# Patient Record
Sex: FEMALE | Race: WHITE | NOT HISPANIC OR LATINO | Employment: OTHER | URBAN - METROPOLITAN AREA
[De-identification: names, ages, dates, MRNs, and addresses within clinical notes are randomized per-mention and may not be internally consistent; named-entity substitution may affect disease eponyms.]

---

## 2017-01-09 ENCOUNTER — OFFICE VISIT (OUTPATIENT)
Dept: INTERNAL MEDICINE | Facility: CLINIC | Age: 77
End: 2017-01-09
Payer: MEDICARE

## 2017-01-09 ENCOUNTER — HOSPITAL ENCOUNTER (OUTPATIENT)
Dept: RADIOLOGY | Facility: HOSPITAL | Age: 77
Discharge: HOME | End: 2017-01-09
Attending: INTERNAL MEDICINE
Payer: MEDICARE

## 2017-01-09 ENCOUNTER — HOSPITAL ENCOUNTER (OUTPATIENT)
Dept: CARDIOLOGY | Facility: CLINIC | Age: 77
Discharge: HOME | End: 2017-01-09
Payer: MEDICARE

## 2017-01-09 VITALS
DIASTOLIC BLOOD PRESSURE: 80 MMHG | HEART RATE: 66 BPM | BODY MASS INDEX: 36.32 KG/M2 | WEIGHT: 239.63 LBS | HEIGHT: 68 IN | SYSTOLIC BLOOD PRESSURE: 146 MMHG

## 2017-01-09 DIAGNOSIS — I10 ESSENTIAL HYPERTENSION: ICD-10-CM

## 2017-01-09 DIAGNOSIS — Z12.31 ENCOUNTER FOR SCREENING MAMMOGRAM FOR MALIGNANT NEOPLASM OF BREAST: ICD-10-CM

## 2017-01-09 DIAGNOSIS — Z00.00 ROUTINE PHYSICAL EXAMINATION: Primary | ICD-10-CM

## 2017-01-09 DIAGNOSIS — Z23 NEED FOR PROPHYLACTIC VACCINATION WITH STREPTOCOCCUS PNEUMONIAE (PNEUMOCOCCUS) AND INFLUENZA VACCINES: ICD-10-CM

## 2017-01-09 DIAGNOSIS — R73.9 HYPERGLYCEMIA: ICD-10-CM

## 2017-01-09 DIAGNOSIS — Z00.00 ROUTINE PHYSICAL EXAMINATION: ICD-10-CM

## 2017-01-09 DIAGNOSIS — K50.00 CROHN'S DISEASE OF SMALL INTESTINE WITHOUT COMPLICATION: ICD-10-CM

## 2017-01-09 DIAGNOSIS — G43.909 MIGRAINE WITHOUT STATUS MIGRAINOSUS, NOT INTRACTABLE, UNSPECIFIED MIGRAINE TYPE: ICD-10-CM

## 2017-01-09 DIAGNOSIS — I70.0 AORTIC ATHEROSCLEROSIS: ICD-10-CM

## 2017-01-09 DIAGNOSIS — E78.5 HYPERLIPIDEMIA, UNSPECIFIED HYPERLIPIDEMIA TYPE: ICD-10-CM

## 2017-01-09 PROCEDURE — G0009 ADMIN PNEUMOCOCCAL VACCINE: HCPCS | Mod: S$GLB,,, | Performed by: INTERNAL MEDICINE

## 2017-01-09 PROCEDURE — 90732 PPSV23 VACC 2 YRS+ SUBQ/IM: CPT | Mod: S$GLB,,, | Performed by: INTERNAL MEDICINE

## 2017-01-09 PROCEDURE — 90732 PPSV23 VACC 2 YRS+ SUBQ/IM: CPT | Mod: PBBFAC

## 2017-01-09 PROCEDURE — 99999 PR PBB SHADOW E&M-EST. PATIENT-LVL III: CPT | Mod: PBBFAC,,, | Performed by: INTERNAL MEDICINE

## 2017-01-09 PROCEDURE — 71020 XR CHEST PA AND LATERAL: CPT | Mod: 26,,, | Performed by: RADIOLOGY

## 2017-01-09 PROCEDURE — 93000 ELECTROCARDIOGRAM COMPLETE: CPT | Mod: S$GLB,,, | Performed by: INTERNAL MEDICINE

## 2017-01-09 PROCEDURE — 3077F SYST BP >= 140 MM HG: CPT | Mod: S$GLB,,, | Performed by: INTERNAL MEDICINE

## 2017-01-09 PROCEDURE — G0009 ADMIN PNEUMOCOCCAL VACCINE: HCPCS | Mod: PBBFAC

## 2017-01-09 PROCEDURE — 99397 PER PM REEVAL EST PAT 65+ YR: CPT | Mod: S$GLB,,, | Performed by: INTERNAL MEDICINE

## 2017-01-09 PROCEDURE — 3079F DIAST BP 80-89 MM HG: CPT | Mod: S$GLB,,, | Performed by: INTERNAL MEDICINE

## 2017-01-09 PROCEDURE — 99499 UNLISTED E&M SERVICE: CPT | Mod: S$GLB,,, | Performed by: INTERNAL MEDICINE

## 2017-01-09 RX ORDER — METOPROLOL TARTRATE 50 MG/1
50 TABLET ORAL 2 TIMES DAILY
Qty: 180 TABLET | Refills: 4 | Status: SHIPPED | OUTPATIENT
Start: 2017-01-09 | End: 2017-01-24

## 2017-01-09 RX ORDER — SIMVASTATIN 10 MG/1
10 TABLET, FILM COATED ORAL NIGHTLY
Qty: 90 TABLET | Refills: 4 | Status: SHIPPED | OUTPATIENT
Start: 2017-01-09 | End: 2017-12-26 | Stop reason: SDUPTHER

## 2017-01-09 RX ORDER — ONDANSETRON 4 MG/1
4 TABLET, ORALLY DISINTEGRATING ORAL EVERY 8 HOURS PRN
Qty: 20 TABLET | Refills: 1 | Status: SHIPPED | OUTPATIENT
Start: 2017-01-09 | End: 2017-01-23 | Stop reason: SDUPTHER

## 2017-01-09 RX ORDER — OMEPRAZOLE 20 MG/1
20 CAPSULE, DELAYED RELEASE ORAL DAILY
Qty: 90 CAPSULE | Refills: 4 | Status: SHIPPED | OUTPATIENT
Start: 2017-01-09 | End: 2017-12-26 | Stop reason: SDUPTHER

## 2017-01-09 NOTE — MR AVS SNAPSHOT
[facility] - Internal Medicine  [address]  [facility] [address]  Phone: [phone]  Fax: [phone]                  Gabriel Grace   2017 1:30 PM   Office Visit    Description:  Female : 1940   Provider:  Prosper Mancilla MD   Department:  [facility] - Internal Medicine           Reason for Visit     Annual Exam           Diagnoses this Visit        Comments    Routine physical examination    -  Primary     Crohn's disease of small intestine without complication     Continue medication.     Aortic atherosclerosis     Continue medication    Essential hypertension         Migraine without status migrainosus, not intractable, unspecified migraine type         Hyperglycemia         Hyperlipidemia, unspecified hyperlipidemia type         Encounter for screening mammogram for malignant neoplasm of breast         Need for prophylactic vaccination with Streptococcus pneumoniae (Pneumococcus) and Influenza vaccines                To Do List           Future Appointments        Provider Department Dept Phone    2017 2:45 PM NOM [id number] 485 LB LIMIT [facility] [phone]    2017 1:30 PM Darell Leigh OD [facility] - Optometry [phone]    2017 1:15 PM Paulo Swain MD [facility] - GYN Oncology [phone]      Goals (5 Years of Data)     None       These Medications        Disp Refills Start End    ondansetron (ZOFRAN-ODT) 4 MG TbDL 20 tablet 1 2017     Take 1 tablet (4 mg total) by mouth every 8 (eight) hours as needed. - Oral    Pharmacy: [facility] - [facility] [address] [facility] Ph #: [phone]       metoprolol tartrate (LOPRESSOR) 50 MG tablet 180 tablet 4 2017     Take 1 tablet (50 mg total) by mouth 2 (two) times daily. - Oral    Pharmacy: [facility] - [facility] [address] [facility] Ph #: [phone]       omeprazole (PRILOSEC) 20 MG capsule 90 capsule 4 2017     Take 1 capsule  (20 mg total) by mouth once daily. - Oral    Pharmacy: [facility] - [place], [place] - [address] Ph #: [phone]       simvastatin (ZOCOR) 10 MG tablet 90 tablet 4 1/9/2017     Take 1 tablet (10 mg total) by mouth every evening. - Oral    Pharmacy: [facility] - [place], [place] - [address] Ph #: [phone]         [facility]     [facility] On Call Nurse Care Line - 24/7 Assistance  Registered nurses in the [facility] On Call Center provide clinical advisement, health education, appointment booking, and other advisory services.  Call for this free service at [phone].             Medications           Message regarding Medications     Verify the changes and/or additions to your medication regime listed below are the same as discussed with your clinician today.  If any of these changes or additions are incorrect, please notify your healthcare provider.        CHANGE how you are taking these medications     Start Taking Instead of    ondansetron (ZOFRAN-ODT) 4 MG TbDL ondansetron (ZOFRAN-ODT) 4 MG TbDL    Dosage:  Take 1 tablet (4 mg total) by mouth every 8 (eight) hours as needed. Dosage:  Take 2 tablets (8 mg total) by mouth every 8 (eight) hours as needed.    Reason for Change:  Patient no longer taking     simvastatin (ZOCOR) 10 MG tablet simvastatin (ZOCOR) 10 MG tablet    Dosage:  Take 1 tablet (10 mg total) by mouth every evening. Dosage:  Take 1 tablet (10 mg total) by mouth every evening. 1 Tablet Oral Every day    Reason for Change:  Reorder       STOP taking these medications     guaifenesin-codeine 100-10 mg/5 ml (TUSSI-ORGANIDIN NR)  mg/5 mL syrup Take 5 mLs by mouth 3 (three) times daily as needed.    oxycodone-acetaminophen (PERCOCET) 5-325 mg per tablet Take 1 tablet by mouth every 4 (four) hours as needed for Pain.           Verify that the below list of medications is an accurate representation of the medications you are currently  "taking.  If none reported, the list may be blank. If incorrect, please contact your healthcare provider. Carry this list with you in case of emergency.           Current Medications     aspirin (ECOTRIN) 81 MG EC tablet Take 81 mg by mouth. 1 Tablet, Delayed Release (E.C.) Oral Every day    biotin 2,500 mcg Cap     calcium-magnesium-zinc Tab Take by mouth. 1  Oral Every day    cholecalciferol, vitamin D3, (VITAMIN D3) 1,000 unit capsule Take 1,000 Units by mouth once daily.      cyanocobalamin, vitamin B-12, (VITAMIN B-12) 50 mcg tablet Take 50 mcg by mouth once daily.      FLAXSEED OIL ORAL Take 1,000 mg by mouth. 1  Oral Every day    folic acid (FOLVITE) 400 MCG tablet Take 400 mcg by mouth. 4 Tablet Oral Every day    mesalamine (ASACOL) 400 mg EC tablet Take 400 mg by mouth. 4 Tablet, Delayed Release (E.C.) Oral Twice a day     metoprolol tartrate (LOPRESSOR) 50 MG tablet Take 1 tablet (50 mg total) by mouth 2 (two) times daily.    multivitamin (THERAGRAN) per tablet Take by mouth. 1  By mouth Every day    omega-3 fatty acids-vitamin E (FISH OIL) 1,000 mg Cap     omeprazole (PRILOSEC) 20 MG capsule Take 1 capsule (20 mg total) by mouth once daily.    simvastatin (ZOCOR) 10 MG tablet Take 1 tablet (10 mg total) by mouth every evening.    vitamin E 1000 UNIT capsule Take by mouth. 1 Capsule Oral Every day    ondansetron (ZOFRAN-ODT) 4 MG TbDL Take 1 tablet (4 mg total) by mouth every 8 (eight) hours as needed.           Clinical Reference Information           Vital Signs - Last Recorded  Most recent update: 1/9/2017  1:49 PM by Lata Carr    BP Pulse Ht Wt BMI    (!) 146/80 66 5' 8" (1.727 m) 108.7 kg (239 lb 10.2 oz) 36.44 kg/m2      Blood Pressure          Most Recent Value    BP  (!)  146/80      Allergies as of 1/9/2017     No Known Allergies      Immunizations Administered on Date of Encounter - 1/9/2017     Name Date Dose VIS Date Route    Pneumococcal Polysaccharide - 23 Valent 1/9/2017 0.5 mL 4/24/2015 " Intramuscular      Orders Placed During Today's Visit      Normal Orders This Visit    Pneumococcal Polysaccharide Vaccine (23 Valent) (SQ/IM)     Future Labs/Procedures Expected by Expires    CBC auto differential  1/9/2017 1/9/2018    Comprehensive metabolic panel  1/9/2017 1/9/2018    EKG 12-lead  1/9/2017 1/9/2018    Hemoglobin A1c  1/9/2017 1/9/2018    Lipid panel  1/9/2017 1/9/2018    TSH  1/9/2017 1/9/2018    X-Ray Chest PA And Lateral  1/9/2017 1/9/2018      [facility] Sign-Up     Activating your [facility] account is as easy as 1-2-3!     1) Visit [email or web address], select Sign Up Now, enter this activation code and your date of birth, then select Next.  [email or web address]  Expires: 2/23/2017  2:27 PM      2) Create a username and password to use when you visit [facility] in the future and select a security question in case you lose your password and select Next.    3) Enter your e-mail address and click Sign Up!    Additional Information  If you have questions, please e-mail [email or web address] or call [phone] to talk to our [facility] staff. Remember, [facility] is NOT to be used for urgent needs. For medical emergencies, dial 911.

## 2017-01-09 NOTE — PROGRESS NOTES
Subjective:       Patient ID: Gabriel Grace is a 76 y.o. female.    Chief Complaint: Annual Exam    HPI Comments: History of present illness: Patient here for annual exam.  She says she would like to do labs EKG and chest x-ray.  She has a history of hypertension, migraines, aortic atherosclerosis, Crohn's disease, dyslipidemia and hyperglycemia.  She says blood pressure and migraines have been stable.  I encouraged her reducing weight to help with blood pressure sugar and lipids.     Review of Systems   Constitutional: Negative for chills, fatigue, fever and unexpected weight change.   HENT: Negative for sore throat.    Eyes: Negative for pain and visual disturbance.   Respiratory: Negative for apnea, cough, chest tightness and shortness of breath.    Cardiovascular: Negative for chest pain and leg swelling.   Gastrointestinal: Negative for abdominal pain, constipation, diarrhea, nausea and vomiting.   Genitourinary: Negative for frequency, hematuria and menstrual problem.   Musculoskeletal: Positive for arthralgias. Negative for joint swelling, neck pain and neck stiffness.   Skin: Negative for rash and wound.   Neurological: Negative for dizziness and headaches.   Hematological: Negative for adenopathy.   Psychiatric/Behavioral: Negative for dysphoric mood. The patient is not nervous/anxious.        Objective:      Physical Exam   Constitutional: She is oriented to person, place, and time. She appears well-developed and well-nourished.   Overweight female   HENT:   Head: Normocephalic and atraumatic.   Right Ear: Tympanic membrane, external ear and ear canal normal.   Left Ear: Tympanic membrane, external ear and ear canal normal.   Nose: Nose normal.   Mouth/Throat: Oropharynx is clear and moist and mucous membranes are normal. No oropharyngeal exudate.   Eyes: Conjunctivae and EOM are normal. Pupils are equal, round, and reactive to light. Right eye exhibits no discharge. Left eye exhibits no discharge.   Neck:  Normal range of motion. Neck supple. No JVD present. No thyromegaly present.   Cardiovascular: Normal rate, regular rhythm, normal heart sounds and intact distal pulses.    No murmur heard.  Pulmonary/Chest: Effort normal and breath sounds normal. No respiratory distress. She has no wheezes. She has no rales.   Abdominal: Soft. Bowel sounds are normal. She exhibits no mass. There is no tenderness.   Musculoskeletal: Normal range of motion. She exhibits edema (trace ankle). She exhibits no tenderness.   Lymphadenopathy:     She has no cervical adenopathy.        Right: No supraclavicular adenopathy present.        Left: No supraclavicular adenopathy present.   Neurological: She is alert and oriented to person, place, and time. She has normal reflexes. No cranial nerve deficit.   Skin: No rash noted.   Psychiatric: She has a normal mood and affect. She does not exhibit a depressed mood.       Assessment:       1. Routine physical examination    2. Crohn's disease of small intestine without complication    3. Aortic atherosclerosis    4. Essential hypertension    5. Migraine without status migrainosus, not intractable, unspecified migraine type    6. Hyperglycemia    7. Hyperlipidemia, unspecified hyperlipidemia type        Plan:       Gabriel was seen today for annual exam.    Diagnoses and all orders for this visit:    Routine physical examination    Crohn's disease of small intestine without complication  Comments:  Continue medication.     Aortic atherosclerosis  Comments:  Continue medication    Essential hypertension    Migraine without status migrainosus, not intractable, unspecified migraine type    Hyperglycemia    Hyperlipidemia, unspecified hyperlipidemia type    Other orders  -     ondansetron (ZOFRAN-ODT) 4 MG TbDL; Take 1 tablet (4 mg total) by mouth every 8 (eight) hours as needed.  -     metoprolol tartrate (LOPRESSOR) 50 MG tablet; Take 1 tablet (50 mg total) by mouth 2 (two) times daily.  -      omeprazole (PRILOSEC) 20 MG capsule; Take 1 capsule (20 mg total) by mouth once daily.  -     simvastatin (ZOCOR) 10 MG tablet; Take 1 tablet (10 mg total) by mouth every evening.

## 2017-01-12 ENCOUNTER — TELEPHONE (OUTPATIENT)
Dept: INTERNAL MEDICINE | Facility: CLINIC | Age: 77
End: 2017-01-12

## 2017-01-12 DIAGNOSIS — R94.31 ABNORMAL EKG: Primary | ICD-10-CM

## 2017-01-12 DIAGNOSIS — I70.0 ATHEROSCLEROSIS OF AORTA: ICD-10-CM

## 2017-01-12 NOTE — TELEPHONE ENCOUNTER
Spoke to pt- adv below mess- pt voiced understanding. She will call and sched cardio appt on her own tomorrow. Provided pt w/phone number.

## 2017-01-12 NOTE — TELEPHONE ENCOUNTER
Please let pt know that her EKG show some minor but new changes and her CXR shows some mild calcium buildup in the blood vessels so I want her to see Cardiology. Her other labs are fairly stable. CXR is unchanged from prior testing. Let me know of any questions. I placed the order.

## 2017-01-16 ENCOUNTER — OFFICE VISIT (OUTPATIENT)
Dept: OPTOMETRY | Facility: CLINIC | Age: 77
End: 2017-01-16
Payer: MEDICARE

## 2017-01-16 DIAGNOSIS — Z13.5 SCREENING FOR EYE CONDITION: ICD-10-CM

## 2017-01-16 DIAGNOSIS — H53.19 PHOTOPSIA: ICD-10-CM

## 2017-01-16 DIAGNOSIS — H43.811 POSTERIOR VITREOUS DETACHMENT OF RIGHT EYE: Primary | ICD-10-CM

## 2017-01-16 DIAGNOSIS — H25.13 NUCLEAR SCLEROSIS, BILATERAL: ICD-10-CM

## 2017-01-16 DIAGNOSIS — H43.391 VITREOUS FLOATERS OF RIGHT EYE: ICD-10-CM

## 2017-01-16 PROCEDURE — 92014 COMPRE OPH EXAM EST PT 1/>: CPT | Mod: S$GLB,,, | Performed by: OPTOMETRIST

## 2017-01-16 PROCEDURE — 99999 PR PBB SHADOW E&M-EST. PATIENT-LVL III: CPT | Mod: PBBFAC,,, | Performed by: OPTOMETRIST

## 2017-01-16 NOTE — MR AVS SNAPSHOT
[facility] - Optometry  [address]  [facility] [address]  Phone: [phone]  Fax: [phone]                  Gabriel Grace   2017 1:30 PM   Office Visit    Description:  Female : 1940   Provider:  Darell Leigh OD   Department:  [facility] - Optometry           Reason for Visit     Follow-up     Spots and/or Floaters     Eye Problem                To Do List           Future Appointments        Provider Department Dept Phone    2017 1:15 PM Paulo Swain MD [facility] - GYN Oncology [phone]    2017 1:30 PM [facility] MAMMO2 SCREEN [facility] [phone]    2017 1:00 PM Wayne Monreal MD [facility] - Cardiology [phone]      Goals (5 Years of Data)     None      [facility] On Call     [facility] Nurse [facility] -  Assistance  Registered nurses in the [facility] Center provide clinical advisement, health education, appointment booking, and other advisory services.  Call for this free service at [phone].             Medications           Message regarding Medications     Verify the changes and/or additions to your medication regime listed below are the same as discussed with your clinician today.  If any of these changes or additions are incorrect, please notify your healthcare provider.             Verify that the below list of medications is an accurate representation of the medications you are currently taking.  If none reported, the list may be blank. If incorrect, please contact your healthcare provider. Carry this list with you in case of emergency.           Current Medications     aspirin (ECOTRIN) 81 MG EC tablet Take 81 mg by mouth. 1 Tablet, Delayed Release (E.C.) Oral Every day    biotin 2,500 mcg Cap     calcium-magnesium-zinc Tab Take by mouth. 1  Oral Every day    cholecalciferol, vitamin D3, (VITAMIN D3) 1,000 unit capsule Take 1,000 Units by mouth once daily.      cyanocobalamin, vitamin B-12, (VITAMIN B-12)  50 mcg tablet Take 50 mcg by mouth once daily.      FLAXSEED OIL ORAL Take 1,000 mg by mouth. 1  Oral Every day    folic acid (FOLVITE) 400 MCG tablet Take 400 mcg by mouth. 4 Tablet Oral Every day    mesalamine (ASACOL) 400 mg EC tablet Take 400 mg by mouth. 4 Tablet, Delayed Release (E.C.) Oral Twice a day     metoprolol tartrate (LOPRESSOR) 50 MG tablet Take 1 tablet (50 mg total) by mouth 2 (two) times daily.    multivitamin (THERAGRAN) per tablet Take by mouth. 1  By mouth Every day    omega-3 fatty acids-vitamin E (FISH OIL) 1,000 mg Cap     omeprazole (PRILOSEC) 20 MG capsule Take 1 capsule (20 mg total) by mouth once daily.    ondansetron (ZOFRAN-ODT) 4 MG TbDL Take 1 tablet (4 mg total) by mouth every 8 (eight) hours as needed.    simvastatin (ZOCOR) 10 MG tablet Take 1 tablet (10 mg total) by mouth every evening.    vitamin E 1000 UNIT capsule Take by mouth. 1 Capsule Oral Every day           Clinical Reference Information           Allergies as of 1/16/2017     No Known Allergies      Immunizations Administered on Date of Encounter - 1/16/2017     None      [facility] Sign-Up     Activating your [facility] account is as easy as 1-2-3!     1) Visit [email or web address], select Sign Up Now, enter this activation code and your date of birth, then select Next.  [email or web address]  Expires: 2/23/2017  2:27 PM      2) Create a username and password to use when you visit [facility] in the future and select a security question in case you lose your password and select Next.    3) Enter your e-mail address and click Sign Up!    Additional Information  If you have questions, please e-mail [email or web address] or call [phone] to talk to our [facility] staff. Remember, [facility] is NOT to be used for urgent needs. For medical emergencies, dial 911.         Instructions    S/P surgery to upper eyelids. (levator resection). Being followed by Dr. Pringle.  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  Persistent  symptoms of flashes (and floater) in the right eye x 6 + weeks.  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  No evidence of secondary retinal tear/hole/break.  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  Suggest retina consult as a precaution, and Ms. Grace requests consult with Dr. Collier.  Generate referral.  Follow-up thereafter as recommended by Dr. Collier

## 2017-01-16 NOTE — PATIENT INSTRUCTIONS
S/P surgery to upper eyelids. (levator resection). Being followed by Dr. Pringle.  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  Persistent symptoms of flashes (and floater) in the right eye x 6 + weeks.  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  No evidence of secondary retinal tear/hole/break.  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  Suggest retina consult as a precaution, and Ms. Grace requests consult with Dr. Collier.  Generate referral.  Follow-up thereafter as recommended by Dr. Collier

## 2017-01-16 NOTE — PROGRESS NOTES
HPI     Follow-up    Additional comments: pt in for rechk of flashes and floaters           Spots and/or Floaters    Additional comments: Still seeing floaters, in OD, but not as noticeable   as at first.            Eye Problem    Additional comments: Had 4 episodes of seeing flashes on sat nite, and   once today- looks like zig-zag lines.            Comments   Patient in for progress check.    Being followed for (diagnosis):   Flashes and floaters in OD    Date last seen:  12/5/16    Doctor last seen:  Dr tesfaye    Prescribed eye medications(s) using:  none    OTC eye medication(s) using:  none    Signs/symptoms of condition resolved/better/stable/worse?:  same    Allergies/Medications reviewed today?  yes               Last edited by Darell Tesfaye, OD on 1/16/2017  1:32 PM. (History)            Assessment /Plan     For exam results, see Encounter Report.    1. Posterior vitreous detachment of right eye  Ambulatory Referral to Ophthalmology   2. Vitreous floaters of right eye  Ambulatory Referral to Ophthalmology   3. Photopsia  Ambulatory Referral to Ophthalmology   4. Nuclear sclerosis, bilateral     5. Screening for eye condition                  S/P surgery to upper eyelids. (levator resection). Being followed by Dr. Pringle.  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  Persistent symptoms of flashes (and floater) in the right eye x 6 + weeks.  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  No evidence of secondary retinal tear/hole/break.  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  Suggest retina consult as a precaution, and Ms. Grace requests consult with Dr. Collier.  Generate referral.  Follow-up thereafter as recommended by Dr. Collier

## 2017-01-17 ENCOUNTER — OFFICE VISIT (OUTPATIENT)
Dept: GYNECOLOGIC ONCOLOGY | Facility: CLINIC | Age: 77
End: 2017-01-17
Payer: MEDICARE

## 2017-01-17 VITALS
WEIGHT: 238.13 LBS | SYSTOLIC BLOOD PRESSURE: 146 MMHG | DIASTOLIC BLOOD PRESSURE: 73 MMHG | HEIGHT: 68 IN | BODY MASS INDEX: 36.09 KG/M2 | HEART RATE: 92 BPM

## 2017-01-17 DIAGNOSIS — Z12.31 SCREENING MAMMOGRAM, ENCOUNTER FOR: ICD-10-CM

## 2017-01-17 DIAGNOSIS — K86.2 PANCREAS CYST: ICD-10-CM

## 2017-01-17 DIAGNOSIS — Z01.419 WELL WOMAN EXAM WITH ROUTINE GYNECOLOGICAL EXAM: Primary | ICD-10-CM

## 2017-01-17 PROCEDURE — 99499 UNLISTED E&M SERVICE: CPT | Mod: S$GLB,,, | Performed by: OBSTETRICS & GYNECOLOGY

## 2017-01-17 PROCEDURE — G0101 CA SCREEN;PELVIC/BREAST EXAM: HCPCS | Mod: S$GLB,,, | Performed by: OBSTETRICS & GYNECOLOGY

## 2017-01-17 PROCEDURE — 99999 PR PBB SHADOW E&M-EST. PATIENT-LVL IV: CPT | Mod: PBBFAC,,, | Performed by: OBSTETRICS & GYNECOLOGY

## 2017-01-17 NOTE — MR AVS SNAPSHOT
[facility] - GYN Oncology  [address]  [facility] [address]  Phone: [phone]                  Gabriel Grace   2017 1:15 PM   Office Visit    Description:  Female : 1940   Provider:  Paulo Swain MD   Department:  [facility] - GYN Oncology           Reason for Visit     Well Woman           Diagnoses this Visit        Comments    Well woman exam with routine gynecological exam    -  Primary     Pancreas cyst         Screening mammogram, encounter for                To Do List           Future Appointments        Provider Department Dept Phone    2017 1:30 PM [facility] [phone]    2017 2:30 PM JENIFFER Collier MD [facility] Ophthalmology [phone]    2017 1:00 PM Wayne Monreal MD [facility] Cardiology [phone]      Goals (5 Years of Data)     None      Follow-Up and Disposition     Return in about 1 year (around 2018).      [facility]     [facility] Nurse [facility] -  Assistance  Registered nurses in the [facility] Center provide clinical advisement, health education, appointment booking, and other advisory services.  Call for this free service at [phone].             Medications           Message regarding Medications     Verify the changes and/or additions to your medication regime listed below are the same as discussed with your clinician today.  If any of these changes or additions are incorrect, please notify your healthcare provider.             Verify that the below list of medications is an accurate representation of the medications you are currently taking.  If none reported, the list may be blank. If incorrect, please contact your healthcare provider. Carry this list with you in case of emergency.           Current Medications     aspirin (ECOTRIN) 81 MG EC tablet Take 81 mg by mouth. 1 Tablet, Delayed Release (E.C.) Oral Every day    biotin 2,500 mcg Cap      "calcium-magnesium-zinc Tab Take by mouth. 1  Oral Every day    cholecalciferol, vitamin D3, (VITAMIN D3) 1,000 unit capsule Take 1,000 Units by mouth once daily.      cyanocobalamin, vitamin B-12, (VITAMIN B-12) 50 mcg tablet Take 50 mcg by mouth once daily.      FLAXSEED OIL ORAL Take 1,000 mg by mouth. 1  Oral Every day    folic acid (FOLVITE) 400 MCG tablet Take 400 mcg by mouth. 4 Tablet Oral Every day    mesalamine (ASACOL) 400 mg EC tablet Take 400 mg by mouth. 4 Tablet, Delayed Release (E.C.) Oral Twice a day     metoprolol tartrate (LOPRESSOR) 50 MG tablet Take 1 tablet (50 mg total) by mouth 2 (two) times daily.    multivitamin (THERAGRAN) per tablet Take by mouth. 1  By mouth Every day    omeprazole (PRILOSEC) 20 MG capsule Take 1 capsule (20 mg total) by mouth once daily.    ondansetron (ZOFRAN-ODT) 4 MG TbDL Take 1 tablet (4 mg total) by mouth every 8 (eight) hours as needed.    simvastatin (ZOCOR) 10 MG tablet Take 1 tablet (10 mg total) by mouth every evening.    vitamin E 1000 UNIT capsule Take by mouth. 1 Capsule Oral Every day    omega-3 fatty acids-vitamin E (FISH OIL) 1,000 mg Cap            Clinical Reference Information           Vital Signs - Last Recorded  Most recent update: 1/17/2017  1:10 PM by Addie Toledo MA    BP Pulse Ht Wt BMI    (!) 146/73 92 5' 8" (1.727 m) 108 kg (238 lb 1.6 oz) 36.2 kg/m2      Blood Pressure          Most Recent Value    BP  (!)  146/73      Allergies as of 1/17/2017     No Known Allergies      Immunizations Administered on Date of Encounter - 1/17/2017     None      Orders Placed During Today's Visit     Future Labs/Procedures Expected by Expires    Mammo Digital Screening Bilat with CAD  1/22/2018 1/17/2019      [facility] Sign-Up     Activating your [facility] account is as easy as 1-2-3!     1) Visit [email or web address], select Sign Up Now, enter this activation code and your date of birth, then select Next.  [email or web address]  Expires: 2/23/2017  2:27 PM  "     2) Create a username and password to use when you visit [facility] in the future and select a security question in case you lose your password and select Next.    3) Enter your e-mail address and click Sign Up!    Additional Information  If you have questions, please e-mail [email or web address] or call [phone] to talk to our [facility] staff. Remember, [facility] is NOT to be used for urgent needs. For medical emergencies, dial 911.

## 2017-01-17 NOTE — PROGRESS NOTES
"Subjective:       Patient ID: Gabriel Grace is a 76 y.o. female.    Chief Complaint: Well Woman (12 month)    HPI   Patient comes in today for well woman exam. She has no complaints. Denies vaginal bleeding.   When seen last year she had complaints of LLQ pain CT scan of the abdomen and pelvis showed a 1.8 cm cyst in the tail of the pancreas. S/P endoscopy with Dr. Librado Gonzalez. Cyst was aspirated. Cytology was negative. Had mild gastritis.        Last Mammogram: Jan 2016: normal. Scheduled for 1/20/2017  Last BMD: 2014: normal. No need to repeat unless clinically indicated per report.   Last Colonoscopy: 2009: normal.       Review of Systems   Constitutional: Negative for chills, fatigue and fever.   Eyes: Negative for visual disturbance.   Respiratory: Negative for cough, shortness of breath and wheezing.    Cardiovascular: Negative for chest pain, palpitations and leg swelling.   Gastrointestinal: Negative for abdominal distention, abdominal pain, constipation, diarrhea, nausea and vomiting.   Genitourinary: Negative for difficulty urinating, dysuria, frequency, genital sores, hematuria, pelvic pain, urgency, vaginal bleeding, vaginal discharge and vaginal pain.   Musculoskeletal: Positive for back pain (chronic). Negative for gait problem and neck stiffness.   Skin: Negative for rash.   Neurological: Negative for seizures and weakness.   Hematological: Negative for adenopathy. Does not bruise/bleed easily.   Psychiatric/Behavioral: The patient is not nervous/anxious.        Objective:       Visit Vitals    BP (!) 146/73    Pulse 92    Ht 5' 8" (1.727 m)    Wt 108 kg (238 lb 1.6 oz)    BMI 36.2 kg/m2       Physical Exam   Constitutional: She is oriented to person, place, and time. She appears well-developed and well-nourished.   HENT:   Head: Normocephalic and atraumatic.   Eyes: No scleral icterus.   Neck: No tracheal deviation present. No thyroid mass and no thyromegaly present.   Cardiovascular: Normal rate " and regular rhythm.    Pulmonary/Chest: Effort normal and breath sounds normal. She has no wheezes. Right breast exhibits no mass, no nipple discharge, no skin change and no tenderness. Left breast exhibits no mass, no nipple discharge, no skin change and no tenderness.   Abdominal: She exhibits no distension and no mass. There is no hepatosplenomegaly. There is no tenderness. There is no rebound and no guarding.   Genitourinary:   Genitourinary Comments: Bimanual exam:  Vulva: no lesions. Normal appearance  Urethra: Normal size and location. No lesions  Bladder: No masses or tenderness.  Vagina: normal mucosa. No lesion  Cervix: absent.   Uterus: absent.  Adnexa: no masses.  Rectovaginal: No posterior cul de sac thickening or nodularity.  Rectal: no masses. Nontender. Normal tone.      Musculoskeletal: She exhibits no edema or tenderness.   Lymphadenopathy:     She has no cervical adenopathy.     She has no axillary adenopathy.        Right: No inguinal and no supraclavicular adenopathy present.        Left: No inguinal and no supraclavicular adenopathy present.   Neurological: She is alert and oriented to person, place, and time.   Skin: Skin is warm and dry. No rash noted.   Psychiatric: She has a normal mood and affect. Her behavior is normal. Judgment and thought content normal.       Assessment:       1. Well woman exam with routine gynecological exam    2. Pancreas cyst    3. Screening mammogram, encounter for        Plan:   Well woman exam with routine gynecological exam  Counseling time of 10 minutes discussing calcium, vitamin D and exercise. Questions answered.     Pancreas cyst  S/P endoscopic drainage with negative cytology  I told her to follow up with Dr. Gonzalez when she asked me if a repeat CT scan was necessary.   I told her this was an incidental finding and not related to her gyn care.   Screening mammogram, encounter for  -     Mammo Digital Screening Bilat with CAD; Future; Expected date:  1/22/18

## 2017-01-20 ENCOUNTER — HOSPITAL ENCOUNTER (OUTPATIENT)
Dept: RADIOLOGY | Facility: HOSPITAL | Age: 77
Discharge: HOME | End: 2017-01-20
Attending: OBSTETRICS & GYNECOLOGY
Payer: MEDICARE

## 2017-01-20 DIAGNOSIS — Z12.31 VISIT FOR SCREENING MAMMOGRAM: ICD-10-CM

## 2017-01-20 PROCEDURE — 77067 SCR MAMMO BI INCL CAD: CPT | Mod: TC

## 2017-01-20 PROCEDURE — 77063 BREAST TOMOSYNTHESIS BI: CPT | Mod: 26,,, | Performed by: RADIOLOGY

## 2017-01-20 PROCEDURE — 77067 SCR MAMMO BI INCL CAD: CPT | Mod: 26,,, | Performed by: RADIOLOGY

## 2017-01-23 ENCOUNTER — INITIAL CONSULT (OUTPATIENT)
Dept: OPHTHALMOLOGY | Facility: CLINIC | Age: 77
End: 2017-01-23
Payer: MEDICARE

## 2017-01-23 DIAGNOSIS — H43.811 POSTERIOR VITREOUS DETACHMENT, RIGHT EYE: Primary | ICD-10-CM

## 2017-01-23 DIAGNOSIS — H25.13 NUCLEAR SCLEROSIS, BILATERAL: ICD-10-CM

## 2017-01-23 PROCEDURE — 99999 PR PBB SHADOW E&M-EST. PATIENT-LVL III: CPT | Mod: PBBFAC,,, | Performed by: OPHTHALMOLOGY

## 2017-01-23 PROCEDURE — 92225 PR SPECIAL EYE EXAM, INITIAL: CPT | Mod: RT,S$GLB,, | Performed by: OPHTHALMOLOGY

## 2017-01-23 PROCEDURE — 99499 UNLISTED E&M SERVICE: CPT | Mod: S$GLB,,, | Performed by: OPHTHALMOLOGY

## 2017-01-23 PROCEDURE — 92014 COMPRE OPH EXAM EST PT 1/>: CPT | Mod: S$GLB,,, | Performed by: OPHTHALMOLOGY

## 2017-01-23 RX ORDER — ONDANSETRON 4 MG/1
TABLET, ORALLY DISINTEGRATING ORAL
Qty: 20 TABLET | Refills: 1 | Status: SHIPPED | OUTPATIENT
Start: 2017-01-23 | End: 2020-10-20

## 2017-01-23 NOTE — PROGRESS NOTES
A/P:    1. PVD OD  - Likely cause of intermittent flashes   - No evidence of tears or holes on DFE  - Educated on RD precautions     2. NSC OU  - Monitor  - New MRx     3. HTN   - Educated on tight BP control         Retina PRN

## 2017-01-23 NOTE — MR AVS SNAPSHOT
[facility] - Ophthalmology  [address]  [facility] [address]  Phone: [phone]  Fax: [phone]                  Gabriel Grace   2017 2:30 PM   Initial consult    Description:  Female : 1940   Provider:  JENIFFER Collier MD   Department:  [facility] - Ophthalmology           Reason for Visit     Spots and/or Floaters           Diagnoses this Visit        Comments    Posterior vitreous detachment, right eye    -  Primary     Nuclear sclerosis, bilateral                To Do List           Future Appointments        Provider Department Dept Phone    2017 1:00 PM Wayne Monreal MD [facility] - Cardiology [phone]      Goals (5 Years of Data)     None      Follow-Up and Disposition     Return if symptoms worsen or fail to improve.      [facility]     [facility] On Call Nurse Care Line -  Assistance  Registered nurses in the [facility] On Call Center provide clinical advisement, health education, appointment booking, and other advisory services.  Call for this free service at [phone].             Medications           Message regarding Medications     Verify the changes and/or additions to your medication regime listed below are the same as discussed with your clinician today.  If any of these changes or additions are incorrect, please notify your healthcare provider.             Verify that the below list of medications is an accurate representation of the medications you are currently taking.  If none reported, the list may be blank. If incorrect, please contact your healthcare provider. Carry this list with you in case of emergency.           Current Medications     aspirin (ECOTRIN) 81 MG EC tablet Take 81 mg by mouth. 1 Tablet, Delayed Release (E.C.) Oral Every day    biotin 2,500 mcg Cap     calcium-magnesium-zinc Tab Take by mouth. 1  Oral Every day    cholecalciferol, vitamin D3, (VITAMIN D3) 1,000 unit capsule Take 1,000 Units by mouth once daily.       cyanocobalamin, vitamin B-12, (VITAMIN B-12) 50 mcg tablet Take 50 mcg by mouth once daily.      FLAXSEED OIL ORAL Take 1,000 mg by mouth. 1  Oral Every day    folic acid (FOLVITE) 400 MCG tablet Take 400 mcg by mouth. 4 Tablet Oral Every day    mesalamine (ASACOL) 400 mg EC tablet Take 400 mg by mouth. 4 Tablet, Delayed Release (E.C.) Oral Twice a day     metoprolol tartrate (LOPRESSOR) 50 MG tablet Take 1 tablet (50 mg total) by mouth 2 (two) times daily.    multivitamin (THERAGRAN) per tablet Take by mouth. 1  By mouth Every day    omega-3 fatty acids-vitamin E (FISH OIL) 1,000 mg Cap     omeprazole (PRILOSEC) 20 MG capsule Take 1 capsule (20 mg total) by mouth once daily.    ondansetron (ZOFRAN-ODT) 4 MG TbDL DISSOLVE 1 TABLET ON THE TONGUE EVERY EIGHT HOURS AS NEEDED    simvastatin (ZOCOR) 10 MG tablet Take 1 tablet (10 mg total) by mouth every evening.    vitamin E 1000 UNIT capsule Take by mouth. 1 Capsule Oral Every day           Clinical Reference Information           Allergies as of 1/23/2017     No Known Allergies      Immunizations Administered on Date of Encounter - 1/23/2017     None      [facility] Sign-Up     Activating your [facility] account is as easy as 1-2-3!     1) Visit [email or web address], select Sign Up Now, enter this activation code and your date of birth, then select Next.  [email or web address]  Expires: 2/23/2017  2:27 PM      2) Create a username and password to use when you visit [facility] in the future and select a security question in case you lose your password and select Next.    3) Enter your e-mail address and click Sign Up!    Additional Information  If you have questions, please e-mail [email or web address] or call [phone] to talk to our [facility] staff. Remember, [facility] is NOT to be used for urgent needs. For medical emergencies, dial 911.

## 2017-01-24 ENCOUNTER — OFFICE VISIT (OUTPATIENT)
Dept: CARDIOLOGY | Facility: CLINIC | Age: 77
End: 2017-01-24
Payer: MEDICARE

## 2017-01-24 VITALS
HEART RATE: 73 BPM | HEIGHT: 68 IN | WEIGHT: 241.38 LBS | DIASTOLIC BLOOD PRESSURE: 70 MMHG | SYSTOLIC BLOOD PRESSURE: 156 MMHG | BODY MASS INDEX: 36.58 KG/M2

## 2017-01-24 DIAGNOSIS — E66.09 NON MORBID OBESITY DUE TO EXCESS CALORIES: ICD-10-CM

## 2017-01-24 DIAGNOSIS — I70.0 AORTIC ATHEROSCLEROSIS: ICD-10-CM

## 2017-01-24 DIAGNOSIS — I10 ESSENTIAL HYPERTENSION: Primary | ICD-10-CM

## 2017-01-24 DIAGNOSIS — G43.909 MIGRAINE WITHOUT STATUS MIGRAINOSUS, NOT INTRACTABLE, UNSPECIFIED MIGRAINE TYPE: ICD-10-CM

## 2017-01-24 DIAGNOSIS — E78.00 PURE HYPERCHOLESTEROLEMIA: ICD-10-CM

## 2017-01-24 PROCEDURE — 1126F AMNT PAIN NOTED NONE PRSNT: CPT | Mod: S$GLB,,, | Performed by: INTERNAL MEDICINE

## 2017-01-24 PROCEDURE — 99999 PR PBB SHADOW E&M-EST. PATIENT-LVL III: CPT | Mod: PBBFAC,,, | Performed by: INTERNAL MEDICINE

## 2017-01-24 PROCEDURE — 1157F ADVNC CARE PLAN IN RCRD: CPT | Mod: S$GLB,,, | Performed by: INTERNAL MEDICINE

## 2017-01-24 PROCEDURE — 3078F DIAST BP <80 MM HG: CPT | Mod: S$GLB,,, | Performed by: INTERNAL MEDICINE

## 2017-01-24 PROCEDURE — 1160F RVW MEDS BY RX/DR IN RCRD: CPT | Mod: S$GLB,,, | Performed by: INTERNAL MEDICINE

## 2017-01-24 PROCEDURE — 99499 UNLISTED E&M SERVICE: CPT | Mod: S$GLB,,, | Performed by: INTERNAL MEDICINE

## 2017-01-24 PROCEDURE — 3077F SYST BP >= 140 MM HG: CPT | Mod: S$GLB,,, | Performed by: INTERNAL MEDICINE

## 2017-01-24 PROCEDURE — 1159F MED LIST DOCD IN RCRD: CPT | Mod: S$GLB,,, | Performed by: INTERNAL MEDICINE

## 2017-01-24 PROCEDURE — 99204 OFFICE O/P NEW MOD 45 MIN: CPT | Mod: S$GLB,,, | Performed by: INTERNAL MEDICINE

## 2017-01-24 RX ORDER — AMLODIPINE BESYLATE 5 MG/1
5 TABLET ORAL DAILY
Qty: 90 TABLET | Refills: 3 | Status: SHIPPED | OUTPATIENT
Start: 2017-01-24 | End: 2017-02-15 | Stop reason: SDUPTHER

## 2017-01-24 NOTE — LETTER
January 24, 2017      Prosper Mancilla MD  [address]  [facility] [address]           [facility] - Cardiology  [address]  [facility] [address]  Phone: [phone]          Patient: Gabriel Grace   MR Number: [id number]   YOB: 1940   Date of Visit: 1/24/2017       Dear Dr. Prosper Mancilla:    Thank you for referring Gabriel Grace to me for evaluation. Attached you will find relevant portions of my assessment and plan of care.    If you have questions, please do not hesitate to call me. I look forward to following Gabriel Grace along with you.    Sincerely,    Wayne Monreal MD    Enclosure  CC:  No Recipients    If you would like to receive this communication electronically, please contact [email or web address] or [phone] to request more information on [facility] access.    For providers and/or their staff who would like to refer a patient to [facility], please contact us through our one-stop-shop provider referral line, [facility] , at [phone].    If you feel you have received this communication in error or would no longer like to receive these types of communications, please e-mail [email or web address]

## 2017-01-24 NOTE — PROGRESS NOTES
Subjective:    Patient ID:  Gabriel Grace is a 76 y.o. female who presents for evaluation of Abnormal ECG and Hypertension      HPI   The patient is a 76 year old female referred by Dr Mancilla for evaluation of an abnormal EKG and thoracic aorta atherosclerosis. She is followed by Dr Mancilla with a history of hypertension, migraines,  Crohn's disease, dyslipidemia and hyperglycemia. She has been in good health non smoker but does not exercise.             CONCLUSIONS     1 - Normal left ventricular systolic function (EF 60-65%).     2 - Normal right ventricular systolic function .     3 - Normal left ventricular diastolic function.     No evidence of stress induced myocardial ischemia.     This document has been electronically    SIGNED BY: Oren Sterling MD On: 11/11/2014 11:07  Lab Results   Component Value Date     01/09/2017    K 4.4 01/09/2017     01/09/2017    CO2 24 01/09/2017    BUN 21 01/09/2017    CREATININE 1.0 01/09/2017     01/09/2017    HGBA1C 5.8 01/09/2017    MG 2.0 10/06/2008    AST 27 01/09/2017    ALT 18 01/09/2017    ALBUMIN 3.9 01/09/2017    PROT 7.5 01/09/2017    BILITOT 0.6 01/09/2017    WBC 6.13 01/09/2017    HGB 12.9 01/09/2017    HCT 39.6 01/09/2017    MCV 97 01/09/2017     (L) 01/09/2017    INR 1.1 02/14/2015    TSH 2.020 01/09/2017         Lab Results   Component Value Date    CHOL 159 01/09/2017    HDL 37 (L) 01/09/2017    TRIG 151 (H) 01/09/2017       Lab Results   Component Value Date    LDLCALC 91.8 01/09/2017     Past Medical History   Diagnosis Date    Cataract     Hyperglycemia 10/2/2013    Hyperlipidemia     Hypertension     Migraine headache     Pancreas cyst 1/17/2017    Thyroid disease      hypothyroidism    Ulcerative colitis, unspecified     Ulcerative colitis, unspecified     Visit for screening mammogram 1/12/2016     Current Outpatient Prescriptions   Medication Sig    aspirin (ECOTRIN) 81 MG EC tablet Take 81 mg by mouth. 1 Tablet,  Delayed Release (E.C.) Oral Every day    biotin 2,500 mcg Cap     calcium-magnesium-zinc Tab Take by mouth. 1  Oral Every day    cholecalciferol, vitamin D3, (VITAMIN D3) 1,000 unit capsule Take 1,000 Units by mouth once daily.      cyanocobalamin, vitamin B-12, (VITAMIN B-12) 50 mcg tablet Take 50 mcg by mouth once daily.      FLAXSEED OIL ORAL Take 1,000 mg by mouth. 1  Oral Every day    folic acid (FOLVITE) 400 MCG tablet Take 400 mcg by mouth. 4 Tablet Oral Every day    mesalamine (ASACOL) 400 mg EC tablet Take 400 mg by mouth. 4 Tablet, Delayed Release (E.C.) Oral Twice a day     multivitamin (THERAGRAN) per tablet Take by mouth. 1  By mouth Every day    omega-3 fatty acids-vitamin E (FISH OIL) 1,000 mg Cap     omeprazole (PRILOSEC) 20 MG capsule Take 1 capsule (20 mg total) by mouth once daily.    ondansetron (ZOFRAN-ODT) 4 MG TbDL DISSOLVE 1 TABLET ON THE TONGUE EVERY EIGHT HOURS AS NEEDED    simvastatin (ZOCOR) 10 MG tablet Take 1 tablet (10 mg total) by mouth every evening.    vitamin E 1000 UNIT capsule Take by mouth. 1 Capsule Oral Every day    amlodipine (NORVASC) 5 MG tablet Take 1 tablet (5 mg total) by mouth once daily.     No current facility-administered medications for this visit.      Review of Systems   Constitution: Negative for decreased appetite, diaphoresis, fever, weakness, malaise/fatigue, weight gain and weight loss.   HENT: Negative for congestion, ear discharge, ear pain, headaches and nosebleeds.    Eyes: Negative for blurred vision, double vision and visual disturbance.   Cardiovascular: Negative for chest pain, claudication, cyanosis, dyspnea on exertion, irregular heartbeat, leg swelling, near-syncope, orthopnea, palpitations, paroxysmal nocturnal dyspnea and syncope.   Respiratory: Negative for cough, hemoptysis, shortness of breath, sleep disturbances due to breathing, snoring, sputum production and wheezing.    Endocrine: Negative for polydipsia, polyphagia and  "polyuria.   Hematologic/Lymphatic: Negative for adenopathy and bleeding problem. Does not bruise/bleed easily.   Skin: Negative for color change, nail changes, poor wound healing and rash.   Musculoskeletal: Negative for muscle cramps and muscle weakness.   Gastrointestinal: Negative for abdominal pain, anorexia, change in bowel habit, hematochezia, nausea and vomiting.   Genitourinary: Negative for dysuria, frequency and hematuria.   Neurological: Negative for brief paralysis, difficulty with concentration, excessive daytime sleepiness, dizziness, focal weakness, light-headedness, seizures and vertigo.   Psychiatric/Behavioral: Negative for altered mental status and depression.   Allergic/Immunologic: Negative for persistent infections.        Objective:  Visit Vitals    BP (!) 156/70 (BP Location: Left arm, Patient Position: Sitting, BP Method: Automatic)    Pulse 73    Ht 5' 8" (1.727 m)    Wt 109.5 kg (241 lb 6.5 oz)    BMI 36.71 kg/m2       Physical Exam   Constitutional: She is oriented to person, place, and time. She appears well-developed and well-nourished.   obese   HENT:   Head: Normocephalic.   Right Ear: External ear normal.   Left Ear: External ear normal.   Nose: Nose normal.   Inspection of lips, teeth and gums normal   Eyes: Conjunctivae and EOM are normal. Pupils are equal, round, and reactive to light. No scleral icterus.   Neck: Normal range of motion. No JVD present. No tracheal deviation present. No thyromegaly present.   Cardiovascular: Normal rate, regular rhythm, normal heart sounds and intact distal pulses.  Exam reveals no gallop and no friction rub.    No murmur heard.  Pulses:       Dorsalis pedis pulses are 1+ on the right side, and 1+ on the left side.        Posterior tibial pulses are 1+ on the right side, and 1+ on the left side.   Pulmonary/Chest: Effort normal and breath sounds normal. No respiratory distress. She has no wheezes. She has no rales. She exhibits no tenderness. "   Abdominal: Soft. Bowel sounds are normal. She exhibits no distension. There is no hepatosplenomegaly. There is no tenderness. There is no guarding.   Musculoskeletal: Normal range of motion. She exhibits no edema or tenderness.   Lymphadenopathy:   Palpation of lymph nodes of neck and groin normal   Neurological: She is oriented to person, place, and time. No cranial nerve deficit. She exhibits normal muscle tone. Coordination normal.   Skin: Skin is warm and dry. No rash noted. No erythema. No pallor.   Palpation of skin normal   Psychiatric: She has a normal mood and affect. Her behavior is normal. Judgment and thought content normal.         Assessment:       1. Essential hypertension    2. Aortic atherosclerosis    3. Pure hypercholesterolemia    4. Migraine without status migrainosus, not intractable, unspecified migraine type    5. Non morbid obesity due to excess calories         Plan:     Gabriel was seen today for abnormal ecg and hypertension.    Diagnoses and all orders for this visit:    Essential hypertension    Aortic atherosclerosis    Pure hypercholesterolemia    Migraine without status migrainosus, not intractable, unspecified migraine type    Non morbid obesity due to excess calories    Other orders  -     amlodipine (NORVASC) 5 MG tablet; Take 1 tablet (5 mg total) by mouth once daily.    low salt diet  Lose weight  Exercise  BP goal <130/80

## 2017-01-24 NOTE — PROGRESS NOTES
Patient, Gabriel Grace (MRN #[id number]), presented with a recorded BMI of 36.71 kg/m^2 and a documented comorbidity(s):  - Hypertension  - Hyperlipidemia  to which the severe obesity is a contributing factor. This is consistent with the definition of severe obesity (BMI 35.0-35.9) with comorbidity (ICD-10 E66.01, Z68.35). The patient's severe obesity was monitored, evaluated, addressed and/or treated. This addendum to the medical record is made on 01/24/2017.

## 2017-01-24 NOTE — MR AVS SNAPSHOT
[facility]  [address]  [facility] [address]  Phone: [phone]                  Gabriel Grace   2017 1:00 PM   Office Visit    Description:  Female : 1940   Provider:  Wayne Monreal MD   Department:  [facility] - Cardiology           Reason for Visit     Abnormal ECG     Hypertension           Diagnoses this Visit        Comments    Essential hypertension    -  Primary     Aortic atherosclerosis         Pure hypercholesterolemia         Migraine without status migrainosus, not intractable, unspecified migraine type         Non morbid obesity due to excess calories                To Do List           Goals (5 Years of Data)     None      Follow-Up and Disposition     Return in about 3 months (around 2017), or if symptoms worsen or fail to improve.    Follow-up and Disposition History       These Medications        Disp Refills Start End    amlodipine (NORVASC) 5 MG tablet 90 tablet 3 2017    Take 1 tablet (5 mg total) by mouth once daily. - Oral    Pharmacy: [facility] [place] [id number] [facility] #: [phone]         [facility] On Call     [facility] On Call Nurse Care Line -  Assistance  Registered nurses in the [facility] On Call Center provide clinical advisement, health education, appointment booking, and other advisory services.  Call for this free service at [phone].             Medications           Message regarding Medications     Verify the changes and/or additions to your medication regime listed below are the same as discussed with your clinician today.  If any of these changes or additions are incorrect, please notify your healthcare provider.        START taking these NEW medications        Refills    amlodipine (NORVASC) 5 MG tablet 3    Sig: Take 1 tablet (5 mg total) by mouth once daily.    Class: Normal    Route: Oral      STOP taking these medications     metoprolol tartrate (LOPRESSOR) 50 MG  "tablet Take 1 tablet (50 mg total) by mouth 2 (two) times daily.           Verify that the below list of medications is an accurate representation of the medications you are currently taking.  If none reported, the list may be blank. If incorrect, please contact your healthcare provider. Carry this list with you in case of emergency.           Current Medications     aspirin (ECOTRIN) 81 MG EC tablet Take 81 mg by mouth. 1 Tablet, Delayed Release (E.C.) Oral Every day    biotin 2,500 mcg Cap     calcium-magnesium-zinc Tab Take by mouth. 1  Oral Every day    cholecalciferol, vitamin D3, (VITAMIN D3) 1,000 unit capsule Take 1,000 Units by mouth once daily.      cyanocobalamin, vitamin B-12, (VITAMIN B-12) 50 mcg tablet Take 50 mcg by mouth once daily.      FLAXSEED OIL ORAL Take 1,000 mg by mouth. 1  Oral Every day    folic acid (FOLVITE) 400 MCG tablet Take 400 mcg by mouth. 4 Tablet Oral Every day    mesalamine (ASACOL) 400 mg EC tablet Take 400 mg by mouth. 4 Tablet, Delayed Release (E.C.) Oral Twice a day     multivitamin (THERAGRAN) per tablet Take by mouth. 1  By mouth Every day    omega-3 fatty acids-vitamin E (FISH OIL) 1,000 mg Cap     omeprazole (PRILOSEC) 20 MG capsule Take 1 capsule (20 mg total) by mouth once daily.    ondansetron (ZOFRAN-ODT) 4 MG TbDL DISSOLVE 1 TABLET ON THE TONGUE EVERY EIGHT HOURS AS NEEDED    simvastatin (ZOCOR) 10 MG tablet Take 1 tablet (10 mg total) by mouth every evening.    vitamin E 1000 UNIT capsule Take by mouth. 1 Capsule Oral Every day    amlodipine (NORVASC) 5 MG tablet Take 1 tablet (5 mg total) by mouth once daily.           Clinical Reference Information           Vital Signs - Last Recorded  Most recent update: 1/24/2017 12:37 PM by Khadijah Masters MA    BP Pulse Ht Wt BMI    (!) 156/70 (BP Location: Left arm, Patient Position: Sitting, BP Method: Automatic) 73 5' 8" (1.727 m) 109.5 kg (241 lb 6.5 oz) 36.71 kg/m2      Blood Pressure          Most Recent Value    " Right Arm BP - Sitting  144/67    Left Arm BP - Sitting  156/70    BP  (!)  156/70      Allergies as of 1/24/2017     No Known Allergies      Immunizations Administered on Date of Encounter - 1/24/2017     None      [facility] Sign-Up     Activating your [facility] account is as easy as 1-2-3!     1) Visit [email or web address], select Sign Up Now, enter this activation code and your date of birth, then select Next.  [email or web address]  Expires: 2/23/2017  2:27 PM      2) Create a username and password to use when you visit [facility] in the future and select a security question in case you lose your password and select Next.    3) Enter your e-mail address and click Sign Up!    Additional Information  If you have questions, please e-mail [email or web address] or call [phone] to talk to our [facility] staff. Remember, [facility] is NOT to be used for urgent needs. For medical emergencies, dial 911.         Instructions    Exercise  Low salt diet  Blood pressure goal <130/80  Lose weight

## 2017-02-13 ENCOUNTER — NURSE TRIAGE (OUTPATIENT)
Dept: ADMINISTRATIVE | Facility: CLINIC | Age: 77
End: 2017-02-13

## 2017-02-13 ENCOUNTER — TELEPHONE (OUTPATIENT)
Dept: CARDIOLOGY | Facility: CLINIC | Age: 77
End: 2017-02-13

## 2017-02-13 ENCOUNTER — HOSPITAL ENCOUNTER (EMERGENCY)
Facility: HOSPITAL | Age: 77
Discharge: HOME | End: 2017-02-13
Attending: EMERGENCY MEDICINE
Payer: MEDICARE

## 2017-02-13 VITALS
HEIGHT: 68 IN | WEIGHT: 230 LBS | TEMPERATURE: 98 F | DIASTOLIC BLOOD PRESSURE: 75 MMHG | RESPIRATION RATE: 16 BRPM | BODY MASS INDEX: 34.86 KG/M2 | OXYGEN SATURATION: 96 % | HEART RATE: 95 BPM | SYSTOLIC BLOOD PRESSURE: 152 MMHG

## 2017-02-13 DIAGNOSIS — R03.0 ELEVATED BLOOD PRESSURE READING: Primary | ICD-10-CM

## 2017-02-13 DIAGNOSIS — I10 ESSENTIAL HYPERTENSION: ICD-10-CM

## 2017-02-13 PROCEDURE — 99283 EMERGENCY DEPT VISIT LOW MDM: CPT | Mod: ,,, | Performed by: PHYSICIAN ASSISTANT

## 2017-02-13 PROCEDURE — 99283 EMERGENCY DEPT VISIT LOW MDM: CPT | Mod: 25

## 2017-02-13 PROCEDURE — 25000003 PHARM REV CODE 250: Performed by: PHYSICIAN ASSISTANT

## 2017-02-13 PROCEDURE — 93010 ELECTROCARDIOGRAM REPORT: CPT | Mod: ,,, | Performed by: INTERNAL MEDICINE

## 2017-02-13 PROCEDURE — 93005 ELECTROCARDIOGRAM TRACING: CPT

## 2017-02-13 RX ORDER — ACETAMINOPHEN 325 MG/1
650 TABLET ORAL
Status: COMPLETED | OUTPATIENT
Start: 2017-02-13 | End: 2017-02-13

## 2017-02-13 RX ADMIN — ACETAMINOPHEN 650 MG: 325 TABLET, FILM COATED ORAL at 10:02

## 2017-02-13 NOTE — ED PROVIDER NOTES
Encounter Date: 2/13/2017    SCRIBE #1 NOTE: I, Akila Schultz, am scribing for, and in the presence of,  Dr. Stevenson. I have scribed the following portions of the note - the APC attestation.       History     Chief Complaint   Patient presents with    Hypertension     my dr changed my bp med, worried was 160 at home, i have a headache     Review of patient's allergies indicates:  No Known Allergies  HPI Comments: Patient is a 76-year-old female with a past medical history of migraine headaches, UC, HLD, and HTN who presents to the ED with elevated blood pressure.  Patient states that she took her blood pressure this morning and it was 160/85 mm.  Patient was worried because her cardiologist recently changed her blood pressure medication from Lopressor to amlodipine on 1/24/17.  Patient states that she started taking amlodipine on 1/31/17.  Patient endorses a headache for the past 3 days.  She complains of 6/10 pain to the left frontal area that is relieved with Advil.  She denies any fever, chills, chest pain, SOB, cough, abdominal pain, nausea, vomiting, urinary difficulties, dysuria, blood in stool, paresthesias, weakness, slurred speech, or difficulty ambulate in.    The history is provided by the patient.     Past Medical History   Diagnosis Date    Cataract     Hyperglycemia 10/2/2013    Hyperlipidemia     Hypertension     Migraine headache     Pancreas cyst 1/17/2017    Thyroid disease      hypothyroidism    Ulcerative colitis, unspecified     Ulcerative colitis, unspecified     Visit for screening mammogram 1/12/2016     Past Medical History Pertinent Negatives   Diagnosis Date Noted    Amblyopia 9/24/2013    Arthritis 9/24/2013    Chronic kidney disease 11/19/2013    COPD (chronic obstructive pulmonary disease) 11/19/2013    Depression 11/19/2013    Diabetes mellitus 9/24/2013    Diabetic retinopathy 9/24/2013    Emphysema of lung 11/19/2013    Glaucoma 9/24/2013    Macular degeneration  2013    Myocardial infarction 2013    Retinal detachment 2013    Seizures 2013    Sickle cell anemia 3/15/2016    Sickle cell trait 3/15/2016    Strabismus 2013    Stroke 2013    Uveitis 2013     Past Surgical History   Procedure Laterality Date    Tonsillectomy      Cholecystectomy      Parathyroidectomy       section       x1    Appendectomy      Dilation and curettage of uterus      Hysterectomy  1973     fabien-lso and right salpingectomy. right ovary remains.     Family History   Problem Relation Age of Onset    Hypertension Mother     Stroke Mother     Diabetes Mother      iddm    Heart disease Father       from mi    Heart attack Father     Parkinsonism Sister     Diabetes Sister     Diabetes Paternal Grandmother     Cancer Paternal Grandfather     Skin cancer Paternal Grandfather     No Known Problems Brother     No Known Problems Maternal Aunt     No Known Problems Maternal Uncle     No Known Problems Paternal Aunt     No Known Problems Paternal Uncle     No Known Problems Maternal Grandmother     No Known Problems Maternal Grandfather     Ovarian cancer Neg Hx     Uterine cancer Neg Hx     Breast cancer Neg Hx     Colon cancer Neg Hx     Amblyopia Neg Hx     Blindness Neg Hx     Cataracts Neg Hx     Glaucoma Neg Hx     Macular degeneration Neg Hx     Retinal detachment Neg Hx     Strabismus Neg Hx     Thyroid disease Neg Hx     Melanoma Neg Hx      Social History   Substance Use Topics    Smoking status: Never Smoker    Smokeless tobacco: None    Alcohol use Yes      Comment: social     Review of Systems   Constitutional: Negative for chills and fever.   HENT: Negative for ear pain and sore throat.    Eyes: Negative for photophobia and visual disturbance.   Respiratory: Negative for cough and shortness of breath.    Cardiovascular: Negative for chest pain.   Gastrointestinal: Negative for abdominal pain,  diarrhea, nausea and vomiting.   Endocrine: Negative for polyuria.   Genitourinary: Negative for difficulty urinating, dysuria, flank pain, hematuria and urgency.   Musculoskeletal: Negative for back pain.   Skin: Negative for pallor and rash.   Neurological: Positive for headaches. Negative for weakness, light-headedness and numbness.       Physical Exam   Initial Vitals   BP Pulse Resp Temp SpO2   02/13/17 0844 02/13/17 0844 02/13/17 0844 02/13/17 0844 02/13/17 0844   191/86 108 16 97.6 °F (36.4 °C) 96 %     Physical Exam    Nursing note and vitals reviewed.  Constitutional: She appears well-developed and well-nourished. She is not diaphoretic. No distress.   HENT:   Head: Normocephalic and atraumatic.   Nose: Nose normal.   Mouth/Throat: Oropharynx is clear and moist.   Eyes: Conjunctivae and EOM are normal.   Neck: Normal range of motion. Neck supple.   Cardiovascular: Normal rate, regular rhythm and normal heart sounds. Exam reveals no friction rub.    No murmur heard.  Pulmonary/Chest: Breath sounds normal. No respiratory distress. She has no wheezes. She has no rales.   Abdominal: Soft. Bowel sounds are normal. She exhibits no distension. There is no tenderness. There is no rebound.   Musculoskeletal: Normal range of motion.   Neurological: She is alert and oriented to person, place, and time. She has normal strength. No cranial nerve deficit or sensory deficit. She displays a negative Romberg sign. Coordination and gait normal.   Reflex Scores:       Patellar reflexes are 2+ on the right side and 2+ on the left side.       Achilles reflexes are 2+ on the right side and 2+ on the left side.  Skin: Skin is warm and dry. No erythema. No pallor.   Psychiatric: She has a normal mood and affect. Her behavior is normal. Judgment and thought content normal.         ED Course   Procedures  Labs Reviewed - No data to display          Medical Decision Making:   History:   Old Medical Records: I decided to obtain old  medical records.  Initial Assessment:   Upon review of patient's recent office visits, her blood pressure readings have been around 140-150/70 mmHg. Her blood pressure logs also reflect the same readings.  Clinical Tests:   Medical Tests: Ordered and Reviewed       APC / Resident Notes:   Patient is a 76-year-old female with a past medical history of migraine headaches, UC, HLD, and HTN who presents to the ED with elevated blood pressure.  Blood pressure in triage of 191/86 mmHg.  Repeat blood pressure of 158/82 mmHg.  Unremarkable physical exam.  No neurological deficits appreciated.     ECG with NSR and no STEMI.  Repeat BP again of 152/75 mmHg.    I have considered but do not suspect hypertensive emergency, ACS, or PE. According to ACEP, medical intervention for asymptomatic HTN is not required.  I feel that she does not need any labs or imaging at this time.  Patient was reassured.  I discussed dietary changes, weight loss, and physical activity which can lower blood pressure in addition to medication.  She can closely follow-up with her cardiologist.  Follow-up with PCP as scheduled or earlier if needed.  I reviewed patient's chart and discussed this case with my supervising MITZI Galan Attestation:   Scribe #1: I performed the above scribed service and the documentation accurately describes the services I performed. I attest to the accuracy of the note.    Attending Attestation:     Physician Attestation Statement for NP/PA:   I discussed this assessment and plan of this patient with the NP/PA, but I did not personally examine the patient. The face to face encounter was performed by the NP/PA.        Physician Attestation for Scribe:  Physician Attestation Statement for Scribe #1: I, Dr. Stevenson, reviewed documentation, as scribed by Akila Schultz in my presence, and it is both accurate and complete.                 ED Course     Clinical Impression:   The primary encounter diagnosis was Elevated blood  pressure reading. A diagnosis of Essential hypertension was also pertinent to this visit.    Disposition:   Disposition: Discharged  Condition: Stable       June Ritter PA-C  02/13/17 1913

## 2017-02-13 NOTE — PROVIDER PROGRESS NOTES - EMERGENCY DEPT.
Encounter Date: 2/13/2017    ED Physician Progress Notes         EKG - STEMI Decision  Initial Reading: No STEMI present.

## 2017-02-13 NOTE — ED AVS SNAPSHOT
[facility]  [address] [facility] [id number]               Gabriel Grace   2017  9:27 AM   ED    Description:  Female : 1940   Department:  [facility]           Your Care was Coordinated By:     Provider Role From To    Bird Stevenson MD Attending Provider 17 0935 --    June Ritter PA-C Physician Assistant 17 0935 --      Reason for Visit     Hypertension           Diagnoses this Visit        Comments    Elevated blood pressure reading    -  Primary     Essential hypertension           ED Disposition     None           To Do List           Follow-up Information     Follow up with Wayne Monreal MD In 3 days.    Specialty:  Cardiology    Contact information:    [address] [place] [id number]  [phone]          [facility] with [facility]    Specialty:  Emergency Medicine    Why:  If symptoms worsen    Contact information:    [facility] [facility] [id number] [phone]      [facility]     [facility] -  Assistance  Registered nurses in the [facility] provide clinical advisement, health education, appointment booking, and other advisory services.  Call for this free service at [phone].             Medications           Message regarding Medications     Verify the changes and/or additions to your medication regime listed below are the same as discussed with your clinician today.  If any of these changes or additions are incorrect, please notify your healthcare provider.        These medications were administered today        Dose Freq    acetaminophen tablet 650 mg 650 mg ED 1 Time    Sig: Take 2 tablets (650 mg total) by mouth ED 1 Time.    Class: Normal    Route: Oral    Cosign for Ordering: Required by Bird Stevenson MD           Verify that the below list of medications is an accurate representation of the medications you  "are currently taking.  If none reported, the list may be blank. If incorrect, please contact your healthcare provider. Carry this list with you in case of emergency.           Current Medications     amlodipine (NORVASC) 5 MG tablet Take 1 tablet (5 mg total) by mouth once daily.    aspirin (ECOTRIN) 81 MG EC tablet Take 81 mg by mouth. 1 Tablet, Delayed Release (E.C.) Oral Every day    biotin 2,500 mcg Cap     calcium-magnesium-zinc Tab Take by mouth. 1  Oral Every day    cholecalciferol, vitamin D3, (VITAMIN D3) 1,000 unit capsule Take 1,000 Units by mouth once daily.      cyanocobalamin, vitamin B-12, (VITAMIN B-12) 50 mcg tablet Take 50 mcg by mouth once daily.      FLAXSEED OIL ORAL Take 1,000 mg by mouth. 1  Oral Every day    folic acid (FOLVITE) 400 MCG tablet Take 400 mcg by mouth. 4 Tablet Oral Every day    mesalamine (ASACOL) 400 mg EC tablet Take 400 mg by mouth. 4 Tablet, Delayed Release (E.C.) Oral Twice a day     multivitamin (THERAGRAN) per tablet Take by mouth. 1  By mouth Every day    omega-3 fatty acids-vitamin E (FISH OIL) 1,000 mg Cap     omeprazole (PRILOSEC) 20 MG capsule Take 1 capsule (20 mg total) by mouth once daily.    ondansetron (ZOFRAN-ODT) 4 MG TbDL DISSOLVE 1 TABLET ON THE TONGUE EVERY EIGHT HOURS AS NEEDED    simvastatin (ZOCOR) 10 MG tablet Take 1 tablet (10 mg total) by mouth every evening.    vitamin E 1000 UNIT capsule Take by mouth. 1 Capsule Oral Every day    acetaminophen tablet 650 mg Take 2 tablets (650 mg total) by mouth ED 1 Time.           Clinical Reference Information           Your Vitals Were     BP Pulse Temp Resp Height Weight    158/82 (BP Location: Left arm, Patient Position: Sitting) 108 97.6 °F (36.4 °C) (Oral) 16 5' 8" (1.727 m) 104.3 kg (230 lb)    SpO2 BMI             96% 34.97 kg/m2         Allergies as of 2/13/2017     No Known Allergies      Immunizations Administered on Date of Encounter - 2/13/2017     None      ED Micro, Lab, POCT     None      ED " Imaging Orders     None        Discharge Instructions       Follow-up with her primary care physician and cardiologist as scheduled or earlier if needed.  Continue to take your blood pressure readings and log them.  Continue all home medication as prescribed.  Consider dietary changes, physical exercise, and weight loss as it has been shown to decrease blood pressure in addition to medication.    Stay hydrated by drinking plenty of fluids.    Consider taking over the counter Tylenol for pains instead of non-steroidal anti-inflammatories (Advil, Motrin, Aleve).  Return to ED for any worsening symptoms.    Future Appointments  Date Time Provider Department Center   3/8/2017 1:00 PM Darell Leigh OD [facility]     Our goal in the emergency department is to always give you outstanding care and exceptional service. You may receive a survey by mail or e-mail in the next week regarding your experience in our ED. We would greatly appreciate your completing and returning the survey. Your feedback provides us with a way to recognize our staff who give very good care and it helps us learn how to improve when your experience was below our aspiration of excellence.       Taking Your Blood Pressure  Blood pressure is the force of blood against the artery wall as it moves from the heart through the blood vessels. You can take your own blood pressure reading using a digital monitor. Take readings as often as your healthcare provider instructs. Take each reading at the same time of day.  Step 1. Relax    · Take your blood pressure at the same time every day, such as in the morning or evening, or at the time your healthcare provider recommends.  · Wait at least a half-hour after smoking, eating, drinking caffeinated beverages, or exercising.  · Sit comfortably at a table with both feet on the floor. Do not cross your legs or feet. Place the monitor near you.  · Rest for a few minutes before you begin.  Step 2. Wrap  the cuff    · Place your arm on the table, palm up. Your arm should be at the level of your heart. Wrap the cuff around your upper arm, just above your elbow. Its best done on bare skin, not over clothing. Most cuffs will indicate where the brachial artery (the blood vessel in the middle of the arm at the inner side of the elbow) should line up with the cuff. Look in your monitor's instruction booklet for an illustration. You can also bring your cuff to your healthcare provider and have them show you how to correctly place the cuff.  · Make sure your cuff fits. If it doesnt wrap around your upper arm, order a larger cuff.  Step 3. Inflate the cuff    · Pump the cuff until the scale reads 160. If you have a self-inflating cuff, push the button that starts the pump.  · The cuff will tighten, then loosen.  · The numbers will change. When they stop changing, your blood pressure reading will appear.  · Take 2 or 3 readings one minute apart.  Step 4. Write down the results of each reading    · Write down your blood pressure numbers for each reading. Note the date and time. Keep your results in one place, such as a notebook. Even if your monitor has a built-in memory, keep a hard copy of the readings.  · Remove the cuff from your arm. Turn off the machine.  · Share your blood pressure records with your healthcare providers at each visit.  Date Last Reviewed: 4/27/2016  © 5127-4983 [employer] [address] [place] [address], [place] [address]. All rights reserved. This information is not intended as a substitute for professional medical care. Always follow your healthcare professional's instructions.    Your Scheduled Appointments     Mar 08, 2017  1:00 PM CST   Established Patient Visit with ELIEL Peng - Optometry ([facility] )    [address]  [facility] [address] [phone]              [facility] Sign-Up     Activating your [facility] account is as easy as 1-2-3!     1)  Visit [email or web address], select Sign Up Now, enter this activation code and your date of birth, then select Next.  [email or web address]  Expires: 2/23/2017  2:27 PM      2) Create a username and password to use when you visit [facility] in the future and select a security question in case you lose your password and select Next.    3) Enter your e-mail address and click Sign Up!    Additional Information  If you have questions, please e-mail [email or web address] or call [phone] to talk to our [facility] mobility staff. Remember, [facility] is NOT to be used for urgent needs. For medical emergencies, dial 911.          [facility] complies with applicable Federal civil rights laws and does not discriminate on the basis of race, color, national origin, age, disability, or sex.        Language Assistance Services     ATTENTION: Language assistance services are available, free of charge. Please call [phone].      ATENCIÓN: Si habla español, tiene a jiang disposición servicios gratuitos de asistencia lingüística. Llame al [phone].     CHÚ Ý: N?u b?n nói Ti?ng Vi?t, có các d?ch v? h? tr? ngôn ng? mi?n phí dành cho b?n. G?i s? [phone].

## 2017-02-13 NOTE — ED TRIAGE NOTES
BP running high at home- 160/85.  Changed meds to norvasc instead of Metoprolol.  Having a h/a for the last few days . Started Norvasc 1/31/2017. Denies edema or chest pain .

## 2017-02-13 NOTE — TELEPHONE ENCOUNTER
Pt states that her blood pressure is 160/85 and she has a headache. Pt has not taken her blood pressure medicine this morning. Pt advised to take her medicine and wait and then recheck her blood pressure- if her headache remains then she needs to go to ER for further eval per protocol    Requesting that someone from DR. Monreal's office contact patient for further instructions.   Reason for Disposition   [1] BP  >= 160 / 100 AND [2] cardiac or neurologic symptoms    (e.g., chest pain, difficulty breathing, unsteady gait, blurred vision)    Protocols used: ST HIGH BLOOD PRESSURE-A-AH

## 2017-02-13 NOTE — DISCHARGE INSTRUCTIONS
Follow-up with her primary care physician and cardiologist as scheduled or earlier if needed.  Continue to take your blood pressure readings and log them.  Continue all home medication as prescribed.  Consider dietary changes, physical exercise, and weight loss as it has been shown to decrease blood pressure in addition to medication.    Stay hydrated by drinking plenty of fluids.    Consider taking over the counter Tylenol for pains instead of non-steroidal anti-inflammatories (Advil, Motrin, Aleve).  Return to ED for any worsening symptoms.    Future Appointments  Date Time Provider Department Center   3/8/2017 1:00 PM Darell Leigh OD [facility]     Our goal in the emergency department is to always give you outstanding care and exceptional service. You may receive a survey by mail or e-mail in the next week regarding your experience in our ED. We would greatly appreciate your completing and returning the survey. Your feedback provides us with a way to recognize our staff who give very good care and it helps us learn how to improve when your experience was below our aspiration of excellence.       Taking Your Blood Pressure  Blood pressure is the force of blood against the artery wall as it moves from the heart through the blood vessels. You can take your own blood pressure reading using a digital monitor. Take readings as often as your healthcare provider instructs. Take each reading at the same time of day.  Step 1. Relax    · Take your blood pressure at the same time every day, such as in the morning or evening, or at the time your healthcare provider recommends.  · Wait at least a half-hour after smoking, eating, drinking caffeinated beverages, or exercising.  · Sit comfortably at a table with both feet on the floor. Do not cross your legs or feet. Place the monitor near you.  · Rest for a few minutes before you begin.  Step 2. Wrap the cuff    · Place your arm on the table, palm up. Your arm  should be at the level of your heart. Wrap the cuff around your upper arm, just above your elbow. Its best done on bare skin, not over clothing. Most cuffs will indicate where the brachial artery (the blood vessel in the middle of the arm at the inner side of the elbow) should line up with the cuff. Look in your monitor's instruction booklet for an illustration. You can also bring your cuff to your healthcare provider and have them show you how to correctly place the cuff.  · Make sure your cuff fits. If it doesnt wrap around your upper arm, order a larger cuff.  Step 3. Inflate the cuff    · Pump the cuff until the scale reads 160. If you have a self-inflating cuff, push the button that starts the pump.  · The cuff will tighten, then loosen.  · The numbers will change. When they stop changing, your blood pressure reading will appear.  · Take 2 or 3 readings one minute apart.  Step 4. Write down the results of each reading    · Write down your blood pressure numbers for each reading. Note the date and time. Keep your results in one place, such as a notebook. Even if your monitor has a built-in memory, keep a hard copy of the readings.  · Remove the cuff from your arm. Turn off the machine.  · Share your blood pressure records with your healthcare providers at each visit.  Date Last Reviewed: 4/27/2016  © 8106-7855 [employer] [address] [place] [address], [place] [address]. All rights reserved. This information is not intended as a substitute for professional medical care. Always follow your healthcare professional's instructions.

## 2017-02-15 ENCOUNTER — OFFICE VISIT (OUTPATIENT)
Dept: CARDIOLOGY | Facility: CLINIC | Age: 77
End: 2017-02-15
Payer: MEDICARE

## 2017-02-15 VITALS
HEART RATE: 104 BPM | WEIGHT: 241.38 LBS | BODY MASS INDEX: 36.58 KG/M2 | HEIGHT: 68 IN | DIASTOLIC BLOOD PRESSURE: 68 MMHG | SYSTOLIC BLOOD PRESSURE: 146 MMHG

## 2017-02-15 DIAGNOSIS — G43.009 NONINTRACTABLE MIGRAINE, UNSPECIFIED MIGRAINE TYPE: ICD-10-CM

## 2017-02-15 DIAGNOSIS — I10 ESSENTIAL HYPERTENSION: Primary | ICD-10-CM

## 2017-02-15 DIAGNOSIS — E78.00 PURE HYPERCHOLESTEROLEMIA: ICD-10-CM

## 2017-02-15 DIAGNOSIS — I70.0 AORTIC ATHEROSCLEROSIS: ICD-10-CM

## 2017-02-15 DIAGNOSIS — E66.09 NON MORBID OBESITY DUE TO EXCESS CALORIES: ICD-10-CM

## 2017-02-15 PROCEDURE — 3078F DIAST BP <80 MM HG: CPT | Mod: S$GLB,,, | Performed by: INTERNAL MEDICINE

## 2017-02-15 PROCEDURE — 1126F AMNT PAIN NOTED NONE PRSNT: CPT | Mod: S$GLB,,, | Performed by: INTERNAL MEDICINE

## 2017-02-15 PROCEDURE — 1160F RVW MEDS BY RX/DR IN RCRD: CPT | Mod: S$GLB,,, | Performed by: INTERNAL MEDICINE

## 2017-02-15 PROCEDURE — 3077F SYST BP >= 140 MM HG: CPT | Mod: S$GLB,,, | Performed by: INTERNAL MEDICINE

## 2017-02-15 PROCEDURE — 99999 PR PBB SHADOW E&M-EST. PATIENT-LVL III: CPT | Mod: PBBFAC,,, | Performed by: INTERNAL MEDICINE

## 2017-02-15 PROCEDURE — 99499 UNLISTED E&M SERVICE: CPT | Mod: S$GLB,,, | Performed by: INTERNAL MEDICINE

## 2017-02-15 PROCEDURE — 1157F ADVNC CARE PLAN IN RCRD: CPT | Mod: S$GLB,,, | Performed by: INTERNAL MEDICINE

## 2017-02-15 PROCEDURE — 99214 OFFICE O/P EST MOD 30 MIN: CPT | Mod: S$GLB,,, | Performed by: INTERNAL MEDICINE

## 2017-02-15 PROCEDURE — 1159F MED LIST DOCD IN RCRD: CPT | Mod: S$GLB,,, | Performed by: INTERNAL MEDICINE

## 2017-02-15 RX ORDER — AMLODIPINE BESYLATE 10 MG/1
10 TABLET ORAL DAILY
Qty: 90 TABLET | Refills: 3 | Status: SHIPPED | OUTPATIENT
Start: 2017-02-15 | End: 2017-03-13 | Stop reason: SDUPTHER

## 2017-02-15 RX ORDER — CARVEDILOL 12.5 MG/1
12.5 TABLET ORAL 2 TIMES DAILY WITH MEALS
Qty: 60 TABLET | Refills: 3 | Status: SHIPPED | OUTPATIENT
Start: 2017-02-15 | End: 2017-03-13 | Stop reason: SDUPTHER

## 2017-02-15 NOTE — MR AVS SNAPSHOT
[facility]  [address]  [facility] [address]  Phone: [phone]                  Gabriel Grace   2/15/2017 9:00 AM   Office Visit    Description:  Female : 1940   Provider:  Wayne Monreal MD   Department:  [facility]           Reason for Visit     Hypertension           Diagnoses this Visit        Comments    Essential hypertension    -  Primary     Aortic atherosclerosis         Nonintractable migraine, unspecified migraine type         Pure hypercholesterolemia         Non morbid obesity due to excess calories                To Do List           Future Appointments        Provider Department Dept Phone    3/8/2017 1:00 PM [facility] Peng - Optometry [phone]      Goals (5 Years of Data)     None      Follow-Up and Disposition     Return in about 4 weeks (around 3/15/2017).    Follow-up and Disposition History       These Medications        Disp Refills Start End    amlodipine (NORVASC) 10 MG tablet 90 tablet 3 2/15/2017 2/15/2018    Take 1 tablet (10 mg total) by mouth once daily. - Oral    Pharmacy: [facility] [address] Ph #: [phone]       carvedilol (COREG) 12.5 MG tablet 60 tablet 3 2/15/2017     Take 1 tablet (12.5 mg total) by mouth 2 (two) times daily with meals. - Oral    Pharmacy: [facility] [address] Ph #: [phone]         [facility]     [facility] On Call Nurse Care Line -  Assistance  Registered nurses in the [facility] Center provide clinical advisement, health education, appointment booking, and other advisory services.  Call for this free service at [phone].             Medications           Message regarding Medications     Verify the changes and/or additions to your medication regime listed below are the same as discussed with your clinician  today.  If any of these changes or additions are incorrect, please notify your healthcare provider.        START taking these NEW medications        Refills    carvedilol (COREG) 12.5 MG tablet 3    Sig: Take 1 tablet (12.5 mg total) by mouth 2 (two) times daily with meals.    Class: Normal    Route: Oral      CHANGE how you are taking these medications     Start Taking Instead of    amlodipine (NORVASC) 10 MG tablet amlodipine (NORVASC) 5 MG tablet    Dosage:  Take 1 tablet (10 mg total) by mouth once daily. Dosage:  Take 1 tablet (5 mg total) by mouth once daily.    Reason for Change:  Reorder            Verify that the below list of medications is an accurate representation of the medications you are currently taking.  If none reported, the list may be blank. If incorrect, please contact your healthcare provider. Carry this list with you in case of emergency.           Current Medications     amlodipine (NORVASC) 10 MG tablet Take 1 tablet (10 mg total) by mouth once daily.    aspirin (ECOTRIN) 81 MG EC tablet Take 81 mg by mouth. 1 Tablet, Delayed Release (E.C.) Oral Every day    biotin 2,500 mcg Cap     calcium-magnesium-zinc Tab Take by mouth. 1  Oral Every day    cholecalciferol, vitamin D3, (VITAMIN D3) 1,000 unit capsule Take 1,000 Units by mouth once daily.      cyanocobalamin, vitamin B-12, (VITAMIN B-12) 50 mcg tablet Take 50 mcg by mouth once daily.      FLAXSEED OIL ORAL Take 1,000 mg by mouth. 1  Oral Every day    folic acid (FOLVITE) 400 MCG tablet Take 400 mcg by mouth. 4 Tablet Oral Every day    mesalamine (ASACOL) 400 mg EC tablet Take 400 mg by mouth. 4 Tablet, Delayed Release (E.C.) Oral Twice a day     multivitamin (THERAGRAN) per tablet Take by mouth. 1  By mouth Every day    omega-3 fatty acids-vitamin E (FISH OIL) 1,000 mg Cap     omeprazole (PRILOSEC) 20 MG capsule Take 1 capsule (20 mg total) by mouth once daily.    ondansetron (ZOFRAN-ODT) 4 MG TbDL DISSOLVE 1 TABLET ON THE TONGUE EVERY  "EIGHT HOURS AS NEEDED    simvastatin (ZOCOR) 10 MG tablet Take 1 tablet (10 mg total) by mouth every evening.    vitamin E 1000 UNIT capsule Take by mouth. 1 Capsule Oral Every day    carvedilol (COREG) 12.5 MG tablet Take 1 tablet (12.5 mg total) by mouth 2 (two) times daily with meals.           Clinical Reference Information           Your Vitals Were     BP Pulse Height Weight BMI    146/68 (BP Location: Left arm, Patient Position: Sitting, BP Method: Automatic) 104 5' 8" (1.727 m) 109.5 kg (241 lb 6.5 oz) 36.71 kg/m2      Blood Pressure          Most Recent Value    Right Arm BP - Sitting  141/67    Left Arm BP - Sitting  146/68    BP  (!)  146/68      Allergies as of 2/15/2017     No Known Allergies      Immunizations Administered on Date of Encounter - 2/15/2017     None      [facility] Sign-Up     Activating your [facility] account is as easy as 1-2-3!     1) Visit [email or web address], select Sign Up Now, enter this activation code and your date of birth, then select Next.  [email or web address]  Expires: 2/23/2017  2:27 PM      2) Create a username and password to use when you visit [facility] in the future and select a security question in case you lose your password and select Next.    3) Enter your e-mail address and click Sign Up!    Additional Information  If you have questions, please e-mail [email or web address] or call [phone] to talk to our [facility] staff. Remember, [facility] is NOT to be used for urgent needs. For medical emergencies, dial 911.         Language Assistance Services     ATTENTION: Language assistance services are available, free of charge. Please call [phone].      ATENCIÓN: Si habla milesañol, tiene a jiang disposición servicios gratuitos de asistencia lingüística. Llame al [phone].     CHÚ Ý: N?u b?n nói Ti?ng Vi?t, có các d?ch v? h? tr? ngôn ng? mi?n phí dành cho b?n. G?i s? [phone].         [facility] complies with applicable Federal civil rights laws and " does not discriminate on the basis of race, color, national origin, age, disability, or sex.

## 2017-02-15 NOTE — LETTER
February 15, 2017      Prosper Mancilla MD  [address]  [facility] [address]           [facility]  [address]  [facility] [address]  Phone: [phone]          Patient: Gabriel Grace   MR Number: [id number]   YOB: 1940   Date of Visit: 2/15/2017       Dear Dr. Prosper Mancilla:    Thank you for referring Gabriel Grace to me for evaluation. Attached you will find relevant portions of my assessment and plan of care.    If you have questions, please do not hesitate to call me. I look forward to following Gabriel Grace along with you.    Sincerely,    Wayne Monreal MD    Enclosure  CC:  No Recipients    If you would like to receive this communication electronically, please contact [email or web address] or [phone] to request more information on [facility] access.    For providers and/or their staff who would like to refer a patient to [facility], please contact us through our one-stop-shop provider referral line, [facility] , at [phone].    If you feel you have received this communication in error or would no longer like to receive these types of communications, please e-mail [email or web address]

## 2017-03-08 ENCOUNTER — OFFICE VISIT (OUTPATIENT)
Dept: OPTOMETRY | Facility: CLINIC | Age: 77
End: 2017-03-08
Payer: MEDICARE

## 2017-03-08 DIAGNOSIS — H25.13 NUCLEAR SCLEROSIS, BILATERAL: Primary | ICD-10-CM

## 2017-03-08 DIAGNOSIS — H43.811 POSTERIOR VITREOUS DETACHMENT OF RIGHT EYE: ICD-10-CM

## 2017-03-08 DIAGNOSIS — H26.9 CORTICAL CATARACT OF BOTH EYES: ICD-10-CM

## 2017-03-08 DIAGNOSIS — H52.202 ASTIGMATISM, LEFT: ICD-10-CM

## 2017-03-08 DIAGNOSIS — H43.391 VITREOUS FLOATERS OF RIGHT EYE: ICD-10-CM

## 2017-03-08 DIAGNOSIS — H52.01 HYPEROPIA, RIGHT: ICD-10-CM

## 2017-03-08 DIAGNOSIS — H35.431 COBBLESTONE RETINAL DEGENERATION, RIGHT: ICD-10-CM

## 2017-03-08 DIAGNOSIS — H52.4 PRESBYOPIA OU: ICD-10-CM

## 2017-03-08 PROCEDURE — 92015 DETERMINE REFRACTIVE STATE: CPT | Mod: S$GLB,,, | Performed by: OPTOMETRIST

## 2017-03-08 PROCEDURE — 99999 PR PBB SHADOW E&M-EST. PATIENT-LVL III: CPT | Mod: PBBFAC,,, | Performed by: OPTOMETRIST

## 2017-03-08 PROCEDURE — 92014 COMPRE OPH EXAM EST PT 1/>: CPT | Mod: S$GLB,,, | Performed by: OPTOMETRIST

## 2017-03-08 NOTE — PATIENT INSTRUCTIONS
S/P surgery to upper eyelids. (levator resection). Being followed by Dr. Pringle.  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  Persistent symptoms of flashes (and floater) in the right eye x 6 + weeks.  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  No evidence of secondary retinal tear/hole/break.  Note recent consult with  in the retina clinic.  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     reports problems with [facility] with last spectacle lens Rx issued.  Rechecked refraction, and issued new spectacle lens Rx.  Return to optical shop for lens remake  Recheck in three months, with repeat DFE at at time, or prior if any apparent increase in flashes or floaters or (sudden) decrease in VA.

## 2017-03-09 NOTE — PROGRESS NOTES
HPI     Concerns About Ocular Health    Additional comments: f/u on PVD in OD - saw Dr. Collier on 01/23/2017.     No evidence of retinal break noted.  Reports problem reading small print   with present (progressive add) lenses.            Comments   Patient in for progress check.  Patient is in for a f/u   Being followed for (diagnosis):  Posterior vitreous detachment OD     Date last seen:  01/23/2017    Doctor last seen:  Dr. Collier     Prescribed eye medications(s) using:  None     OTC eye medication(s) using:  None     Signs/symptoms of condition resolved/better/stable/worse?:  Better     Allergies/Medications reviewed today?  Yes               Last edited by Darell Leigh, OD on 3/8/2017  2:01 PM. (History)            Assessment /Plan     For exam results, see Encounter Report.    1. Nuclear sclerosis, bilateral     2. Cortical cataract of both eyes     3. Vitreous floaters of right eye     4. Posterior vitreous detachment of right eye     5. Hyperopia, right     6. Astigmatism, left     7. Presbyopia OU     8. Cobblestone retinal degeneration, right                  S/P surgery to upper eyelids. (levator resection). Being followed by Dr. Pringle.  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  Persistent symptoms of flashes (and floater) in the right eye x 6 + weeks.  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  No evidence of secondary retinal tear/hole/break.  Note recent consult with  in the retina clinic.  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     reports problems with [facility] with last spectacle lens Rx issued.  Rechecked refraction, and issued new spectacle lens Rx.  Return to optical shop for lens remake  Recheck in three months, with repeat DFE at at time, or prior if any apparent increase in flashes or floaters or (sudden) decrease in VA.

## 2017-03-13 ENCOUNTER — OFFICE VISIT (OUTPATIENT)
Dept: CARDIOLOGY | Facility: CLINIC | Age: 77
End: 2017-03-13
Payer: MEDICARE

## 2017-03-13 VITALS
DIASTOLIC BLOOD PRESSURE: 60 MMHG | HEIGHT: 68 IN | BODY MASS INDEX: 34.98 KG/M2 | HEART RATE: 80 BPM | SYSTOLIC BLOOD PRESSURE: 126 MMHG | WEIGHT: 230.81 LBS

## 2017-03-13 DIAGNOSIS — E78.00 PURE HYPERCHOLESTEROLEMIA: ICD-10-CM

## 2017-03-13 DIAGNOSIS — G43.009 NONINTRACTABLE MIGRAINE, UNSPECIFIED MIGRAINE TYPE: ICD-10-CM

## 2017-03-13 DIAGNOSIS — I10 ESSENTIAL HYPERTENSION: Primary | ICD-10-CM

## 2017-03-13 DIAGNOSIS — E66.09 NON MORBID OBESITY DUE TO EXCESS CALORIES: ICD-10-CM

## 2017-03-13 DIAGNOSIS — I70.0 AORTIC ATHEROSCLEROSIS: ICD-10-CM

## 2017-03-13 PROCEDURE — 1160F RVW MEDS BY RX/DR IN RCRD: CPT | Mod: S$GLB,,, | Performed by: INTERNAL MEDICINE

## 2017-03-13 PROCEDURE — 1157F ADVNC CARE PLAN IN RCRD: CPT | Mod: S$GLB,,, | Performed by: INTERNAL MEDICINE

## 2017-03-13 PROCEDURE — 99499 UNLISTED E&M SERVICE: CPT | Mod: S$GLB,,, | Performed by: INTERNAL MEDICINE

## 2017-03-13 PROCEDURE — 1159F MED LIST DOCD IN RCRD: CPT | Mod: S$GLB,,, | Performed by: INTERNAL MEDICINE

## 2017-03-13 PROCEDURE — 1126F AMNT PAIN NOTED NONE PRSNT: CPT | Mod: S$GLB,,, | Performed by: INTERNAL MEDICINE

## 2017-03-13 PROCEDURE — 99214 OFFICE O/P EST MOD 30 MIN: CPT | Mod: S$GLB,,, | Performed by: INTERNAL MEDICINE

## 2017-03-13 PROCEDURE — 3078F DIAST BP <80 MM HG: CPT | Mod: S$GLB,,, | Performed by: INTERNAL MEDICINE

## 2017-03-13 PROCEDURE — 3074F SYST BP LT 130 MM HG: CPT | Mod: S$GLB,,, | Performed by: INTERNAL MEDICINE

## 2017-03-13 PROCEDURE — 99999 PR PBB SHADOW E&M-EST. PATIENT-LVL III: CPT | Mod: PBBFAC,,, | Performed by: INTERNAL MEDICINE

## 2017-03-13 RX ORDER — CARVEDILOL 12.5 MG/1
12.5 TABLET ORAL 2 TIMES DAILY WITH MEALS
Qty: 60 TABLET | Refills: 3 | Status: SHIPPED | OUTPATIENT
Start: 2017-03-13 | End: 2017-03-13 | Stop reason: SDUPTHER

## 2017-03-13 RX ORDER — AMLODIPINE BESYLATE 10 MG/1
10 TABLET ORAL DAILY
Qty: 90 TABLET | Refills: 3 | Status: SHIPPED | OUTPATIENT
Start: 2017-03-13 | End: 2018-02-20 | Stop reason: SDUPTHER

## 2017-03-13 RX ORDER — CARVEDILOL 12.5 MG/1
12.5 TABLET ORAL 2 TIMES DAILY WITH MEALS
Qty: 180 TABLET | Refills: 3 | Status: SHIPPED | OUTPATIENT
Start: 2017-03-13 | End: 2018-02-20 | Stop reason: SDUPTHER

## 2017-03-13 NOTE — MR AVS SNAPSHOT
[facility]  [address]  [facility] [address]  Phone: [phone]                  Gabriel Grace   3/13/2017 1:30 PM   Office Visit    Description:  Female : 1940   Provider:  Wayne Monreal MD   Department:  [facility]           Reason for Visit     Hypertension           Diagnoses this Visit        Comments    Essential hypertension    -  Primary     Aortic atherosclerosis         Nonintractable migraine, unspecified migraine type         Pure hypercholesterolemia         Non morbid obesity due to excess calories                To Do List           Goals (5 Years of Data)     None      Follow-Up and Disposition     Return in about 1 year (around 3/13/2018), or if symptoms worsen or fail to improve.    Follow-up and Disposition History       These Medications        Disp Refills Start End    amlodipine (NORVASC) 10 MG tablet 90 tablet 3 3/13/2017 3/13/2018    Take 1 tablet (10 mg total) by mouth once daily. - Oral    Pharmacy: [facility] [address] [facility] [address] [facility] Ph #: [phone]       carvedilol (COREG) 12.5 MG tablet 180 tablet 3 3/13/2017     Take 1 tablet (12.5 mg total) by mouth 2 (two) times daily with meals. - Oral    Pharmacy: [facility] - [facility] [address] [facility] Ph #: [phone]         [facility]     [facility] On Call Nurse Care Line -  Assistance  Registered nurses in the [facility] On Call Center provide clinical advisement, health education, appointment booking, and other advisory services.  Call for this free service at [phone].             Medications           Message regarding Medications     Verify the changes and/or additions to your medication regime listed below are the same as discussed with your clinician today.  If any of these changes or additions are incorrect, please notify your healthcare provider.             Verify that the below list of medications is an  "accurate representation of the medications you are currently taking.  If none reported, the list may be blank. If incorrect, please contact your healthcare provider. Carry this list with you in case of emergency.           Current Medications     amlodipine (NORVASC) 10 MG tablet Take 1 tablet (10 mg total) by mouth once daily.    aspirin (ECOTRIN) 81 MG EC tablet Take 81 mg by mouth. 1 Tablet, Delayed Release (E.C.) Oral Every day    biotin 2,500 mcg Cap     calcium-magnesium-zinc Tab Take by mouth. 1  Oral Every day    carvedilol (COREG) 12.5 MG tablet Take 1 tablet (12.5 mg total) by mouth 2 (two) times daily with meals.    cholecalciferol, vitamin D3, (VITAMIN D3) 1,000 unit capsule Take 1,000 Units by mouth once daily.      cyanocobalamin, vitamin B-12, (VITAMIN B-12) 50 mcg tablet Take 50 mcg by mouth once daily.      FLAXSEED OIL ORAL Take 1,000 mg by mouth. 1  Oral Every day    folic acid (FOLVITE) 400 MCG tablet Take 400 mcg by mouth. 4 Tablet Oral Every day    mesalamine (ASACOL) 400 mg EC tablet Take 400 mg by mouth. 4 Tablet, Delayed Release (E.C.) Oral Twice a day     multivitamin (THERAGRAN) per tablet Take by mouth. 1  By mouth Every day    omega-3 fatty acids-vitamin E (FISH OIL) 1,000 mg Cap     omeprazole (PRILOSEC) 20 MG capsule Take 1 capsule (20 mg total) by mouth once daily.    ondansetron (ZOFRAN-ODT) 4 MG TbDL DISSOLVE 1 TABLET ON THE TONGUE EVERY EIGHT HOURS AS NEEDED    simvastatin (ZOCOR) 10 MG tablet Take 1 tablet (10 mg total) by mouth every evening.    vitamin E 1000 UNIT capsule Take by mouth. 1 Capsule Oral Every day           Clinical Reference Information           Your Vitals Were     BP Pulse Height Weight BMI    126/60 (BP Location: Left arm, Patient Position: Sitting, BP Method: Automatic) 80 5' 8" (1.727 m) 104.7 kg (230 lb 13.2 oz) 35.1 kg/m2      Blood Pressure          Most Recent Value    Right Arm BP - Sitting  134/68    Left Arm BP - Sitting  126/60    BP  126/60    "   Allergies as of 3/13/2017     No Known Allergies      Immunizations Administered on Date of Encounter - 3/13/2017     None      [facility] Sign-Up     Activating your [facility] account is as easy as 1-2-3!     1) Visit [email or web address], select Sign Up Now, enter this activation code and your date of birth, then select Next.  [email or web address]  Expires: 4/22/2017  3:46 PM      2) Create a username and password to use when you visit [facility] in the future and select a security question in case you lose your password and select Next.    3) Enter your e-mail address and click Sign Up!    Additional Information  If you have questions, please e-mail [email or web address] or call [phone] to talk to our [facility] staff. Remember, [facility] is NOT to be used for urgent needs. For medical emergencies, dial 911.         Language Assistance Services     ATTENTION: Language assistance services are available, free of charge. Please call [phone].      ATENCIÓN: Si habla español, tiene a jiang disposición servicios gratuitos de asistencia lingüística. Llame al [phone].     MCKENZIE Ý: N?u b?n nói Ti?ng Vi?t, có các d?ch v? h? tr? ngôn ng? mi?n phí dành cho b?n. G?i s? [phone].         [facility] complies with applicable Federal civil rights laws and does not discriminate on the basis of race, color, national origin, age, disability, or sex.

## 2017-03-13 NOTE — PROGRESS NOTES
Subjective:    Patient ID:  Gabriel Grace is a 77 y.o. female who presents for follow-up of Hypertension (4 week f/u )      HPI  The patient is a 77 year old female presents for follow up of hypertension management having been referred by Dr Mancilla. She was placed on amlodipine and coreg and her home BPs have been well controlled, 112-146 systolic. She is doing well and has no chest pain, SOB or palpitations.     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    aspirin (ECOTRIN) 81 MG EC tablet Take 81 mg by mouth. 1 Tablet, Delayed Release (E.C.) Oral Every day    biotin 2,500 mcg Cap     calcium-magnesium-zinc Tab Take by mouth. 1  Oral Every day    carvedilol (COREG) 12.5 MG tablet Take 1 tablet (12.5 mg total) by mouth 2 (two) times daily with meals.    cholecalciferol, vitamin D3, (VITAMIN D3) 1,000 unit capsule Take 1,000 Units by mouth once daily.      cyanocobalamin, vitamin B-12, (VITAMIN B-12) 50 mcg tablet Take 50 mcg by mouth once daily.      FLAXSEED OIL ORAL Take 1,000 mg by mouth. 1  Oral Every day    folic acid (FOLVITE) 400 MCG tablet Take 400 mcg by mouth. 4 Tablet Oral Every day    mesalamine (ASACOL) 400 mg EC tablet Take 400 mg by mouth. 4 Tablet, Delayed Release (E.C.) Oral Twice a day     multivitamin (THERAGRAN) per tablet Take by mouth. 1  By mouth Every day    omega-3 fatty acids-vitamin E (FISH OIL) 1,000 mg Cap     omeprazole (PRILOSEC) 20 MG capsule Take 1 capsule (20 mg total) by mouth once daily.    ondansetron (ZOFRAN-ODT) 4 MG TbDL DISSOLVE 1 TABLET ON THE  "TONGUE EVERY EIGHT HOURS AS NEEDED    simvastatin (ZOCOR) 10 MG tablet Take 1 tablet (10 mg total) by mouth every evening.    vitamin E 1000 UNIT capsule Take by mouth. 1 Capsule Oral Every day     No current facility-administered medications for this visit.                Review of Systems   Constitution: Negative for decreased appetite, diaphoresis, fever, weakness, malaise/fatigue, weight gain and weight loss.   HENT: Negative for congestion, ear discharge, ear pain, headaches and nosebleeds.    Eyes: Negative for blurred vision, double vision and visual disturbance.   Cardiovascular: Negative for chest pain, claudication, cyanosis, dyspnea on exertion, irregular heartbeat, leg swelling, near-syncope, orthopnea, palpitations, paroxysmal nocturnal dyspnea and syncope.   Respiratory: Negative for cough, hemoptysis, shortness of breath, sleep disturbances due to breathing, snoring, sputum production and wheezing.    Endocrine: Negative for polydipsia, polyphagia and polyuria.   Hematologic/Lymphatic: Negative for adenopathy and bleeding problem. Does not bruise/bleed easily.   Skin: Negative for color change, nail changes, poor wound healing and rash.   Musculoskeletal: Negative for muscle cramps and muscle weakness.   Gastrointestinal: Negative for abdominal pain, anorexia, change in bowel habit, hematochezia, nausea and vomiting.   Genitourinary: Negative for dysuria, frequency and hematuria.   Neurological: Negative for brief paralysis, difficulty with concentration, excessive daytime sleepiness, dizziness, focal weakness, light-headedness, seizures and vertigo.   Psychiatric/Behavioral: Negative for altered mental status and depression.   Allergic/Immunologic: Negative for persistent infections.        Objective:/60 (BP Location: Left arm, Patient Position: Sitting, BP Method: Automatic)  Pulse 80  Ht 5' 8" (1.727 m)  Wt 104.7 kg (230 lb 13.2 oz)  BMI 35.1 kg/m2    Physical Exam   Constitutional: She " is oriented to person, place, and time. She appears well-developed and well-nourished.   obese   HENT:   Head: Normocephalic.   Right Ear: External ear normal.   Left Ear: External ear normal.   Nose: Nose normal.   Inspection of lips, teeth and gums normal   Eyes: Conjunctivae and EOM are normal. Pupils are equal, round, and reactive to light. No scleral icterus.   Neck: Normal range of motion. No JVD present. No tracheal deviation present. No thyromegaly present.   Cardiovascular: Normal rate, regular rhythm, normal heart sounds and intact distal pulses.  Exam reveals no gallop and no friction rub.    No murmur heard.  Pulses:       Dorsalis pedis pulses are 2+ on the right side, and 2+ on the left side.   Pulmonary/Chest: Effort normal and breath sounds normal. No respiratory distress. She has no wheezes. She has no rales. She exhibits no tenderness.   Abdominal: Soft. Bowel sounds are normal. She exhibits no distension. There is no hepatosplenomegaly. There is no tenderness. There is no guarding.   Musculoskeletal: Normal range of motion. She exhibits no edema or tenderness.   Lymphadenopathy:   Palpation of lymph nodes of neck and groin normal   Neurological: She is oriented to person, place, and time. No cranial nerve deficit. She exhibits normal muscle tone. Coordination normal.   Skin: Skin is warm and dry. No rash noted. No erythema. No pallor.   Palpation of skin normal   Psychiatric: She has a normal mood and affect. Her behavior is normal. Judgment and thought content normal.         Assessment:       1. Essential hypertension    2. Aortic atherosclerosis    3. Nonintractable migraine, unspecified migraine type    4. Pure hypercholesterolemia    5. Non morbid obesity due to excess calories         Plan:       Gabirel was seen today for hypertension.    Diagnoses and all orders for this visit:    Essential hypertension    Aortic atherosclerosis    Nonintractable migraine, unspecified migraine type    Pure  hypercholesterolemia    Non morbid obesity due to excess calories    Other orders  -     amlodipine (NORVASC) 10 MG tablet; Take 1 tablet (10 mg total) by mouth once daily.  -     carvedilol (COREG) 12.5 MG tablet; Take 1 tablet (12.5 mg total) by mouth 2 (two) times daily with meals.

## 2017-06-01 ENCOUNTER — OFFICE VISIT (OUTPATIENT)
Dept: OPTOMETRY | Facility: CLINIC | Age: 77
End: 2017-06-01
Payer: MEDICARE

## 2017-06-01 DIAGNOSIS — H25.13 NUCLEAR SCLEROSIS, BILATERAL: ICD-10-CM

## 2017-06-01 DIAGNOSIS — H35.431 COBBLESTONE RETINAL DEGENERATION, RIGHT: ICD-10-CM

## 2017-06-01 DIAGNOSIS — H53.19 PHOTOPSIA: ICD-10-CM

## 2017-06-01 DIAGNOSIS — Z13.5 SCREENING FOR EYE CONDITION: ICD-10-CM

## 2017-06-01 DIAGNOSIS — H26.9 CORTICAL CATARACT OF BOTH EYES: ICD-10-CM

## 2017-06-01 DIAGNOSIS — H43.391 VITREOUS FLOATERS OF RIGHT EYE: Primary | ICD-10-CM

## 2017-06-01 DIAGNOSIS — H43.811 POSTERIOR VITREOUS DETACHMENT OF RIGHT EYE: ICD-10-CM

## 2017-06-01 PROCEDURE — 92014 COMPRE OPH EXAM EST PT 1/>: CPT | Mod: S$GLB,,, | Performed by: OPTOMETRIST

## 2017-06-01 PROCEDURE — 99999 PR PBB SHADOW E&M-EST. PATIENT-LVL III: CPT | Mod: PBBFAC,,, | Performed by: OPTOMETRIST

## 2017-06-01 NOTE — PROGRESS NOTES
HPI     Concerns About Ocular Health    Additional comments: 3 month f/u on flashes and floaters in the right   eye.  Still aware of floater(s), but also still aware of flashes (at   night).  No such symptoms in the left eye.            Comments   Patient in for progress check.  Patient is in for a f/u   Being followed for (diagnosis):  Posterior vitreous detachment OD     Date last seen:  03/08/17    Doctor last seen:  Dr. Leigh     Prescribed eye medications(s) using:  None     OTC eye medication(s) using:  None     Signs/symptoms of condition resolved/better/stable/worse?:  Better , pt   still notices flashes of light, floaters but no increase.     Allergies/Medications reviewed today?  Yes               Last edited by aDrell Leigh, OD on 6/1/2017  2:00 PM. (History)            Assessment /Plan     For exam results, see Encounter Report.    1. Vitreous floaters of right eye     2. Posterior vitreous detachment of right eye     3. Cobblestone retinal degeneration, right     4. Photopsia     5. Screening for eye condition     6. Nuclear sclerosis, bilateral     7. Cortical cataract of both eyes                  S/P surgery to upper eyelids. (levator resection). Being followed by Dr. Pringle.  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  No evidence of secondary retinal tear/hole/break.  Note prior consult with  in the retina clinic.  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    Recheck in four months - or prior if she notes increased flashes, or increased floaters, or (sudden) decrease in VA in the interim.

## 2017-06-01 NOTE — PATIENT INSTRUCTIONS
S/P surgery to upper eyelids. (levator resection). Being followed by Dr. Pringle.  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  No evidence of secondary retinal tear/hole/break.  Note prior consult with  in the retina clinic.  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    Recheck in four months - or prior if she notes increased flashes, or increased floaters, or (sudden) decrease in VA in the interim.

## 2017-06-05 RX ORDER — CARVEDILOL 12.5 MG/1
TABLET ORAL
Qty: 60 TABLET | Refills: 0 | Status: SHIPPED | OUTPATIENT
Start: 2017-06-05 | End: 2018-02-20 | Stop reason: SDUPTHER

## 2017-10-10 ENCOUNTER — OFFICE VISIT (OUTPATIENT)
Dept: OPTOMETRY | Facility: CLINIC | Age: 77
End: 2017-10-10
Payer: MEDICARE

## 2017-10-10 DIAGNOSIS — Z13.5 SCREENING FOR EYE CONDITION: ICD-10-CM

## 2017-10-10 DIAGNOSIS — H53.19 PHOTOPSIA: ICD-10-CM

## 2017-10-10 DIAGNOSIS — H43.811 POSTERIOR VITREOUS DETACHMENT OF RIGHT EYE: Primary | ICD-10-CM

## 2017-10-10 DIAGNOSIS — H26.9 CORTICAL CATARACT OF BOTH EYES: ICD-10-CM

## 2017-10-10 DIAGNOSIS — H35.431 COBBLESTONE RETINAL DEGENERATION, RIGHT: ICD-10-CM

## 2017-10-10 DIAGNOSIS — H25.13 NUCLEAR SCLEROSIS, BILATERAL: ICD-10-CM

## 2017-10-10 PROCEDURE — 99999 PR PBB SHADOW E&M-EST. PATIENT-LVL II: CPT | Mod: PBBFAC,,, | Performed by: OPTOMETRIST

## 2017-10-10 PROCEDURE — 92014 COMPRE OPH EXAM EST PT 1/>: CPT | Mod: S$GLB,,, | Performed by: OPTOMETRIST

## 2017-10-10 NOTE — PROGRESS NOTES
HPI     Concerns About Ocular Health    Additional comments: 4 month f/u on vitreous detachment with flashes in   the right eye.  Rarely sees flashes now - saw once several days ago.   No   apparent decrease in VA with glasses.            Comments   Patient in for progress check.  Patient is in for a 4 month f/u   Being followed for (diagnosis):  Posterior vitreous detachment OD     Date last seen:  06/01/2017    Doctor last seen:  Dr. Leigh     Prescribed eye medications(s) using:  None     OTC eye medication(s) using:  None     Signs/symptoms of condition resolved/better/stable/worse?:  Better , but   patient still notices flashes of light only occasionally - aware of   floaters but no increase.     Allergies/Medications reviewed today?  Yes                   Last edited by Darell Leigh, OD on 10/10/2017  2:02 PM. (History)            Assessment /Plan     For exam results, see Encounter Report.    1. Posterior vitreous detachment of right eye     2. Cobblestone retinal degeneration, right     3. Photopsia     4. Screening for eye condition     5. Nuclear sclerosis, bilateral     6. Cortical cataract of both eyes                    S/P surgery to upper eyelids. (levator resection). Being followed by Dr. Pringle.  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  VA with glasses consistent with best corrected VA with last refraction in each eye.  Refraction not repeated.     Persistent symptoms of flashes (and floater) in the right eye although becoming less noticeable over time Prior diagnosis of posterior vitreous detachment in the right with pre-papillary Multani ring floater.  No evidence of secondary retinal tear/hole/break.  Note prior consult with  in the retina clinic.  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     Recheck in six months - or prior if she notes increased flashes, or increased floaters, or  (sudden) decrease in VA in the interim.

## 2017-10-10 NOTE — PATIENT INSTRUCTIONS
S/P surgery to upper eyelids. (levator resection). Being followed by Dr. Pringle.  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  VA with glasses consistent with best corrected VA with last refraction in each eye.  Refraction not repeated.     Persistent symptoms of flashes (and floater) in the right eye although becoming less noticeable over time Prior diagnosis of posterior vitreous detachment in the right with pre-papillary Multani ring floater.  No evidence of secondary retinal tear/hole/break.  Note prior consult with  in the retina clinic.  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     Recheck in six months - or prior if she notes increased flashes, or increased floaters, or (sudden) decrease in VA in the interim.

## 2017-12-26 RX ORDER — OMEPRAZOLE 20 MG/1
CAPSULE, DELAYED RELEASE ORAL
Qty: 90 CAPSULE | Refills: 4 | Status: SHIPPED | OUTPATIENT
Start: 2017-12-26 | End: 2018-02-20 | Stop reason: SDUPTHER

## 2017-12-26 RX ORDER — SIMVASTATIN 10 MG/1
TABLET, FILM COATED ORAL
Qty: 90 TABLET | Refills: 4 | Status: SHIPPED | OUTPATIENT
Start: 2017-12-26 | End: 2018-02-20 | Stop reason: SDUPTHER

## 2018-02-12 ENCOUNTER — TELEPHONE (OUTPATIENT)
Dept: GYNECOLOGIC ONCOLOGY | Facility: CLINIC | Age: 78
End: 2018-02-12

## 2018-02-12 DIAGNOSIS — Z12.31 SCREENING MAMMOGRAM, ENCOUNTER FOR: Primary | ICD-10-CM

## 2018-02-12 NOTE — TELEPHONE ENCOUNTER
----- Message from Jane Carson MD sent at 2/12/2018  3:51 PM CST -----  Contact: PREET LONDONO [[id number]]  Order placed. Please have Latisha schedule  ----- Message -----  From: Ruby Grijalva MA  Sent: 2/12/2018   3:32 PM  To: Jane Carson MD        ----- Message -----  From: Liyah Long  Sent: 2/12/2018   1:09 PM  To: [facility] Staff    x_  1st Request  _  2nd Request  _  3rd Request      Who: PREET LONDONO [[id number]]    Why: Patient states she would like a 3D MMG ordered so that she can have it scheduled sometime this week.  Patient states she would like a call back from Latisha.      What Number to Call Back: [phone]     When to Expect a call back: (Before the end of the day)   -- if call after 3:00 call back will be tomorrow.

## 2018-02-12 NOTE — TELEPHONE ENCOUNTER
Called patient to inform her that order for 3D mammogram were placed today and Latisha will be giving her a call to schedule appointment. Patient states understanding. Thanks

## 2018-02-14 ENCOUNTER — HOSPITAL ENCOUNTER (OUTPATIENT)
Dept: RADIOLOGY | Facility: HOSPITAL | Age: 78
Discharge: HOME | End: 2018-02-14
Attending: OBSTETRICS & GYNECOLOGY
Payer: MEDICARE

## 2018-02-14 VITALS — WEIGHT: 230 LBS | BODY MASS INDEX: 34.86 KG/M2 | HEIGHT: 68 IN

## 2018-02-14 DIAGNOSIS — Z12.31 SCREENING MAMMOGRAM, ENCOUNTER FOR: ICD-10-CM

## 2018-02-14 DIAGNOSIS — Z12.31 SCREENING MAMMOGRAM, ENCOUNTER FOR: Primary | ICD-10-CM

## 2018-02-14 PROCEDURE — 77063 BREAST TOMOSYNTHESIS BI: CPT | Mod: 26,,, | Performed by: RADIOLOGY

## 2018-02-14 PROCEDURE — 77067 SCR MAMMO BI INCL CAD: CPT | Mod: TC

## 2018-02-14 PROCEDURE — 77067 SCR MAMMO BI INCL CAD: CPT | Mod: 26,,, | Performed by: RADIOLOGY

## 2018-02-15 ENCOUNTER — TELEPHONE (OUTPATIENT)
Dept: GYNECOLOGIC ONCOLOGY | Facility: CLINIC | Age: 78
End: 2018-02-15

## 2018-02-15 NOTE — TELEPHONE ENCOUNTER
Called pt to give her normal mammogram results per Dr Carson no answer left a message via voice mail to call me back at [phone].  MA/LPN

## 2018-02-15 NOTE — TELEPHONE ENCOUNTER
----- Message from Mayra Portillo sent at 2/15/2018  2:06 PM CST -----  Contact: pt  _  1st Request  _  2nd Request  _  3rd Request        Who: pt    Why: Requesting a call back in regards to returned the nurse's phone call. Couldn't understand voice mail.  Please call pt     What Number to Call Back:[phone]    When to Expect a call back: (Within 24 hours)    Please return the call at earliest convenience. Thanks!

## 2018-02-20 ENCOUNTER — OFFICE VISIT (OUTPATIENT)
Dept: INTERNAL MEDICINE | Facility: CLINIC | Age: 78
End: 2018-02-20
Payer: MEDICARE

## 2018-02-20 ENCOUNTER — HOSPITAL ENCOUNTER (OUTPATIENT)
Dept: RADIOLOGY | Facility: HOSPITAL | Age: 78
Discharge: HOME | End: 2018-02-20
Attending: INTERNAL MEDICINE
Payer: MEDICARE

## 2018-02-20 ENCOUNTER — HOSPITAL ENCOUNTER (OUTPATIENT)
Dept: CARDIOLOGY | Facility: CLINIC | Age: 78
Discharge: HOME | End: 2018-02-20
Payer: MEDICARE

## 2018-02-20 VITALS
WEIGHT: 236.31 LBS | SYSTOLIC BLOOD PRESSURE: 138 MMHG | BODY MASS INDEX: 35.81 KG/M2 | HEART RATE: 82 BPM | HEIGHT: 68 IN | DIASTOLIC BLOOD PRESSURE: 84 MMHG

## 2018-02-20 DIAGNOSIS — I70.0 AORTIC ATHEROSCLEROSIS: ICD-10-CM

## 2018-02-20 DIAGNOSIS — G43.909 MIGRAINE WITHOUT STATUS MIGRAINOSUS, NOT INTRACTABLE, UNSPECIFIED MIGRAINE TYPE: ICD-10-CM

## 2018-02-20 DIAGNOSIS — E78.00 PURE HYPERCHOLESTEROLEMIA: ICD-10-CM

## 2018-02-20 DIAGNOSIS — R73.9 HYPERGLYCEMIA: ICD-10-CM

## 2018-02-20 DIAGNOSIS — K50.00 CROHN'S DISEASE OF SMALL INTESTINE WITHOUT COMPLICATION: ICD-10-CM

## 2018-02-20 DIAGNOSIS — I10 ESSENTIAL HYPERTENSION: ICD-10-CM

## 2018-02-20 DIAGNOSIS — I70.0 ATHEROSCLEROSIS OF AORTA: ICD-10-CM

## 2018-02-20 DIAGNOSIS — Z00.00 ROUTINE PHYSICAL EXAMINATION: Primary | ICD-10-CM

## 2018-02-20 DIAGNOSIS — E66.09 NON MORBID OBESITY DUE TO EXCESS CALORIES: ICD-10-CM

## 2018-02-20 DIAGNOSIS — D69.6 THROMBOCYTOPENIA: ICD-10-CM

## 2018-02-20 PROCEDURE — 99397 PER PM REEVAL EST PAT 65+ YR: CPT | Mod: S$GLB,,, | Performed by: INTERNAL MEDICINE

## 2018-02-20 PROCEDURE — 93000 ELECTROCARDIOGRAM COMPLETE: CPT | Mod: S$GLB,,, | Performed by: INTERNAL MEDICINE

## 2018-02-20 PROCEDURE — 71046 X-RAY EXAM CHEST 2 VIEWS: CPT | Mod: TC

## 2018-02-20 PROCEDURE — 99499 UNLISTED E&M SERVICE: CPT | Mod: S$GLB,,, | Performed by: INTERNAL MEDICINE

## 2018-02-20 PROCEDURE — 99999 PR PBB SHADOW E&M-EST. PATIENT-LVL III: CPT | Mod: PBBFAC,,, | Performed by: INTERNAL MEDICINE

## 2018-02-20 PROCEDURE — 71046 X-RAY EXAM CHEST 2 VIEWS: CPT | Mod: 26,,, | Performed by: RADIOLOGY

## 2018-02-20 RX ORDER — OMEPRAZOLE 20 MG/1
20 CAPSULE, DELAYED RELEASE ORAL DAILY
Qty: 90 CAPSULE | Refills: 4 | Status: SHIPPED | OUTPATIENT
Start: 2018-02-20 | End: 2019-02-27 | Stop reason: SDUPTHER

## 2018-02-20 RX ORDER — SIMVASTATIN 10 MG/1
10 TABLET, FILM COATED ORAL NIGHTLY
Qty: 90 TABLET | Refills: 4 | Status: SHIPPED | OUTPATIENT
Start: 2018-02-20 | End: 2019-02-27 | Stop reason: SDUPTHER

## 2018-02-20 RX ORDER — CARVEDILOL 12.5 MG/1
12.5 TABLET ORAL 2 TIMES DAILY WITH MEALS
Qty: 180 TABLET | Refills: 3 | Status: SHIPPED | OUTPATIENT
Start: 2018-02-20 | End: 2019-02-27 | Stop reason: SDUPTHER

## 2018-02-20 RX ORDER — CARVEDILOL 12.5 MG/1
TABLET ORAL
Qty: 180 TABLET | Refills: 3 | Status: SHIPPED | OUTPATIENT
Start: 2018-02-20 | End: 2018-11-13 | Stop reason: SDUPTHER

## 2018-02-20 RX ORDER — AMLODIPINE BESYLATE 10 MG/1
10 TABLET ORAL DAILY
Qty: 90 TABLET | Refills: 3 | Status: SHIPPED | OUTPATIENT
Start: 2018-02-20 | End: 2019-01-30 | Stop reason: SDUPTHER

## 2018-02-20 NOTE — PROGRESS NOTES
Patient, Gabriel Grace (MRN #[id number]), presented with a recorded BMI of 35.93 kg/m^2 and a documented comorbidity(s):  - Hypertension  - Hyperlipidemia  to which the severe obesity is a contributing factor. This is consistent with the definition of severe obesity (BMI 35.0-35.9) with comorbidity (ICD-10 E66.01, Z68.35). The patient's severe obesity was monitored, evaluated, addressed and/or treated. This addendum to the medical record is made on 02/20/2018.

## 2018-02-20 NOTE — PROGRESS NOTES
Subjective:       Patient ID: Gabriel Grace is a 77 y.o. female.    Chief Complaint: Annual Exam      Patient here for annual exam.  She has been under some stress because she had to renovate a bathroom on a toilet leaked.  She said it will be a 1-2 months job.  She remains overweight but is continuing to work on losing weight.  She will be seeing Cardiology next month.  She asked to continue getting EKG chest x-ray and labs.  She also needed prescriptions refilled.      Review of Systems   Constitutional: Negative for chills, fatigue, fever and unexpected weight change.   HENT: Negative for nosebleeds, sinus pain and sore throat.    Eyes: Negative for pain and visual disturbance.   Respiratory: Negative for apnea, cough, chest tightness and shortness of breath.    Cardiovascular: Negative for chest pain and leg swelling.   Gastrointestinal: Negative for abdominal pain, constipation, diarrhea, nausea and vomiting.   Genitourinary: Negative for frequency, hematuria and menstrual problem.   Musculoskeletal: Negative for arthralgias, joint swelling, neck pain and neck stiffness.   Skin: Negative for rash and wound.   Neurological: Negative for dizziness and headaches.   Hematological: Negative for adenopathy.   Psychiatric/Behavioral: Negative for dysphoric mood. The patient is not nervous/anxious.        Objective:      Physical Exam   Constitutional: She is oriented to person, place, and time. She appears well-developed and well-nourished.   Obese female     HENT:   Head: Normocephalic and atraumatic.   Right Ear: Tympanic membrane, external ear and ear canal normal.   Left Ear: Tympanic membrane, external ear and ear canal normal.   Nose: Nose normal.   Mouth/Throat: Oropharynx is clear and moist and mucous membranes are normal. No oropharyngeal exudate.   Eyes: Conjunctivae and EOM are normal. Pupils are equal, round, and reactive to light. Right eye exhibits no discharge. Left eye exhibits no discharge.   Neck:  Normal range of motion. Neck supple. No JVD present. No thyromegaly present.   Cardiovascular: Normal rate, regular rhythm, normal heart sounds and intact distal pulses.    No murmur heard.  Pulmonary/Chest: Effort normal and breath sounds normal. No respiratory distress. She has no wheezes. She has no rales.   Abdominal: Soft. Bowel sounds are normal. She exhibits no mass. There is no tenderness.   Musculoskeletal: Normal range of motion. She exhibits no edema or tenderness.   Lymphadenopathy:     She has no cervical adenopathy.        Right: No supraclavicular adenopathy present.        Left: No supraclavicular adenopathy present.   Neurological: She is alert and oriented to person, place, and time. She has normal reflexes. No cranial nerve deficit.   Skin: No rash noted.   Psychiatric: She has a normal mood and affect. Her behavior is normal. She does not exhibit a depressed mood.       Assessment:       1. Routine physical examination    2. Atherosclerosis of aorta    3. Aortic atherosclerosis    4. Crohn's disease of small intestine without complication    5. Essential hypertension    6. Pure hypercholesterolemia    7. Migraine without status migrainosus, not intractable, unspecified migraine type    8. Hyperglycemia    9. Non morbid obesity due to excess calories    10. Thrombocytopenia        Plan:       Gabriel was seen today for annual exam.    Diagnoses and all orders for this visit:    Routine physical examination    Atherosclerosis of aorta  Comments:  Continue to monitor vitals, labs and continue to see Cardiology.   Orders:  -     Lipid panel; Future  -     CBC auto differential; Future  -     Comprehensive metabolic panel; Future  -     TSH; Future  -     EKG 12-lead; Future  -     X-Ray Chest PA And Lateral; Future  -     Hemoglobin A1c; Future    Aortic atherosclerosis  Comments:  Continue to monitor vitals, labs and continue to see Cardiology.   Orders:  -     Lipid panel; Future  -     CBC auto  differential; Future  -     Comprehensive metabolic panel; Future  -     TSH; Future  -     EKG 12-lead; Future  -     X-Ray Chest PA And Lateral; Future  -     Hemoglobin A1c; Future    Crohn's disease of small intestine without complication  Comments:  Sees outside [facility]. Due for C-scope this year.   Orders:  -     Lipid panel; Future  -     CBC auto differential; Future  -     Comprehensive metabolic panel; Future  -     TSH; Future  -     EKG 12-lead; Future  -     X-Ray Chest PA And Lateral; Future  -     Hemoglobin A1c; Future    Essential hypertension  -     Lipid panel; Future  -     CBC auto differential; Future  -     Comprehensive metabolic panel; Future  -     TSH; Future  -     EKG 12-lead; Future  -     X-Ray Chest PA And Lateral; Future  -     Hemoglobin A1c; Future    Pure hypercholesterolemia  -     Lipid panel; Future  -     CBC auto differential; Future  -     Comprehensive metabolic panel; Future  -     TSH; Future  -     EKG 12-lead; Future  -     X-Ray Chest PA And Lateral; Future  -     Hemoglobin A1c; Future    Migraine without status migrainosus, not intractable, unspecified migraine type  -     Lipid panel; Future  -     CBC auto differential; Future  -     Comprehensive metabolic panel; Future  -     TSH; Future  -     EKG 12-lead; Future  -     X-Ray Chest PA And Lateral; Future  -     Hemoglobin A1c; Future    Hyperglycemia  -     Lipid panel; Future  -     CBC auto differential; Future  -     Comprehensive metabolic panel; Future  -     TSH; Future  -     EKG 12-lead; Future  -     X-Ray Chest PA And Lateral; Future  -     Hemoglobin A1c; Future    Non morbid obesity due to excess calories  -     Lipid panel; Future  -     CBC auto differential; Future  -     Comprehensive metabolic panel; Future  -     TSH; Future  -     EKG 12-lead; Future  -     X-Ray Chest PA And Lateral; Future  -     Hemoglobin A1c; Future    Thrombocytopenia  Comments:  Monitor labs. No bruising  Orders:  -      Lipid panel; Future  -     CBC auto differential; Future  -     Comprehensive metabolic panel; Future  -     TSH; Future  -     EKG 12-lead; Future  -     X-Ray Chest PA And Lateral; Future  -     Hemoglobin A1c; Future    Other orders  -     omeprazole (PRILOSEC) 20 MG capsule; Take 1 capsule (20 mg total) by mouth once daily.  -     simvastatin (ZOCOR) 10 MG tablet; Take 1 tablet (10 mg total) by mouth every evening.  -     carvedilol (COREG) 12.5 MG tablet; Take 1 tablet (12.5 mg total) by mouth 2 (two) times daily with meals.  -     amLODIPine (NORVASC) 10 MG tablet; Take 1 tablet (10 mg total) by mouth once daily.        Review all of the above.

## 2018-03-01 ENCOUNTER — TELEPHONE (OUTPATIENT)
Dept: INTERNAL MEDICINE | Facility: CLINIC | Age: 78
End: 2018-03-01

## 2018-03-01 ENCOUNTER — PES CALL (OUTPATIENT)
Dept: ADMINISTRATIVE | Facility: CLINIC | Age: 78
End: 2018-03-01

## 2018-03-01 NOTE — TELEPHONE ENCOUNTER
Please let pt know that the sugar and cholesterol averages look good. The Chest Xray was also stable compared to prior.

## 2018-03-08 ENCOUNTER — TELEPHONE (OUTPATIENT)
Dept: INTERNAL MEDICINE | Facility: CLINIC | Age: 78
End: 2018-03-08

## 2018-03-08 NOTE — TELEPHONE ENCOUNTER
Spoke to pt and she stated she did not receive results by mail. Results have been printed and mailed again

## 2018-03-08 NOTE — TELEPHONE ENCOUNTER
----- Message from Kyle Steinberg sent at 3/8/2018  1:24 PM CST -----  Contact: SELF/[phone]  Pt states that she needs EKG,blood and chest X-Ray results mailed to her home. Please call and advise.        Thank You

## 2018-05-12 ENCOUNTER — HOSPITAL ENCOUNTER (EMERGENCY)
Facility: HOSPITAL | Age: 78
Discharge: HOME | End: 2018-05-12
Attending: EMERGENCY MEDICINE
Payer: MEDICARE

## 2018-05-12 VITALS
HEART RATE: 72 BPM | HEIGHT: 68 IN | DIASTOLIC BLOOD PRESSURE: 72 MMHG | WEIGHT: 230 LBS | BODY MASS INDEX: 34.86 KG/M2 | TEMPERATURE: 99 F | RESPIRATION RATE: 16 BRPM | SYSTOLIC BLOOD PRESSURE: 140 MMHG | OXYGEN SATURATION: 100 %

## 2018-05-12 DIAGNOSIS — R07.9 CHEST PAIN: Primary | ICD-10-CM

## 2018-05-12 LAB
ALBUMIN SERPL BCP-MCNC: 3.8 G/DL
ALP SERPL-CCNC: 70 U/L
ALT SERPL W/O P-5'-P-CCNC: 14 U/L
ANION GAP SERPL CALC-SCNC: 9 MMOL/L
AST SERPL-CCNC: 19 U/L
BASOPHILS # BLD AUTO: 0.01 K/UL
BASOPHILS NFR BLD: 0.2 %
BILIRUB SERPL-MCNC: 0.6 MG/DL
BNP SERPL-MCNC: 70 PG/ML
BUN SERPL-MCNC: 21 MG/DL
CALCIUM SERPL-MCNC: 10.6 MG/DL
CHLORIDE SERPL-SCNC: 109 MMOL/L
CO2 SERPL-SCNC: 24 MMOL/L
CREAT SERPL-MCNC: 1 MG/DL
D DIMER PPP IA.FEU-MCNC: 0.94 MG/L FEU
DIFFERENTIAL METHOD: ABNORMAL
EOSINOPHIL # BLD AUTO: 0.2 K/UL
EOSINOPHIL NFR BLD: 2.5 %
ERYTHROCYTE [DISTWIDTH] IN BLOOD BY AUTOMATED COUNT: 13.4 %
EST. GFR  (AFRICAN AMERICAN): >60 ML/MIN/1.73 M^2
EST. GFR  (NON AFRICAN AMERICAN): 53.7 ML/MIN/1.73 M^2
GLUCOSE SERPL-MCNC: 162 MG/DL
HCT VFR BLD AUTO: 37.4 %
HGB BLD-MCNC: 12.7 G/DL
IMM GRANULOCYTES # BLD AUTO: 0.01 K/UL
IMM GRANULOCYTES NFR BLD AUTO: 0.2 %
LIPASE SERPL-CCNC: 28 U/L
LYMPHOCYTES # BLD AUTO: 1 K/UL
LYMPHOCYTES NFR BLD: 17.3 %
MCH RBC QN AUTO: 32.6 PG
MCHC RBC AUTO-ENTMCNC: 34 G/DL
MCV RBC AUTO: 96 FL
MONOCYTES # BLD AUTO: 0.3 K/UL
MONOCYTES NFR BLD: 4.7 %
NEUTROPHILS # BLD AUTO: 4.5 K/UL
NEUTROPHILS NFR BLD: 75.1 %
NRBC BLD-RTO: 0 /100 WBC
PLATELET # BLD AUTO: 96 K/UL
PMV BLD AUTO: 12.2 FL
POTASSIUM SERPL-SCNC: 3.9 MMOL/L
PROT SERPL-MCNC: 7.4 G/DL
RBC # BLD AUTO: 3.89 M/UL
SODIUM SERPL-SCNC: 142 MMOL/L
TROPONIN I SERPL DL<=0.01 NG/ML-MCNC: 0.01 NG/ML
TROPONIN I SERPL DL<=0.01 NG/ML-MCNC: <0.006 NG/ML
WBC # BLD AUTO: 6.01 K/UL

## 2018-05-12 PROCEDURE — 99284 EMERGENCY DEPT VISIT MOD MDM: CPT | Mod: 25

## 2018-05-12 PROCEDURE — 25500020 PHARM REV CODE 255: Performed by: EMERGENCY MEDICINE

## 2018-05-12 PROCEDURE — 85379 FIBRIN DEGRADATION QUANT: CPT

## 2018-05-12 PROCEDURE — 83880 ASSAY OF NATRIURETIC PEPTIDE: CPT

## 2018-05-12 PROCEDURE — 83690 ASSAY OF LIPASE: CPT

## 2018-05-12 PROCEDURE — 80053 COMPREHEN METABOLIC PANEL: CPT

## 2018-05-12 PROCEDURE — 85025 COMPLETE CBC W/AUTO DIFF WBC: CPT

## 2018-05-12 PROCEDURE — 84484 ASSAY OF TROPONIN QUANT: CPT

## 2018-05-12 PROCEDURE — 93005 ELECTROCARDIOGRAM TRACING: CPT

## 2018-05-12 PROCEDURE — 93010 ELECTROCARDIOGRAM REPORT: CPT | Mod: ,,, | Performed by: INTERNAL MEDICINE

## 2018-05-12 PROCEDURE — 25000003 PHARM REV CODE 250: Performed by: EMERGENCY MEDICINE

## 2018-05-12 PROCEDURE — 99285 EMERGENCY DEPT VISIT HI MDM: CPT | Mod: ,,, | Performed by: EMERGENCY MEDICINE

## 2018-05-12 RX ORDER — NAPROXEN SODIUM 220 MG/1
162 TABLET, FILM COATED ORAL
Status: COMPLETED | OUTPATIENT
Start: 2018-05-12 | End: 2018-05-12

## 2018-05-12 RX ADMIN — IOHEXOL 100 ML: 350 INJECTION, SOLUTION INTRAVENOUS at 02:05

## 2018-05-12 RX ADMIN — ASPIRIN 81 MG CHEWABLE TABLET 162 MG: 81 TABLET CHEWABLE at 10:05

## 2018-05-12 RX ADMIN — ALUMINUM HYDROXIDE, MAGNESIUM HYDROXIDE, AND SIMETHICONE 50 ML: 200; 200; 20 SUSPENSION ORAL at 10:05

## 2018-05-12 NOTE — ED PROVIDER NOTES
Encounter Date: 2018    SCRIBE #1 NOTE: I, Oren Costello, am scribing for, and in the presence of,  Dr. Fournier. I have scribed the entire note.       History     Chief Complaint   Patient presents with    Chest Pain     hx hiatal hernia, denies cardiac hx, hurts when taking deep breath, belching alot     Time patient was seen by the provider: 10:35 AM      The patient is a 78 y.o. female with co-morbidities including: Hiatal hernia, Ulcerative colitis, HLD, and HTN who presents to the ED with a complaint of intermittent pressure-like epigastric CP for 2-3 days duration that is worsened with deep breaths. Pt also complains of SOB, nausea, and increased belching. She denies any vomiting, diaphoresis, recent travel or immobilization, and hx of blood clots or tobacco use. Pt reports feeling stressed lately and states she has been compliant with her medications. Pt last had the CP 4 hours ago this morning. No abdominal pain.        The history is provided by the patient and medical records.     Review of patient's allergies indicates:  No Known Allergies  Past Medical History:   Diagnosis Date    Cataract     Hyperglycemia 10/2/2013    Hyperlipidemia     Hypertension     Migraine headache     Pancreas cyst 2017    Thyroid disease     hypothyroidism    Ulcerative colitis, unspecified     Ulcerative colitis, unspecified     Visit for screening mammogram 2016     Past Surgical History:   Procedure Laterality Date    APPENDECTOMY      CAROTID ENDARTERECTOMY       SECTION      x1    CHOLECYSTECTOMY      DILATION AND CURETTAGE OF UTERUS      HYSTERECTOMY  1973    fabien-lso and right salpingectomy. right ovary remains.    PARATHYROIDECTOMY      TONSILLECTOMY       Family History   Problem Relation Age of Onset    Hypertension Mother     Stroke Mother     Diabetes Mother         iddm    Heart disease Father          from mi    Heart attack Father     Parkinsonism Sister      Diabetes Sister     Diabetes Paternal Grandmother     Cancer Paternal Grandfather     Skin cancer Paternal Grandfather     No Known Problems Brother     No Known Problems Maternal Aunt     No Known Problems Maternal Uncle     No Known Problems Paternal Aunt     No Known Problems Paternal Uncle     No Known Problems Maternal Grandmother     No Known Problems Maternal Grandfather     Psoriasis Sister     Ovarian cancer Neg Hx     Uterine cancer Neg Hx     Breast cancer Neg Hx     Colon cancer Neg Hx     Amblyopia Neg Hx     Blindness Neg Hx     Cataracts Neg Hx     Glaucoma Neg Hx     Macular degeneration Neg Hx     Retinal detachment Neg Hx     Strabismus Neg Hx     Thyroid disease Neg Hx     Melanoma Neg Hx      Social History   Substance Use Topics    Smoking status: Never Smoker    Smokeless tobacco: Not on file    Alcohol use Yes      Comment: rare     Review of Systems   Constitutional: Negative for diaphoresis and fever.   HENT: Negative for sore throat.    Respiratory: Positive for shortness of breath.    Cardiovascular: Positive for chest pain (epiastric, intermittent, pressure-like).   Gastrointestinal: Positive for nausea. Negative for vomiting.        + increased belching   Genitourinary: Negative for dysuria.   Musculoskeletal: Negative for back pain.   Skin: Negative for rash.   Neurological: Negative for weakness.   Hematological: Does not bruise/bleed easily.       Physical Exam     Initial Vitals [05/12/18 1020]   BP Pulse Resp Temp SpO2   (!) 171/82 91 18 97.6 °F (36.4 °C) 96 %      MAP       111.67         Physical Exam    Nursing note and vitals reviewed.  Constitutional: She appears well-developed and well-nourished. She is not diaphoretic. No distress.   HENT:   Head: Normocephalic and atraumatic.   Eyes: Conjunctivae and EOM are normal.   Neck: Normal range of motion. Neck supple. No JVD present.   Cardiovascular: Normal rate, regular rhythm, normal heart sounds and  intact distal pulses. Exam reveals no gallop and no friction rub.    No murmur heard.  Pulmonary/Chest: Breath sounds normal. No respiratory distress. She has no wheezes. She has no rhonchi. She has no rales. She exhibits no tenderness.   No epigastric tenderness.    Abdominal: Soft. Bowel sounds are normal. She exhibits no distension and no mass. There is no tenderness. There is no rebound and no guarding.   Negative Major's sign.    Musculoskeletal: Normal range of motion.   Lymphadenopathy:     She has no cervical adenopathy.   Neurological: She is alert and oriented to person, place, and time. She has normal strength. No cranial nerve deficit or sensory deficit.   Skin: Skin is warm and dry. Capillary refill takes less than 2 seconds.   Psychiatric: She has a normal mood and affect.         ED Course   Procedures  Labs Reviewed   CBC W/ AUTO DIFFERENTIAL - Abnormal; Notable for the following:        Result Value    RBC 3.89 (*)     MCH 32.6 (*)     Platelets 96 (*)     Gran% 75.1 (*)     Lymph% 17.3 (*)     All other components within normal limits   COMPREHENSIVE METABOLIC PANEL - Abnormal; Notable for the following:     Glucose 162 (*)     Calcium 10.6 (*)     eGFR if non  53.7 (*)     All other components within normal limits   D DIMER, QUANTITATIVE - Abnormal; Notable for the following:     D-Dimer 0.94 (*)     All other components within normal limits    Narrative:     DDIMR added per Dr. Fournier, order ID [id number] 05/12/18 11:47   TROPONIN I   TROPONIN I   B-TYPE NATRIURETIC PEPTIDE   LIPASE     EKG Readings: (Independently Interpreted)   NSR at rate of 87 bpm. Left axis deviation. T-wave inversion in lead 3. ST segments are normal. No change from previous.      Imaging Results          CTA Chest Non-Coronary (PE Study) (Final result)  Result time 05/12/18 16:04:42    Final result by Arturo Corey MD (05/12/18 16:04:42)                 Impression:      No evidence of pulmonary  thromboembolism to the level of proximal segmental arteries noting slightly sub-optimal opacification of the pulmonary arterial system with contrast.    Degraded pulmonary parenchymal evaluation due to respiratory motion artifact.  Question of mosaic attenuation of pulmonary parenchyma which can be seen in the setting of small airway disease or increased pulmonary vascular resistance.    Left renal and hepatic cysts.  Small hiatal hernia.    Electronically signed by resident: Hebert Pavon  Date:    05/12/2018  Time:    15:38    Electronically signed by: Arturo Corey MD  Date:    05/12/2018  Time:    16:04             Narrative:    EXAMINATION:  CTA CHEST NON CORONARY    CLINICAL HISTORY:  Chest pain, acute, PE suspected, low pretest prob;    TECHNIQUE:  Low dose axial images, sagittal and coronal reformations were obtained from the thoracic inlet to the lung bases following the IV administration of 100 mL of Omnipaque 350.  Contrast timing was optimized to evaluate the pulmonary arteries.    COMPARISON:  CTA cardiac 08/19/2011.  CT of the abdomen and pelvis 01/27/2016.    FINDINGS:  Pulmonary vasculature: There is slightly suboptimal opacification of the pulmonary arterial system with contrast.  No filling defect identified within the pulmonary arterial system to the level of the proximal segmental arteries.    Aorta: Left-sided aortic arch.  No aneurysm and no significant atherosclerosis    Base of Neck: No significant abnormality.    Thoracic soft tissues: Normal.    Heart: Enlarged.    Jeanette/Mediastinum: No pathologic leonardo enlargement.  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    Airways: Patent.    Lungs/Pleura: Degraded evaluation due to respiratory motion artifact.  There is question of nonspecific mosaic attenuation of pulmonary parenchyma.  There is bandlike atelectasis at the lung bases.    Upper Abdomen: There are multiple hepatic cysts.  A large left  renal cyst is partially visualized. There is a small hiatal hernia.    Bones: No acute fracture. No suspicious lytic or sclerotic lesions.                               X-Ray Chest PA And Lateral (Final result)  Result time 05/12/18 11:29:40    Final result by Ian Alcantar DO (05/12/18 11:29:40)                 Impression:      See above      Electronically signed by: Ian Alcantar DO  Date:    05/12/2018  Time:    11:29             Narrative:    EXAMINATION:  XR CHEST PA AND LATERAL    CLINICAL HISTORY:  Chest Pain;    TECHNIQUE:  PA and lateral views of the chest were performed.    COMPARISON:  02/20/2018    FINDINGS:  Ill-defined left basilar opacity similar prior which may represent atelectasis and scarring.  There is no significant new lung opacity.  There is no pneumothorax or definite effusion.  Atherosclerotic aorta.  Borderline heart size unchanged.  Degenerative change in the visualized spine.                                         Medical Decision Making:   History:   Old Medical Records: I decided to obtain old medical records.  Initial Assessment:   79 yo female with hx of HTN, HLD, and UC presents with 2-3 days of epigastric chest pain. This patient's differential diagnosis includes, but is not limited to: acute myocardial infarction (AMI), acute coronary syndrome, pneumothorax, pleurisy, pericarditis, pneumoniaaortic dissection, thoracic aortic aneurysm, costochondritis, pulmonary embolus, trauma,  muscular pain, reflux, gallbladder pathology, pancreatitis, diaphragmatic irritation, hiatal hernia, gastritis.     Overall low suspicion for cardiac but, given age and risk factors, will obtain serum labs and CXR. Will administer aspirin and GI cocktail.     Reassessment:  Trop negative. Serum labs reviewed with pt. CXR without acute process. D-dimer elevated, will obtain CT PE. She reports no significant relief of sx s/p GI cocktail.     Reassessment:  Repeat trop negative. CT PE neg. She reports  pain improved. I see no evidence of acute emergent chest pain at this time.  Provided with extensive return precautions.       Independently Interpreted Test(s):   I have ordered and independently interpreted X-rays - see prior notes.  I have ordered and independently interpreted EKG Reading(s) - see prior notes  Clinical Tests:   Lab Tests: Ordered and Reviewed  Radiological Study: Ordered and Reviewed  Medical Tests: Ordered and Reviewed    Additional MDM:     Well's Criteria Score:  -Clinical symptoms of DVT (leg swelling, pain with palpation) = 0.0  -Other diagnosis less likely than pulmonary embolism =            0.0  -Heart Rate >100 =   0.0  -Immobilization (= or > than 3 days) or surgery in the previous 4 weeks = 0.0  -Previous DVT/PE = 0.0  -Hemoptysis =          0.0  -Malignancy =           0.0  Well's Probability Score =    0      Heart Score:    History:          Slightly suspicious.  ECG:             Normal  Age:               >65 years  Risk factors: 1-2 risk factors  Troponin:       Less than or equal to normal limit  Final Score: 3             Scribe Attestation:   Scribe #1: I performed the above scribed service and the documentation accurately describes the services I performed. I attest to the accuracy of the note.               Clinical Impression:   The encounter diagnosis was Chest pain.                           Naveen Fournier MD  05/12/18 2282

## 2018-05-12 NOTE — PROVIDER PROGRESS NOTES - EMERGENCY DEPT.
Encounter Date: 5/12/2018    ED Physician Progress Notes         EKG - STEMI Decision  Initial Reading: No STEMI present.    I, Suly Gonzalez, am scribing for, and in the presence of, Dr. Cottrell. I performed the above scribed service and the documentation accurately describes the services I performed. I attest to the accuracy of the note.

## 2018-05-12 NOTE — ED NOTES
Patient and family updated on POC. CT stating patient has approx 20minutes of waiting time before CTA. Patient resting comfortably, remains on tele. No complaints at this time.

## 2018-05-12 NOTE — ED NOTES
LOC: The patient is awake, alert and aware of environment with an appropriate affect, the patient is oriented x 3 and speaking appropriately.  APPEARANCE: Patient resting comfortably and in no acute distress, patient is clean and well groomed, patient's clothing is properly fastened.  SKIN: The skin is warm and dry, color consistent with ethnicity, patient has normal skin turgor and moist mucus membranes, skin intact, no breakdown or bruising noted.  MUSCULOSKELETAL: Patient moving all extremities spontaneously, no obvious swelling or deformities noted.  Tenderness to sternum on palpation.  RESPIRATORY: Airway is open and patent, respirations are spontaneous, patient has a normal effort and rate, no accessory muscle use noted.  CARDIAC: Patient has a normal rate and regular rhythm, no periphreal edema noted.  ABDOMEN: Soft and non tender to palpation, no distention noted.  + intermittent nausea  NEUROLOGIC:  facial expression is symmetrical, patient moving all extremities spontaneously, normal sensation in all extremities when touched with a finger.  Follows all commands appropriately.

## 2018-05-12 NOTE — ED TRIAGE NOTES
"Pt with intermittent epigastric pressure that radiates to the chest.  Pt states that the pain increases with deep breath.  Pt admits to "burping and belching" after eating.  Also with intermittent SOB and nausea.  "

## 2018-09-10 ENCOUNTER — PES CALL (OUTPATIENT)
Dept: ADMINISTRATIVE | Facility: CLINIC | Age: 78
End: 2018-09-10

## 2018-10-10 ENCOUNTER — TELEPHONE (OUTPATIENT)
Dept: INTERNAL MEDICINE | Facility: CLINIC | Age: 78
End: 2018-10-10

## 2018-10-10 NOTE — TELEPHONE ENCOUNTER
An assessment summary was received from [facility], operated by [facility].

## 2018-11-13 RX ORDER — CARVEDILOL 12.5 MG/1
TABLET ORAL
Qty: 180 TABLET | Refills: 3 | Status: SHIPPED | OUTPATIENT
Start: 2018-11-13 | End: 2019-02-27

## 2018-11-20 ENCOUNTER — TELEPHONE (OUTPATIENT)
Dept: INTERNAL MEDICINE | Facility: CLINIC | Age: 78
End: 2018-11-20

## 2018-11-20 NOTE — TELEPHONE ENCOUNTER
A summary of encounter, colonoscopy report and pathology results received from [facility].

## 2018-12-06 ENCOUNTER — TELEPHONE (OUTPATIENT)
Dept: INTERNAL MEDICINE | Facility: CLINIC | Age: 78
End: 2018-12-06

## 2019-01-12 ENCOUNTER — OFFICE VISIT (OUTPATIENT)
Dept: OPTOMETRY | Facility: CLINIC | Age: 79
End: 2019-01-12
Payer: MEDICARE

## 2019-01-12 DIAGNOSIS — H25.13 NUCLEAR SCLEROSIS, BILATERAL: ICD-10-CM

## 2019-01-12 DIAGNOSIS — H26.9 CORTICAL CATARACT OF BOTH EYES: ICD-10-CM

## 2019-01-12 DIAGNOSIS — H43.811 POSTERIOR VITREOUS DETACHMENT OF RIGHT EYE: Primary | ICD-10-CM

## 2019-01-12 DIAGNOSIS — H35.431 COBBLESTONE RETINAL DEGENERATION, RIGHT: ICD-10-CM

## 2019-01-12 DIAGNOSIS — H43.391 VITREOUS FLOATER, RIGHT: ICD-10-CM

## 2019-01-12 DIAGNOSIS — H52.4 PRESBYOPIA OF BOTH EYES: ICD-10-CM

## 2019-01-12 DIAGNOSIS — H52.203 ASTIGMATISM OF BOTH EYES, UNSPECIFIED TYPE: ICD-10-CM

## 2019-01-12 PROCEDURE — 99999 PR PBB SHADOW E&M-EST. PATIENT-LVL II: CPT | Mod: PBBFAC,HCNC,, | Performed by: OPTOMETRIST

## 2019-01-12 PROCEDURE — 99999 PR PBB SHADOW E&M-EST. PATIENT-LVL II: ICD-10-PCS | Mod: PBBFAC,HCNC,, | Performed by: OPTOMETRIST

## 2019-01-12 PROCEDURE — 92015 PR REFRACTION: ICD-10-PCS | Mod: HCNC,S$GLB,, | Performed by: OPTOMETRIST

## 2019-01-12 PROCEDURE — 92015 DETERMINE REFRACTIVE STATE: CPT | Mod: HCNC,S$GLB,, | Performed by: OPTOMETRIST

## 2019-01-12 PROCEDURE — 92014 PR EYE EXAM, EST PATIENT,COMPREHESV: ICD-10-PCS | Mod: HCNC,S$GLB,, | Performed by: OPTOMETRIST

## 2019-01-12 PROCEDURE — 92014 COMPRE OPH EXAM EST PT 1/>: CPT | Mod: HCNC,S$GLB,, | Performed by: OPTOMETRIST

## 2019-01-12 NOTE — PROGRESS NOTES
"HPI     Concerns About Ocular Health      Additional comments: General eye exam and refraction and refraction.   No acute ocular/vision problems.               Comments     Patient's age: 78 y.o. WF  Occupation: retired RN  Approximate date of last eye examination: 10/10/2017  Name of last eye doctor seen: [facility]/State: [place]  Wears glasses? Yes      If yes, wears  Full-time or part-time?  Full time    Approximate age of present glasses:  Unsure    Any problem with VA with glasses?  No problems with glasses   Wears CLs?:  no  Headaches?  no  Eye pain/discomfort?  No                                                                              Flashes?  no  Floaters?  Yes not often   Diplopia/Double vision?  no  Patient's Ocular History:         Any eye surgeries? no         Any eye injury?  no         Any treatment for eye disease?  no  Family history of eye disease?  no  Significant patient medical history:         1. Diabetes?  no       If yes, IDDM or NIDDM? n/a   2. HBP?  Yes controlled with med              3. Other (describe): High cholesterol, ulcerative colitis     ! OTC eyedrops currently using:  no   ! Prescription eye meds currently using:  no   ! Any history of allergy/adverse reaction to any eye meds used   previously?  no   ! Any history of allergy/adverse reaction to eyedrops used during prior   eye exam(s)? no   ! Any history of allergy/adverse reaction to Novacaine or similar meds?   no   ! Any history of allergy/adverse reaction to Epinephrine or similar meds?   no    ! Patient okay with use of anesthetic eyedrops to check eye pressure?    yes       ! Patient okay with use of eyedrops to dilate pupils today?  yes   !  Allergies/Medications/Medical History/Family History reviewed today?    yes      PD =   68/65  Desired reading distance =  19"                                                                        Last edited by Darell Leigh, OD on 1/12/2019  1:07 PM. (History) "            Assessment /Plan     For exam results, see Encounter Report.    1. Posterior vitreous detachment of right eye     2. Cobblestone retinal degeneration, right     3. Vitreous floater, right     4. Nuclear sclerosis, bilateral     5. Cortical cataract of both eyes     6. Astigmatism of both eyes, unspecified type     7. Presbyopia of both eyes                   S/P surgery to upper eyelids. (levator resection).  Nuclear sclerosis of lens of both eyes, and early cortical cataract in both eyes.  No need for cataract surgery in either eye.     Prior diagnosis of posterior vitreous detachment in the right with pre-papillary Multani ring floater.  No evidence of secondary retinal tear/hole/break.    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    Astigmatic refractive error in each eye, greater in the left eye than in the right eye.  Satisfactory best-corrected VA in each eye.  Presbyopia consistent with age  New spectacle lens Rx issued for use as desired.  Recommend full-time wear.    Recheck in one year - or prior if any problems noted in the interim

## 2019-01-12 NOTE — PATIENT INSTRUCTIONS
S/P surgery to upper eyelids. (levator resection).  Nuclear sclerosis of lens of both eyes, and early cortical cataract in both eyes.  No need for cataract surgery in either eye.     Prior diagnosis of posterior vitreous detachment in the right with pre-papillary Multani ring floater.  No evidence of secondary retinal tear/hole/break.    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    Astigmatic refractive error in each eye, greater in the left eye than in the right eye.  Satisfactory best-corrected VA in each eye.  Presbyopia consistent with age  New spectacle lens Rx issued for use as desired.  Recommend full-time wear.    Recheck in one year - or prior if any problems noted in the interim

## 2019-01-30 RX ORDER — AMLODIPINE BESYLATE 10 MG/1
TABLET ORAL
Qty: 90 TABLET | Refills: 3 | Status: SHIPPED | OUTPATIENT
Start: 2019-01-30 | End: 2019-02-27 | Stop reason: SDUPTHER

## 2019-02-25 ENCOUNTER — PATIENT OUTREACH (OUTPATIENT)
Dept: ADMINISTRATIVE | Facility: HOSPITAL | Age: 79
End: 2019-02-25

## 2019-02-25 ENCOUNTER — TELEPHONE (OUTPATIENT)
Dept: ADMINISTRATIVE | Facility: HOSPITAL | Age: 79
End: 2019-02-25

## 2019-02-25 DIAGNOSIS — I10 ESSENTIAL HYPERTENSION: Primary | ICD-10-CM

## 2019-02-25 DIAGNOSIS — I10 ESSENTIAL HYPERTENSION: ICD-10-CM

## 2019-02-25 DIAGNOSIS — R73.9 HYPERGLYCEMIA: ICD-10-CM

## 2019-02-25 DIAGNOSIS — I70.0 AORTIC ATHEROSCLEROSIS: ICD-10-CM

## 2019-02-25 DIAGNOSIS — E78.00 PURE HYPERCHOLESTEROLEMIA: ICD-10-CM

## 2019-02-25 DIAGNOSIS — Z00.00 ANNUAL PHYSICAL EXAM: ICD-10-CM

## 2019-02-25 DIAGNOSIS — Z12.39 SCREENING FOR MALIGNANT NEOPLASM OF BREAST: Primary | ICD-10-CM

## 2019-02-25 DIAGNOSIS — I70.0 AORTIC ATHEROSCLEROSIS: Primary | ICD-10-CM

## 2019-02-27 ENCOUNTER — HOSPITAL ENCOUNTER (OUTPATIENT)
Dept: RADIOLOGY | Facility: HOSPITAL | Age: 79
Discharge: HOME | End: 2019-02-27
Attending: INTERNAL MEDICINE
Payer: MEDICARE

## 2019-02-27 ENCOUNTER — OFFICE VISIT (OUTPATIENT)
Dept: INTERNAL MEDICINE | Facility: CLINIC | Age: 79
End: 2019-02-27
Payer: MEDICARE

## 2019-02-27 VITALS
WEIGHT: 243.19 LBS | SYSTOLIC BLOOD PRESSURE: 136 MMHG | DIASTOLIC BLOOD PRESSURE: 72 MMHG | OXYGEN SATURATION: 97 % | HEART RATE: 90 BPM | BODY MASS INDEX: 36.97 KG/M2

## 2019-02-27 DIAGNOSIS — S83.412A SPRAIN OF MEDIAL COLLATERAL LIGAMENT OF LEFT KNEE, INITIAL ENCOUNTER: ICD-10-CM

## 2019-02-27 DIAGNOSIS — Z00.00 ROUTINE PHYSICAL EXAMINATION: Primary | ICD-10-CM

## 2019-02-27 DIAGNOSIS — I70.0 ATHEROSCLEROSIS OF AORTA: ICD-10-CM

## 2019-02-27 DIAGNOSIS — R73.9 HYPERGLYCEMIA: ICD-10-CM

## 2019-02-27 DIAGNOSIS — Z12.39 SCREENING FOR MALIGNANT NEOPLASM OF BREAST: ICD-10-CM

## 2019-02-27 DIAGNOSIS — D69.6 THROMBOCYTOPENIA: ICD-10-CM

## 2019-02-27 DIAGNOSIS — I10 ESSENTIAL HYPERTENSION: ICD-10-CM

## 2019-02-27 DIAGNOSIS — K50.00 CROHN'S DISEASE OF SMALL INTESTINE WITHOUT COMPLICATION: ICD-10-CM

## 2019-02-27 DIAGNOSIS — E78.00 PURE HYPERCHOLESTEROLEMIA: ICD-10-CM

## 2019-02-27 PROCEDURE — 3078F DIAST BP <80 MM HG: CPT | Mod: HCNC,CPTII,S$GLB, | Performed by: INTERNAL MEDICINE

## 2019-02-27 PROCEDURE — 99499 UNLISTED E&M SERVICE: CPT | Mod: HCNC,S$GLB,, | Performed by: INTERNAL MEDICINE

## 2019-02-27 PROCEDURE — 77063 BREAST TOMOSYNTHESIS BI: CPT | Mod: 26,HCNC,, | Performed by: RADIOLOGY

## 2019-02-27 PROCEDURE — 99397 PR PREVENTIVE VISIT,EST,65 & OVER: ICD-10-PCS | Mod: HCNC,S$GLB,, | Performed by: INTERNAL MEDICINE

## 2019-02-27 PROCEDURE — 3078F PR MOST RECENT DIASTOLIC BLOOD PRESSURE < 80 MM HG: ICD-10-PCS | Mod: HCNC,CPTII,S$GLB, | Performed by: INTERNAL MEDICINE

## 2019-02-27 PROCEDURE — 3075F PR MOST RECENT SYSTOLIC BLOOD PRESS GE 130-139MM HG: ICD-10-PCS | Mod: HCNC,CPTII,S$GLB, | Performed by: INTERNAL MEDICINE

## 2019-02-27 PROCEDURE — 77063 MAMMO DIGITAL SCREENING BILAT WITH TOMOSYNTHESIS_CAD: ICD-10-PCS | Mod: 26,HCNC,, | Performed by: RADIOLOGY

## 2019-02-27 PROCEDURE — 77067 SCR MAMMO BI INCL CAD: CPT | Mod: 26,HCNC,, | Performed by: RADIOLOGY

## 2019-02-27 PROCEDURE — 99999 PR PBB SHADOW E&M-EST. PATIENT-LVL III: CPT | Mod: PBBFAC,HCNC,, | Performed by: INTERNAL MEDICINE

## 2019-02-27 PROCEDURE — 3075F SYST BP GE 130 - 139MM HG: CPT | Mod: HCNC,CPTII,S$GLB, | Performed by: INTERNAL MEDICINE

## 2019-02-27 PROCEDURE — 99397 PER PM REEVAL EST PAT 65+ YR: CPT | Mod: HCNC,S$GLB,, | Performed by: INTERNAL MEDICINE

## 2019-02-27 PROCEDURE — 77067 SCR MAMMO BI INCL CAD: CPT | Mod: TC,HCNC

## 2019-02-27 PROCEDURE — 99999 PR PBB SHADOW E&M-EST. PATIENT-LVL III: ICD-10-PCS | Mod: PBBFAC,HCNC,, | Performed by: INTERNAL MEDICINE

## 2019-02-27 PROCEDURE — 77067 MAMMO DIGITAL SCREENING BILAT WITH TOMOSYNTHESIS_CAD: ICD-10-PCS | Mod: 26,HCNC,, | Performed by: RADIOLOGY

## 2019-02-27 PROCEDURE — 99499 RISK ADDL DX/OHS AUDIT: ICD-10-PCS | Mod: S$GLB,,, | Performed by: INTERNAL MEDICINE

## 2019-02-27 RX ORDER — CARVEDILOL 12.5 MG/1
12.5 TABLET ORAL 2 TIMES DAILY WITH MEALS
Qty: 180 TABLET | Refills: 3 | Status: SHIPPED | OUTPATIENT
Start: 2019-02-27 | End: 2020-01-22 | Stop reason: SDUPTHER

## 2019-02-27 RX ORDER — OMEPRAZOLE 20 MG/1
20 CAPSULE, DELAYED RELEASE ORAL DAILY
Qty: 90 CAPSULE | Refills: 4 | Status: SHIPPED | OUTPATIENT
Start: 2019-02-27 | End: 2020-03-16

## 2019-02-27 RX ORDER — AMLODIPINE BESYLATE 10 MG/1
TABLET ORAL
Qty: 90 TABLET | Refills: 3 | Status: SHIPPED | OUTPATIENT
Start: 2019-02-27 | End: 2020-01-22 | Stop reason: SDUPTHER

## 2019-02-27 RX ORDER — SIMVASTATIN 10 MG/1
10 TABLET, FILM COATED ORAL NIGHTLY
Qty: 90 TABLET | Refills: 4 | Status: SHIPPED | OUTPATIENT
Start: 2019-02-27 | End: 2020-03-16

## 2019-02-27 NOTE — PROGRESS NOTES
Patient, Gabriel Grace (MRN #[id number]), presented with a recorded BMI of 36.97 kg/m^2 and a documented comorbidity(s):  - Hypertension  - Hyperlipidemia  to which the severe obesity is a contributing factor. This is consistent with the definition of severe obesity (BMI 35.0-39.9) with comorbidity (ICD-10 E66.01, Z68.35). The patient's severe obesity was monitored, evaluated, addressed and/or treated. This addendum to the medical record is made on 02/27/2019.

## 2019-02-27 NOTE — PROGRESS NOTES
Subjective:       Patient ID: Gabriel Grace is a 78 y.o. female.    Chief Complaint: Annual Exam    Patient history of Crohn's disease migraines, hyperglycemia, thrombocytopenia, obesity and arthritis comes in for annual exam.  She is up-to-date with vision screening.  She had her flu shot.  Labs are pending.  She did sprain her left knee but it is significantly improved.  She does not want to see Orthopedics at this time.  I reviewed all of her prescriptions.  We talked about the importance of weight loss, diet and exercise when she can get back to it.  I reviewed the ER visit and studies from last May.  She has not had any recurrences.      Review of Systems   Constitutional: Negative for chills, fatigue, fever and unexpected weight change.   HENT: Negative for nosebleeds, sinus pain and sore throat.    Eyes: Negative for pain and visual disturbance.   Respiratory: Negative for apnea, cough, chest tightness and shortness of breath.    Cardiovascular: Negative for chest pain and leg swelling.   Gastrointestinal: Negative for abdominal pain, constipation, diarrhea, nausea and vomiting.   Genitourinary: Negative for frequency, hematuria and menstrual problem.   Musculoskeletal: Positive for arthralgias and gait problem (Left knee pain medially). Negative for joint swelling, neck pain and neck stiffness.   Skin: Negative for rash and wound.   Neurological: Negative for dizziness and headaches.   Hematological: Negative for adenopathy.   Psychiatric/Behavioral: Negative for dysphoric mood. The patient is not nervous/anxious.        Objective:      Physical Exam   Constitutional: She is oriented to person, place, and time. She appears well-developed and well-nourished. No distress.   Obese female, NAD     HENT:   Head: Normocephalic and atraumatic.   Right Ear: External ear normal.   Left Ear: External ear normal.   Mouth/Throat: Oropharynx is clear and moist. No oropharyngeal exudate.   Eyes: Conjunctivae are normal.  Pupils are equal, round, and reactive to light. No scleral icterus.   Neck: Normal range of motion. Neck supple. No thyromegaly present.   Cardiovascular: Normal rate and regular rhythm.   No murmur heard.  Pulmonary/Chest: Effort normal and breath sounds normal. She has no wheezes.   Abdominal: Soft. Bowel sounds are normal. She exhibits no distension. There is no tenderness.   Musculoskeletal: She exhibits no tenderness.   Lymphadenopathy:     She has no cervical adenopathy.   Neurological: She is alert and oriented to person, place, and time.   Skin: No rash noted.   Psychiatric: She has a normal mood and affect.   Vitals reviewed.      Assessment:       1. Routine physical examination    2. Crohn's disease of small intestine without complication    3. Hyperglycemia    4. Thrombocytopenia    5. Essential hypertension    6. Pure hypercholesterolemia    7. Atherosclerosis of aorta    8. Sprain of medial collateral ligament of left knee, initial encounter        Plan:       Gabriel was seen today for annual exam.    Diagnoses and all orders for this visit:    Routine physical examination    Crohn's disease of small intestine without complication    Hyperglycemia    Thrombocytopenia  Comments:  Clinically stable.     Essential hypertension    Pure hypercholesterolemia    Atherosclerosis of aorta  Comments:  Clinically stable. Continue medication, monitor labs and Rxs .    Sprain of medial collateral ligament of left knee, initial encounter    Other orders  -     simvastatin (ZOCOR) 10 MG tablet; Take 1 tablet (10 mg total) by mouth every evening.  -     amLODIPine (NORVASC) 10 MG tablet; TAKE 1 TABLET ONE TIME DAILY  -     carvedilol (COREG) 12.5 MG tablet; Take 1 tablet (12.5 mg total) by mouth 2 (two) times daily with meals.  -     omeprazole (PRILOSEC) 20 MG capsule; Take 1 capsule (20 mg total) by mouth once daily.          Patient knows she needs to work on weight reduction.  She this has been slowed by her left  knee sprain this is getting better and she does not want to see Orthopedics at this time.  Review labs and sent patient a copy

## 2019-04-15 ENCOUNTER — PES CALL (OUTPATIENT)
Dept: ADMINISTRATIVE | Facility: CLINIC | Age: 79
End: 2019-04-15

## 2019-09-06 ENCOUNTER — PES CALL (OUTPATIENT)
Dept: ADMINISTRATIVE | Facility: CLINIC | Age: 79
End: 2019-09-06

## 2019-10-02 ENCOUNTER — PES CALL (OUTPATIENT)
Dept: ADMINISTRATIVE | Facility: CLINIC | Age: 79
End: 2019-10-02

## 2019-12-14 ENCOUNTER — HOSPITAL ENCOUNTER (EMERGENCY)
Facility: HOSPITAL | Age: 79
Discharge: HOME | End: 2019-12-14
Attending: EMERGENCY MEDICINE
Payer: MEDICARE

## 2019-12-14 VITALS
HEIGHT: 68 IN | OXYGEN SATURATION: 95 % | HEART RATE: 80 BPM | WEIGHT: 240 LBS | DIASTOLIC BLOOD PRESSURE: 69 MMHG | BODY MASS INDEX: 36.37 KG/M2 | SYSTOLIC BLOOD PRESSURE: 152 MMHG | TEMPERATURE: 98 F | RESPIRATION RATE: 20 BRPM

## 2019-12-14 DIAGNOSIS — L02.91 ABSCESS: Primary | ICD-10-CM

## 2019-12-14 PROCEDURE — 87070 CULTURE OTHR SPECIMN AEROBIC: CPT

## 2019-12-14 PROCEDURE — 99283 EMERGENCY DEPT VISIT LOW MDM: CPT | Mod: 25,HCNC

## 2019-12-14 PROCEDURE — 10060 PR DRAIN SKIN ABSCESS SIMPLE: ICD-10-PCS | Mod: LT,,, | Performed by: PHYSICIAN ASSISTANT

## 2019-12-14 PROCEDURE — 99284 PR EMERGENCY DEPT VISIT,LEVEL IV: ICD-10-PCS | Mod: 25,,, | Performed by: PHYSICIAN ASSISTANT

## 2019-12-14 PROCEDURE — 25000003 PHARM REV CODE 250: Performed by: PHYSICIAN ASSISTANT

## 2019-12-14 PROCEDURE — 10060 I&D ABSCESS SIMPLE/SINGLE: CPT | Mod: HCNC

## 2019-12-14 PROCEDURE — 87186 SC STD MICRODIL/AGAR DIL: CPT | Mod: HCNC

## 2019-12-14 PROCEDURE — 25000003 PHARM REV CODE 250: Performed by: EMERGENCY MEDICINE

## 2019-12-14 PROCEDURE — 87077 CULTURE AEROBIC IDENTIFY: CPT | Mod: HCNC

## 2019-12-14 PROCEDURE — 99284 EMERGENCY DEPT VISIT MOD MDM: CPT | Mod: 25,,, | Performed by: PHYSICIAN ASSISTANT

## 2019-12-14 PROCEDURE — 10060 I&D ABSCESS SIMPLE/SINGLE: CPT | Mod: LT,,, | Performed by: PHYSICIAN ASSISTANT

## 2019-12-14 RX ORDER — LIDOCAINE HYDROCHLORIDE 10 MG/ML
10 INJECTION, SOLUTION EPIDURAL; INFILTRATION; INTRACAUDAL; PERINEURAL
Status: COMPLETED | OUTPATIENT
Start: 2019-12-14 | End: 2019-12-14

## 2019-12-14 RX ORDER — SULFAMETHOXAZOLE AND TRIMETHOPRIM 800; 160 MG/1; MG/1
1 TABLET ORAL 2 TIMES DAILY
Qty: 14 TABLET | Refills: 0 | Status: SHIPPED | OUTPATIENT
Start: 2019-12-14 | End: 2019-12-19 | Stop reason: SDUPTHER

## 2019-12-14 RX ORDER — SULFAMETHOXAZOLE AND TRIMETHOPRIM 800; 160 MG/1; MG/1
1 TABLET ORAL
Status: COMPLETED | OUTPATIENT
Start: 2019-12-14 | End: 2019-12-14

## 2019-12-14 RX ORDER — ACETAMINOPHEN 500 MG
1000 TABLET ORAL
Status: COMPLETED | OUTPATIENT
Start: 2019-12-14 | End: 2019-12-14

## 2019-12-14 RX ADMIN — SULFAMETHOXAZOLE AND TRIMETHOPRIM 1 TABLET: 800; 160 TABLET ORAL at 09:12

## 2019-12-14 RX ADMIN — LIDOCAINE HYDROCHLORIDE 100 MG: 10 INJECTION, SOLUTION EPIDURAL; INFILTRATION; INTRACAUDAL; PERINEURAL at 09:12

## 2019-12-14 RX ADMIN — ACETAMINOPHEN 1000 MG: 500 TABLET ORAL at 08:12

## 2019-12-14 RX ADMIN — LIDOCAINE HYDROCHLORIDE 100 MG: 10 INJECTION, SOLUTION EPIDURAL; INFILTRATION; INTRACAUDAL; PERINEURAL at 08:12

## 2019-12-14 NOTE — ED PROVIDER NOTES
Encounter Date: 2019       History     Chief Complaint   Patient presents with    Abscess     79-year-old female with PMHx of hypothyroidism, headache, UC, hyperlipidemia, and pancreatic cyst who presents to the ED with c/o abscess. Per pt, she developed soreness to her left axilla about 6 days ago and gradually developed an abscess to the area a few days ago. Her abscess began draining 2-3 days ago. She reports mild associated pain, primarily to touch, and rated 4/10 currently. She has been taking Advil for pain relief. Per pt, she had a cyst drained in the same area a few years ago.  She has had multiple personal stressors with death and illness in the family. Denies fevers, chills, chest pain, SOB, abdominal pain, n/v, numbness, weakness, HA, urinary symptoms, or any other medical complaints. She takes a daily baby ASA, but denies any other blood thinners. Of note, pt is a retired RN.     The history is provided by the patient.     Review of patient's allergies indicates:  No Known Allergies  Past Medical History:   Diagnosis Date    Cataract     Hyperglycemia 10/2/2013    Hyperlipidemia     Hypertension     Migraine headache     Pancreas cyst 2017    Thyroid disease     hypothyroidism    Ulcerative colitis, unspecified     Ulcerative colitis, unspecified     Visit for screening mammogram 2016     Past Surgical History:   Procedure Laterality Date    APPENDECTOMY      CAROTID ENDARTERECTOMY       SECTION      x1    CHOLECYSTECTOMY      DILATION AND CURETTAGE OF UTERUS      HYSTERECTOMY  1973    fabien-lso and right salpingectomy. right ovary remains.    PARATHYROIDECTOMY      TONSILLECTOMY       Family History   Problem Relation Age of Onset    Hypertension Mother     Stroke Mother     Diabetes Mother         iddm    Heart disease Father          from mi    Heart attack Father     Parkinsonism Sister     Diabetes Sister     Diabetes Paternal Grandmother      Cancer Paternal Grandfather     Skin cancer Paternal Grandfather     No Known Problems Brother     No Known Problems Maternal Aunt     No Known Problems Maternal Uncle     No Known Problems Paternal Aunt     No Known Problems Paternal Uncle     No Known Problems Maternal Grandmother     No Known Problems Maternal Grandfather     Psoriasis Sister     Ovarian cancer Neg Hx     Uterine cancer Neg Hx     Breast cancer Neg Hx     Colon cancer Neg Hx     Amblyopia Neg Hx     Blindness Neg Hx     Cataracts Neg Hx     Glaucoma Neg Hx     Macular degeneration Neg Hx     Retinal detachment Neg Hx     Strabismus Neg Hx     Thyroid disease Neg Hx     Melanoma Neg Hx      Social History     Tobacco Use    Smoking status: Never Smoker   Substance Use Topics    Alcohol use: Yes     Comment: rare    Drug use: No     Review of Systems   Constitutional: Negative for chills and fever.   HENT: Negative for congestion.    Eyes: Negative for visual disturbance.   Respiratory: Negative for shortness of breath.    Cardiovascular: Negative for chest pain.   Gastrointestinal: Negative for abdominal pain, constipation, diarrhea, nausea and vomiting.   Genitourinary: Negative for dysuria, frequency and hematuria.   Musculoskeletal: Negative for back pain and neck pain.   Skin:        +abscess    Neurological: Negative for dizziness, weakness, light-headedness and headaches.   Hematological: Does not bruise/bleed easily.       Physical Exam     Initial Vitals [12/14/19 0813]   BP Pulse Resp Temp SpO2   (!) 167/79 100 16 98 °F (36.7 °C) 98 %      MAP       --         Physical Exam    Nursing note and vitals reviewed.  Constitutional: She appears well-developed and well-nourished.   HENT:   Head: Normocephalic and atraumatic.   Eyes: Conjunctivae and EOM are normal.   Neck: Normal range of motion. Neck supple.   Cardiovascular: Normal rate, regular rhythm, normal heart sounds and intact distal pulses.   Left radial pulse  2+   Pulmonary/Chest: Breath sounds normal. She has no wheezes. She has no rhonchi. She has no rales.   Abdominal: Soft. There is no tenderness. There is no rebound and no guarding.   Musculoskeletal: Normal range of motion. She exhibits no edema or tenderness.   Neurological: She is alert and oriented to person, place, and time. She has normal strength.   Sensation intact in upper extremities.    Skin: Skin is warm.   4 cm area of induration to left axilla with overlying fluctuance and purulent drainage, consistent with an abscess. Moderate amount of surrounding erythema and mild TTP. See picture below for details.    Psychiatric: She has a normal mood and affect.             ED Course   I & D - Incision and Drainage  Date/Time: 12/14/2019 9:45 AM  Location procedure was performed: [facility] EMERGENCY DEPARTMENT  Performed by: Yamileth Flores PA-C  Authorized by: Dot Merritt MD   Assisting provider: Yamileth Flores PA-C  Pre-operative diagnosis: abscess  Post-operative diagnosis: abscess  Consent Done: Yes  Consent: Verbal consent obtained.  Consent given by: patient  Patient identity confirmed: name  Type: abscess  Body area: upper extremity  Location details: left arm  Anesthesia: local infiltration    Anesthesia:  Local Anesthetic: lidocaine 1% without epinephrine  Anesthetic total: 15 mL  Description of findings: Scant amount of purulent discharge, large amount of cyst capsule removed   Scalpel size: 11  Incision type: single straight  Complexity: simple  Drainage: pus,  bloody,  purulent and  viscous  Drainage amount: moderate  Wound treatment: incision,  drainage,  deloculation,  expression of material,  removal of cyst capsule and  wound packed  Packing material: 1/2 in gauze  Complications: No  Specimens: No  Implants: No        Labs Reviewed   CULTURE, AEROBIC  (SPECIFY SOURCE)          Imaging Results    None          Medical Decision Making:   History:   Old Medical Records: I decided to obtain old  medical records.  Initial Assessment:   79-year-old female with PMHx of hypothyroidism, headache, UC, hyperlipidemia, and pancreatic cyst who presents to the ED with c/o abscess. Per pt, she developed soreness to her left axilla about 6 days ago and gradually developed an abscess to the area a few days ago. Her abscess began draining 2-3 days ago. VSS. Afebrile. RRR. Lungs CTA bilaterally. 4 cm area of induration to left axilla with overlying fluctuance and purulent drainage, consistent with an abscess. Moderate amount of surrounding erythema and mild TTP. See picture below for details.   Differential Diagnosis:   DDx includes but is not limited to abscess, sebaceous cyst, cellulites, hidradenitis suppurativa, MRSA infection.   Clinical Tests:   Lab Tests: Ordered  ED Management:  Pt is hemodynamically stable and in no acute distress. I&D performed in the ED, see procedure note for details. Aerobic culture obtained. Patient tolerated procedure well. Patient given acetaminophen as analgesic.  Will discharge with prescription for Bactrim, 1st dose given in the ED.  Prescription called into patient's preferred pharmacy.  Patient is stable for discharge with instructions to follow up with her PCP within 5-7 days.  Reviewed proper wound care.  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  All questions answered.  Patient expressed understanding and is agreeable with the plan.    I have discussed the treatment and management of this patient with my supervising physician, and we agree on the plan of care.      Yamileth Flores PA-C                                   Clinical Impression:       ICD-10-CM ICD-9-CM   1. Abscess L02.91 682.9         Disposition:   Disposition: Discharged  Condition: Stable                     Yamileth Flores PA-C  12/14/19 0953

## 2019-12-14 NOTE — ED NOTES
Pt's first and last name and birthday confirmed.   LOC: The patient is awake, alert and aware of environment with an appropriate affect, the patient is oriented x 3 and speaking appropriately.  APPEARANCE: Patient resting comfortably and in no acute distress, patient is clean and well groomed.  SKIN: The skin is warm and dry, patient has normal skin turgor and moist mucus membranes, skin intact, no breakdown or brusing noted. Redness and swelling noted to the left axilla. No drainage noted.   MUSKULOSKELETAL: Patient moving all extremities well, no obvious swelling or deformities noted.  RESPIRATORY: Airway is open and patent, respirations are spontaneous, patient has a normal effort and rate.   NEURO: No neuro deficits, hand grasp equal, no drift noted, no facial droop noted. Speech is clear.

## 2019-12-14 NOTE — DISCHARGE INSTRUCTIONS
Please follow up with Dr Mancilla within the next 5-7 days. Please take Bactrim twice daily for 7 days. You should keep wound clean and dry. Clean with soap and water. Return to the ER if your develop any signs of worsening infection, fever, or any other concerning symptoms.

## 2019-12-16 LAB — BACTERIA SPEC AEROBE CULT: ABNORMAL

## 2019-12-19 ENCOUNTER — OFFICE VISIT (OUTPATIENT)
Dept: INTERNAL MEDICINE | Facility: CLINIC | Age: 79
End: 2019-12-19
Payer: MEDICARE

## 2019-12-19 VITALS
HEIGHT: 68 IN | HEART RATE: 82 BPM | OXYGEN SATURATION: 98 % | DIASTOLIC BLOOD PRESSURE: 68 MMHG | BODY MASS INDEX: 34.59 KG/M2 | SYSTOLIC BLOOD PRESSURE: 132 MMHG | WEIGHT: 228.19 LBS | TEMPERATURE: 99 F

## 2019-12-19 DIAGNOSIS — R21 RASH: ICD-10-CM

## 2019-12-19 DIAGNOSIS — K50.00 CROHN'S DISEASE OF SMALL INTESTINE WITHOUT COMPLICATION: ICD-10-CM

## 2019-12-19 DIAGNOSIS — L02.419 AXILLARY ABSCESS: Primary | ICD-10-CM

## 2019-12-19 DIAGNOSIS — I10 ESSENTIAL HYPERTENSION: ICD-10-CM

## 2019-12-19 PROCEDURE — 3075F PR MOST RECENT SYSTOLIC BLOOD PRESS GE 130-139MM HG: ICD-10-PCS | Mod: HCNC,CPTII,S$GLB, | Performed by: INTERNAL MEDICINE

## 2019-12-19 PROCEDURE — 1101F PT FALLS ASSESS-DOCD LE1/YR: CPT | Mod: HCNC,CPTII,S$GLB, | Performed by: INTERNAL MEDICINE

## 2019-12-19 PROCEDURE — 1159F PR MEDICATION LIST DOCUMENTED IN MEDICAL RECORD: ICD-10-PCS | Mod: HCNC,S$GLB,, | Performed by: INTERNAL MEDICINE

## 2019-12-19 PROCEDURE — 99999 PR PBB SHADOW E&M-EST. PATIENT-LVL IV: ICD-10-PCS | Mod: PBBFAC,HCNC,, | Performed by: INTERNAL MEDICINE

## 2019-12-19 PROCEDURE — 1126F AMNT PAIN NOTED NONE PRSNT: CPT | Mod: HCNC,S$GLB,, | Performed by: INTERNAL MEDICINE

## 2019-12-19 PROCEDURE — 99214 PR OFFICE/OUTPT VISIT, EST, LEVL IV, 30-39 MIN: ICD-10-PCS | Mod: HCNC,S$GLB,, | Performed by: INTERNAL MEDICINE

## 2019-12-19 PROCEDURE — 99214 OFFICE O/P EST MOD 30 MIN: CPT | Mod: HCNC,S$GLB,, | Performed by: INTERNAL MEDICINE

## 2019-12-19 PROCEDURE — 1126F PR PAIN SEVERITY QUANTIFIED, NO PAIN PRESENT: ICD-10-PCS | Mod: HCNC,S$GLB,, | Performed by: INTERNAL MEDICINE

## 2019-12-19 PROCEDURE — 3078F PR MOST RECENT DIASTOLIC BLOOD PRESSURE < 80 MM HG: ICD-10-PCS | Mod: HCNC,CPTII,S$GLB, | Performed by: INTERNAL MEDICINE

## 2019-12-19 PROCEDURE — 1159F MED LIST DOCD IN RCRD: CPT | Mod: HCNC,S$GLB,, | Performed by: INTERNAL MEDICINE

## 2019-12-19 PROCEDURE — 3078F DIAST BP <80 MM HG: CPT | Mod: HCNC,CPTII,S$GLB, | Performed by: INTERNAL MEDICINE

## 2019-12-19 PROCEDURE — 99999 PR PBB SHADOW E&M-EST. PATIENT-LVL IV: CPT | Mod: PBBFAC,HCNC,, | Performed by: INTERNAL MEDICINE

## 2019-12-19 PROCEDURE — 1101F PR PT FALLS ASSESS DOC 0-1 FALLS W/OUT INJ PAST YR: ICD-10-PCS | Mod: HCNC,CPTII,S$GLB, | Performed by: INTERNAL MEDICINE

## 2019-12-19 PROCEDURE — 3075F SYST BP GE 130 - 139MM HG: CPT | Mod: HCNC,CPTII,S$GLB, | Performed by: INTERNAL MEDICINE

## 2019-12-19 RX ORDER — SULFAMETHOXAZOLE AND TRIMETHOPRIM 800; 160 MG/1; MG/1
1 TABLET ORAL 2 TIMES DAILY
Qty: 14 TABLET | Refills: 0 | Status: SHIPPED | OUTPATIENT
Start: 2019-12-19 | End: 2019-12-26

## 2019-12-19 NOTE — PROGRESS NOTES
Subjective:       Patient ID: Gabriel Grace is a 79 y.o. female.    Chief Complaint: Recurrent Skin Infections (Left underarm was lanced on saturday patient pulled packin out yesterday,taking bactrim )    HPI:  Patient comes in for ER follow-up for abscess under the left axilla.  It was lanced and drained and she was placed on Bactrim.  She says the drainage has diminished and is much less swollen and less tender.  There is a secondary rash on the arm but she thinks it is from the bandage in the drainage. She is going out of town in 3 days and was instructed to get checked.    Review of Systems   Constitutional: Negative for appetite change, chills and fever.   HENT: Negative for nosebleeds, sinus pain and sore throat.    Eyes: Negative for visual disturbance.   Respiratory: Negative for cough, shortness of breath and wheezing.    Cardiovascular: Negative for chest pain and leg swelling.   Gastrointestinal: Negative for abdominal pain, constipation and diarrhea.   Genitourinary: Negative for difficulty urinating and hematuria.   Musculoskeletal: Positive for arthralgias. Negative for neck pain and neck stiffness.   Skin: Positive for rash and wound (Left axilla). Negative for pallor.   Neurological: Negative for headaches.   Psychiatric/Behavioral: Negative for dysphoric mood and suicidal ideas. The patient is not nervous/anxious.        Objective:      Physical Exam   Constitutional: She appears well-developed and well-nourished.   HENT:   Head: Normocephalic and atraumatic.   Cardiovascular: Normal rate and regular rhythm.   Pulmonary/Chest: Effort normal and breath sounds normal.   Abdominal: Soft. There is no tenderness.   Musculoskeletal: She exhibits tenderness (Mild arthritis changes with decreased range of motion of the left shoulder.).   Skin: Rash ( Read homogeneous rash involving the axilla.  No masses or drainage.) noted. There is erythema ( abscess with induration actually near the proximal medial aspect  the left arm at the axilla.  There is some extension toward the axillary region.  The area of induration is still about 1 in in diameter but there was no drainage expressed.).       Assessment:       1. Axillary abscess    2. Rash    3. Crohn's disease of small intestine without complication    4. Essential hypertension        Plan:       Gabriel was seen today for recurrent skin infections.    Diagnoses and all orders for this visit:    Axillary abscess  Comments:  Lanced in ER and placed on Bactrim. Cx grew Proteus and is sensitive to Bactrim.     Rash    Crohn's disease of small intestine without complication    Essential hypertension    Other orders  -     sulfamethoxazole-trimethoprim 800-160mg (BACTRIM DS) 800-160 mg Tab; Take 1 tablet by mouth 2 (two) times daily. for 7 days     Continue warm compresses.  Continue antibiotics and I will give her an additional few days to extend on her trip since I do not think this will be fully resolved.  Topical treatment for rash along with observation.  Follow-up locally when on vacation during the holidays or follow-up with us when she returns

## 2020-01-22 RX ORDER — AMLODIPINE BESYLATE 10 MG/1
TABLET ORAL
Qty: 90 TABLET | Refills: 3 | Status: SHIPPED | OUTPATIENT
Start: 2020-01-22 | End: 2020-10-29

## 2020-01-22 RX ORDER — CARVEDILOL 12.5 MG/1
TABLET ORAL
Qty: 180 TABLET | Refills: 3 | Status: SHIPPED | OUTPATIENT
Start: 2020-01-22 | End: 2020-10-29

## 2020-02-27 ENCOUNTER — HOSPITAL ENCOUNTER (INPATIENT)
Facility: HOSPITAL | Age: 80
LOS: 1 days | Discharge: HOME | DRG: 176 | End: 2020-02-28
Attending: EMERGENCY MEDICINE | Admitting: HOSPITALIST
Payer: MEDICARE

## 2020-02-27 DIAGNOSIS — I26.99 PULMONARY EMBOLISM, UNSPECIFIED CHRONICITY, UNSPECIFIED PULMONARY EMBOLISM TYPE, UNSPECIFIED WHETHER ACUTE COR PULMONALE PRESENT: Primary | ICD-10-CM

## 2020-02-27 DIAGNOSIS — I26.99 PULMONARY EMBOLISM: ICD-10-CM

## 2020-02-27 DIAGNOSIS — M54.9 BACK PAIN: ICD-10-CM

## 2020-02-27 DIAGNOSIS — M54.9 BACK PAIN, UNSPECIFIED BACK LOCATION, UNSPECIFIED BACK PAIN LATERALITY, UNSPECIFIED CHRONICITY: ICD-10-CM

## 2020-02-27 PROCEDURE — 12000002 HC ACUTE/MED SURGE SEMI-PRIVATE ROOM: Mod: HCNC

## 2020-02-27 PROCEDURE — 93010 EKG 12-LEAD: ICD-10-PCS | Mod: HCNC,,, | Performed by: INTERNAL MEDICINE

## 2020-02-27 PROCEDURE — 93005 ELECTROCARDIOGRAM TRACING: CPT | Mod: HCNC

## 2020-02-27 PROCEDURE — 99285 EMERGENCY DEPT VISIT HI MDM: CPT | Mod: 25,HCNC

## 2020-02-27 PROCEDURE — 96361 HYDRATE IV INFUSION ADD-ON: CPT | Mod: HCNC

## 2020-02-27 PROCEDURE — 87502 INFLUENZA DNA AMP PROBE: CPT | Mod: HCNC

## 2020-02-27 PROCEDURE — 93010 ELECTROCARDIOGRAM REPORT: CPT | Mod: HCNC,,, | Performed by: INTERNAL MEDICINE

## 2020-02-27 PROCEDURE — 96374 THER/PROPH/DIAG INJ IV PUSH: CPT | Mod: HCNC

## 2020-02-27 PROCEDURE — 99285 PR EMERGENCY DEPT VISIT,LEVEL V: ICD-10-PCS | Mod: HCNC,,, | Performed by: PHYSICIAN ASSISTANT

## 2020-02-27 PROCEDURE — 99285 EMERGENCY DEPT VISIT HI MDM: CPT | Mod: HCNC,,, | Performed by: PHYSICIAN ASSISTANT

## 2020-02-28 VITALS
HEART RATE: 101 BPM | BODY MASS INDEX: 30.3 KG/M2 | OXYGEN SATURATION: 97 % | WEIGHT: 199.94 LBS | RESPIRATION RATE: 20 BRPM | SYSTOLIC BLOOD PRESSURE: 138 MMHG | DIASTOLIC BLOOD PRESSURE: 65 MMHG | TEMPERATURE: 98 F | HEIGHT: 68 IN

## 2020-02-28 PROBLEM — K51.90 ULCERATIVE COLITIS: Status: ACTIVE | Noted: 2020-02-28

## 2020-02-28 PROBLEM — Z12.9 CANCER SCREENING: Status: ACTIVE | Noted: 2020-02-28

## 2020-02-28 PROBLEM — I26.99 PULMONARY EMBOLISM: Status: ACTIVE | Noted: 2020-02-28

## 2020-02-28 LAB
ALBUMIN SERPL BCP-MCNC: 3.1 G/DL (ref 3.5–5.2)
ALP SERPL-CCNC: 70 U/L (ref 55–135)
ALT SERPL W/O P-5'-P-CCNC: 14 U/L (ref 10–44)
AMORPH CRY UR QL COMP ASSIST: ABNORMAL
ANION GAP SERPL CALC-SCNC: 11 MMOL/L (ref 8–16)
ASCENDING AORTA: 3.13 CM
AST SERPL-CCNC: 23 U/L (ref 10–40)
BACTERIA #/AREA URNS AUTO: ABNORMAL /HPF
BASOPHILS # BLD AUTO: 0.02 K/UL (ref 0–0.2)
BASOPHILS NFR BLD: 0.2 % (ref 0–1.9)
BILIRUB SERPL-MCNC: 0.5 MG/DL (ref 0.1–1)
BILIRUB UR QL STRIP: NEGATIVE
BNP SERPL-MCNC: 50 PG/ML (ref 0–99)
BSA FOR ECHO PROCEDURE: 2.09 M2
BUN SERPL-MCNC: 26 MG/DL (ref 8–23)
CALCIUM SERPL-MCNC: 10.4 MG/DL (ref 8.7–10.5)
CHLORIDE SERPL-SCNC: 107 MMOL/L (ref 95–110)
CLARITY UR REFRACT.AUTO: ABNORMAL
CO2 SERPL-SCNC: 22 MMOL/L (ref 23–29)
COLOR UR AUTO: ABNORMAL
CREAT SERPL-MCNC: 1 MG/DL (ref 0.5–1.4)
CRP SERPL-MCNC: 181.9 MG/L (ref 0–8.2)
CV ECHO LV RWT: 0.52 CM
DIFFERENTIAL METHOD: ABNORMAL
DOP CALC LVOT AREA: 3.2 CM2
DOP CALC LVOT DIAMETER: 2.02 CM
ECHO LV POSTERIOR WALL: 1.14 CM (ref 0.6–1.1)
EOSINOPHIL # BLD AUTO: 0 K/UL (ref 0–0.5)
EOSINOPHIL NFR BLD: 0.2 % (ref 0–8)
ERYTHROCYTE [DISTWIDTH] IN BLOOD BY AUTOMATED COUNT: 13.6 % (ref 11.5–14.5)
ERYTHROCYTE [SEDIMENTATION RATE] IN BLOOD BY WESTERGREN METHOD: 59 MM/HR (ref 0–36)
EST. GFR  (AFRICAN AMERICAN): >60 ML/MIN/1.73 M^2
EST. GFR  (NON AFRICAN AMERICAN): 53.3 ML/MIN/1.73 M^2
FRACTIONAL SHORTENING: 33 % (ref 28–44)
GLUCOSE SERPL-MCNC: 118 MG/DL (ref 70–110)
GLUCOSE UR QL STRIP: NEGATIVE
HCT VFR BLD AUTO: 34.6 % (ref 37–48.5)
HGB BLD-MCNC: 10.7 G/DL (ref 12–16)
HGB UR QL STRIP: NEGATIVE
HYALINE CASTS UR QL AUTO: 7 /LPF
IMM GRANULOCYTES # BLD AUTO: 0.02 K/UL (ref 0–0.04)
IMM GRANULOCYTES NFR BLD AUTO: 0.2 % (ref 0–0.5)
INFLUENZA A, MOLECULAR: NEGATIVE
INFLUENZA B, MOLECULAR: NEGATIVE
INTERVENTRICULAR SEPTUM: 1.15 CM (ref 0.6–1.1)
KETONES UR QL STRIP: ABNORMAL
LA MAJOR: 7.1 CM
LA MINOR: 6.49 CM
LA WIDTH: 3.71 CM
LEFT ATRIUM SIZE: 4.41 CM
LEFT ATRIUM VOLUME INDEX: 46.1 ML/M2
LEFT ATRIUM VOLUME: 94.31 CM3
LEFT INTERNAL DIMENSION IN SYSTOLE: 2.93 CM (ref 2.1–4)
LEFT VENTRICLE DIASTOLIC VOLUME INDEX: 42.65 ML/M2
LEFT VENTRICLE DIASTOLIC VOLUME: 87.18 ML
LEFT VENTRICLE MASS INDEX: 87 G/M2
LEFT VENTRICLE SYSTOLIC VOLUME INDEX: 16.1 ML/M2
LEFT VENTRICLE SYSTOLIC VOLUME: 32.98 ML
LEFT VENTRICULAR INTERNAL DIMENSION IN DIASTOLE: 4.39 CM (ref 3.5–6)
LEFT VENTRICULAR MASS: 178.21 G
LEUKOCYTE ESTERASE UR QL STRIP: ABNORMAL
LYMPHOCYTES # BLD AUTO: 1.2 K/UL (ref 1–4.8)
LYMPHOCYTES NFR BLD: 12.5 % (ref 18–48)
MCH RBC QN AUTO: 30.7 PG (ref 27–31)
MCHC RBC AUTO-ENTMCNC: 30.9 G/DL (ref 32–36)
MCV RBC AUTO: 99 FL (ref 82–98)
MICROSCOPIC COMMENT: ABNORMAL
MONOCYTES # BLD AUTO: 0.9 K/UL (ref 0.3–1)
MONOCYTES NFR BLD: 9.5 % (ref 4–15)
NEUTROPHILS # BLD AUTO: 7.6 K/UL (ref 1.8–7.7)
NEUTROPHILS NFR BLD: 77.4 % (ref 38–73)
NITRITE UR QL STRIP: NEGATIVE
NRBC BLD-RTO: 0 /100 WBC
PH UR STRIP: 5 [PH] (ref 5–8)
PISA TR MAX VEL: 2.75 M/S
PLATELET # BLD AUTO: 148 K/UL (ref 150–350)
PMV BLD AUTO: 12.3 FL (ref 9.2–12.9)
POTASSIUM SERPL-SCNC: 4 MMOL/L (ref 3.5–5.1)
PROT SERPL-MCNC: 7.2 G/DL (ref 6–8.4)
PROT UR QL STRIP: ABNORMAL
RA MAJOR: 6.87 CM
RA PRESSURE: 3 MMHG
RA WIDTH: 3.77 CM
RBC # BLD AUTO: 3.48 M/UL (ref 4–5.4)
RBC #/AREA URNS AUTO: 2 /HPF (ref 0–4)
RIGHT VENTRICULAR END-DIASTOLIC DIMENSION: 4.03 CM
SINUS: 3.07 CM
SODIUM SERPL-SCNC: 140 MMOL/L (ref 136–145)
SP GR UR STRIP: >1.03 (ref 1–1.03)
SPECIMEN SOURCE: NORMAL
SQUAMOUS #/AREA URNS AUTO: 2 /HPF
STJ: 2.99 CM
TDI LATERAL: 0.08 M/S
TDI SEPTAL: 0.06 M/S
TDI: 0.07 M/S
TR MAX PG: 30 MMHG
TRICUSPID ANNULAR PLANE SYSTOLIC EXCURSION: 2.62 CM
TROPONIN I SERPL DL<=0.01 NG/ML-MCNC: 0.03 NG/ML (ref 0–0.03)
TV REST PULMONARY ARTERY PRESSURE: 33 MMHG
URN SPEC COLLECT METH UR: ABNORMAL
WBC # BLD AUTO: 9.76 K/UL (ref 3.9–12.7)
WBC #/AREA URNS AUTO: 11 /HPF (ref 0–5)

## 2020-02-28 PROCEDURE — 25000003 PHARM REV CODE 250: Mod: HCNC | Performed by: STUDENT IN AN ORGANIZED HEALTH CARE EDUCATION/TRAINING PROGRAM

## 2020-02-28 PROCEDURE — 25500020 PHARM REV CODE 255: Mod: HCNC | Performed by: EMERGENCY MEDICINE

## 2020-02-28 PROCEDURE — 85652 RBC SED RATE AUTOMATED: CPT | Mod: HCNC

## 2020-02-28 PROCEDURE — 99223 1ST HOSP IP/OBS HIGH 75: CPT | Mod: AI,HCNC,GC, | Performed by: HOSPITALIST

## 2020-02-28 PROCEDURE — 85025 COMPLETE CBC W/AUTO DIFF WBC: CPT | Mod: HCNC

## 2020-02-28 PROCEDURE — 80053 COMPREHEN METABOLIC PANEL: CPT | Mod: HCNC

## 2020-02-28 PROCEDURE — 63600175 PHARM REV CODE 636 W HCPCS: Mod: HCNC | Performed by: STUDENT IN AN ORGANIZED HEALTH CARE EDUCATION/TRAINING PROGRAM

## 2020-02-28 PROCEDURE — 99223 PR INITIAL HOSPITAL CARE,LEVL III: ICD-10-PCS | Mod: AI,HCNC,GC, | Performed by: HOSPITALIST

## 2020-02-28 PROCEDURE — 81001 URINALYSIS AUTO W/SCOPE: CPT | Mod: HCNC

## 2020-02-28 PROCEDURE — 84484 ASSAY OF TROPONIN QUANT: CPT | Mod: HCNC

## 2020-02-28 PROCEDURE — 86140 C-REACTIVE PROTEIN: CPT | Mod: HCNC

## 2020-02-28 PROCEDURE — 94761 N-INVAS EAR/PLS OXIMETRY MLT: CPT | Mod: HCNC

## 2020-02-28 PROCEDURE — 87077 CULTURE AEROBIC IDENTIFY: CPT | Mod: HCNC

## 2020-02-28 PROCEDURE — 87088 URINE BACTERIA CULTURE: CPT | Mod: HCNC

## 2020-02-28 PROCEDURE — 83880 ASSAY OF NATRIURETIC PEPTIDE: CPT | Mod: HCNC

## 2020-02-28 PROCEDURE — 11000001 HC ACUTE MED/SURG PRIVATE ROOM: Mod: HCNC

## 2020-02-28 PROCEDURE — 63600175 PHARM REV CODE 636 W HCPCS: Mod: HCNC | Performed by: PHYSICIAN ASSISTANT

## 2020-02-28 PROCEDURE — 87186 SC STD MICRODIL/AGAR DIL: CPT | Mod: HCNC

## 2020-02-28 PROCEDURE — 25000003 PHARM REV CODE 250: Mod: HCNC | Performed by: PHYSICIAN ASSISTANT

## 2020-02-28 PROCEDURE — 87502 INFLUENZA DNA AMP PROBE: CPT | Mod: HCNC

## 2020-02-28 PROCEDURE — 87086 URINE CULTURE/COLONY COUNT: CPT | Mod: HCNC

## 2020-02-28 RX ORDER — CEPHALEXIN 500 MG/1
500 CAPSULE ORAL EVERY 12 HOURS
Qty: 20 CAPSULE | Refills: 0 | OUTPATIENT
Start: 2020-02-28 | End: 2020-02-28 | Stop reason: SDUPTHER

## 2020-02-28 RX ORDER — CEPHALEXIN 500 MG/1
500 CAPSULE ORAL
Status: COMPLETED | OUTPATIENT
Start: 2020-02-28 | End: 2020-02-28

## 2020-02-28 RX ORDER — PANTOPRAZOLE SODIUM 40 MG/1
40 TABLET, DELAYED RELEASE ORAL DAILY
Status: DISCONTINUED | OUTPATIENT
Start: 2020-02-28 | End: 2020-02-29 | Stop reason: HOSPADM

## 2020-02-28 RX ORDER — MORPHINE SULFATE 4 MG/ML
4 INJECTION, SOLUTION INTRAMUSCULAR; INTRAVENOUS
Status: COMPLETED | OUTPATIENT
Start: 2020-02-28 | End: 2020-02-28

## 2020-02-28 RX ORDER — AMLODIPINE BESYLATE 10 MG/1
10 TABLET ORAL DAILY
Status: DISCONTINUED | OUTPATIENT
Start: 2020-02-28 | End: 2020-02-29 | Stop reason: HOSPADM

## 2020-02-28 RX ORDER — CARVEDILOL 12.5 MG/1
12.5 TABLET ORAL 2 TIMES DAILY WITH MEALS
Status: DISCONTINUED | OUTPATIENT
Start: 2020-02-28 | End: 2020-02-29 | Stop reason: HOSPADM

## 2020-02-28 RX ORDER — IBUPROFEN 400 MG/1
400 TABLET ORAL ONCE
Status: COMPLETED | OUTPATIENT
Start: 2020-02-28 | End: 2020-02-28

## 2020-02-28 RX ORDER — SODIUM CHLORIDE 0.9 % (FLUSH) 0.9 %
10 SYRINGE (ML) INJECTION
Status: DISCONTINUED | OUTPATIENT
Start: 2020-02-28 | End: 2020-02-29 | Stop reason: HOSPADM

## 2020-02-28 RX ORDER — CEPHALEXIN 500 MG/1
500 CAPSULE ORAL EVERY 12 HOURS
Qty: 20 CAPSULE | Refills: 0 | Status: SHIPPED | OUTPATIENT
Start: 2020-02-28 | End: 2020-02-28 | Stop reason: SDUPTHER

## 2020-02-28 RX ORDER — ASPIRIN 81 MG/1
81 TABLET ORAL DAILY
Status: DISCONTINUED | OUTPATIENT
Start: 2020-02-28 | End: 2020-02-29 | Stop reason: HOSPADM

## 2020-02-28 RX ORDER — CHOLECALCIFEROL (VITAMIN D3) 25 MCG
1000 TABLET ORAL DAILY
Status: CANCELLED | OUTPATIENT
Start: 2020-02-28

## 2020-02-28 RX ORDER — METHOCARBAMOL 500 MG/1
1000 TABLET, FILM COATED ORAL 3 TIMES DAILY
Qty: 20 TABLET | Refills: 0 | Status: SHIPPED | OUTPATIENT
Start: 2020-02-28 | End: 2020-02-28 | Stop reason: SDUPTHER

## 2020-02-28 RX ORDER — PNV NO.95/FERROUS FUM/FOLIC AC 28MG-0.8MG
1000 TABLET ORAL DAILY
Status: CANCELLED | OUTPATIENT
Start: 2020-02-28

## 2020-02-28 RX ORDER — SIMVASTATIN 10 MG/1
10 TABLET, FILM COATED ORAL NIGHTLY
Status: DISCONTINUED | OUTPATIENT
Start: 2020-02-28 | End: 2020-02-29 | Stop reason: HOSPADM

## 2020-02-28 RX ORDER — CEPHALEXIN 500 MG/1
500 CAPSULE ORAL EVERY 12 HOURS
Qty: 20 CAPSULE | Refills: 0 | OUTPATIENT
Start: 2020-02-28 | End: 2020-03-09

## 2020-02-28 RX ORDER — METHOCARBAMOL 500 MG/1
1000 TABLET, FILM COATED ORAL 3 TIMES DAILY
Qty: 20 TABLET | Refills: 0 | OUTPATIENT
Start: 2020-02-28 | End: 2020-03-05 | Stop reason: SDUPTHER

## 2020-02-28 RX ORDER — METHOCARBAMOL 500 MG/1
1000 TABLET, FILM COATED ORAL 3 TIMES DAILY
Qty: 20 TABLET | Refills: 0 | OUTPATIENT
Start: 2020-02-28 | End: 2020-02-28 | Stop reason: SDUPTHER

## 2020-02-28 RX ORDER — FOLIC ACID 1 MG/1
1 TABLET ORAL DAILY
Status: DISCONTINUED | OUTPATIENT
Start: 2020-02-28 | End: 2020-02-29 | Stop reason: HOSPADM

## 2020-02-28 RX ORDER — CYANOCOBALAMIN (VITAMIN B-12) 250 MCG
250 TABLET ORAL DAILY
Status: DISCONTINUED | OUTPATIENT
Start: 2020-02-28 | End: 2020-02-29 | Stop reason: HOSPADM

## 2020-02-28 RX ADMIN — AMLODIPINE BESYLATE 10 MG: 10 TABLET ORAL at 03:02

## 2020-02-28 RX ADMIN — CEPHALEXIN 500 MG: 500 CAPSULE ORAL at 06:02

## 2020-02-28 RX ADMIN — IBUPROFEN 400 MG: 400 TABLET, FILM COATED ORAL at 06:02

## 2020-02-28 RX ADMIN — PANTOPRAZOLE SODIUM 40 MG: 40 TABLET, DELAYED RELEASE ORAL at 03:02

## 2020-02-28 RX ADMIN — CARVEDILOL 12.5 MG: 12.5 TABLET, FILM COATED ORAL at 04:02

## 2020-02-28 RX ADMIN — MORPHINE SULFATE 4 MG: 4 INJECTION, SOLUTION INTRAMUSCULAR; INTRAVENOUS at 12:02

## 2020-02-28 RX ADMIN — HUMAN ALBUMIN MICROSPHERES AND PERFLUTREN 0.66 MG: 10; .22 INJECTION, SOLUTION INTRAVENOUS at 01:02

## 2020-02-28 RX ADMIN — MESALAMINE 1000 MG: 250 CAPSULE ORAL at 04:02

## 2020-02-28 RX ADMIN — ASPIRIN 81 MG: 81 TABLET, COATED ORAL at 03:02

## 2020-02-28 RX ADMIN — IOHEXOL 100 ML: 350 INJECTION, SOLUTION INTRAVENOUS at 02:02

## 2020-02-28 RX ADMIN — SODIUM CHLORIDE 1000 ML: 0.9 INJECTION, SOLUTION INTRAVENOUS at 12:02

## 2020-02-28 RX ADMIN — APIXABAN 10 MG: 5 TABLET, FILM COATED ORAL at 06:02

## 2020-02-28 NOTE — PROGRESS NOTES
"   02/28/20 1609   Vital Signs   Temp 97.8 °F (36.6 °C)   Pulse 89   Resp 18   /65   Height and Weight   Height 5' 8" (1.727 m)   Weight 90.7 kg (199 lb 15.3 oz)   Weight Method Standard Scale   BSA (Calculated - sq m) 2.09 sq meters   BMI (Calculated) 30.4   Weight in (lb) to have BMI = 25 164.1     Pt arrived to unit no complaints at this time.   "

## 2020-02-28 NOTE — HPI
79-year-old female with UC (x 40 years) presents to the ER for evaluation of back pain. Pain is located between the shoulder blades; she has had similar episodes before that She denies any falls or injury. Pain present for 1 week and has been getting worse. Pain not improved with over-the-counter medications.  She denies any fevers chills nausea or vomiting, SOB and cough.  She denies any numbness or tingling.  She has never had pain like this before.  She also reports worsening migratory joint pain which started after the back pain. Patient states that she lives at home and does not normally get out the house very much.  She does get around the house in ambulates unassisted.  For the past couple of days she has been pretty much sedentary in bed secondary to her pain. She denies personal history of cancer. She states she is up to date on her cancer screenings. She states her UC was followed by yearly c-scopes for years and eventually went to every 2-3 years     In the ED, VS included T 100.3, , RR 18, /90 with O2 95% on room air. CTA significant for right middle lobe lobar artery PE. No evidence of right heart strain on CT. Trop 0.025 and BNP 50. UA additionally shows evidence of UTI.  She did not require supplemental oxygen during her ED stay.

## 2020-02-28 NOTE — PROVIDER PROGRESS NOTES - EMERGENCY DEPT.
Encounter Date: 2/27/2020    ED Physician Progress Notes           Patient signed out to me by my colleague with instructions to follow-up pending work-up. Please see main ED note for previous ED stay documentation. Patient signed out to me with CTA pending.    CTA significant for right middle lobe lobar artery PE. No evidence of right heart strain on CT. Trop 0.025 and BNP 50. UA additionally shows evidence of UTI. Will initiate anticoagulation with Eliquis, first dose given in the ED. Keflex given for UTI coverage. Admitted to medicine for further management. Patient expresses understanding and agreeable to the plan.

## 2020-02-28 NOTE — DISCHARGE INSTRUCTIONS
Use robaxin for back pain and tylenol as needed  Followup with primary care  Return to the ED as needed

## 2020-02-28 NOTE — ED NOTES
"Pt states, "severe back pain. Worse since this past Tuesday. Pain in my neck, joints, and upper back. The upper back pain is worse with deep breaths." no acute distress noted. Stable condition. Family member at bedside.    "

## 2020-02-28 NOTE — ASSESSMENT & PLAN NOTE
CTA with PE in right mid lobar artery  Seems to be unprovoked  Denies h/o GIB, blood in stool, hemoptysis  Hgb 10.7  - Eliquis 10mg BID x 7 days, then 5mg BID  - f/u with PCP  - obtain age appropriate cancer screening

## 2020-02-28 NOTE — PROVIDER PROGRESS NOTES - EMERGENCY DEPT.
Encounter Date: 2/27/2020    ED Physician Progress Notes         EKG - STEMI Decision  Initial Reading: No STEMI present.

## 2020-02-28 NOTE — ASSESSMENT & PLAN NOTE
New PE, appears unprovoked.  Pt states she has UC  Pt states she has had all appropriate cancer screenings  - Continue to obtain age appropriate cancer screening

## 2020-02-28 NOTE — H&P
[facility] Hospital Medicine  History & Physical    Patient Name: Gabriel Grace  MRN: [id number]  Admission Date: 2/27/2020  Attending Physician: No att. providers found   Primary Care Provider: Prosper Mancilla MD    [facility] Medicine Team: [facility] HOSP MED 3 Yeni Bueno MD     Patient information was obtained from patient and ER records.     Subjective:     Principal Problem: pulmonary embolism    Chief Complaint:   Chief Complaint   Patient presents with    Back Pain     x3 days, upper back 10/10. Denies injury/trauma.        HPI: 79-year-old female with UC (x 40 years) presents to the ER for evaluation of back pain. Pain is located between the shoulder blades; she has had similar episodes before that She denies any falls or injury. Pain present for 1 week and has been getting worse. Pain not improved with over-the-counter medications.  She denies any fevers chills nausea or vomiting, SOB and cough.  She denies any numbness or tingling.  She has never had pain like this before.  She also reports worsening migratory joint pain which started after the back pain. Patient states that she lives at home and does not normally get out the house very much.  She does get around the house in ambulates unassisted.  For the past couple of days she has been pretty much sedentary in bed secondary to her pain. She denies personal history of cancer. She states she is up to date on her cancer screenings. She states her UC was followed by yearly c-scopes for years and eventually went to every 2-3 years     In the ED, VS included T 100.3, , RR 18, /90 with O2 95% on room air. CTA significant for right middle lobe lobar artery PE. No evidence of right heart strain on CT. Trop 0.025 and BNP 50. UA additionally shows evidence of UTI.  She did not require supplemental oxygen during her ED stay.     Past Medical History:   Diagnosis Date    Cataract     Hyperglycemia 10/2/2013    Hyperlipidemia      Hypertension     Migraine headache     Pancreas cyst 2017    Thyroid disease     hypothyroidism    Ulcerative colitis, unspecified     Ulcerative colitis, unspecified     Visit for screening mammogram 2016       Past Surgical History:   Procedure Laterality Date    APPENDECTOMY      CAROTID ENDARTERECTOMY       SECTION      x1    CHOLECYSTECTOMY      DILATION AND CURETTAGE OF UTERUS      HYSTERECTOMY  1973    fabien-lso and right salpingectomy. right ovary remains.    PARATHYROIDECTOMY      TONSILLECTOMY         Review of patient's allergies indicates:  No Known Allergies    No current facility-administered medications on file prior to encounter.      Current Outpatient Medications on File Prior to Encounter   Medication Sig    amLODIPine (NORVASC) 10 MG tablet TAKE 1 TABLET EVERY DAY    aspirin (ECOTRIN) 81 MG EC tablet Take 81 mg by mouth. 1 Tablet, Delayed Release (E.C.) Oral Every day    biotin 2,500 mcg Cap     calcium-magnesium-zinc Tab Take by mouth. 1  Oral Every day    carvedilol (COREG) 12.5 MG tablet TAKE 1 TABLET TWICE DAILY WITH MEALS    cholecalciferol, vitamin D3, (VITAMIN D3) 1,000 unit capsule Take 1,000 Units by mouth once daily.      cyanocobalamin, vitamin B-12, (VITAMIN B-12) 50 mcg tablet Take 50 mcg by mouth once daily.      FLAXSEED OIL ORAL Take 1,000 mg by mouth. 1  Oral Every day    folic acid (FOLVITE) 400 MCG tablet Take 400 mcg by mouth. 4 Tablet Oral Every day    mesalamine (ASACOL) 400 mg EC tablet Take 400 mg by mouth. 4 Tablet, Delayed Release (E.C.) Oral Twice a day     multivitamin (THERAGRAN) per tablet Take by mouth. 1  By mouth Every day    omega-3 fatty acids-vitamin E (FISH OIL) 1,000 mg Cap     omeprazole (PRILOSEC) 20 MG capsule Take 1 capsule (20 mg total) by mouth once daily.    ondansetron (ZOFRAN-ODT) 4 MG TbDL DISSOLVE 1 TABLET ON THE TONGUE EVERY EIGHT HOURS AS NEEDED    simvastatin (ZOCOR) 10 MG tablet Take 1 tablet (10 mg  total) by mouth every evening.    vitamin E 1000 UNIT capsule Take by mouth. 1 Capsule Oral Every day     Family History     Problem Relation (Age of Onset)    Cancer Paternal Grandfather    Diabetes Mother, Sister, Paternal Grandmother    Heart attack Father    Heart disease Father    Hypertension Mother    No Known Problems Brother, Maternal Aunt, Maternal Uncle, Paternal Aunt, Paternal Uncle, Maternal Grandmother, Maternal Grandfather    Parkinsonism Sister    Psoriasis Sister    Skin cancer Paternal Grandfather    Stroke Mother        Tobacco Use    Smoking status: Never Smoker   Substance and Sexual Activity    Alcohol use: Yes     Comment: rare    Drug use: No    Sexual activity: Not Currently     Review of Systems   Constitutional: Negative for appetite change, chills, fever and unexpected weight change.   HENT: Negative for congestion, nosebleeds and sore throat.    Eyes: Negative for photophobia and pain.   Respiratory: Negative for cough and shortness of breath.    Cardiovascular: Negative for chest pain and palpitations.   Gastrointestinal: Negative for abdominal pain, blood in stool, diarrhea, nausea and vomiting.   Genitourinary: Negative for dysuria and hematuria.   Musculoskeletal: Positive for back pain.   Skin: Negative for color change.   Neurological: Negative for seizures, weakness and numbness.   Hematological: Negative for adenopathy.   Psychiatric/Behavioral: Negative for agitation and confusion.     Objective:     Vital Signs (Most Recent):  Temp: 100.3 °F (37.9 °C) (02/27/20 2008)  Pulse: 85 (02/28/20 0537)  Resp: 20 (02/28/20 0501)  BP: (!) 130/59 (02/28/20 0533)  SpO2: 95 % (02/28/20 0537) Vital Signs (24h Range):  Temp:  [100.3 °F (37.9 °C)] 100.3 °F (37.9 °C)  Pulse:  [] 85  Resp:  [16-22] 20  SpO2:  [90 %-97 %] 95 %  BP: (107-190)/(53-90) 130/59     Weight: 90.7 kg (200 lb)  Body mass index is 30.41 kg/m².    Physical Exam   Constitutional: She appears well-developed and  well-nourished.   HENT:   Head: Normocephalic and atraumatic.   Nose: Nose normal.   Eyes: Pupils are equal, round, and reactive to light. Conjunctivae and EOM are normal. Right eye exhibits no discharge. Left eye exhibits no discharge.   Neck: Normal range of motion. Neck supple. No thyromegaly present.   Cardiovascular: Normal rate and regular rhythm.   No murmur heard.  Pulmonary/Chest: She has no wheezes. She has no rales.   Abdominal: Soft. Bowel sounds are normal.   Musculoskeletal: She exhibits no edema or deformity.   Neurological: No cranial nerve deficit. Coordination normal.   Skin: Skin is warm and dry. Capillary refill takes less than 2 seconds. No erythema. No pallor.         CRANIAL NERVES     CN III, IV, VI   Pupils are equal, round, and reactive to light.  Extraocular motions are normal.        Significant Labs: All pertinent labs within the past 24 hours have been reviewed.    Significant Imaging: I have reviewed all pertinent imaging results/findings within the past 24 hours.   CT Chest 2/28/20:  Pulmonary thromboembolism in the right middle lobe lobar artery.  Additional smaller regions of collateral visualized in the subsegmental arteries, lateral basal segment right lower lobe. No convincing evidence of pulmonary infarct.    Assessment/Plan:     Pulmonary embolism  CTA with PE in right mid lobar artery  Seems to be unprovoked  Denies h/o GIB, blood in stool, hemoptysis  Hgb 10.7  - Eliquis 10mg BID x 7 days, then 5mg BID  - f/u with PCP  - obtain age appropriate cancer screening    Ulcerative colitis  40 year h/o UC per patient  On mesalamine at home per med list  - restart inpatient  - recommend age and disease specific colon cancer screenings    Cancer screening  New PE, appears unprovoked.  Pt states she has UC  Pt states she has had all appropriate cancer screenings  - Continue to obtain age appropriate cancer screening    Hypertension  Home meds include: amlodipine 10, coreg 12.5      VTE  Risk Mitigation (From admission, onward)    None           Yeni Bueno MD  Department of Hospital Medicine   [facility]

## 2020-02-28 NOTE — DISCHARGE SUMMARY
[facility] Hospital Medicine  Discharge Summary      Patient Name: Gabriel Grace  MRN: [id number]  Admission Date: 2/27/2020  Hospital Length of Stay: 0 days  Discharge Date and Time:  02/28/2020 4:38 PM  Attending Physician: Sarah Leonard MD   Discharging Provider: Oren Rolle MD  Primary Care Provider: Prosper Mancilla MD  [facility] Medicine Team: [facility] HOSP MED 3 Oren Rolle MD    HPI:   79-year-old female with UC (x 40 years) presents to the ER for evaluation of back pain. Pain is located between the shoulder blades; she has had similar episodes before that She denies any falls or injury. Pain present for 1 week and has been getting worse. Pain not improved with over-the-counter medications.  She denies any fevers chills nausea or vomiting, SOB and cough.  She denies any numbness or tingling.  She has never had pain like this before.  She also reports worsening migratory joint pain which started after the back pain. Patient states that she lives at home and does not normally get out the house very much.  She does get around the house in ambulates unassisted.  For the past couple of days she has been pretty much sedentary in bed secondary to her pain. She denies personal history of cancer. She states she is up to date on her cancer screenings. She states her UC was followed by yearly c-scopes for years and eventually went to every 2-3 years     In the ED, VS included T 100.3, , RR 18, /90 with O2 95% on room air. CTA significant for right middle lobe lobar artery PE. No evidence of right heart strain on CT. Trop 0.025 and BNP 50. UA additionally shows evidence of UTI.  She did not require supplemental oxygen during her ED stay.     * No surgery found *      Hospital Course:   78 yo F admitted with CTA significant for right middle lobe lobar artery PE. No evidence of right heart strain on CT. Trop 0.025 and BNP 50. UA additionally shows evidence of UTI. Will initiate  anticoagulation with Eliquis, first dose given in the ED. Keflex given for UTI coverage. TTE shows no evidence of right heart strain and is WNLs. Patient was counseled extensively of importance of taking apixaban on discharge. On day of discharge, patient was medically stable and ready for discharge. All questions and concerns of the patient were addressed and answered. Patient was counseled on discharge.     Physical Exam on Day of Discharge:  Vital Signs Reviewed  Gen: NAD  Pulm: CTA-B  Cardiac: RRR, no murmurs  MSK: no edema present  Neuro: AAOx3  Psych: normal behavior     Consults:     No new Assessment & Plan notes have been filed under this hospital service since the last note was generated.  Service: Hospital Medicine    Final Active Diagnoses:    Diagnosis Date Noted POA    PRINCIPAL PROBLEM:  Pulmonary embolism [I26.99] 02/28/2020 Yes    Cancer screening [Z12.9] 02/28/2020 Not Applicable    Ulcerative colitis [K51.90] 02/28/2020 Yes    Hypertension [I10]  Yes      Problems Resolved During this Admission:       Discharged Condition: good    Disposition: Home or Self Care    Follow Up:  Follow-up Information     Prosper Mancilla MD.    Specialty:  Internal Medicine  Contact information:  [address]  [place] [id number] [phone]             Prosper Mancilla MD In 1 week.    Specialty:  Internal Medicine  Contact information:  [address] [place] [id number] [phone]                 Patient Instructions:   No discharge procedures on file.    Significant Diagnostic Studies: Labs: All labs within the past 24 hours have been reviewed    Pending Diagnostic Studies:     None         Medications:  Reconciled Home Medications:      Medication List      START taking these medications    cephALEXin 500 MG capsule  Commonly known as:  KEFLEX  Take 1 capsule (500 mg total) by mouth every 12 (twelve) hours. for 10 days     * Eliquis DVT-PE Treat 30D Start 5 mg (74 tabs) Dspk  Generic  drug:  apixaban  For the first 6 days take two 5 mg tablets twice daily.  After 6 days take one 5 mg tablet twice daily.     * apixaban 5 mg Tab  Commonly known as:  ELIQUIS  Take 1 tablet (5 mg total) by mouth 2 (two) times daily. 2nd script  Start taking on:  March 28, 2020     methocarbamol 500 MG Tab  Commonly known as:  ROBAXIN  Take 2 tablets (1,000 mg total) by mouth 3 (three) times daily. for 5 days         * This list has 2 medication(s) that are the same as other medications prescribed for you. Read the directions carefully, and ask your doctor or other care provider to review them with you.            CONTINUE taking these medications    amLODIPine 10 MG tablet  Commonly known as:  NORVASC  TAKE 1 TABLET EVERY DAY     AsacoL 400 mg EC tablet  Generic drug:  mesalamine  Take 400 mg by mouth. 4 Tablet, Delayed Release (E.C.) Oral Twice a day     aspirin 81 MG EC tablet  Commonly known as:  ECOTRIN  Take 81 mg by mouth. 1 Tablet, Delayed Release (E.C.) Oral Every day     biotin 2,500 mcg Cap     calcium-magnesium-zinc Tab  Take by mouth. 1  Oral Every day     carvediloL 12.5 MG tablet  Commonly known as:  COREG  TAKE 1 TABLET TWICE DAILY WITH MEALS     Fish Oil 1,000 mg Cap  Generic drug:  omega-3 fatty acids-vitamin E     FLAXSEED OIL ORAL  Take 1,000 mg by mouth. 1  Oral Every day     folic acid 400 MCG tablet  Commonly known as:  FOLVITE  Take 400 mcg by mouth. 4 Tablet Oral Every day     multivitamin per tablet  Commonly known as:  THERAGRAN  Take by mouth. 1  By mouth Every day     omeprazole 20 MG capsule  Commonly known as:  PRILOSEC  Take 1 capsule (20 mg total) by mouth once daily.     ondansetron 4 MG Tbdl  Commonly known as:  ZOFRAN-ODT  DISSOLVE 1 TABLET ON THE TONGUE EVERY EIGHT HOURS AS NEEDED     simvastatin 10 MG tablet  Commonly known as:  ZOCOR  Take 1 tablet (10 mg total) by mouth every evening.     Vitamin B-12 50 mcg tablet  Generic drug:  cyanocobalamin (vitamin B-12)  Take 50 mcg by  mouth once daily.     Vitamin D3 25 mcg (1,000 unit) capsule  Generic drug:  cholecalciferol (vitamin D3)  Take 1,000 Units by mouth once daily.     vitamin E 1000 UNIT capsule  Take by mouth. 1 Capsule Oral Every day            Indwelling Lines/Drains at time of discharge:   Lines/Drains/Airways     None                 Time spent on the discharge of patient: 35 minutes  Patient was seen and examined on the date of discharge and determined to be suitable for discharge.         Oren Rolle MD  Department of Hospital Medicine  [facility]

## 2020-02-28 NOTE — ASSESSMENT & PLAN NOTE
40 year h/o UC per patient  On mesalamine at home per med list  - restart inpatient  - recommend age and disease specific colon cancer screenings

## 2020-02-28 NOTE — HOSPITAL COURSE
78 yo F admitted with CTA significant for right middle lobe lobar artery PE. No evidence of right heart strain on CT. Trop 0.025 and BNP 50. UA additionally shows evidence of UTI. Will initiate anticoagulation with Eliquis, first dose given in the ED. Keflex given for UTI coverage. TTE shows no evidence of right heart strain and is WNLs. Patient was counseled extensively of importance of taking apixaban on discharge. On day of discharge, patient was medically stable and ready for discharge. All questions and concerns of the patient were addressed and answered. Patient was counseled on discharge.     Physical Exam on Day of Discharge:  Vital Signs Reviewed  Gen: NAD  Pulm: CTA-B  Cardiac: RRR, no murmurs  MSK: no edema present  Neuro: AAOx3  Psych: normal behavior

## 2020-02-28 NOTE — SUBJECTIVE & OBJECTIVE
Past Medical History:   Diagnosis Date    Cataract     Hyperglycemia 10/2/2013    Hyperlipidemia     Hypertension     Migraine headache     Pancreas cyst 2017    Thyroid disease     hypothyroidism    Ulcerative colitis, unspecified     Ulcerative colitis, unspecified     Visit for screening mammogram 2016       Past Surgical History:   Procedure Laterality Date    APPENDECTOMY      CAROTID ENDARTERECTOMY       SECTION      x1    CHOLECYSTECTOMY      DILATION AND CURETTAGE OF UTERUS      HYSTERECTOMY  1973    fabien-lso and right salpingectomy. right ovary remains.    PARATHYROIDECTOMY      TONSILLECTOMY         Review of patient's allergies indicates:  No Known Allergies    No current facility-administered medications on file prior to encounter.      Current Outpatient Medications on File Prior to Encounter   Medication Sig    amLODIPine (NORVASC) 10 MG tablet TAKE 1 TABLET EVERY DAY    aspirin (ECOTRIN) 81 MG EC tablet Take 81 mg by mouth. 1 Tablet, Delayed Release (E.C.) Oral Every day    biotin 2,500 mcg Cap     calcium-magnesium-zinc Tab Take by mouth. 1  Oral Every day    carvedilol (COREG) 12.5 MG tablet TAKE 1 TABLET TWICE DAILY WITH MEALS    cholecalciferol, vitamin D3, (VITAMIN D3) 1,000 unit capsule Take 1,000 Units by mouth once daily.      cyanocobalamin, vitamin B-12, (VITAMIN B-12) 50 mcg tablet Take 50 mcg by mouth once daily.      FLAXSEED OIL ORAL Take 1,000 mg by mouth. 1  Oral Every day    folic acid (FOLVITE) 400 MCG tablet Take 400 mcg by mouth. 4 Tablet Oral Every day    mesalamine (ASACOL) 400 mg EC tablet Take 400 mg by mouth. 4 Tablet, Delayed Release (E.C.) Oral Twice a day     multivitamin (THERAGRAN) per tablet Take by mouth. 1  By mouth Every day    omega-3 fatty acids-vitamin E (FISH OIL) 1,000 mg Cap     omeprazole (PRILOSEC) 20 MG capsule Take 1 capsule (20 mg total) by mouth once daily.    ondansetron (ZOFRAN-ODT) 4 MG TbDL DISSOLVE 1  TABLET ON THE TONGUE EVERY EIGHT HOURS AS NEEDED    simvastatin (ZOCOR) 10 MG tablet Take 1 tablet (10 mg total) by mouth every evening.    vitamin E 1000 UNIT capsule Take by mouth. 1 Capsule Oral Every day     Family History     Problem Relation (Age of Onset)    Cancer Paternal Grandfather    Diabetes Mother, Sister, Paternal Grandmother    Heart attack Father    Heart disease Father    Hypertension Mother    No Known Problems Brother, Maternal Aunt, Maternal Uncle, Paternal Aunt, Paternal Uncle, Maternal Grandmother, Maternal Grandfather    Parkinsonism Sister    Psoriasis Sister    Skin cancer Paternal Grandfather    Stroke Mother        Tobacco Use    Smoking status: Never Smoker   Substance and Sexual Activity    Alcohol use: Yes     Comment: rare    Drug use: No    Sexual activity: Not Currently     Review of Systems   Constitutional: Negative for appetite change, chills, fever and unexpected weight change.   HENT: Negative for congestion, nosebleeds and sore throat.    Eyes: Negative for photophobia and pain.   Respiratory: Negative for cough and shortness of breath.    Cardiovascular: Negative for chest pain and palpitations.   Gastrointestinal: Negative for abdominal pain, blood in stool, diarrhea, nausea and vomiting.   Genitourinary: Negative for dysuria and hematuria.   Musculoskeletal: Positive for back pain.   Skin: Negative for color change.   Neurological: Negative for seizures, weakness and numbness.   Hematological: Negative for adenopathy.   Psychiatric/Behavioral: Negative for agitation and confusion.     Objective:     Vital Signs (Most Recent):  Temp: 100.3 °F (37.9 °C) (02/27/20 2008)  Pulse: 85 (02/28/20 0537)  Resp: 20 (02/28/20 0501)  BP: (!) 130/59 (02/28/20 0533)  SpO2: 95 % (02/28/20 0537) Vital Signs (24h Range):  Temp:  [100.3 °F (37.9 °C)] 100.3 °F (37.9 °C)  Pulse:  [] 85  Resp:  [16-22] 20  SpO2:  [90 %-97 %] 95 %  BP: (107-190)/(53-90) 130/59     Weight: 90.7 kg (200  lb)  Body mass index is 30.41 kg/m².    Physical Exam   Constitutional: She appears well-developed and well-nourished.   HENT:   Head: Normocephalic and atraumatic.   Nose: Nose normal.   Eyes: Pupils are equal, round, and reactive to light. Conjunctivae and EOM are normal. Right eye exhibits no discharge. Left eye exhibits no discharge.   Neck: Normal range of motion. Neck supple. No thyromegaly present.   Cardiovascular: Normal rate and regular rhythm.   No murmur heard.  Pulmonary/Chest: She has no wheezes. She has no rales.   Abdominal: Soft. Bowel sounds are normal.   Musculoskeletal: She exhibits no edema or deformity.   Neurological: No cranial nerve deficit. Coordination normal.   Skin: Skin is warm and dry. Capillary refill takes less than 2 seconds. No erythema. No pallor.         CRANIAL NERVES     CN III, IV, VI   Pupils are equal, round, and reactive to light.  Extraocular motions are normal.        Significant Labs: All pertinent labs within the past 24 hours have been reviewed.    Significant Imaging: I have reviewed all pertinent imaging results/findings within the past 24 hours.   CT Chest 2/28/20:  Pulmonary thromboembolism in the right middle lobe lobar artery.  Additional smaller regions of collateral visualized in the subsegmental arteries, lateral basal segment right lower lobe. No convincing evidence of pulmonary infarct.

## 2020-02-28 NOTE — ED PROVIDER NOTES
Encounter Date: 2020       History     Chief Complaint   Patient presents with    Back Pain     x3 days, upper back 10/10. Denies injury/trauma.     79-year-old female presents to the ER for evaluation of back pain.  Pain is located between the shoulder blades.  She denies any falls or injury. Pain present for 1 week.  Pain getting worse.  Pain not improved with over-the-counter medications.  She denies any fevers chills nausea or vomiting. She denies a cough.  She denies any numbness or tingling.  She has never had pain like this before.  She also reports worsening migratory joint pain.  That has started after the back pain. Upon arrival to the ER she was febrile at a 100.4° F.  She has since taken 40 mg of Motrin.  Patient states that she lives at home and does not normally get out the house very much.  She does get around the house in ambulates unassisted.  For the past couple of days she has been pretty much sedentary in bed secondary to her pain.        Review of patient's allergies indicates:  No Known Allergies  Past Medical History:   Diagnosis Date    Cataract     Hyperglycemia 10/2/2013    Hyperlipidemia     Hypertension     Migraine headache     Pancreas cyst 2017    Thyroid disease     hypothyroidism    Ulcerative colitis, unspecified     Ulcerative colitis, unspecified     Visit for screening mammogram 2016     Past Surgical History:   Procedure Laterality Date    APPENDECTOMY      CAROTID ENDARTERECTOMY       SECTION      x1    CHOLECYSTECTOMY      DILATION AND CURETTAGE OF UTERUS      HYSTERECTOMY  1973    fabien-lso and right salpingectomy. right ovary remains.    PARATHYROIDECTOMY      TONSILLECTOMY       Family History   Problem Relation Age of Onset    Hypertension Mother     Stroke Mother     Diabetes Mother         iddm    Heart disease Father          from mi    Heart attack Father     Parkinsonism Sister     Diabetes Sister     Diabetes  Paternal Grandmother     Cancer Paternal Grandfather     Skin cancer Paternal Grandfather     No Known Problems Brother     No Known Problems Maternal Aunt     No Known Problems Maternal Uncle     No Known Problems Paternal Aunt     No Known Problems Paternal Uncle     No Known Problems Maternal Grandmother     No Known Problems Maternal Grandfather     Psoriasis Sister     Ovarian cancer Neg Hx     Uterine cancer Neg Hx     Breast cancer Neg Hx     Colon cancer Neg Hx     Amblyopia Neg Hx     Blindness Neg Hx     Cataracts Neg Hx     Glaucoma Neg Hx     Macular degeneration Neg Hx     Retinal detachment Neg Hx     Strabismus Neg Hx     Thyroid disease Neg Hx     Melanoma Neg Hx      Social History     Tobacco Use    Smoking status: Never Smoker   Substance Use Topics    Alcohol use: Yes     Comment: rare    Drug use: No     Review of Systems   Constitutional: Negative for fever.   HENT: Negative for sore throat.    Respiratory: Negative for shortness of breath.    Cardiovascular: Negative for chest pain.   Gastrointestinal: Negative for nausea.   Genitourinary: Negative for dysuria.   Musculoskeletal: Positive for arthralgias and back pain. Negative for gait problem, joint swelling, myalgias, neck pain and neck stiffness.   Skin: Negative for rash.   Neurological: Negative for weakness.   Hematological: Does not bruise/bleed easily.       Physical Exam     Initial Vitals [02/27/20 2008]   BP Pulse Resp Temp SpO2   (!) 190/90 (!) 113 18 100.3 °F (37.9 °C) 95 %      MAP       --         Physical Exam    Constitutional: Vital signs are normal. She appears well-developed and well-nourished. She is not diaphoretic. No distress.   HENT:   Head: Normocephalic and atraumatic.   Right Ear: Hearing and external ear normal.   Left Ear: Hearing and external ear normal.   Mouth/Throat: Oropharynx is clear and moist.   Eyes: Conjunctivae are normal.   Cardiovascular: Normal rate and regular rhythm. Exam  reveals no gallop and no friction rub.    No murmur heard.  Pulmonary/Chest: No respiratory distress. She has no wheezes. She has no rhonchi. She has no rales. She exhibits no tenderness.   Clear bilaterally with good air movement  The chest wall is nontender   Abdominal: Soft. Normal appearance and bowel sounds are normal.   Abdomen soft and nontender   Musculoskeletal: Normal range of motion.   The joints are not red warm tender or swollen she was moving all of her extremities well  This cervical spine in the thoracic spine are nontender there are no rashes or step-offs   Neurological: She is alert and oriented to person, place, and time. She displays normal reflexes. No cranial nerve deficit or sensory deficit.   Nonfocal, no deficits, moving all extremities well, no weakness   Skin: Skin is warm and intact.   No rashes or lesions   Psychiatric: She has a normal mood and affect. Her speech is normal and behavior is normal. Cognition and memory are normal.         ED Course   Procedures  Labs Reviewed   CBC W/ AUTO DIFFERENTIAL - Abnormal; Notable for the following components:       Result Value    RBC 3.48 (*)     Hemoglobin 10.7 (*)     Hematocrit 34.6 (*)     Mean Corpuscular Volume 99 (*)     Mean Corpuscular Hemoglobin Conc 30.9 (*)     Platelets 148 (*)     Gran% 77.4 (*)     Lymph% 12.5 (*)     All other components within normal limits   COMPREHENSIVE METABOLIC PANEL - Abnormal; Notable for the following components:    CO2 22 (*)     Glucose 118 (*)     BUN, Bld 26 (*)     Albumin 3.1 (*)     eGFR if non  53.3 (*)     All other components within normal limits   URINALYSIS, REFLEX TO URINE CULTURE - Abnormal; Notable for the following components:    Appearance, UA Hazy (*)     Specific Gravity, UA >1.030 (*)     Protein, UA 2+ (*)     Ketones, UA Trace (*)     Leukocytes, UA Trace (*)     All other components within normal limits   SEDIMENTATION RATE - Abnormal; Notable for the following  components:    Sed Rate 59 (*)     All other components within normal limits   C-REACTIVE PROTEIN - Abnormal; Notable for the following components:    .9 (*)     All other components within normal limits   URINALYSIS MICROSCOPIC - Abnormal; Notable for the following components:    WBC, UA 11 (*)     Bacteria Moderate (*)     Hyaline Casts, UA 7 (*)     All other components within normal limits   INFLUENZA A & B BY MOLECULAR   TROPONIN I   B-TYPE NATRIURETIC PEPTIDE   B-TYPE NATRIURETIC PEPTIDE    Narrative:     add on test bnp per dr alex polanco order# [id number]  02/28/2020    04:47   add on test troponin per dr alex polanco order# [id number]  02/28/2020    05:00    TROPONIN I    Narrative:     add on test bnp per dr alex polanco order# [id number]  02/28/2020    04:47   add on test troponin per dr alex polanco order# [id number]  02/28/2020    05:00      EKG Readings: (Independently Interpreted)   Initial Reading: No STEMI.   Sinus tachycardia rate 112     ECG Results          EKG 12-lead (Final result)  Result time 02/28/20 16:39:43    Final result by Interface, Lab In [facility] (02/28/20 16:39:43)                 Narrative:    Test Reason : M54.9,    Vent. Rate : 112 BPM     Atrial Rate : 112 BPM     P-R Int : 170 ms          QRS Dur : 082 ms      QT Int : 314 ms       P-R-T Axes : 007 -24 -02 degrees     QTc Int : 428 ms    Sinus tachycardia  Cannot rule out Anterior infarct (cited on or before 12-MAY-2018)  Abnormal ECG  When compared with ECG of 12-MAY-2018 10:25,  No significant change was found  Confirmed by Brian Hernandez MD ([id number]) on 2/28/2020 4:39:33 PM    Referred By: [email or web address]   SELF           Confirmed By:Brian Hernandez MD                            Imaging Results           CTA Chest Non-Coronary (PE Study) (Final result)  Result time 02/28/20 04:37:10    Final result by Yves Castañeda MD (02/28/20 04:37:10)                 Impression:      Pulmonary thromboembolism in the right middle lobe lobar artery.   Additional smaller regions of collateral visualized in the subsegmental arteries, lateral basal segment right lower lobe.  No convincing evidence of pulmonary infarct.    Small hiatal hernia.    Stable hepatic cysts and left renal cyst.    This report was flagged in Epic as abnormal.    COMMUNICATION  This critical result was discovered/received at 04:19.  The critical information above was relayed directly by Edgar Funez MD by telephone to Robin Davis PA-C on 02/20/2020 at 04:20.    Electronically signed by resident: Edgar Funez MD  Date:    02/28/2020  Time:    04:04    Electronically signed by: Yves Castañeda MD  Date:    02/28/2020  Time:    04:37             Narrative:    EXAMINATION:  CTA CHEST NON CORONARY    CLINICAL HISTORY:  Aortic dz, non-traumatic, known or suspect;Chest pain, acute, PE suspected, high pretest prob;    TECHNIQUE:  Low dose axial images, sagittal and coronal reformations were obtained from the thoracic inlet to the lung bases following the IV administration of 75 mL of Omnipaque 350.  Contrast timing was optimized to evaluate the pulmonary arteries.  MIP images were performed.    COMPARISON:  CTA chest non coronary 05/12/2018    FINDINGS:  The vascular and soft tissues structures at the base of the neck are unremarkable.  There is a left-sided aortic arch with 3 branch vessels. The aorta is normal in caliber, contour, and course without significant calcific atherosclerosis.  The incidental note again made of tiny diminutive left SVC terminating at the coronary sinus.  There is no cardiac enlargement or pericardial fluid.  There is mild calcific atherosclerosis of the coronary arteries.There is no axillary, hilar, or mediastinal lymphadenopathy.    The pulmonary arteries distribute normally.  There are 4 pulmonary veins that return to the left atrium.  There is filling defect in the right pulmonary artery at the origin of the lobar arteries to the might middle lobe, extending  into the segmental artery of the medial segment right middle lobe.  No filling defect extending into the segmental arteries of the right upper or lower lobes.  Additional filling defects visualized in the subsegmental arteries of the lateral basal segment right middle lobe.    The trachea and proximal airways are patent.  The lungs are symmetrically expanded.  There is bandlike opacification in the lingula, right middle lobe, and both lower lobes consistent with subsegmental atelectasis versus scarring.  There is mild bilateral dependent atelectasis.  No large focal consolidation, pleural effusion, pleural thickening, or pneumothorax.  No convincing evidence of pulmonary infarct.    There is a small hiatal hernia.  Numerous hepatic cysts are present, the largest measuring 5.7 cm in left hepatic lobe at the dome.  Partially visualized large left-sided renal cyst.    The osseous structures demonstrate thoracic dextroscoliosis and age-appropriate degenerative change.  Extrathoracic soft tissue structures are unremarkable.                               X-Ray Chest PA And Lateral (Final result)  Result time 02/28/20 02:00:36    Final result by Yves Castañeda MD (02/28/20 02:00:36)                 Impression:      No acute cardiopulmonary process.      Electronically signed by: Yves Castañeda MD  Date:    02/28/2020  Time:    02:00             Narrative:    EXAMINATION:  XR CHEST PA AND LATERAL    CLINICAL HISTORY:  Dorsalgia, unspecified    TECHNIQUE:  PA and lateral views of the chest were performed.    COMPARISON:  May 12, 2018.    FINDINGS:  There is no consolidation, effusion, or pneumothorax.    Cardiomediastinal silhouette is enlarged, stable.    Regional osseous structures are unchanged.                              X-Rays:   Independently Interpreted Readings:   Other Readings:  No acute pulmonary process    Medical Decision Making:   Initial Assessment:   79-year-old female with pain between the shoulder  blades bilaterally  Differential Diagnosis:   Dissection, aneurysm, abscess, pneumonia, fracture, contusion, disc herniation  Clinical Tests:   Lab Tests: Ordered and Reviewed  Radiological Study: Ordered and Reviewed  Medical Tests: Ordered and Reviewed  ED Management:  Plan:  Labs include inflammatory markers, chest x-ray, pain medication  Labs show mildly elevated inflammatory markers, CXR without acute process  Pain improved with morphine, though pt still wont sit up in bed without assistance.   Will get a CT scan  Urine slightly dirty with a few WBC and trace leukocytes, pt with fever, will give keflex. She is pending CTA read.   Pain improved with morphine, Pt ambulated.   Will Signout to to the ongoing ED team pending CT read and further dispo                                 Clinical Impression:       ICD-10-CM ICD-9-CM   1. Pulmonary embolism, unspecified chronicity, unspecified pulmonary embolism type, unspecified whether acute cor pulmonale present I26.99 415.19   2. Back pain M54.9 724.5   3. Back pain, unspecified back location, unspecified back pain laterality, unspecified chronicity M54.9 724.5   4. Pulmonary embolism I26.99 415.19             ED Disposition Condition    Admit                           Babak Snow PA-C  03/1940

## 2020-02-29 NOTE — PLAN OF CARE
Pt aaox4. V/s stable. eliquis delivered to bedside. Sister stated she would  med from pharmacy. Iv d/c. Discharge planning presented to pt. Understanding verbalized.

## 2020-02-29 NOTE — PROGRESS NOTES
Pt transported home via stretcher through ambulance. Pt given all discharge instructions and belongings packed up.

## 2020-02-29 NOTE — PROGRESS NOTES
Pt educated on medication Eliquis and maintaining compliance with medication. Pt verbalize understanding, pt sister at bedside verbalize understanding. Pt requested to be taking home via ambulance. Charge nurse notified, will await transportation.

## 2020-03-01 LAB — BACTERIA UR CULT: ABNORMAL

## 2020-03-04 DIAGNOSIS — I26.99 PULMONARY EMBOLISM, UNSPECIFIED CHRONICITY, UNSPECIFIED PULMONARY EMBOLISM TYPE, UNSPECIFIED WHETHER ACUTE COR PULMONALE PRESENT: Primary | ICD-10-CM

## 2020-03-04 DIAGNOSIS — R53.1 WEAKNESS: ICD-10-CM

## 2020-03-04 DIAGNOSIS — R53.81 DEBILITY: ICD-10-CM

## 2020-03-05 RX ORDER — METHOCARBAMOL 500 MG/1
TABLET, FILM COATED ORAL
Qty: 30 TABLET | Refills: 0 | Status: SHIPPED | OUTPATIENT
Start: 2020-03-05 | End: 2020-10-20

## 2020-03-05 RX ORDER — METHOCARBAMOL 500 MG/1
1000 TABLET, FILM COATED ORAL 3 TIMES DAILY
Qty: 20 TABLET | Refills: 0 | Status: SHIPPED | OUTPATIENT
Start: 2020-03-05 | End: 2020-03-10

## 2020-03-06 ENCOUNTER — TELEPHONE (OUTPATIENT)
Dept: INTERNAL MEDICINE | Facility: CLINIC | Age: 80
End: 2020-03-06

## 2020-03-06 PROCEDURE — G0180 PR HOME HEALTH MD CERTIFICATION: ICD-10-PCS | Mod: ,,, | Performed by: INTERNAL MEDICINE

## 2020-03-06 PROCEDURE — G0180 MD CERTIFICATION HHA PATIENT: HCPCS | Mod: ,,, | Performed by: INTERNAL MEDICINE

## 2020-03-06 NOTE — TELEPHONE ENCOUNTER
Thanks. See if Hailey faced the muscle relaxer. For some reason it printed and I asked her to fax it.

## 2020-03-06 NOTE — TELEPHONE ENCOUNTER
Spoke to [facility]. They are in the process now of filling it.  Spoke to pt informed her med is being filled. She verbalized understanding.  Tried to call Fannie pt sister. No answer. Left voicemail to return the call     Pt also informed me while I was on the phone with her. Home Health is on there way right now to her.

## 2020-03-06 NOTE — TELEPHONE ENCOUNTER
"Spoke to Miladys with [facility]. I explained what is going on with pt and home health and medical equipment. They have the referral and is sending it to the dept that looks over it and will start the process asap. Miladys stated hopefully they can get someone out by this weekend or Monday the latest.     Miladys told me that there is something in their system that is making the referrals have an expected date attached to the referral which makes the time pushed back. Pt referral expected date has 03/11 they would not have received it until 03/11. I asked her if that is something we did, she said no all the referrals have been doing that lately and they are unsure why because there is not a stop to pick a date on the referral.     I called  and explained that I called [facility] and the process has been started. I gave her [facility] number and told her if she doesn't here from them to give them a call. But I informed her they have the information and is starting on it now. She verbalized understanding. Pt also asked for me to leave a voicemail message to [facility] number. I did that after the phone call.     Pt is also requesting Robaxin to be refilled to [facility].    I spoke to pt sister . I explained everything to her that I told .  verbalized understating and has the number to [facility] also.    Lastly I asked both pt and her sister is there anything that  office can do to help. Pt said "No" and pt sister said "No I just want everyone to know what happened"     "

## 2020-03-09 ENCOUNTER — TELEPHONE (OUTPATIENT)
Dept: INTERNAL MEDICINE | Facility: CLINIC | Age: 80
End: 2020-03-09

## 2020-03-09 DIAGNOSIS — R26.81 GAIT INSTABILITY: Primary | ICD-10-CM

## 2020-03-09 NOTE — TELEPHONE ENCOUNTER
----- Message from Melisa Chu sent at 3/9/2020  3:55 PM CDT -----  Contact: Tonia hughes [facility]   [facility] is calling for OT and PT orders for the patient. Tonia state the patient need a walker and bed commode.  Please call and advise.

## 2020-03-16 RX ORDER — OMEPRAZOLE 20 MG/1
CAPSULE, DELAYED RELEASE ORAL
Qty: 90 CAPSULE | Refills: 4 | Status: SHIPPED | OUTPATIENT
Start: 2020-03-16 | End: 2021-02-26

## 2020-03-16 RX ORDER — SIMVASTATIN 10 MG/1
TABLET, FILM COATED ORAL
Qty: 90 TABLET | Refills: 4 | Status: SHIPPED | OUTPATIENT
Start: 2020-03-16 | End: 2021-02-26

## 2020-03-24 ENCOUNTER — TELEPHONE (OUTPATIENT)
Dept: INTERNAL MEDICINE | Facility: CLINIC | Age: 80
End: 2020-03-24

## 2020-03-24 ENCOUNTER — EXTERNAL HOME HEALTH (OUTPATIENT)
Dept: HOME HEALTH SERVICES | Facility: HOSPITAL | Age: 80
End: 2020-03-24
Payer: MEDICARE

## 2020-03-24 NOTE — TELEPHONE ENCOUNTER
----- Message from Cristel Chu sent at 3/24/2020  3:05 PM CDT -----  Contact: Jennifer with [facility]   Jennifer with [facility] called in regards to the patient having low grade fever 99.1 will monitor.

## 2020-03-25 ENCOUNTER — TELEPHONE (OUTPATIENT)
Dept: HOME HEALTH SERVICES | Facility: HOSPITAL | Age: 80
End: 2020-03-25

## 2020-03-27 ENCOUNTER — TELEPHONE (OUTPATIENT)
Dept: INTERNAL MEDICINE | Facility: CLINIC | Age: 80
End: 2020-03-27

## 2020-03-27 DIAGNOSIS — I26.99 PULMONARY EMBOLISM, UNSPECIFIED CHRONICITY, UNSPECIFIED PULMONARY EMBOLISM TYPE, UNSPECIFIED WHETHER ACUTE COR PULMONALE PRESENT: ICD-10-CM

## 2020-03-27 DIAGNOSIS — I51.7 ATRIAL ENLARGEMENT, BILATERAL: Primary | ICD-10-CM

## 2020-03-28 ENCOUNTER — PATIENT OUTREACH (OUTPATIENT)
Dept: ADMINISTRATIVE | Facility: OTHER | Age: 80
End: 2020-03-28

## 2020-03-28 DIAGNOSIS — Z12.31 ENCOUNTER FOR SCREENING MAMMOGRAM FOR MALIGNANT NEOPLASM OF BREAST: Primary | ICD-10-CM

## 2020-03-29 ENCOUNTER — NURSE TRIAGE (OUTPATIENT)
Dept: ADMINISTRATIVE | Facility: CLINIC | Age: 80
End: 2020-03-29

## 2020-03-29 NOTE — PROGRESS NOTES
Chart reviewed.   Immunizations: Reconciled  Orders placed: Mammogram  Upcoming appts to satisfy ANA topics: n/a

## 2020-03-30 ENCOUNTER — TELEPHONE (OUTPATIENT)
Dept: CARDIOLOGY | Facility: CLINIC | Age: 80
End: 2020-03-30

## 2020-03-30 ENCOUNTER — OFFICE VISIT (OUTPATIENT)
Dept: CARDIOLOGY | Facility: CLINIC | Age: 80
End: 2020-03-30
Payer: MEDICARE

## 2020-03-30 DIAGNOSIS — I10 ESSENTIAL HYPERTENSION: ICD-10-CM

## 2020-03-30 DIAGNOSIS — I26.99 PULMONARY EMBOLISM, UNSPECIFIED CHRONICITY, UNSPECIFIED PULMONARY EMBOLISM TYPE, UNSPECIFIED WHETHER ACUTE COR PULMONALE PRESENT: Primary | ICD-10-CM

## 2020-03-30 DIAGNOSIS — E66.09 NON MORBID OBESITY DUE TO EXCESS CALORIES: ICD-10-CM

## 2020-03-30 DIAGNOSIS — E78.00 PURE HYPERCHOLESTEROLEMIA: ICD-10-CM

## 2020-03-30 DIAGNOSIS — I51.7 ATRIAL ENLARGEMENT, BILATERAL: ICD-10-CM

## 2020-03-30 DIAGNOSIS — I70.0 AORTIC ATHEROSCLEROSIS: ICD-10-CM

## 2020-03-30 PROCEDURE — 99999 PR PBB SHADOW E&M-EST. PATIENT-LVL I: CPT | Mod: PBBFAC,HCNC,, | Performed by: INTERNAL MEDICINE

## 2020-03-30 PROCEDURE — 99499 RISK ADDL DX/OHS AUDIT: ICD-10-PCS | Mod: S$GLB,,, | Performed by: INTERNAL MEDICINE

## 2020-03-30 PROCEDURE — 1159F MED LIST DOCD IN RCRD: CPT | Mod: HCNC,S$GLB,, | Performed by: INTERNAL MEDICINE

## 2020-03-30 PROCEDURE — 99443 PR PHYSICIAN TELEPHONE EVALUATION 21-30 MIN: CPT | Mod: 95,HCNC,S$GLB, | Performed by: INTERNAL MEDICINE

## 2020-03-30 PROCEDURE — 1101F PT FALLS ASSESS-DOCD LE1/YR: CPT | Mod: HCNC,CPTII,S$GLB, | Performed by: INTERNAL MEDICINE

## 2020-03-30 PROCEDURE — 99999 PR PBB SHADOW E&M-EST. PATIENT-LVL I: ICD-10-PCS | Mod: PBBFAC,HCNC,, | Performed by: INTERNAL MEDICINE

## 2020-03-30 PROCEDURE — 1101F PR PT FALLS ASSESS DOC 0-1 FALLS W/OUT INJ PAST YR: ICD-10-PCS | Mod: HCNC,CPTII,S$GLB, | Performed by: INTERNAL MEDICINE

## 2020-03-30 PROCEDURE — 99443 PR PHYSICIAN TELEPHONE EVALUATION 21-30 MIN: ICD-10-PCS | Mod: 95,HCNC,S$GLB, | Performed by: INTERNAL MEDICINE

## 2020-03-30 PROCEDURE — 1159F PR MEDICATION LIST DOCUMENTED IN MEDICAL RECORD: ICD-10-PCS | Mod: HCNC,S$GLB,, | Performed by: INTERNAL MEDICINE

## 2020-03-30 PROCEDURE — 99499 UNLISTED E&M SERVICE: CPT | Mod: S$GLB,,, | Performed by: INTERNAL MEDICINE

## 2020-03-30 NOTE — PROGRESS NOTES
Subjective:    Patient ID:  Gabriel Grace is a 80 y.o. 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 A CTA was done was found a PE. She was discharged 2/28/20 on eliquis. Today she stated the the pain in her back was related to taking deep breaths. She has a distant history of superficial phlebitis of her leg. The pleuritic pain resolved shortly after discharge and has not recurred and has no  leg edema. She has arthritic pain and advised to avoid NSAIDS.       Impression2/28/20       Pulmonary thromboembolism in the right middle lobe lobar artery.  Additional smaller regions of collateral visualized in the subsegmental arteries, lateral basal segment right lower lobe.  No convincing evidence of pulmonary infarct.       Conclusion 2/28/20    · Normal left ventricular systolic function. The estimated ejection fraction is 55%.  · Local segmental wall motion abnormalities.  · Concentric left ventricular remodeling.  · Indeterminate left ventricular diastolic function.  · Moderate left atrial enlargement.  · Low normal right ventricular systolic function.  · Severe right atrial enlargement.  · Normal central venous pressure (3 mmHg).  · The estimated PA systolic pressure is 33 mmHg.  · Trivial posterior pericardial effusion.          Lab Results   Component Value Date     02/28/2020    K 4.0 02/28/2020     02/28/2020    CO2 22 (L) 02/28/2020    BUN 26 (H) 02/28/2020    CREATININE 1.0 02/28/2020     (H) 02/28/2020    HGBA1C 6.0 (H) 02/27/2019    MG 2.0 10/06/2008    AST 23 02/28/2020    ALT 14 02/28/2020    ALBUMIN 3.1 (L) 02/28/2020    PROT 7.2 02/28/2020    BILITOT 0.5 02/28/2020    WBC 9.76 02/28/2020    HGB 10.7 (L) 02/28/2020    HCT 34.6 (L) 02/28/2020    MCV 99 (H) 02/28/2020      (L) 02/28/2020    INR 1.1 02/14/2015    TSH 1.859 02/27/2019         Lab Results   Component Value Date    CHOL 154 02/27/2019    HDL 40 02/27/2019    TRIG 155 (H) 02/27/2019       Lab Results   Component Value Date    LDLCALC 83.0 02/27/2019       Past Medical History:   Diagnosis Date    Cataract     Hyperglycemia 10/2/2013    Hyperlipidemia     Hypertension     Migraine headache     Pancreas cyst 1/17/2017    Thyroid disease     hypothyroidism    Ulcerative colitis, unspecified     Ulcerative colitis, unspecified     Visit for screening mammogram 1/12/2016       Current Outpatient Medications:     amLODIPine (NORVASC) 10 MG tablet, TAKE 1 TABLET EVERY DAY, Disp: 90 tablet, Rfl: 3    apixaban (ELIQUIS) 5 mg Tab, Take 1 tablet (5 mg total) by mouth 2 (two) times daily. 2nd script, Disp: 60 tablet, Rfl: 1    aspirin (ECOTRIN) 81 MG EC tablet, Take 81 mg by mouth. 1 Tablet, Delayed Release (E.C.) Oral Every day, Disp: , Rfl:     biotin 2,500 mcg Cap, , Disp: , Rfl:     calcium-magnesium-zinc Tab, Take by mouth. 1  Oral Every day, Disp: , Rfl:     carvedilol (COREG) 12.5 MG tablet, TAKE 1 TABLET TWICE DAILY WITH MEALS, Disp: 180 tablet, Rfl: 3    cephALEXin (KEFLEX) 500 MG capsule, Take one capsule by mouth every 12 hours for ten days., Disp: 20 capsule, Rfl: 0    cholecalciferol, vitamin D3, (VITAMIN D3) 1,000 unit capsule, Take 1,000 Units by mouth once daily.  , Disp: , Rfl:     cyanocobalamin, vitamin B-12, (VITAMIN B-12) 50 mcg tablet, Take 50 mcg by mouth once daily.  , Disp: , Rfl:     FLAXSEED OIL ORAL, Take 1,000 mg by mouth. 1  Oral Every day, Disp: , Rfl:     folic acid (FOLVITE) 400 MCG tablet, Take 400 mcg by mouth. 4 Tablet Oral Every day, Disp: , Rfl:     mesalamine (ASACOL) 400 mg EC tablet, Take 400 mg by mouth. 4 Tablet, Delayed Release (E.C.) Oral Twice a day , Disp: , Rfl:     methocarbamol (ROBAXIN) 500 MG Tab, Take two tablets (1,000 mg total) by mouth 3 times daily  for five days., Disp: 30 tablet, Rfl: 0    multivitamin (THERAGRAN) per tablet, Take by mouth. 1  By mouth Every day, Disp: , Rfl:     omega-3 fatty acids-vitamin E (FISH OIL) 1,000 mg Cap, , Disp: , Rfl:     omeprazole (PRILOSEC) 20 MG capsule, TAKE 1 CAPSULE EVERY DAY, Disp: 90 capsule, Rfl: 4    ondansetron (ZOFRAN-ODT) 4 MG TbDL, DISSOLVE 1 TABLET ON THE TONGUE EVERY EIGHT HOURS AS NEEDED, Disp: 20 tablet, Rfl: 1    simvastatin (ZOCOR) 10 MG tablet, TAKE 1 TABLET EVERY EVENING, Disp: 90 tablet, Rfl: 4    vitamin E 1000 UNIT capsule, Take by mouth. 1 Capsule Oral Every day, Disp: , Rfl:           Review of Systems   Constitution: Negative for decreased appetite, diaphoresis, fever, malaise/fatigue, weight gain and weight loss.   HENT: Negative for congestion, ear discharge, ear pain and nosebleeds.    Eyes: Negative for blurred vision, double vision and visual disturbance.   Cardiovascular: Negative for chest pain, claudication, cyanosis, dyspnea on exertion, irregular heartbeat, leg swelling, near-syncope, orthopnea, palpitations, paroxysmal nocturnal dyspnea and syncope.   Respiratory: Negative for cough, hemoptysis, shortness of breath, sleep disturbances due to breathing, snoring, sputum production and wheezing.         Phleuretic back pain resolved   Endocrine: Negative for polydipsia, polyphagia and polyuria.   Hematologic/Lymphatic: Negative for adenopathy and bleeding problem. Does not bruise/bleed easily.   Skin: Negative for color change, nail changes, poor wound healing and rash.   Musculoskeletal: Positive for arthritis. Negative for muscle cramps and muscle weakness.   Gastrointestinal: Negative for abdominal pain, anorexia, change in bowel habit, hematochezia, nausea and vomiting.   Genitourinary: Negative for dysuria, frequency and hematuria.   Neurological: Negative for brief paralysis, difficulty with concentration, excessive daytime sleepiness, dizziness, focal weakness, headaches,  light-headedness, seizures, vertigo and weakness.   Psychiatric/Behavioral: Negative for altered mental status and depression.   Allergic/Immunologic: Negative for persistent infections.        Objective:LMP  (LMP Unknown)             Physical Exam none      Assessment:       1. Pure hypercholesterolemia    2. Atrial enlargement, bilateral    3. Pulmonary embolism, unspecified chronicity, unspecified pulmonary embolism type, unspecified whether acute cor pulmonale present    4. Essential hypertension    5. Aortic atherosclerosis    6. Non morbid obesity due to excess calories         Plan:       Diagnoses and all orders for this visit:    Pure hypercholesterolemia    Atrial enlargement, bilateral  -     Ambulatory referral/consult to Cardiology    Pulmonary embolism, unspecified chronicity, unspecified pulmonary embolism type, unspecified whether acute cor pulmonale present  -     Ambulatory referral/consult to Cardiology    Essential hypertension    Aortic atherosclerosis    Non morbid obesity due to excess calories

## 2020-03-30 NOTE — LETTER
March 30, 2020      Prosper Mancilla MD  [address]  [facility] [address]           [facility] - Cardiology  [address]  [facility] [address]  Phone: [phone]          Patient: Gabriel Grace   MR Number: [id number]   YOB: 1940   Date of Visit: 3/30/2020       Dear Dr. Prosper Mancilla:    Thank you for referring Gabriel Grace to me for evaluation. Attached you will find relevant portions of my assessment and plan of care.    If you have questions, please do not hesitate to call me. I look forward to following Gabriel Grace along with you.    Sincerely,    Wayne Monreal MD    Enclosure  CC:  No Recipients    If you would like to receive this communication electronically, please contact [email or web address] or [phone] to request more information on Made[employer] Link access.    For providers and/or their staff who would like to refer a patient to [facility], please contact us through our one-stop-shop provider referral line, [facility] , at [phone].    If you feel you have received this communication in error or would no longer like to receive these types of communications, please e-mail [email or web address]

## 2020-03-30 NOTE — TELEPHONE ENCOUNTER
Pt got a text message stating that she has an in person visit tomorrow.  Was told that she would have phone visit with Dr. Monreal. States pt is weak and she is not sure that she can get the patient physically to the appt.  States that they were not advised at discharge of pt's current status. Please call as soon as the clinic opens to advise if visit is virtual or in person. [phone].    Reason for Disposition   [1] Follow-up call to recent contact AND [2] information only call, no triage required    Protocols used: INFORMATION ONLY CALL-A-

## 2020-09-29 ENCOUNTER — PATIENT MESSAGE (OUTPATIENT)
Dept: OTHER | Facility: OTHER | Age: 80
End: 2020-09-29

## 2020-10-18 ENCOUNTER — PATIENT OUTREACH (OUTPATIENT)
Dept: ADMINISTRATIVE | Facility: OTHER | Age: 80
End: 2020-10-18

## 2020-10-20 ENCOUNTER — OFFICE VISIT (OUTPATIENT)
Dept: CARDIOLOGY | Facility: CLINIC | Age: 80
End: 2020-10-20
Payer: MEDICARE

## 2020-10-20 ENCOUNTER — TELEPHONE (OUTPATIENT)
Dept: INTERNAL MEDICINE | Facility: CLINIC | Age: 80
End: 2020-10-20

## 2020-10-20 ENCOUNTER — HOSPITAL ENCOUNTER (OUTPATIENT)
Dept: CARDIOLOGY | Facility: HOSPITAL | Age: 80
Discharge: HOME | End: 2020-10-20
Attending: INTERNAL MEDICINE
Payer: MEDICARE

## 2020-10-20 VITALS
HEIGHT: 68 IN | SYSTOLIC BLOOD PRESSURE: 129 MMHG | WEIGHT: 195.31 LBS | BODY MASS INDEX: 29.6 KG/M2 | HEART RATE: 86 BPM | DIASTOLIC BLOOD PRESSURE: 62 MMHG

## 2020-10-20 VITALS
DIASTOLIC BLOOD PRESSURE: 62 MMHG | WEIGHT: 195.31 LBS | BODY MASS INDEX: 29.6 KG/M2 | HEART RATE: 86 BPM | SYSTOLIC BLOOD PRESSURE: 129 MMHG | OXYGEN SATURATION: 97 % | HEIGHT: 68 IN

## 2020-10-20 DIAGNOSIS — E78.00 PURE HYPERCHOLESTEROLEMIA: ICD-10-CM

## 2020-10-20 DIAGNOSIS — I10 ESSENTIAL HYPERTENSION: ICD-10-CM

## 2020-10-20 DIAGNOSIS — I26.99 PULMONARY EMBOLISM, UNSPECIFIED CHRONICITY, UNSPECIFIED PULMONARY EMBOLISM TYPE, UNSPECIFIED WHETHER ACUTE COR PULMONALE PRESENT: Primary | ICD-10-CM

## 2020-10-20 DIAGNOSIS — I26.99 PULMONARY EMBOLISM, UNSPECIFIED CHRONICITY, UNSPECIFIED PULMONARY EMBOLISM TYPE, UNSPECIFIED WHETHER ACUTE COR PULMONALE PRESENT: ICD-10-CM

## 2020-10-20 DIAGNOSIS — R06.09 DOE (DYSPNEA ON EXERTION): ICD-10-CM

## 2020-10-20 DIAGNOSIS — R63.4 WEIGHT LOSS, UNINTENTIONAL: ICD-10-CM

## 2020-10-20 DIAGNOSIS — Z12.31 ENCOUNTER FOR SCREENING MAMMOGRAM FOR MALIGNANT NEOPLASM OF BREAST: Primary | ICD-10-CM

## 2020-10-20 DIAGNOSIS — I70.0 AORTIC ATHEROSCLEROSIS: ICD-10-CM

## 2020-10-20 LAB
ASCENDING AORTA: 3.07 CM
AV INDEX (PROSTH): 0.79
AV MEAN GRADIENT: 5 MMHG
AV PEAK GRADIENT: 10 MMHG
AV VALVE AREA: 2.71 CM2
AV VELOCITY RATIO: 0.7
BSA FOR ECHO PROCEDURE: 2.06 M2
CV ECHO LV RWT: 0.33 CM
DOP CALC AO PEAK VEL: 1.59 M/S
DOP CALC AO VTI: 30.13 CM
DOP CALC LVOT AREA: 3.4 CM2
DOP CALC LVOT DIAMETER: 2.09 CM
DOP CALC LVOT PEAK VEL: 1.11 M/S
DOP CALC LVOT STROKE VOLUME: 81.57 CM3
DOP CALCLVOT PEAK VEL VTI: 23.79 CM
E WAVE DECELERATION TIME: 296.88 MSEC
E/A RATIO: 0.65
E/E' RATIO: 11 M/S
ECHO LV POSTERIOR WALL: 0.89 CM (ref 0.6–1.1)
FRACTIONAL SHORTENING: 44 % (ref 28–44)
INTERVENTRICULAR SEPTUM: 0.92 CM (ref 0.6–1.1)
IVRT: 134.16 MSEC
LA MAJOR: 5.64 CM
LA MINOR: 5.99 CM
LA WIDTH: 3.49 CM
LEFT ATRIUM SIZE: 3.47 CM
LEFT ATRIUM VOLUME INDEX: 29.6 ML/M2
LEFT ATRIUM VOLUME: 59.8 CM3
LEFT INTERNAL DIMENSION IN SYSTOLE: 2.97 CM (ref 2.1–4)
LEFT VENTRICLE DIASTOLIC VOLUME INDEX: 67.7 ML/M2
LEFT VENTRICLE DIASTOLIC VOLUME: 136.99 ML
LEFT VENTRICLE MASS INDEX: 88 G/M2
LEFT VENTRICLE SYSTOLIC VOLUME INDEX: 16.8 ML/M2
LEFT VENTRICLE SYSTOLIC VOLUME: 34.03 ML
LEFT VENTRICULAR INTERNAL DIMENSION IN DIASTOLE: 5.33 CM (ref 3.5–6)
LEFT VENTRICULAR MASS: 177.46 G
LV LATERAL E/E' RATIO: 9.63 M/S
LV SEPTAL E/E' RATIO: 12.83 M/S
MV PEAK A VEL: 1.18 M/S
MV PEAK E VEL: 0.77 M/S
MV STENOSIS PRESSURE HALF TIME: 86.1 MS
MV VALVE AREA P 1/2 METHOD: 2.56 CM2
PISA TR MAX VEL: 2.82 M/S
PULM VEIN S/D RATIO: 2.31
PV PEAK D VEL: 0.29 M/S
PV PEAK S VEL: 0.67 M/S
RA MAJOR: 5.23 CM
RA PRESSURE: 3 MMHG
RA WIDTH: 3.21 CM
RIGHT VENTRICULAR END-DIASTOLIC DIMENSION: 3.27 CM
RV TISSUE DOPPLER FREE WALL SYSTOLIC VELOCITY 1 (APICAL 4 CHAMBER VIEW): 15.22 CM/S
SINUS: 3.22 CM
STJ: 2.69 CM
TDI LATERAL: 0.08 M/S
TDI SEPTAL: 0.06 M/S
TDI: 0.07 M/S
TR MAX PG: 32 MMHG
TRICUSPID ANNULAR PLANE SYSTOLIC EXCURSION: 2.93 CM
TV REST PULMONARY ARTERY PRESSURE: 35 MMHG

## 2020-10-20 PROCEDURE — 1101F PT FALLS ASSESS-DOCD LE1/YR: CPT | Mod: HCNC,CPTII,S$GLB, | Performed by: INTERNAL MEDICINE

## 2020-10-20 PROCEDURE — 1126F PR PAIN SEVERITY QUANTIFIED, NO PAIN PRESENT: ICD-10-PCS | Mod: HCNC,S$GLB,, | Performed by: INTERNAL MEDICINE

## 2020-10-20 PROCEDURE — 3078F DIAST BP <80 MM HG: CPT | Mod: HCNC,CPTII,S$GLB, | Performed by: INTERNAL MEDICINE

## 2020-10-20 PROCEDURE — 1159F PR MEDICATION LIST DOCUMENTED IN MEDICAL RECORD: ICD-10-PCS | Mod: HCNC,S$GLB,, | Performed by: INTERNAL MEDICINE

## 2020-10-20 PROCEDURE — 99999 PR PBB SHADOW E&M-EST. PATIENT-LVL V: CPT | Mod: PBBFAC,HCNC,, | Performed by: INTERNAL MEDICINE

## 2020-10-20 PROCEDURE — 93306 TTE W/DOPPLER COMPLETE: CPT | Mod: HCNC

## 2020-10-20 PROCEDURE — 99499 RISK ADDL DX/OHS AUDIT: ICD-10-PCS | Mod: S$GLB,,, | Performed by: INTERNAL MEDICINE

## 2020-10-20 PROCEDURE — 1101F PR PT FALLS ASSESS DOC 0-1 FALLS W/OUT INJ PAST YR: ICD-10-PCS | Mod: HCNC,CPTII,S$GLB, | Performed by: INTERNAL MEDICINE

## 2020-10-20 PROCEDURE — 99214 PR OFFICE/OUTPT VISIT, EST, LEVL IV, 30-39 MIN: ICD-10-PCS | Mod: HCNC,S$GLB,, | Performed by: INTERNAL MEDICINE

## 2020-10-20 PROCEDURE — 3078F PR MOST RECENT DIASTOLIC BLOOD PRESSURE < 80 MM HG: ICD-10-PCS | Mod: HCNC,CPTII,S$GLB, | Performed by: INTERNAL MEDICINE

## 2020-10-20 PROCEDURE — 93306 ECHO (CUPID ONLY): ICD-10-PCS | Mod: 26,HCNC,, | Performed by: INTERNAL MEDICINE

## 2020-10-20 PROCEDURE — 3074F SYST BP LT 130 MM HG: CPT | Mod: HCNC,CPTII,S$GLB, | Performed by: INTERNAL MEDICINE

## 2020-10-20 PROCEDURE — 93306 TTE W/DOPPLER COMPLETE: CPT | Mod: 26,HCNC,, | Performed by: INTERNAL MEDICINE

## 2020-10-20 PROCEDURE — 99999 PR PBB SHADOW E&M-EST. PATIENT-LVL V: ICD-10-PCS | Mod: PBBFAC,HCNC,, | Performed by: INTERNAL MEDICINE

## 2020-10-20 PROCEDURE — 1126F AMNT PAIN NOTED NONE PRSNT: CPT | Mod: HCNC,S$GLB,, | Performed by: INTERNAL MEDICINE

## 2020-10-20 PROCEDURE — 1159F MED LIST DOCD IN RCRD: CPT | Mod: HCNC,S$GLB,, | Performed by: INTERNAL MEDICINE

## 2020-10-20 PROCEDURE — 99214 OFFICE O/P EST MOD 30 MIN: CPT | Mod: HCNC,S$GLB,, | Performed by: INTERNAL MEDICINE

## 2020-10-20 PROCEDURE — 3074F PR MOST RECENT SYSTOLIC BLOOD PRESSURE < 130 MM HG: ICD-10-PCS | Mod: HCNC,CPTII,S$GLB, | Performed by: INTERNAL MEDICINE

## 2020-10-20 PROCEDURE — 99499 UNLISTED E&M SERVICE: CPT | Mod: S$GLB,,, | Performed by: INTERNAL MEDICINE

## 2020-10-20 RX ORDER — BALSALAZIDE DISODIUM 750 MG/1
2250 CAPSULE ORAL DAILY
COMMUNITY
Start: 2020-09-02

## 2020-10-20 RX ORDER — APIXABAN 5 MG/1
5 TABLET, FILM COATED ORAL 2 TIMES DAILY
COMMUNITY
Start: 2020-09-02 | End: 2021-01-11

## 2020-10-20 NOTE — PROGRESS NOTES
Subjective:    Patient ID:  Gabriel Grace is a 80 y.o. 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 A CTA was done was found a PE. She was discharged 2/28/20 on eliquis  Which is continued. She reports SALGADO and fatigue.. She is not exercise. She as a loss of appetite and weight in February/March    Conclusion    · Normal left ventricular systolic function. The estimated ejection fraction is 55%.  · Local segmental wall motion abnormalities.  · Concentric left ventricular remodeling.  · Indeterminate left ventricular diastolic function.  · Moderate left atrial enlargement.  · Low normal right ventricular systolic function.  · Severe right atrial enlargement.  · Normal central venous pressure (3 mmHg).  · The estimated PA systolic pressure is 33 mmHg.  · Trivial posterior pericardial effusion.          Lab Results   Component Value Date     02/28/2020    K 4.0 02/28/2020     02/28/2020    CO2 22 (L) 02/28/2020    BUN 26 (H) 02/28/2020    CREATININE 1.0 02/28/2020     (H) 02/28/2020    HGBA1C 6.0 (H) 02/27/2019    MG 2.0 10/06/2008    AST 23 02/28/2020    ALT 14 02/28/2020    ALBUMIN 3.1 (L) 02/28/2020    PROT 7.2 02/28/2020    BILITOT 0.5 02/28/2020    WBC 9.76 02/28/2020    HGB 10.7 (L) 02/28/2020    HCT 34.6 (L) 02/28/2020    MCV 99 (H) 02/28/2020     (L) 02/28/2020    INR 1.1 02/14/2015    TSH 1.859 02/27/2019         Lab Results   Component Value Date    CHOL 154 02/27/2019    HDL 40 02/27/2019    TRIG 155 (H) 02/27/2019       Lab Results   Component Value Date    LDLCALC 83.0 02/27/2019       Past Medical History:   Diagnosis Date    Cataract     Hyperglycemia 10/2/2013    Hyperlipidemia     Hypertension     Migraine headache     Pancreas cyst 1/17/2017    Thyroid disease     hypothyroidism     Ulcerative colitis, unspecified     Ulcerative colitis, unspecified     Visit for screening mammogram 1/12/2016       Current Outpatient Medications:     amLODIPine (NORVASC) 10 MG tablet, TAKE 1 TABLET EVERY DAY, Disp: 90 tablet, Rfl: 3    balsalazide (COLAZAL) 750 mg capsule, , Disp: , Rfl:     biotin 2,500 mcg Cap, , Disp: , Rfl:     calcium-magnesium-zinc Tab, Take by mouth. 1  Oral Every day, Disp: , Rfl:     carvedilol (COREG) 12.5 MG tablet, TAKE 1 TABLET TWICE DAILY WITH MEALS, Disp: 180 tablet, Rfl: 3    cholecalciferol, vitamin D3, (VITAMIN D3) 1,000 unit capsule, Take 1,000 Units by mouth once daily.  , Disp: , Rfl:     cyanocobalamin, vitamin B-12, (VITAMIN B-12) 50 mcg tablet, Take 50 mcg by mouth once daily.  , Disp: , Rfl:     ELIQUIS 5 mg Tab, 5 mg 2 (two) times daily. , Disp: , Rfl:     FLAXSEED OIL ORAL, Take 1,000 mg by mouth. 1  Oral Every day, Disp: , Rfl:     folic acid (FOLVITE) 400 MCG tablet, Take 800 mcg by mouth once daily. 4 Tablet Oral Every day, Disp: , Rfl:     mesalamine (ASACOL) 400 mg EC tablet, Take 400 mg by mouth. 4 Tablet, Delayed Release (E.C.) Oral Twice a day , Disp: , Rfl:     multivitamin (THERAGRAN) per tablet, Take by mouth. 1  By mouth Every day, Disp: , Rfl:     omega-3 fatty acids-vitamin E (FISH OIL) 1,000 mg Cap, , Disp: , Rfl:     omeprazole (PRILOSEC) 20 MG capsule, TAKE 1 CAPSULE EVERY DAY, Disp: 90 capsule, Rfl: 4    simvastatin (ZOCOR) 10 MG tablet, TAKE 1 TABLET EVERY EVENING, Disp: 90 tablet, Rfl: 4    vitamin E 1000 UNIT capsule, Take by mouth. 1 Capsule Oral Every day, Disp: , Rfl:           Review of Systems   Constitution: Positive for decreased appetite, malaise/fatigue and weight loss. Negative for diaphoresis, fever and weight gain.   HENT: Negative for congestion, ear discharge, ear pain and nosebleeds.    Eyes: Negative for blurred vision, double vision and visual disturbance.   Cardiovascular: Positive for dyspnea on exertion.  "Negative for chest pain, claudication, cyanosis, irregular heartbeat, leg swelling, near-syncope, orthopnea, palpitations, paroxysmal nocturnal dyspnea and syncope.   Respiratory: Negative for cough, hemoptysis, shortness of breath, sleep disturbances due to breathing, snoring, sputum production and wheezing.    Endocrine: Negative for polydipsia, polyphagia and polyuria.   Hematologic/Lymphatic: Negative for adenopathy and bleeding problem. Does not bruise/bleed easily.   Skin: Negative for color change, nail changes, poor wound healing and rash.   Musculoskeletal: Negative for muscle cramps and muscle weakness.   Gastrointestinal: Negative for abdominal pain, anorexia, change in bowel habit, hematochezia, nausea and vomiting.   Genitourinary: Negative for dysuria, frequency and hematuria.   Neurological: Negative for brief paralysis, difficulty with concentration, excessive daytime sleepiness, dizziness, focal weakness, headaches, light-headedness, seizures, vertigo and weakness.   Psychiatric/Behavioral: Negative for altered mental status and depression.   Allergic/Immunologic: Negative for persistent infections.        Objective:/62 (BP Location: Left arm, Patient Position: Sitting, BP Method: Large (Automatic))   Pulse 86   Ht 5' 8" (1.727 m)   Wt 88.6 kg (195 lb 5.2 oz)   LMP  (LMP Unknown)   SpO2 97%   BMI 29.70 kg/m²             Physical Exam   Constitutional: She is oriented to person, place, and time. She appears well-developed and well-nourished.   HENT:   Head: Normocephalic.   Right Ear: External ear normal.   Left Ear: External ear normal.   Nose: Nose normal.   Inspection of lips, teeth and gums normal   Eyes: Pupils are equal, round, and reactive to light. Conjunctivae and EOM are normal. No scleral icterus.   Neck: Normal range of motion. No JVD present. No tracheal deviation present. No thyromegaly present.   Cardiovascular: Normal rate, regular rhythm, normal heart sounds and intact " distal pulses. Exam reveals no gallop and no friction rub.   No murmur heard.  Pulses:       Dorsalis pedis pulses are 2+ on the left side.   Pulmonary/Chest: Effort normal and breath sounds normal. No respiratory distress. She has no wheezes. She has no rales. She exhibits no tenderness.   Abdominal: Soft. Bowel sounds are normal. She exhibits no distension. There is no hepatosplenomegaly. There is no abdominal tenderness. There is no guarding.   Musculoskeletal: Normal range of motion.         General: No tenderness or edema.   Lymphadenopathy:   Palpation of lymph nodes of neck and groin normal   Neurological: She is oriented to person, place, and time. No cranial nerve deficit. She exhibits normal muscle tone. Coordination normal.   Skin: Skin is warm and dry. No rash noted. No erythema. No pallor.   Palpation of skin normal   Psychiatric: She has a normal mood and affect. Her behavior is normal. Judgment and thought content normal.         Assessment:       1. Pulmonary embolism, unspecified chronicity, unspecified pulmonary embolism type, unspecified whether acute cor pulmonale present    2. Essential hypertension    3. Aortic atherosclerosis    4. Pure hypercholesterolemia    5. Weight loss, unintentional    6. SALGADO (dyspnea on exertion)         Plan:       Gabriel was seen today for pulmonary embolism, unspecified chronicity, unspecified pulm and shortness of breath.    Diagnoses and all orders for this visit:    Pulmonary embolism, unspecified chronicity, unspecified pulmonary embolism type, unspecified whether acute cor pulmonale present  -     Echo Color Flow Doppler? Yes; Future    Essential hypertension  -     Comprehensive Metabolic Panel; Future; Expected date: 10/20/2020  -     CBC auto differential; Future; Expected date: 10/20/2020    Aortic atherosclerosis  -     Lipid Panel; Future; Expected date: 10/20/2020    Pure hypercholesterolemia  -     Lipid Panel; Future; Expected date: 10/20/2020    Weight  loss, unintentional  -     COVID-19 (SARS CoV-2) IgG Antibody; Future; Expected date: 10/20/2020    SALGADO (dyspnea on exertion)  -     Echo Color Flow Doppler? Yes; Future    Other orders  -     balsalazide (COLAZAL) 750 mg capsule  -     ELIQUIS 5 mg Tab; 5 mg 2 (two) times daily.

## 2020-10-20 NOTE — TELEPHONE ENCOUNTER
----- Message from Amadou Sheridan sent at 10/20/2020  4:34 PM CDT -----  Regarding: mammo order        The Pt states that she wants a 3D mammo done and I scheduled her from your previous order for the regular screening mammo which didn't have the ROLY/3D on the order.  The Pt states that she would like for you to enter in the order for 3D.    Please contact the Pt after the order has been changed so that she will know to call us back to reschedule the appt in 3D.    Phone # [phone]

## 2020-10-21 ENCOUNTER — HOSPITAL ENCOUNTER (OUTPATIENT)
Dept: RADIOLOGY | Facility: HOSPITAL | Age: 80
Discharge: HOME | End: 2020-10-21
Attending: INTERNAL MEDICINE
Payer: MEDICARE

## 2020-10-21 ENCOUNTER — PATIENT MESSAGE (OUTPATIENT)
Dept: CARDIOLOGY | Facility: CLINIC | Age: 80
End: 2020-10-21

## 2020-10-21 VITALS — WEIGHT: 191.81 LBS | HEIGHT: 68 IN | BODY MASS INDEX: 29.07 KG/M2

## 2020-10-21 DIAGNOSIS — Z12.31 ENCOUNTER FOR SCREENING MAMMOGRAM FOR MALIGNANT NEOPLASM OF BREAST: ICD-10-CM

## 2020-10-21 PROCEDURE — 77063 BREAST TOMOSYNTHESIS BI: CPT | Mod: 26,HCNC,, | Performed by: RADIOLOGY

## 2020-10-21 PROCEDURE — 77067 SCR MAMMO BI INCL CAD: CPT | Mod: TC,HCNC

## 2020-10-21 PROCEDURE — 77067 MAMMO DIGITAL SCREENING BILAT WITH TOMO: ICD-10-PCS | Mod: 26,HCNC,, | Performed by: RADIOLOGY

## 2020-10-21 PROCEDURE — 77063 MAMMO DIGITAL SCREENING BILAT WITH TOMO: ICD-10-PCS | Mod: 26,HCNC,, | Performed by: RADIOLOGY

## 2020-10-21 PROCEDURE — 77067 SCR MAMMO BI INCL CAD: CPT | Mod: 26,HCNC,, | Performed by: RADIOLOGY

## 2020-10-22 DIAGNOSIS — R73.03 PRE-DIABETES: Primary | ICD-10-CM

## 2020-10-30 ENCOUNTER — INITIAL CONSULT (OUTPATIENT)
Dept: GYNECOLOGIC ONCOLOGY | Facility: CLINIC | Age: 80
End: 2020-10-30
Payer: MEDICARE

## 2020-10-30 VITALS
DIASTOLIC BLOOD PRESSURE: 68 MMHG | SYSTOLIC BLOOD PRESSURE: 151 MMHG | BODY MASS INDEX: 29.5 KG/M2 | HEART RATE: 87 BPM | WEIGHT: 194 LBS

## 2020-10-30 DIAGNOSIS — Z01.419 WELL WOMAN EXAM WITH ROUTINE GYNECOLOGICAL EXAM: Primary | ICD-10-CM

## 2020-10-30 DIAGNOSIS — Z12.31 SCREENING MAMMOGRAM, ENCOUNTER FOR: ICD-10-CM

## 2020-10-30 PROCEDURE — 3078F PR MOST RECENT DIASTOLIC BLOOD PRESSURE < 80 MM HG: ICD-10-PCS | Mod: HCNC,CPTII,S$GLB, | Performed by: OBSTETRICS & GYNECOLOGY

## 2020-10-30 PROCEDURE — G0101 PR CA SCREEN;PELVIC/BREAST EXAM: ICD-10-PCS | Mod: HCNC,S$GLB,, | Performed by: OBSTETRICS & GYNECOLOGY

## 2020-10-30 PROCEDURE — G0101 CA SCREEN;PELVIC/BREAST EXAM: HCPCS | Mod: HCNC,S$GLB,, | Performed by: OBSTETRICS & GYNECOLOGY

## 2020-10-30 PROCEDURE — 99999 PR PBB SHADOW E&M-EST. PATIENT-LVL III: ICD-10-PCS | Mod: PBBFAC,HCNC,, | Performed by: OBSTETRICS & GYNECOLOGY

## 2020-10-30 PROCEDURE — 3077F PR MOST RECENT SYSTOLIC BLOOD PRESSURE >= 140 MM HG: ICD-10-PCS | Mod: HCNC,CPTII,S$GLB, | Performed by: OBSTETRICS & GYNECOLOGY

## 2020-10-30 PROCEDURE — 3077F SYST BP >= 140 MM HG: CPT | Mod: HCNC,CPTII,S$GLB, | Performed by: OBSTETRICS & GYNECOLOGY

## 2020-10-30 PROCEDURE — 99999 PR PBB SHADOW E&M-EST. PATIENT-LVL III: CPT | Mod: PBBFAC,HCNC,, | Performed by: OBSTETRICS & GYNECOLOGY

## 2020-10-30 PROCEDURE — 3078F DIAST BP <80 MM HG: CPT | Mod: HCNC,CPTII,S$GLB, | Performed by: OBSTETRICS & GYNECOLOGY

## 2020-10-30 NOTE — LETTER
October 30, 2020      Prosper Mancilla MD  [address]  [facility] [address]           [facility] - Gyn Onc 2nd Fl  [address]  [facility] [address]  Phone: [phone]          Patient: Gabriel Grace   MR Number: [id number]   YOB: 1940   Date of Visit: 10/30/2020       Dear Dr. Prosper Mancilla:    Thank you for referring Gabriel Graec to me for evaluation. Attached you will find relevant portions of my assessment and plan of care.    If you have questions, please do not hesitate to call me. I look forward to following Gabriel Grace along with you.    Sincerely,    Paulo Swain MD    Enclosure  CC:  No Recipients    If you would like to receive this communication electronically, please contact [email or web address] or [phone] to request more information on M-[employer] Link access.    For providers and/or their staff who would like to refer a patient to [facility], please contact us through our one-stop-shop provider referral line, [facility] , at [phone].    If you feel you have received this communication in error or would no longer like to receive these types of communications, please e-mail [email or web address]

## 2020-10-30 NOTE — PROGRESS NOTES
Subjective:       Patient ID: Gabriel Grace is a 80 y.o. female.    Chief Complaint: Well Woman    HPI     Patient comes in today for well woman exam. She has no complaints. Denies vaginal bleeding.     Recent PE in Feb 2020. On Eliquis. Limited activity but she is now feeling stronger.     No gyn complaints.           Last Mammogram: 10/21/2020: normal.   Last BMD: 2014: normal. No need to repeat unless clinically indicated per report.   Last Colonoscopy: 2009: normal.       Review of Systems   Constitutional: Negative for chills, fatigue and fever.   Eyes: Negative for visual disturbance.   Respiratory: Negative for cough, shortness of breath and wheezing.    Cardiovascular: Negative for chest pain, palpitations and leg swelling.   Gastrointestinal: Negative for abdominal distention, abdominal pain, constipation, diarrhea, nausea and vomiting.   Genitourinary: Negative for difficulty urinating, dysuria, frequency, genital sores, hematuria, pelvic pain, urgency, vaginal bleeding, vaginal discharge and vaginal pain.   Musculoskeletal: Negative for gait problem and neck stiffness.   Skin: Negative for rash.   Neurological: Negative for seizures and weakness.   Hematological: Negative for adenopathy. Does not bruise/bleed easily.   Psychiatric/Behavioral: The patient is not nervous/anxious.        Objective:   BP (!) 151/68   Pulse 87   Wt 88 kg (194 lb)   LMP  (LMP Unknown)   BMI 29.50 kg/m²      Physical Exam  Constitutional:       Appearance: She is well-developed.   HENT:      Head: Normocephalic and atraumatic.   Eyes:      General: No scleral icterus.  Neck:      Thyroid: No thyroid mass or thyromegaly.      Trachea: No tracheal deviation.   Cardiovascular:      Rate and Rhythm: Normal rate and regular rhythm.   Pulmonary:      Effort: Pulmonary effort is normal.      Breath sounds: Normal breath sounds. No wheezing.   Chest:      Breasts:         Right: No mass, nipple discharge, skin change or tenderness.          Left: No mass, nipple discharge, skin change or tenderness.   Abdominal:      General: There is no distension.      Palpations: There is no mass.      Tenderness: There is no abdominal tenderness. There is no guarding or rebound.   Genitourinary:     Comments: Bimanual exam:  Vulva: no lesions. Normal appearance  Urethra: Normal size and location. No lesions  Bladder: No masses or tenderness.  Vagina: normal mucosa. No lesion  Cervix: absent.   Uterus: absent.  Adnexa: no masses.  Rectovaginal: No posterior cul de sac thickening or nodularity.  Rectal: no masses. Nontender. Normal tone.     Musculoskeletal:         General: No tenderness.   Lymphadenopathy:      Cervical: No cervical adenopathy.      Upper Body:      Right upper body: No supraclavicular adenopathy.      Left upper body: No supraclavicular adenopathy.   Skin:     General: Skin is warm and dry.      Findings: No rash.   Neurological:      Mental Status: She is alert and oriented to person, place, and time.   Psychiatric:         Behavior: Behavior normal.         Thought Content: Thought content normal.         Judgment: Judgment normal.         Assessment:       1. Well woman exam with routine gynecological exam    2. Screening mammogram, encounter for        Plan:   Well woman exam with routine gynecological exam  Normal exam.   RTC in 1 year.     Screening mammogram, encounter for  -     Mammo Digital Screening Bilat; Future; Expected date: 10/22/2021

## 2020-11-12 ENCOUNTER — PES CALL (OUTPATIENT)
Dept: ADMINISTRATIVE | Facility: CLINIC | Age: 80
End: 2020-11-12

## 2020-12-11 ENCOUNTER — PATIENT MESSAGE (OUTPATIENT)
Dept: OTHER | Facility: OTHER | Age: 80
End: 2020-12-11

## 2020-12-22 ENCOUNTER — PATIENT OUTREACH (OUTPATIENT)
Dept: ADMINISTRATIVE | Facility: OTHER | Age: 80
End: 2020-12-22

## 2020-12-28 ENCOUNTER — OFFICE VISIT (OUTPATIENT)
Dept: OPTOMETRY | Facility: CLINIC | Age: 80
End: 2020-12-28
Payer: MEDICARE

## 2020-12-28 DIAGNOSIS — H52.203 ASTIGMATISM OF BOTH EYES, UNSPECIFIED TYPE: ICD-10-CM

## 2020-12-28 DIAGNOSIS — H43.811 POSTERIOR VITREOUS DETACHMENT OF RIGHT EYE: ICD-10-CM

## 2020-12-28 DIAGNOSIS — H25.13 NUCLEAR SCLEROSIS, BILATERAL: Primary | ICD-10-CM

## 2020-12-28 DIAGNOSIS — H26.9 CORTICAL CATARACT OF BOTH EYES: ICD-10-CM

## 2020-12-28 DIAGNOSIS — H52.4 PRESBYOPIA OF BOTH EYES: ICD-10-CM

## 2020-12-28 DIAGNOSIS — H43.391 VITREOUS FLOATER, RIGHT: ICD-10-CM

## 2020-12-28 DIAGNOSIS — H35.431 COBBLESTONE RETINAL DEGENERATION, RIGHT: ICD-10-CM

## 2020-12-28 PROCEDURE — 99499 UNLISTED E&M SERVICE: CPT | Mod: HCNC,S$GLB,, | Performed by: OPTOMETRIST

## 2020-12-28 PROCEDURE — 92015 PR REFRACTION: ICD-10-PCS | Mod: HCNC,S$GLB,, | Performed by: OPTOMETRIST

## 2020-12-28 PROCEDURE — 1126F AMNT PAIN NOTED NONE PRSNT: CPT | Mod: HCNC,S$GLB,, | Performed by: OPTOMETRIST

## 2020-12-28 PROCEDURE — 99999 PR PBB SHADOW E&M-EST. PATIENT-LVL III: ICD-10-PCS | Mod: PBBFAC,HCNC,, | Performed by: OPTOMETRIST

## 2020-12-28 PROCEDURE — 3288F FALL RISK ASSESSMENT DOCD: CPT | Mod: HCNC,CPTII,S$GLB, | Performed by: OPTOMETRIST

## 2020-12-28 PROCEDURE — 99999 PR PBB SHADOW E&M-EST. PATIENT-LVL III: CPT | Mod: PBBFAC,HCNC,, | Performed by: OPTOMETRIST

## 2020-12-28 PROCEDURE — 92015 DETERMINE REFRACTIVE STATE: CPT | Mod: HCNC,S$GLB,, | Performed by: OPTOMETRIST

## 2020-12-28 PROCEDURE — 1126F PR PAIN SEVERITY QUANTIFIED, NO PAIN PRESENT: ICD-10-PCS | Mod: HCNC,S$GLB,, | Performed by: OPTOMETRIST

## 2020-12-28 PROCEDURE — 1101F PR PT FALLS ASSESS DOC 0-1 FALLS W/OUT INJ PAST YR: ICD-10-PCS | Mod: HCNC,CPTII,S$GLB, | Performed by: OPTOMETRIST

## 2020-12-28 PROCEDURE — 1101F PT FALLS ASSESS-DOCD LE1/YR: CPT | Mod: HCNC,CPTII,S$GLB, | Performed by: OPTOMETRIST

## 2020-12-28 PROCEDURE — 3288F PR FALLS RISK ASSESSMENT DOCUMENTED: ICD-10-PCS | Mod: HCNC,CPTII,S$GLB, | Performed by: OPTOMETRIST

## 2020-12-28 PROCEDURE — 99499 NO LOS: ICD-10-PCS | Mod: HCNC,S$GLB,, | Performed by: OPTOMETRIST

## 2020-12-29 ENCOUNTER — PES CALL (OUTPATIENT)
Dept: ADMINISTRATIVE | Facility: CLINIC | Age: 80
End: 2020-12-29

## 2021-01-04 ENCOUNTER — PATIENT MESSAGE (OUTPATIENT)
Dept: ADMINISTRATIVE | Facility: HOSPITAL | Age: 81
End: 2021-01-04

## 2021-01-10 ENCOUNTER — IMMUNIZATION (OUTPATIENT)
Dept: INTERNAL MEDICINE | Facility: CLINIC | Age: 81
End: 2021-01-10
Payer: MEDICARE

## 2021-01-10 DIAGNOSIS — Z23 NEED FOR VACCINATION: ICD-10-CM

## 2021-01-10 PROCEDURE — 91300 COVID-19, MRNA, LNP-S, PF, 30 MCG/0.3 ML DOSE VACCINE: CPT | Mod: PBBFAC | Performed by: INTERNAL MEDICINE

## 2021-01-11 RX ORDER — APIXABAN 5 MG/1
TABLET, FILM COATED ORAL
Qty: 180 TABLET | Refills: 3 | Status: SHIPPED | OUTPATIENT
Start: 2021-01-11 | End: 2021-07-26 | Stop reason: ALTCHOICE

## 2021-01-24 ENCOUNTER — PATIENT OUTREACH (OUTPATIENT)
Dept: ADMINISTRATIVE | Facility: OTHER | Age: 81
End: 2021-01-24

## 2021-01-25 ENCOUNTER — OFFICE VISIT (OUTPATIENT)
Dept: CARDIOLOGY | Facility: CLINIC | Age: 81
End: 2021-01-25
Payer: MEDICARE

## 2021-01-25 VITALS
SYSTOLIC BLOOD PRESSURE: 134 MMHG | HEART RATE: 90 BPM | DIASTOLIC BLOOD PRESSURE: 63 MMHG | BODY MASS INDEX: 29.27 KG/M2 | HEIGHT: 68 IN | WEIGHT: 193.13 LBS

## 2021-01-25 DIAGNOSIS — Z79.01 LONG TERM (CURRENT) USE OF ANTICOAGULANTS: ICD-10-CM

## 2021-01-25 DIAGNOSIS — E78.00 PURE HYPERCHOLESTEROLEMIA: ICD-10-CM

## 2021-01-25 DIAGNOSIS — I87.2 VENOUS INSUFFICIENCY: ICD-10-CM

## 2021-01-25 DIAGNOSIS — I26.99 PULMONARY EMBOLISM, UNSPECIFIED CHRONICITY, UNSPECIFIED PULMONARY EMBOLISM TYPE, UNSPECIFIED WHETHER ACUTE COR PULMONALE PRESENT: Primary | ICD-10-CM

## 2021-01-25 DIAGNOSIS — R73.9 HYPERGLYCEMIA: ICD-10-CM

## 2021-01-25 DIAGNOSIS — R06.09 DOE (DYSPNEA ON EXERTION): ICD-10-CM

## 2021-01-25 DIAGNOSIS — I10 ESSENTIAL HYPERTENSION: ICD-10-CM

## 2021-01-25 PROCEDURE — 3075F SYST BP GE 130 - 139MM HG: CPT | Mod: CPTII,S$GLB,, | Performed by: INTERNAL MEDICINE

## 2021-01-25 PROCEDURE — 3075F PR MOST RECENT SYSTOLIC BLOOD PRESS GE 130-139MM HG: ICD-10-PCS | Mod: CPTII,S$GLB,, | Performed by: INTERNAL MEDICINE

## 2021-01-25 PROCEDURE — 99214 OFFICE O/P EST MOD 30 MIN: CPT | Mod: S$GLB,,, | Performed by: INTERNAL MEDICINE

## 2021-01-25 PROCEDURE — 1159F MED LIST DOCD IN RCRD: CPT | Mod: S$GLB,,, | Performed by: INTERNAL MEDICINE

## 2021-01-25 PROCEDURE — 99999 PR PBB SHADOW E&M-EST. PATIENT-LVL III: CPT | Mod: PBBFAC,,, | Performed by: INTERNAL MEDICINE

## 2021-01-25 PROCEDURE — 1126F AMNT PAIN NOTED NONE PRSNT: CPT | Mod: S$GLB,,, | Performed by: INTERNAL MEDICINE

## 2021-01-25 PROCEDURE — 99214 PR OFFICE/OUTPT VISIT, EST, LEVL IV, 30-39 MIN: ICD-10-PCS | Mod: S$GLB,,, | Performed by: INTERNAL MEDICINE

## 2021-01-25 PROCEDURE — 99499 UNLISTED E&M SERVICE: CPT | Mod: S$GLB,,, | Performed by: INTERNAL MEDICINE

## 2021-01-25 PROCEDURE — 3078F PR MOST RECENT DIASTOLIC BLOOD PRESSURE < 80 MM HG: ICD-10-PCS | Mod: CPTII,S$GLB,, | Performed by: INTERNAL MEDICINE

## 2021-01-25 PROCEDURE — 1159F PR MEDICATION LIST DOCUMENTED IN MEDICAL RECORD: ICD-10-PCS | Mod: S$GLB,,, | Performed by: INTERNAL MEDICINE

## 2021-01-25 PROCEDURE — 99499 RISK ADDL DX/OHS AUDIT: ICD-10-PCS | Mod: S$GLB,,, | Performed by: INTERNAL MEDICINE

## 2021-01-25 PROCEDURE — 3078F DIAST BP <80 MM HG: CPT | Mod: CPTII,S$GLB,, | Performed by: INTERNAL MEDICINE

## 2021-01-25 PROCEDURE — 1126F PR PAIN SEVERITY QUANTIFIED, NO PAIN PRESENT: ICD-10-PCS | Mod: S$GLB,,, | Performed by: INTERNAL MEDICINE

## 2021-01-25 PROCEDURE — 99999 PR PBB SHADOW E&M-EST. PATIENT-LVL III: ICD-10-PCS | Mod: PBBFAC,,, | Performed by: INTERNAL MEDICINE

## 2021-01-31 ENCOUNTER — IMMUNIZATION (OUTPATIENT)
Dept: INTERNAL MEDICINE | Facility: CLINIC | Age: 81
End: 2021-01-31
Payer: MEDICARE

## 2021-01-31 DIAGNOSIS — Z23 NEED FOR VACCINATION: Primary | ICD-10-CM

## 2021-01-31 PROCEDURE — 91300 PR SARS-COV- 2 COVID-19 VACCINE, NO PRSV, 30MCG/0.3ML, IM: CPT | Mod: ,,, | Performed by: INTERNAL MEDICINE

## 2021-01-31 PROCEDURE — 0002A PR IMMUNIZ ADMIN, SARS-COV-2 COVID-19 VACC, 30MCG/0.3ML, 2ND DOSE: ICD-10-PCS | Mod: CV19,,, | Performed by: INTERNAL MEDICINE

## 2021-01-31 PROCEDURE — 0002A PR IMMUNIZ ADMIN, SARS-COV-2 COVID-19 VACC, 30MCG/0.3ML, 2ND DOSE: CPT | Mod: CV19,,, | Performed by: INTERNAL MEDICINE

## 2021-01-31 PROCEDURE — 91300 PR SARS-COV- 2 COVID-19 VACCINE, NO PRSV, 30MCG/0.3ML, IM: ICD-10-PCS | Mod: ,,, | Performed by: INTERNAL MEDICINE

## 2021-01-31 RX ADMIN — Medication 0.3 ML: at 11:01

## 2021-04-05 ENCOUNTER — PATIENT MESSAGE (OUTPATIENT)
Dept: ADMINISTRATIVE | Facility: HOSPITAL | Age: 81
End: 2021-04-05

## 2021-04-15 ENCOUNTER — HOSPITAL ENCOUNTER (OUTPATIENT)
Dept: RADIOLOGY | Facility: OTHER | Age: 81
Discharge: HOME | End: 2021-04-15
Attending: INTERNAL MEDICINE
Payer: MEDICARE

## 2021-04-15 DIAGNOSIS — K86.3 CYST AND PSEUDOCYST OF PANCREAS: ICD-10-CM

## 2021-04-15 DIAGNOSIS — K86.2 CYST AND PSEUDOCYST OF PANCREAS: ICD-10-CM

## 2021-04-15 PROCEDURE — 74170 CT ABD WO CNTRST FLWD CNTRST: CPT | Mod: 26,,, | Performed by: RADIOLOGY

## 2021-04-15 PROCEDURE — 74170 CT ABD WO CNTRST FLWD CNTRST: CPT | Mod: TC

## 2021-04-15 PROCEDURE — 25500020 PHARM REV CODE 255: Performed by: INTERNAL MEDICINE

## 2021-04-15 PROCEDURE — 74170 CT ABDOMEN W WO CONTRAST: ICD-10-PCS | Mod: 26,,, | Performed by: RADIOLOGY

## 2021-04-15 RX ADMIN — IOHEXOL 100 ML: 350 INJECTION, SOLUTION INTRAVENOUS at 09:04

## 2021-05-04 ENCOUNTER — PES CALL (OUTPATIENT)
Dept: ADMINISTRATIVE | Facility: CLINIC | Age: 81
End: 2021-05-04

## 2021-05-21 RX ORDER — CARVEDILOL 12.5 MG/1
12.5 TABLET ORAL 2 TIMES DAILY WITH MEALS
Qty: 180 TABLET | Refills: 3 | Status: SHIPPED | OUTPATIENT
Start: 2021-05-21 | End: 2021-07-26 | Stop reason: SDUPTHER

## 2021-06-09 ENCOUNTER — PATIENT OUTREACH (OUTPATIENT)
Dept: ADMINISTRATIVE | Facility: HOSPITAL | Age: 81
End: 2021-06-09

## 2021-07-07 ENCOUNTER — PATIENT MESSAGE (OUTPATIENT)
Dept: ADMINISTRATIVE | Facility: HOSPITAL | Age: 81
End: 2021-07-07

## 2021-07-26 ENCOUNTER — OFFICE VISIT (OUTPATIENT)
Dept: CARDIOLOGY | Facility: CLINIC | Age: 81
End: 2021-07-26
Payer: MEDICARE

## 2021-07-26 VITALS
WEIGHT: 194 LBS | SYSTOLIC BLOOD PRESSURE: 153 MMHG | DIASTOLIC BLOOD PRESSURE: 67 MMHG | BODY MASS INDEX: 29.4 KG/M2 | HEIGHT: 68 IN | HEART RATE: 86 BPM

## 2021-07-26 DIAGNOSIS — E78.00 PURE HYPERCHOLESTEROLEMIA: ICD-10-CM

## 2021-07-26 DIAGNOSIS — M53.3 SACRO-ILIAC PAIN: ICD-10-CM

## 2021-07-26 DIAGNOSIS — I87.2 VENOUS INSUFFICIENCY: ICD-10-CM

## 2021-07-26 DIAGNOSIS — I10 ESSENTIAL HYPERTENSION: Primary | ICD-10-CM

## 2021-07-26 DIAGNOSIS — I70.0 AORTIC ATHEROSCLEROSIS: ICD-10-CM

## 2021-07-26 PROCEDURE — 3078F DIAST BP <80 MM HG: CPT | Mod: CPTII,S$GLB,, | Performed by: INTERNAL MEDICINE

## 2021-07-26 PROCEDURE — 99214 PR OFFICE/OUTPT VISIT, EST, LEVL IV, 30-39 MIN: ICD-10-PCS | Mod: S$GLB,,, | Performed by: INTERNAL MEDICINE

## 2021-07-26 PROCEDURE — 3078F PR MOST RECENT DIASTOLIC BLOOD PRESSURE < 80 MM HG: ICD-10-PCS | Mod: CPTII,S$GLB,, | Performed by: INTERNAL MEDICINE

## 2021-07-26 PROCEDURE — 1159F PR MEDICATION LIST DOCUMENTED IN MEDICAL RECORD: ICD-10-PCS | Mod: CPTII,S$GLB,, | Performed by: INTERNAL MEDICINE

## 2021-07-26 PROCEDURE — 1159F MED LIST DOCD IN RCRD: CPT | Mod: CPTII,S$GLB,, | Performed by: INTERNAL MEDICINE

## 2021-07-26 PROCEDURE — 99499 RISK ADDL DX/OHS AUDIT: ICD-10-PCS | Mod: HCNC,S$GLB,, | Performed by: INTERNAL MEDICINE

## 2021-07-26 PROCEDURE — 99999 PR PBB SHADOW E&M-EST. PATIENT-LVL III: CPT | Mod: PBBFAC,,, | Performed by: INTERNAL MEDICINE

## 2021-07-26 PROCEDURE — 1125F PR PAIN SEVERITY QUANTIFIED, PAIN PRESENT: ICD-10-PCS | Mod: CPTII,S$GLB,, | Performed by: INTERNAL MEDICINE

## 2021-07-26 PROCEDURE — 3077F PR MOST RECENT SYSTOLIC BLOOD PRESSURE >= 140 MM HG: ICD-10-PCS | Mod: CPTII,S$GLB,, | Performed by: INTERNAL MEDICINE

## 2021-07-26 PROCEDURE — 99214 OFFICE O/P EST MOD 30 MIN: CPT | Mod: S$GLB,,, | Performed by: INTERNAL MEDICINE

## 2021-07-26 PROCEDURE — 1125F AMNT PAIN NOTED PAIN PRSNT: CPT | Mod: CPTII,S$GLB,, | Performed by: INTERNAL MEDICINE

## 2021-07-26 PROCEDURE — 99999 PR PBB SHADOW E&M-EST. PATIENT-LVL III: ICD-10-PCS | Mod: PBBFAC,,, | Performed by: INTERNAL MEDICINE

## 2021-07-26 PROCEDURE — 3077F SYST BP >= 140 MM HG: CPT | Mod: CPTII,S$GLB,, | Performed by: INTERNAL MEDICINE

## 2021-07-26 PROCEDURE — 99499 UNLISTED E&M SERVICE: CPT | Mod: HCNC,S$GLB,, | Performed by: INTERNAL MEDICINE

## 2021-07-26 RX ORDER — CYCLOBENZAPRINE HCL 5 MG
5 TABLET ORAL 3 TIMES DAILY PRN
Qty: 20 TABLET | Refills: 2 | Status: SHIPPED | OUTPATIENT
Start: 2021-07-26 | End: 2021-08-05

## 2021-07-26 RX ORDER — CARVEDILOL 12.5 MG/1
12.5 TABLET ORAL 2 TIMES DAILY WITH MEALS
Qty: 180 TABLET | Refills: 3 | Status: SHIPPED | OUTPATIENT
Start: 2021-07-26 | End: 2022-01-25 | Stop reason: SDUPTHER

## 2021-07-26 RX ORDER — OMEPRAZOLE 20 MG/1
20 CAPSULE, DELAYED RELEASE ORAL DAILY
Qty: 90 CAPSULE | Refills: 4 | Status: SHIPPED | OUTPATIENT
Start: 2021-07-26 | End: 2022-01-25 | Stop reason: SDUPTHER

## 2021-07-26 RX ORDER — AMLODIPINE BESYLATE 10 MG/1
TABLET ORAL
Qty: 90 TABLET | Refills: 0 | Status: SHIPPED | OUTPATIENT
Start: 2021-07-26 | End: 2021-10-15 | Stop reason: SDUPTHER

## 2021-07-26 RX ORDER — SIMVASTATIN 10 MG/1
10 TABLET, FILM COATED ORAL NIGHTLY
Qty: 90 TABLET | Refills: 4 | Status: SHIPPED | OUTPATIENT
Start: 2021-07-26 | End: 2022-01-25 | Stop reason: SDUPTHER

## 2021-08-21 ENCOUNTER — IMMUNIZATION (OUTPATIENT)
Dept: INTERNAL MEDICINE | Facility: CLINIC | Age: 81
End: 2021-08-21
Payer: MEDICARE

## 2021-08-21 DIAGNOSIS — Z23 NEED FOR VACCINATION: Primary | ICD-10-CM

## 2021-08-21 PROCEDURE — 0003A COVID-19, MRNA, LNP-S, PF, 30 MCG/0.3 ML DOSE VACCINE: CPT | Mod: CV19,,, | Performed by: INTERNAL MEDICINE

## 2021-08-21 PROCEDURE — 91300 COVID-19, MRNA, LNP-S, PF, 30 MCG/0.3 ML DOSE VACCINE: CPT | Mod: ,,, | Performed by: INTERNAL MEDICINE

## 2021-08-21 PROCEDURE — 0003A COVID-19, MRNA, LNP-S, PF, 30 MCG/0.3 ML DOSE VACCINE: ICD-10-PCS | Mod: CV19,,, | Performed by: INTERNAL MEDICINE

## 2021-08-21 PROCEDURE — 91300 COVID-19, MRNA, LNP-S, PF, 30 MCG/0.3 ML DOSE VACCINE: ICD-10-PCS | Mod: ,,, | Performed by: INTERNAL MEDICINE

## 2021-10-04 ENCOUNTER — PATIENT MESSAGE (OUTPATIENT)
Dept: ADMINISTRATIVE | Facility: HOSPITAL | Age: 81
End: 2021-10-04

## 2021-10-12 ENCOUNTER — TELEPHONE (OUTPATIENT)
Dept: INTERNAL MEDICINE | Facility: CLINIC | Age: 81
End: 2021-10-12

## 2021-10-12 DIAGNOSIS — Z12.31 ENCOUNTER FOR SCREENING MAMMOGRAM FOR MALIGNANT NEOPLASM OF BREAST: Primary | ICD-10-CM

## 2021-10-15 DIAGNOSIS — I10 ESSENTIAL HYPERTENSION: ICD-10-CM

## 2021-10-15 RX ORDER — AMLODIPINE BESYLATE 10 MG/1
TABLET ORAL
Qty: 90 TABLET | Refills: 3 | Status: SHIPPED | OUTPATIENT
Start: 2021-10-15 | End: 2022-01-25 | Stop reason: SDUPTHER

## 2021-10-26 ENCOUNTER — HOSPITAL ENCOUNTER (OUTPATIENT)
Dept: RADIOLOGY | Facility: HOSPITAL | Age: 81
Discharge: HOME | End: 2021-10-26
Attending: INTERNAL MEDICINE
Payer: MEDICARE

## 2021-10-26 VITALS — BODY MASS INDEX: 28.79 KG/M2 | HEIGHT: 68 IN | WEIGHT: 190 LBS

## 2021-10-26 DIAGNOSIS — Z12.31 ENCOUNTER FOR SCREENING MAMMOGRAM FOR MALIGNANT NEOPLASM OF BREAST: ICD-10-CM

## 2021-10-26 PROCEDURE — 77067 MAMMO DIGITAL SCREENING BILAT WITH TOMO: ICD-10-PCS | Mod: 26,HCNC,, | Performed by: RADIOLOGY

## 2021-10-26 PROCEDURE — 77063 BREAST TOMOSYNTHESIS BI: CPT | Mod: 26,HCNC,, | Performed by: RADIOLOGY

## 2021-10-26 PROCEDURE — 77063 MAMMO DIGITAL SCREENING BILAT WITH TOMO: ICD-10-PCS | Mod: 26,HCNC,, | Performed by: RADIOLOGY

## 2021-10-26 PROCEDURE — 77067 SCR MAMMO BI INCL CAD: CPT | Mod: 26,HCNC,, | Performed by: RADIOLOGY

## 2021-10-26 PROCEDURE — 77067 SCR MAMMO BI INCL CAD: CPT | Mod: TC,HCNC

## 2021-11-02 ENCOUNTER — OFFICE VISIT (OUTPATIENT)
Dept: GYNECOLOGIC ONCOLOGY | Facility: CLINIC | Age: 81
End: 2021-11-02
Payer: MEDICARE

## 2021-11-02 VITALS
BODY MASS INDEX: 29.46 KG/M2 | HEART RATE: 80 BPM | WEIGHT: 193.81 LBS | SYSTOLIC BLOOD PRESSURE: 140 MMHG | DIASTOLIC BLOOD PRESSURE: 69 MMHG

## 2021-11-02 DIAGNOSIS — Z01.419 WELL WOMAN EXAM WITH ROUTINE GYNECOLOGICAL EXAM: Primary | ICD-10-CM

## 2021-11-02 PROCEDURE — 99999 PR PBB SHADOW E&M-EST. PATIENT-LVL III: CPT | Mod: PBBFAC,HCNC,, | Performed by: OBSTETRICS & GYNECOLOGY

## 2021-11-02 PROCEDURE — 3288F FALL RISK ASSESSMENT DOCD: CPT | Mod: HCNC,CPTII,S$GLB, | Performed by: OBSTETRICS & GYNECOLOGY

## 2021-11-02 PROCEDURE — 1159F MED LIST DOCD IN RCRD: CPT | Mod: HCNC,CPTII,S$GLB, | Performed by: OBSTETRICS & GYNECOLOGY

## 2021-11-02 PROCEDURE — 1159F PR MEDICATION LIST DOCUMENTED IN MEDICAL RECORD: ICD-10-PCS | Mod: HCNC,CPTII,S$GLB, | Performed by: OBSTETRICS & GYNECOLOGY

## 2021-11-02 PROCEDURE — 1160F PR REVIEW ALL MEDS BY PRESCRIBER/CLIN PHARMACIST DOCUMENTED: ICD-10-PCS | Mod: HCNC,CPTII,S$GLB, | Performed by: OBSTETRICS & GYNECOLOGY

## 2021-11-02 PROCEDURE — 99999 PR PBB SHADOW E&M-EST. PATIENT-LVL III: ICD-10-PCS | Mod: PBBFAC,HCNC,, | Performed by: OBSTETRICS & GYNECOLOGY

## 2021-11-02 PROCEDURE — 3078F PR MOST RECENT DIASTOLIC BLOOD PRESSURE < 80 MM HG: ICD-10-PCS | Mod: HCNC,CPTII,S$GLB, | Performed by: OBSTETRICS & GYNECOLOGY

## 2021-11-02 PROCEDURE — G0101 PR CA SCREEN;PELVIC/BREAST EXAM: ICD-10-PCS | Mod: HCNC,S$GLB,, | Performed by: OBSTETRICS & GYNECOLOGY

## 2021-11-02 PROCEDURE — 1101F PR PT FALLS ASSESS DOC 0-1 FALLS W/OUT INJ PAST YR: ICD-10-PCS | Mod: HCNC,CPTII,S$GLB, | Performed by: OBSTETRICS & GYNECOLOGY

## 2021-11-02 PROCEDURE — 3288F PR FALLS RISK ASSESSMENT DOCUMENTED: ICD-10-PCS | Mod: HCNC,CPTII,S$GLB, | Performed by: OBSTETRICS & GYNECOLOGY

## 2021-11-02 PROCEDURE — 1126F AMNT PAIN NOTED NONE PRSNT: CPT | Mod: HCNC,CPTII,S$GLB, | Performed by: OBSTETRICS & GYNECOLOGY

## 2021-11-02 PROCEDURE — 1126F PR PAIN SEVERITY QUANTIFIED, NO PAIN PRESENT: ICD-10-PCS | Mod: HCNC,CPTII,S$GLB, | Performed by: OBSTETRICS & GYNECOLOGY

## 2021-11-02 PROCEDURE — 1101F PT FALLS ASSESS-DOCD LE1/YR: CPT | Mod: HCNC,CPTII,S$GLB, | Performed by: OBSTETRICS & GYNECOLOGY

## 2021-11-02 PROCEDURE — G0101 CA SCREEN;PELVIC/BREAST EXAM: HCPCS | Mod: HCNC,S$GLB,, | Performed by: OBSTETRICS & GYNECOLOGY

## 2021-11-02 PROCEDURE — 3077F SYST BP >= 140 MM HG: CPT | Mod: HCNC,CPTII,S$GLB, | Performed by: OBSTETRICS & GYNECOLOGY

## 2021-11-02 PROCEDURE — 3077F PR MOST RECENT SYSTOLIC BLOOD PRESSURE >= 140 MM HG: ICD-10-PCS | Mod: HCNC,CPTII,S$GLB, | Performed by: OBSTETRICS & GYNECOLOGY

## 2021-11-02 PROCEDURE — 3078F DIAST BP <80 MM HG: CPT | Mod: HCNC,CPTII,S$GLB, | Performed by: OBSTETRICS & GYNECOLOGY

## 2021-11-02 PROCEDURE — 1160F RVW MEDS BY RX/DR IN RCRD: CPT | Mod: HCNC,CPTII,S$GLB, | Performed by: OBSTETRICS & GYNECOLOGY

## 2022-01-18 ENCOUNTER — LAB VISIT (OUTPATIENT)
Dept: LAB | Facility: HOSPITAL | Age: 82
End: 2022-01-18
Attending: INTERNAL MEDICINE
Payer: MEDICARE

## 2022-01-18 ENCOUNTER — OFFICE VISIT (OUTPATIENT)
Dept: OPTOMETRY | Facility: CLINIC | Age: 82
End: 2022-01-18
Payer: COMMERCIAL

## 2022-01-18 DIAGNOSIS — H25.13 NUCLEAR SCLEROSIS, BILATERAL: Primary | ICD-10-CM

## 2022-01-18 DIAGNOSIS — R73.9 HYPERGLYCEMIA: ICD-10-CM

## 2022-01-18 DIAGNOSIS — I10 ESSENTIAL HYPERTENSION: ICD-10-CM

## 2022-01-18 DIAGNOSIS — E78.00 PURE HYPERCHOLESTEROLEMIA: ICD-10-CM

## 2022-01-18 DIAGNOSIS — H52.4 PRESBYOPIA OF BOTH EYES: ICD-10-CM

## 2022-01-18 DIAGNOSIS — H43.811 POSTERIOR VITREOUS DETACHMENT OF RIGHT EYE: ICD-10-CM

## 2022-01-18 DIAGNOSIS — H35.431 COBBLESTONE RETINAL DEGENERATION, RIGHT: ICD-10-CM

## 2022-01-18 DIAGNOSIS — H26.9 CORTICAL CATARACT OF BOTH EYES: ICD-10-CM

## 2022-01-18 DIAGNOSIS — H43.391 VITREOUS FLOATER, RIGHT: ICD-10-CM

## 2022-01-18 DIAGNOSIS — Z79.01 LONG TERM (CURRENT) USE OF ANTICOAGULANTS: ICD-10-CM

## 2022-01-18 DIAGNOSIS — H52.203 ASTIGMATISM OF BOTH EYES, UNSPECIFIED TYPE: ICD-10-CM

## 2022-01-18 LAB
ALBUMIN SERPL BCP-MCNC: 3.9 G/DL (ref 3.5–5.2)
ALP SERPL-CCNC: 82 U/L (ref 55–135)
ALT SERPL W/O P-5'-P-CCNC: 14 U/L (ref 10–44)
ANION GAP SERPL CALC-SCNC: 6 MMOL/L (ref 8–16)
ANION GAP SERPL CALC-SCNC: 6 MMOL/L (ref 8–16)
AST SERPL-CCNC: 17 U/L (ref 10–40)
BASOPHILS # BLD AUTO: 0.02 K/UL (ref 0–0.2)
BASOPHILS # BLD AUTO: 0.02 K/UL (ref 0–0.2)
BASOPHILS NFR BLD: 0.4 % (ref 0–1.9)
BASOPHILS NFR BLD: 0.4 % (ref 0–1.9)
BILIRUB SERPL-MCNC: 0.4 MG/DL (ref 0.1–1)
BUN SERPL-MCNC: 29 MG/DL (ref 8–23)
BUN SERPL-MCNC: 29 MG/DL (ref 8–23)
CALCIUM SERPL-MCNC: 10.6 MG/DL (ref 8.7–10.5)
CALCIUM SERPL-MCNC: 10.6 MG/DL (ref 8.7–10.5)
CHLORIDE SERPL-SCNC: 111 MMOL/L (ref 95–110)
CHLORIDE SERPL-SCNC: 111 MMOL/L (ref 95–110)
CHOLEST SERPL-MCNC: 161 MG/DL (ref 120–199)
CHOLEST SERPL-MCNC: 161 MG/DL (ref 120–199)
CHOLEST/HDLC SERPL: 4.2 {RATIO} (ref 2–5)
CHOLEST/HDLC SERPL: 4.2 {RATIO} (ref 2–5)
CO2 SERPL-SCNC: 23 MMOL/L (ref 23–29)
CO2 SERPL-SCNC: 23 MMOL/L (ref 23–29)
CREAT SERPL-MCNC: 1 MG/DL (ref 0.5–1.4)
CREAT SERPL-MCNC: 1 MG/DL (ref 0.5–1.4)
DIFFERENTIAL METHOD: ABNORMAL
DIFFERENTIAL METHOD: ABNORMAL
EOSINOPHIL # BLD AUTO: 0.2 K/UL (ref 0–0.5)
EOSINOPHIL # BLD AUTO: 0.2 K/UL (ref 0–0.5)
EOSINOPHIL NFR BLD: 3.2 % (ref 0–8)
EOSINOPHIL NFR BLD: 3.2 % (ref 0–8)
ERYTHROCYTE [DISTWIDTH] IN BLOOD BY AUTOMATED COUNT: 13.1 % (ref 11.5–14.5)
ERYTHROCYTE [DISTWIDTH] IN BLOOD BY AUTOMATED COUNT: 13.1 % (ref 11.5–14.5)
EST. GFR  (AFRICAN AMERICAN): >60 ML/MIN/1.73 M^2
EST. GFR  (AFRICAN AMERICAN): >60 ML/MIN/1.73 M^2
EST. GFR  (NON AFRICAN AMERICAN): 52.6 ML/MIN/1.73 M^2
EST. GFR  (NON AFRICAN AMERICAN): 52.6 ML/MIN/1.73 M^2
ESTIMATED AVG GLUCOSE: 94 MG/DL (ref 68–131)
GLUCOSE SERPL-MCNC: 122 MG/DL (ref 70–110)
GLUCOSE SERPL-MCNC: 122 MG/DL (ref 70–110)
HBA1C MFR BLD: 4.9 % (ref 4–5.6)
HCT VFR BLD AUTO: 36.8 % (ref 37–48.5)
HCT VFR BLD AUTO: 36.8 % (ref 37–48.5)
HDLC SERPL-MCNC: 38 MG/DL (ref 40–75)
HDLC SERPL-MCNC: 38 MG/DL (ref 40–75)
HDLC SERPL: 23.6 % (ref 20–50)
HDLC SERPL: 23.6 % (ref 20–50)
HGB BLD-MCNC: 12 G/DL (ref 12–16)
HGB BLD-MCNC: 12 G/DL (ref 12–16)
IMM GRANULOCYTES # BLD AUTO: 0.01 K/UL (ref 0–0.04)
IMM GRANULOCYTES # BLD AUTO: 0.01 K/UL (ref 0–0.04)
IMM GRANULOCYTES NFR BLD AUTO: 0.2 % (ref 0–0.5)
IMM GRANULOCYTES NFR BLD AUTO: 0.2 % (ref 0–0.5)
LDLC SERPL CALC-MCNC: 102.6 MG/DL (ref 63–159)
LDLC SERPL CALC-MCNC: 102.6 MG/DL (ref 63–159)
LYMPHOCYTES # BLD AUTO: 1.2 K/UL (ref 1–4.8)
LYMPHOCYTES # BLD AUTO: 1.2 K/UL (ref 1–4.8)
LYMPHOCYTES NFR BLD: 22.3 % (ref 18–48)
LYMPHOCYTES NFR BLD: 22.3 % (ref 18–48)
MCH RBC QN AUTO: 32.9 PG (ref 27–31)
MCH RBC QN AUTO: 32.9 PG (ref 27–31)
MCHC RBC AUTO-ENTMCNC: 32.6 G/DL (ref 32–36)
MCHC RBC AUTO-ENTMCNC: 32.6 G/DL (ref 32–36)
MCV RBC AUTO: 101 FL (ref 82–98)
MCV RBC AUTO: 101 FL (ref 82–98)
MONOCYTES # BLD AUTO: 0.4 K/UL (ref 0.3–1)
MONOCYTES # BLD AUTO: 0.4 K/UL (ref 0.3–1)
MONOCYTES NFR BLD: 7.4 % (ref 4–15)
MONOCYTES NFR BLD: 7.4 % (ref 4–15)
NEUTROPHILS # BLD AUTO: 3.5 K/UL (ref 1.8–7.7)
NEUTROPHILS # BLD AUTO: 3.5 K/UL (ref 1.8–7.7)
NEUTROPHILS NFR BLD: 66.5 % (ref 38–73)
NEUTROPHILS NFR BLD: 66.5 % (ref 38–73)
NONHDLC SERPL-MCNC: 123 MG/DL
NONHDLC SERPL-MCNC: 123 MG/DL
NRBC BLD-RTO: 0 /100 WBC
NRBC BLD-RTO: 0 /100 WBC
PLATELET # BLD AUTO: 187 K/UL (ref 150–450)
PLATELET # BLD AUTO: 187 K/UL (ref 150–450)
PMV BLD AUTO: 11.4 FL (ref 9.2–12.9)
PMV BLD AUTO: 11.4 FL (ref 9.2–12.9)
POTASSIUM SERPL-SCNC: 4.6 MMOL/L (ref 3.5–5.1)
POTASSIUM SERPL-SCNC: 4.6 MMOL/L (ref 3.5–5.1)
PROT SERPL-MCNC: 7.5 G/DL (ref 6–8.4)
RBC # BLD AUTO: 3.65 M/UL (ref 4–5.4)
RBC # BLD AUTO: 3.65 M/UL (ref 4–5.4)
SODIUM SERPL-SCNC: 140 MMOL/L (ref 136–145)
SODIUM SERPL-SCNC: 140 MMOL/L (ref 136–145)
TRIGL SERPL-MCNC: 102 MG/DL (ref 30–150)
TRIGL SERPL-MCNC: 102 MG/DL (ref 30–150)
WBC # BLD AUTO: 5.28 K/UL (ref 3.9–12.7)
WBC # BLD AUTO: 5.28 K/UL (ref 3.9–12.7)

## 2022-01-18 PROCEDURE — 92014 COMPRE OPH EXAM EST PT 1/>: CPT | Mod: S$GLB,,, | Performed by: OPTOMETRIST

## 2022-01-18 PROCEDURE — 80053 COMPREHEN METABOLIC PANEL: CPT | Mod: HCNC | Performed by: INTERNAL MEDICINE

## 2022-01-18 PROCEDURE — 92015 PR REFRACTION: ICD-10-PCS | Mod: S$GLB,,, | Performed by: OPTOMETRIST

## 2022-01-18 PROCEDURE — 92014 PR EYE EXAM, EST PATIENT,COMPREHESV: ICD-10-PCS | Mod: S$GLB,,, | Performed by: OPTOMETRIST

## 2022-01-18 PROCEDURE — 99999 PR PBB SHADOW E&M-EST. PATIENT-LVL III: ICD-10-PCS | Mod: PBBFAC,,, | Performed by: OPTOMETRIST

## 2022-01-18 PROCEDURE — 92015 DETERMINE REFRACTIVE STATE: CPT | Mod: S$GLB,,, | Performed by: OPTOMETRIST

## 2022-01-18 PROCEDURE — 99999 PR PBB SHADOW E&M-EST. PATIENT-LVL III: CPT | Mod: PBBFAC,,, | Performed by: OPTOMETRIST

## 2022-01-18 PROCEDURE — 83036 HEMOGLOBIN GLYCOSYLATED A1C: CPT | Mod: HCNC | Performed by: INTERNAL MEDICINE

## 2022-01-18 PROCEDURE — 80061 LIPID PANEL: CPT | Mod: HCNC | Performed by: INTERNAL MEDICINE

## 2022-01-18 PROCEDURE — 85025 COMPLETE CBC W/AUTO DIFF WBC: CPT | Mod: HCNC | Performed by: INTERNAL MEDICINE

## 2022-01-18 PROCEDURE — 36415 COLL VENOUS BLD VENIPUNCTURE: CPT | Mod: HCNC | Performed by: INTERNAL MEDICINE

## 2022-01-18 RX ORDER — INFLUENZA VACCINE, ADJUVANTED 15; 15; 15; 15 UG/.5ML; UG/.5ML; UG/.5ML; UG/.5ML
INJECTION, SUSPENSION INTRAMUSCULAR
COMMUNITY
Start: 2021-10-07

## 2022-01-18 NOTE — PROGRESS NOTES
"HPI     eye examination       Additional comments: Annual general eye examination and refraction.  Feels spectacle lens Rx need adjustment for near -  reports problem with   near VA with CLs.  Distance VA with glasses seems okay.   Wears glasses full-time.               Comments     Patient's age: 81 y.o. WF  Occupation: retired RN  Approximate date of last eye examination: 12/28/2020  Name of last eye doctor seen:    City/State: [facility]  Wears glasses? Yes      If yes, wears  Full-time or part-time?  Full time  Approximate age of present glasses:  3 years     Any problem with VA with glasses?  No (!)  Wears CLs?:  no  Headaches?  no  Eye pain/discomfort?  No                                                                              Flashes?  no  Floaters?  No   Diplopia/Double vision?  no  Patient's Ocular History:         Any eye surgeries? no         Any eye injury?  no         Any treatment for eye disease?  no  Family history of eye disease?  no  Significant patient medical history:         1. Diabetes?  no       If yes, IDDM or NIDDM? n/a   2. HBP?  Yes controlled with med              3. Other (describe): High cholesterol, ulcerative colitis     ! OTC eyedrops currently using:  no   ! Prescription eye meds currently using:  no   ! Any history of allergy/adverse reaction to any eye meds used   previously?  no   ! Any history of allergy/adverse reaction to eyedrops used during prior   eye exam(s)? no   ! Any history of allergy/adverse reaction to Novacaine or similar meds?   no   ! Any history of allergy/adverse reaction to Epinephrine or similar meds?   no    ! Patient okay with use of anesthetic eyedrops to check eye pressure?    yes       ! Patient okay with use of eyedrops to dilate pupils today?  yes   !  Allergies/Medications/Medical History/Family History reviewed today?    yes      PD =  68/65  Desired reading distance =  19"                                                                    "    Last edited by Darell Leigh, OD on 1/18/2022  1:48 PM. (History)            Assessment /Plan     For exam results, see Encounter Report.    1. Nuclear sclerosis, bilateral     2. Cortical cataract of both eyes     3. Posterior vitreous detachment of right eye     4. Vitreous floater, right     5. Cobblestone retinal degeneration, right     6. Astigmatism of both eyes, unspecified type     7. Presbyopia of both eyes                    S/P surgery to upper eyelids. (levator resection).  Nuclear sclerosis of lens of both eyes, and early cortical cataract in both eyes.  Still no need for cataract surgery in either eye, based on the best-corrected VA achieved today with refraction.     Prior diagnosis of posterior vitreous detachment in the right with Multani ring floater.  Floater noted on dilated fundus examination.   No evidence of secondary retinal tear/hole/break.     Peripheral cobblestone retinal degeneration inferiorly in the right eye, as noted previously, but no evidence of retinal tear/hole/break otherwise.     Astigmatic refractive error in each eye, greater in the left eye than in the right eye.  Satisfactory best-corrected VA in each eye.  Presbyopia consistent with age  New spectacle lens Rx issued for use as desired.  Recommend full-time wear.     Recheck in one year - or prior if any problems noted in the interim

## 2022-01-18 NOTE — PATIENT INSTRUCTIONS
S/P surgery to upper eyelids. (levator resection).  Nuclear sclerosis of lens of both eyes, and early cortical cataract in both eyes.  Still no need for cataract surgery in either eye, based on the best-corrected VA achieved today with refraction.     Prior diagnosis of posterior vitreous detachment in the right with Multani ring floater.  Floater noted on dilated fundus examination.   No evidence of secondary retinal tear/hole/break.     Peripheral cobblestone retinal degeneration inferiorly in the right eye, as noted previously, but no evidence of retinal tear/hole/break otherwise.     Astigmatic refractive error in each eye, greater in the left eye than in the right eye.  Satisfactory best-corrected VA in each eye.  Presbyopia consistent with age  New spectacle lens Rx issued for use as desired.  Recommend full-time wear.     Recheck in one year - or prior if any problems noted in the interim

## 2022-01-25 ENCOUNTER — OFFICE VISIT (OUTPATIENT)
Dept: CARDIOLOGY | Facility: CLINIC | Age: 82
End: 2022-01-25
Payer: MEDICARE

## 2022-01-25 VITALS
DIASTOLIC BLOOD PRESSURE: 72 MMHG | HEIGHT: 68 IN | BODY MASS INDEX: 29.86 KG/M2 | SYSTOLIC BLOOD PRESSURE: 148 MMHG | WEIGHT: 197.06 LBS | HEART RATE: 80 BPM

## 2022-01-25 DIAGNOSIS — R06.09 DOE (DYSPNEA ON EXERTION): Primary | ICD-10-CM

## 2022-01-25 DIAGNOSIS — R73.03 PRE-DIABETES: ICD-10-CM

## 2022-01-25 DIAGNOSIS — I87.2 VENOUS INSUFFICIENCY: ICD-10-CM

## 2022-01-25 DIAGNOSIS — I26.99 PULMONARY EMBOLISM, UNSPECIFIED CHRONICITY, UNSPECIFIED PULMONARY EMBOLISM TYPE, UNSPECIFIED WHETHER ACUTE COR PULMONALE PRESENT: ICD-10-CM

## 2022-01-25 DIAGNOSIS — I10 PRIMARY HYPERTENSION: ICD-10-CM

## 2022-01-25 DIAGNOSIS — E78.00 PURE HYPERCHOLESTEROLEMIA: ICD-10-CM

## 2022-01-25 DIAGNOSIS — I10 ESSENTIAL HYPERTENSION: ICD-10-CM

## 2022-01-25 DIAGNOSIS — Z79.01 LONG TERM (CURRENT) USE OF ANTICOAGULANTS: ICD-10-CM

## 2022-01-25 PROCEDURE — 99999 PR PBB SHADOW E&M-EST. PATIENT-LVL III: ICD-10-PCS | Mod: PBBFAC,HCNC,, | Performed by: INTERNAL MEDICINE

## 2022-01-25 PROCEDURE — 3078F DIAST BP <80 MM HG: CPT | Mod: HCNC,CPTII,S$GLB, | Performed by: INTERNAL MEDICINE

## 2022-01-25 PROCEDURE — 99213 PR OFFICE/OUTPT VISIT, EST, LEVL III, 20-29 MIN: ICD-10-PCS | Mod: HCNC,S$GLB,, | Performed by: INTERNAL MEDICINE

## 2022-01-25 PROCEDURE — 1126F AMNT PAIN NOTED NONE PRSNT: CPT | Mod: HCNC,CPTII,S$GLB, | Performed by: INTERNAL MEDICINE

## 2022-01-25 PROCEDURE — 1159F MED LIST DOCD IN RCRD: CPT | Mod: HCNC,CPTII,S$GLB, | Performed by: INTERNAL MEDICINE

## 2022-01-25 PROCEDURE — 3288F FALL RISK ASSESSMENT DOCD: CPT | Mod: HCNC,CPTII,S$GLB, | Performed by: INTERNAL MEDICINE

## 2022-01-25 PROCEDURE — 1126F PR PAIN SEVERITY QUANTIFIED, NO PAIN PRESENT: ICD-10-PCS | Mod: HCNC,CPTII,S$GLB, | Performed by: INTERNAL MEDICINE

## 2022-01-25 PROCEDURE — 1101F PT FALLS ASSESS-DOCD LE1/YR: CPT | Mod: HCNC,CPTII,S$GLB, | Performed by: INTERNAL MEDICINE

## 2022-01-25 PROCEDURE — 99499 RISK ADDL DX/OHS AUDIT: ICD-10-PCS | Mod: S$GLB,,, | Performed by: INTERNAL MEDICINE

## 2022-01-25 PROCEDURE — 99999 PR PBB SHADOW E&M-EST. PATIENT-LVL III: CPT | Mod: PBBFAC,HCNC,, | Performed by: INTERNAL MEDICINE

## 2022-01-25 PROCEDURE — 3077F PR MOST RECENT SYSTOLIC BLOOD PRESSURE >= 140 MM HG: ICD-10-PCS | Mod: HCNC,CPTII,S$GLB, | Performed by: INTERNAL MEDICINE

## 2022-01-25 PROCEDURE — 3078F PR MOST RECENT DIASTOLIC BLOOD PRESSURE < 80 MM HG: ICD-10-PCS | Mod: HCNC,CPTII,S$GLB, | Performed by: INTERNAL MEDICINE

## 2022-01-25 PROCEDURE — 3288F PR FALLS RISK ASSESSMENT DOCUMENTED: ICD-10-PCS | Mod: HCNC,CPTII,S$GLB, | Performed by: INTERNAL MEDICINE

## 2022-01-25 PROCEDURE — 1101F PR PT FALLS ASSESS DOC 0-1 FALLS W/OUT INJ PAST YR: ICD-10-PCS | Mod: HCNC,CPTII,S$GLB, | Performed by: INTERNAL MEDICINE

## 2022-01-25 PROCEDURE — 1159F PR MEDICATION LIST DOCUMENTED IN MEDICAL RECORD: ICD-10-PCS | Mod: HCNC,CPTII,S$GLB, | Performed by: INTERNAL MEDICINE

## 2022-01-25 PROCEDURE — 99499 UNLISTED E&M SERVICE: CPT | Mod: S$GLB,,, | Performed by: INTERNAL MEDICINE

## 2022-01-25 PROCEDURE — 3077F SYST BP >= 140 MM HG: CPT | Mod: HCNC,CPTII,S$GLB, | Performed by: INTERNAL MEDICINE

## 2022-01-25 PROCEDURE — 99213 OFFICE O/P EST LOW 20 MIN: CPT | Mod: HCNC,S$GLB,, | Performed by: INTERNAL MEDICINE

## 2022-01-25 RX ORDER — CARVEDILOL 12.5 MG/1
12.5 TABLET ORAL 2 TIMES DAILY WITH MEALS
Qty: 180 TABLET | Refills: 3 | Status: SHIPPED | OUTPATIENT
Start: 2022-01-25 | End: 2022-07-11 | Stop reason: SDUPTHER

## 2022-01-25 RX ORDER — CYCLOBENZAPRINE HCL 5 MG
TABLET ORAL
Qty: 20 TABLET | Refills: 2 | Status: SHIPPED | OUTPATIENT
Start: 2022-01-25 | End: 2022-02-16

## 2022-01-25 RX ORDER — SIMVASTATIN 10 MG/1
10 TABLET, FILM COATED ORAL NIGHTLY
Qty: 90 TABLET | Refills: 3 | Status: SHIPPED | OUTPATIENT
Start: 2022-01-25 | End: 2022-07-11 | Stop reason: SDUPTHER

## 2022-01-25 RX ORDER — AMLODIPINE BESYLATE 10 MG/1
TABLET ORAL
Qty: 90 TABLET | Refills: 3 | Status: SHIPPED | OUTPATIENT
Start: 2022-01-25 | End: 2022-07-11 | Stop reason: SDUPTHER

## 2022-01-25 RX ORDER — OMEPRAZOLE 20 MG/1
20 CAPSULE, DELAYED RELEASE ORAL DAILY
Qty: 90 CAPSULE | Refills: 3 | Status: SHIPPED | OUTPATIENT
Start: 2022-01-25 | End: 2023-02-27 | Stop reason: SDUPTHER

## 2022-01-25 NOTE — PROGRESS NOTES
Subjective:    Patient ID:  Gabriel Grace is a 81 y.o. 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 She continues to do well and reports no chest pain or SALGADO.  Lab Results   Component Value Date     01/18/2022     01/18/2022    K 4.6 01/18/2022    K 4.6 01/18/2022     (H) 01/18/2022     (H) 01/18/2022    CO2 23 01/18/2022    CO2 23 01/18/2022    BUN 29 (H) 01/18/2022    BUN 29 (H) 01/18/2022    CREATININE 1.0 01/18/2022    CREATININE 1.0 01/18/2022     (H) 01/18/2022     (H) 01/18/2022    HGBA1C 4.9 01/18/2022    MG 2.0 10/06/2008    AST 17 01/18/2022    ALT 14 01/18/2022    ALBUMIN 3.9 01/18/2022    PROT 7.5 01/18/2022    BILITOT 0.4 01/18/2022    WBC 5.28 01/18/2022    WBC 5.28 01/18/2022    HGB 12.0 01/18/2022    HGB 12.0 01/18/2022    HCT 36.8 (L) 01/18/2022    HCT 36.8 (L) 01/18/2022     (H) 01/18/2022     (H) 01/18/2022     01/18/2022     01/18/2022    INR 1.1 02/14/2015    TSH 1.859 02/27/2019         Lab Results   Component Value Date    CHOL 161 01/18/2022    CHOL 161 01/18/2022    HDL 38 (L) 01/18/2022    HDL 38 (L) 01/18/2022    TRIG 102 01/18/2022    TRIG 102 01/18/2022       Lab Results   Component Value Date    LDLCALC 102.6 01/18/2022    LDLCALC 102.6 01/18/2022       Past Medical History:   Diagnosis Date    Cataract     Hyperglycemia 10/2/2013    Hyperlipidemia     Hypertension     Migraine headache     Pancreas cyst 1/17/2017    Thyroid disease     hypothyroidism    Ulcerative colitis, unspecified     Ulcerative colitis, unspecified     Visit for screening mammogram 1/12/2016       Current Outpatient Medications:     balsalazide (COLAZAL) 750 mg capsule, Take 2,250 mg by mouth once daily. , Disp: , Rfl:     biotin 2,500 mcg Cap, , Disp: , Rfl:      calcium-magnesium-zinc Tab, Take by mouth. 1  Oral Every day, Disp: , Rfl:     cholecalciferol, vitamin D3, (VITAMIN D3) 25 mcg (1,000 unit) capsule, Take 1,000 Units by mouth once daily., Disp: , Rfl:     cyanocobalamin, vitamin B-12, 50 mcg tablet, Take 50 mcg by mouth once daily., Disp: , Rfl:     FLAXSEED OIL ORAL, Take 1,000 mg by mouth. 1  Oral Every day, Disp: , Rfl:     folic acid (FOLVITE) 400 MCG tablet, Take 800 mcg by mouth once daily. 4 Tablet Oral Every day, Disp: , Rfl:     multivitamin (THERAGRAN) per tablet, Take by mouth. 1  By mouth Every day, Disp: , Rfl:     omega-3 fatty acids-vitamin E 1,000 mg Cap, , Disp: , Rfl:     amLODIPine (NORVASC) 10 MG tablet, TAKE 1 TABLET EVERY DAY  .  STOP OLD RX, Disp: 90 tablet, Rfl: 3    carvediloL (COREG) 12.5 MG tablet, Take 1 tablet (12.5 mg total) by mouth 2 (two) times daily with meals., Disp: 180 tablet, Rfl: 3    cyclobenzaprine (FLEXERIL) 5 MG tablet, TAKE 1 TABLET THREE TIMES DAILY  AS NEEDED FOR MUSCLE SPASMS., Disp: 20 tablet, Rfl: 2    FLUAD QUAD 2021-22,65Y UP,,PF, 60 mcg (15 mcg x 4)/0.5 mL Syrg, , Disp: , Rfl:     omeprazole (PRILOSEC) 20 MG capsule, Take 1 capsule (20 mg total) by mouth once daily., Disp: 90 capsule, Rfl: 3    simvastatin (ZOCOR) 10 MG tablet, Take 1 tablet (10 mg total) by mouth every evening., Disp: 90 tablet, Rfl: 3          Review of Systems   Constitutional: Negative for decreased appetite, diaphoresis, fever, malaise/fatigue, weight gain and weight loss.   HENT: Negative for congestion, ear discharge, ear pain and nosebleeds.    Eyes: Negative for blurred vision, double vision and visual disturbance.   Cardiovascular: Negative for chest pain, claudication, cyanosis, dyspnea on exertion, irregular heartbeat, leg swelling, near-syncope, orthopnea, palpitations, paroxysmal nocturnal dyspnea and syncope.   Respiratory: Negative for cough, hemoptysis, shortness of breath, sleep disturbances due to breathing, snoring,  "sputum production and wheezing.    Endocrine: Negative for polydipsia, polyphagia and polyuria.   Hematologic/Lymphatic: Negative for adenopathy and bleeding problem. Does not bruise/bleed easily.   Skin: Negative for color change, nail changes, poor wound healing and rash.   Musculoskeletal: Negative for muscle cramps and muscle weakness.   Gastrointestinal: Negative for abdominal pain, anorexia, change in bowel habit, hematochezia, nausea and vomiting.   Genitourinary: Negative for dysuria, frequency and hematuria.   Neurological: Negative for brief paralysis, difficulty with concentration, excessive daytime sleepiness, dizziness, focal weakness, headaches, light-headedness, seizures, vertigo and weakness.   Psychiatric/Behavioral: Negative for altered mental status and depression.   Allergic/Immunologic: Negative for persistent infections.        Objective:BP (!) 148/72 (BP Location: Left arm, Patient Position: Sitting, BP Method: Medium (Automatic))   Pulse 80   Ht 5' 8" (1.727 m)   Wt 89.4 kg (197 lb 1.5 oz)   LMP  (LMP Unknown)   BMI 29.97 kg/m²             Physical Exam  Constitutional:       Appearance: She is well-developed and well-nourished. She is obese.   HENT:      Head: Normocephalic.      Right Ear: External ear normal.      Left Ear: External ear normal.      Nose: Nose normal.        Comments: Inspection of lips, teeth and gums normalEyes:      General: No scleral icterus.     Extraocular Movements: EOM normal.      Conjunctiva/sclera: Conjunctivae normal.      Pupils: Pupils are equal, round, and reactive to light.   Neck:      Thyroid: No thyromegaly.      Vascular: No JVD.      Trachea: No tracheal deviation.   Cardiovascular:      Rate and Rhythm: Normal rate and regular rhythm.      Pulses: Intact distal pulses.           Carotid pulses are 2+ on the right side and 2+ on the left side.       Dorsalis pedis pulses are 0 on the right side and 0 on the left side.        Posterior tibial " pulses are 0 on the right side and 0 on the left side.      Heart sounds: Normal heart sounds. No murmur heard.  No friction rub. No gallop.    Pulmonary:      Effort: Pulmonary effort is normal. No respiratory distress.      Breath sounds: Normal breath sounds. No wheezing or rales.       Chest:      Chest wall: No tenderness.   Abdominal:      General: Bowel sounds are normal. There is no distension.      Palpations: Abdomen is soft. There is no hepatosplenomegaly.      Tenderness: There is no abdominal tenderness. There is no guarding.   Musculoskeletal:         General: No tenderness or edema. Normal range of motion.      Cervical back: Normal range of motion.   Lymphadenopathy:      Comments: Palpation of lymph nodes of neck and groin normal   Skin:     General: Skin is warm and dry.      Coloration: Skin is not pale.      Findings: No erythema or rash.      Comments: Palpation of skin normal   Neurological:      Mental Status: She is alert and oriented to person, place, and time.      Cranial Nerves: No cranial nerve deficit.      Motor: No abnormal muscle tone.      Coordination: Coordination normal.   Psychiatric:         Mood and Affect: Mood and affect normal.         Behavior: Behavior normal.         Thought Content: Thought content normal.         Judgment: Judgment normal.           Assessment:       1. SALGADO (dyspnea on exertion)    2. Essential hypertension    3. Pure hypercholesterolemia    4. Long term (current) use of anticoagulants    5. Pulmonary embolism, unspecified chronicity, unspecified pulmonary embolism type, unspecified whether acute cor pulmonale present    6. Primary hypertension    7. Venous insufficiency    8. Pre-diabetes         Plan:       Gabriel was seen today for follow-up.    Diagnoses and all orders for this visit:    SALGADO (dyspnea on exertion)    Essential hypertension  -     amLODIPine (NORVASC) 10 MG tablet; TAKE 1 TABLET EVERY DAY  .  STOP OLD RX  -     carvediloL (COREG) 12.5  MG tablet; Take 1 tablet (12.5 mg total) by mouth 2 (two) times daily with meals.  -     Comprehensive Metabolic Panel; Future; Expected date: 07/27/2022    Pure hypercholesterolemia  -     simvastatin (ZOCOR) 10 MG tablet; Take 1 tablet (10 mg total) by mouth every evening.    Long term (current) use of anticoagulants  -     CBC Auto Differential; Future; Expected date: 07/27/2022    Pulmonary embolism, unspecified chronicity, unspecified pulmonary embolism type, unspecified whether acute cor pulmonale present    Primary hypertension    Venous insufficiency    Pre-diabetes  -     Hemoglobin A1C; Future; Expected date: 07/27/2022    Other orders  -     cyclobenzaprine (FLEXERIL) 5 MG tablet; TAKE 1 TABLET THREE TIMES DAILY  AS NEEDED FOR MUSCLE SPASMS.  -     omeprazole (PRILOSEC) 20 MG capsule; Take 1 capsule (20 mg total) by mouth once daily.

## 2022-01-26 ENCOUNTER — PATIENT MESSAGE (OUTPATIENT)
Dept: ADMINISTRATIVE | Facility: HOSPITAL | Age: 82
End: 2022-01-26
Payer: MEDICARE

## 2022-02-16 RX ORDER — CYCLOBENZAPRINE HCL 5 MG
TABLET ORAL
Qty: 20 TABLET | Refills: 2 | Status: SHIPPED | OUTPATIENT
Start: 2022-02-16 | End: 2023-01-17 | Stop reason: SDUPTHER

## 2022-03-16 ENCOUNTER — PATIENT MESSAGE (OUTPATIENT)
Dept: ADMINISTRATIVE | Facility: HOSPITAL | Age: 82
End: 2022-03-16
Payer: MEDICARE

## 2022-04-04 ENCOUNTER — OFFICE VISIT (OUTPATIENT)
Dept: INTERNAL MEDICINE | Facility: CLINIC | Age: 82
End: 2022-04-04
Payer: MEDICARE

## 2022-04-04 VITALS
OXYGEN SATURATION: 98 % | SYSTOLIC BLOOD PRESSURE: 138 MMHG | HEART RATE: 87 BPM | WEIGHT: 197.56 LBS | BODY MASS INDEX: 29.94 KG/M2 | DIASTOLIC BLOOD PRESSURE: 70 MMHG | HEIGHT: 68 IN | TEMPERATURE: 99 F

## 2022-04-04 DIAGNOSIS — K51.919 ULCERATIVE COLITIS WITH COMPLICATION, UNSPECIFIED LOCATION: ICD-10-CM

## 2022-04-04 DIAGNOSIS — R73.9 HYPERGLYCEMIA: ICD-10-CM

## 2022-04-04 DIAGNOSIS — E78.00 PURE HYPERCHOLESTEROLEMIA: ICD-10-CM

## 2022-04-04 DIAGNOSIS — I10 PRIMARY HYPERTENSION: ICD-10-CM

## 2022-04-04 DIAGNOSIS — Z00.00 ROUTINE PHYSICAL EXAMINATION: Primary | ICD-10-CM

## 2022-04-04 DIAGNOSIS — R35.0 FREQUENCY OF MICTURITION: ICD-10-CM

## 2022-04-04 PROCEDURE — 1126F PR PAIN SEVERITY QUANTIFIED, NO PAIN PRESENT: ICD-10-PCS | Mod: CPTII,S$GLB,, | Performed by: INTERNAL MEDICINE

## 2022-04-04 PROCEDURE — 1159F PR MEDICATION LIST DOCUMENTED IN MEDICAL RECORD: ICD-10-PCS | Mod: CPTII,S$GLB,, | Performed by: INTERNAL MEDICINE

## 2022-04-04 PROCEDURE — 1126F AMNT PAIN NOTED NONE PRSNT: CPT | Mod: CPTII,S$GLB,, | Performed by: INTERNAL MEDICINE

## 2022-04-04 PROCEDURE — 1101F PR PT FALLS ASSESS DOC 0-1 FALLS W/OUT INJ PAST YR: ICD-10-PCS | Mod: CPTII,S$GLB,, | Performed by: INTERNAL MEDICINE

## 2022-04-04 PROCEDURE — 93005 EKG 12-LEAD: ICD-10-PCS | Mod: S$GLB,,, | Performed by: INTERNAL MEDICINE

## 2022-04-04 PROCEDURE — 1160F RVW MEDS BY RX/DR IN RCRD: CPT | Mod: CPTII,S$GLB,, | Performed by: INTERNAL MEDICINE

## 2022-04-04 PROCEDURE — 93005 ELECTROCARDIOGRAM TRACING: CPT | Mod: S$GLB,,, | Performed by: INTERNAL MEDICINE

## 2022-04-04 PROCEDURE — 93010 EKG 12-LEAD: ICD-10-PCS | Mod: S$GLB,,, | Performed by: INTERNAL MEDICINE

## 2022-04-04 PROCEDURE — 93010 ELECTROCARDIOGRAM REPORT: CPT | Mod: S$GLB,,, | Performed by: INTERNAL MEDICINE

## 2022-04-04 PROCEDURE — 99397 PR PREVENTIVE VISIT,EST,65 & OVER: ICD-10-PCS | Mod: S$GLB,,, | Performed by: INTERNAL MEDICINE

## 2022-04-04 PROCEDURE — 3075F PR MOST RECENT SYSTOLIC BLOOD PRESS GE 130-139MM HG: ICD-10-PCS | Mod: CPTII,S$GLB,, | Performed by: INTERNAL MEDICINE

## 2022-04-04 PROCEDURE — 3288F FALL RISK ASSESSMENT DOCD: CPT | Mod: CPTII,S$GLB,, | Performed by: INTERNAL MEDICINE

## 2022-04-04 PROCEDURE — 1160F PR REVIEW ALL MEDS BY PRESCRIBER/CLIN PHARMACIST DOCUMENTED: ICD-10-PCS | Mod: CPTII,S$GLB,, | Performed by: INTERNAL MEDICINE

## 2022-04-04 PROCEDURE — 1101F PT FALLS ASSESS-DOCD LE1/YR: CPT | Mod: CPTII,S$GLB,, | Performed by: INTERNAL MEDICINE

## 2022-04-04 PROCEDURE — 3078F DIAST BP <80 MM HG: CPT | Mod: CPTII,S$GLB,, | Performed by: INTERNAL MEDICINE

## 2022-04-04 PROCEDURE — 3288F PR FALLS RISK ASSESSMENT DOCUMENTED: ICD-10-PCS | Mod: CPTII,S$GLB,, | Performed by: INTERNAL MEDICINE

## 2022-04-04 PROCEDURE — 1159F MED LIST DOCD IN RCRD: CPT | Mod: CPTII,S$GLB,, | Performed by: INTERNAL MEDICINE

## 2022-04-04 PROCEDURE — 3075F SYST BP GE 130 - 139MM HG: CPT | Mod: CPTII,S$GLB,, | Performed by: INTERNAL MEDICINE

## 2022-04-04 PROCEDURE — 99397 PER PM REEVAL EST PAT 65+ YR: CPT | Mod: S$GLB,,, | Performed by: INTERNAL MEDICINE

## 2022-04-04 PROCEDURE — 99499 UNLISTED E&M SERVICE: CPT | Mod: S$GLB,,, | Performed by: INTERNAL MEDICINE

## 2022-04-04 PROCEDURE — 3078F PR MOST RECENT DIASTOLIC BLOOD PRESSURE < 80 MM HG: ICD-10-PCS | Mod: CPTII,S$GLB,, | Performed by: INTERNAL MEDICINE

## 2022-04-04 PROCEDURE — 99999 PR PBB SHADOW E&M-EST. PATIENT-LVL IV: CPT | Mod: PBBFAC,,, | Performed by: INTERNAL MEDICINE

## 2022-04-04 PROCEDURE — 99499 RISK ADDL DX/OHS AUDIT: ICD-10-PCS | Mod: S$GLB,,, | Performed by: INTERNAL MEDICINE

## 2022-04-04 PROCEDURE — 99999 PR PBB SHADOW E&M-EST. PATIENT-LVL IV: ICD-10-PCS | Mod: PBBFAC,,, | Performed by: INTERNAL MEDICINE

## 2022-04-04 RX ORDER — SULFAMETHOXAZOLE AND TRIMETHOPRIM 800; 160 MG/1; MG/1
1 TABLET ORAL 2 TIMES DAILY
Qty: 14 TABLET | Refills: 0 | Status: SHIPPED | OUTPATIENT
Start: 2022-04-04

## 2022-04-04 NOTE — PROGRESS NOTES
Subjective:       Patient ID: Gabriel Grace is a 82 y.o. female.    Chief Complaint: Annual Exam and Urinary Tract Infection    HPI: pt here for Annual Exam. Also having frequency and some mild dysuria.   Also wants to update an EKG for Cardiology. No fever or chills.   She had a Pulm EMbolism since our last visit. She was quite weak during the height of this, but in general has improved. Slowly getting energy back.   She does see Cardiology but says they did not do it EKG last time so she wants to get that today.  She also would like to start Bactrim pending the urine results because she is fearful of side effects worsening UTI.    Urinary Tract Infection   Associated symptoms include frequency. Pertinent negatives include no chills, hematuria, constipation or rash.     Review of Systems   Constitutional: Negative for appetite change, chills and fever.   HENT: Negative for nosebleeds and sore throat.    Eyes: Negative for pain and visual disturbance.   Respiratory: Negative for cough, shortness of breath and wheezing.    Cardiovascular: Negative for chest pain and leg swelling.   Gastrointestinal: Negative for abdominal pain, constipation and diarrhea.   Endocrine: Negative for polyuria.   Genitourinary: Positive for frequency. Negative for difficulty urinating, hematuria and vaginal bleeding.   Musculoskeletal: Positive for arthralgias. Negative for back pain, gait problem and neck pain.   Integumentary:  Negative for pallor and rash.   Neurological: Negative for tremors, seizures and headaches.   Hematological: Does not bruise/bleed easily.   Psychiatric/Behavioral: Negative for dysphoric mood. The patient is not nervous/anxious.            Past Medical History:   Diagnosis Date    Cataract     Hyperglycemia 10/2/2013    Hyperlipidemia     Hypertension     Migraine headache     Pancreas cyst 1/17/2017    Thyroid disease     hypothyroidism    Ulcerative colitis, unspecified     Ulcerative colitis,  unspecified     Visit for screening mammogram 2016     Past Surgical History:   Procedure Laterality Date    APPENDECTOMY      CAROTID ENDARTERECTOMY       SECTION      x1    CHOLECYSTECTOMY      DILATION AND CURETTAGE OF UTERUS      HYSTERECTOMY  1973    fabien-lso and right salpingectomy. right ovary remains.    PARATHYROIDECTOMY      TONSILLECTOMY        Patient Active Problem List   Diagnosis    Hyperlipidemia    Hypertension    Migraine headache    Nuclear sclerosis - Both Eyes    Crohn's disease of small intestine    Hyperglycemia    Well woman exam with routine gynecological exam    Aortic atherosclerosis    LLQ pain    Visit for screening mammogram    Ptosis    Atherosclerosis of aorta    Pancreas cyst    Posterior vitreous detachment, right eye    Non morbid obesity due to excess calories    Thrombocytopenia    Pulmonary embolism    Cancer screening    Ulcerative colitis    Atrial enlargement, bilateral        Objective:      Physical Exam  Constitutional:       General: She is not in acute distress.     Appearance: She is well-developed. She is obese.   HENT:      Head: Normocephalic and atraumatic.      Right Ear: Tympanic membrane, ear canal and external ear normal.      Left Ear: Tympanic membrane, ear canal and external ear normal.      Mouth/Throat:      Pharynx: No oropharyngeal exudate or posterior oropharyngeal erythema.   Eyes:      General: No scleral icterus.     Conjunctiva/sclera: Conjunctivae normal.      Pupils: Pupils are equal, round, and reactive to light.   Neck:      Thyroid: No thyromegaly.   Cardiovascular:      Rate and Rhythm: Normal rate and regular rhythm.      Pulses: Normal pulses.      Heart sounds: No murmur heard.  Pulmonary:      Effort: Pulmonary effort is normal.      Breath sounds: Normal breath sounds. No wheezing.   Abdominal:      General: Bowel sounds are normal. There is no distension.      Palpations: Abdomen is soft.       Tenderness: There is no abdominal tenderness.   Musculoskeletal:         General: No tenderness.      Cervical back: Normal range of motion and neck supple.      Right lower leg: No edema.      Left lower leg: No edema.   Lymphadenopathy:      Cervical: No cervical adenopathy.   Skin:     Coloration: Skin is not jaundiced.      Findings: No rash.   Neurological:      General: No focal deficit present.      Mental Status: She is alert and oriented to person, place, and time.   Psychiatric:         Mood and Affect: Mood normal.         Behavior: Behavior normal.         Assessment:       Problem List Items Addressed This Visit        Cardiac/Vascular    Hyperlipidemia    Relevant Orders    EKG 12-lead    Hypertension    Relevant Orders    EKG 12-lead       Endocrine    Hyperglycemia    Relevant Orders    EKG 12-lead       GI    Ulcerative colitis      Other Visit Diagnoses     Routine physical examination    -  Primary    Frequency of micturition        Relevant Orders    Urinalysis, Reflex to Urine Culture Urine, Clean Catch          Plan:         Gabriel was seen today for annual exam and urinary tract infection.    Diagnoses and all orders for this visit:    Routine physical examination    Primary hypertension  -     EKG 12-lead    Hyperglycemia  -     EKG 12-lead    Pure hypercholesterolemia  -     EKG 12-lead    Ulcerative colitis with complication, unspecified location    Frequency of micturition  -     Urinalysis, Reflex to Urine Culture Urine, Clean Catch; Future    Other orders  -     sulfamethoxazole-trimethoprim 800-160mg (BACTRIM DS) 800-160 mg Tab; Take 1 tablet by mouth 2 (two) times daily.           review all studies.  Patient aware we may have to change antibiotics depending on culture  Review EKG see Cardiology  Continue  Labs reviewed in detail with patient    EKG shows some nonspecific changes, appears similar to EKG from 2018 on my review.  Wait for full evaluation.

## 2022-04-05 ENCOUNTER — TELEPHONE (OUTPATIENT)
Dept: INTERNAL MEDICINE | Facility: CLINIC | Age: 82
End: 2022-04-05
Payer: MEDICARE

## 2022-04-06 RX ORDER — AMOXICILLIN AND CLAVULANATE POTASSIUM 875; 125 MG/1; MG/1
1 TABLET, FILM COATED ORAL 2 TIMES DAILY
Qty: 14 TABLET | Refills: 0 | Status: SHIPPED | OUTPATIENT
Start: 2022-04-06 | End: 2022-04-13

## 2022-04-06 NOTE — TELEPHONE ENCOUNTER
Please let pt know that her Urine infection is sensitive to Augmentin so I sent that Rx to her local [facility]. Let me know if she has any questions of problems. This should help treat the UTI.     Prosper Mancilla MD FACP  Internal Medicine

## 2022-04-07 ENCOUNTER — IMMUNIZATION (OUTPATIENT)
Dept: INTERNAL MEDICINE | Facility: CLINIC | Age: 82
End: 2022-04-07
Payer: MEDICARE

## 2022-04-07 DIAGNOSIS — Z23 NEED FOR VACCINATION: Primary | ICD-10-CM

## 2022-04-07 PROCEDURE — 0054A COVID-19, MRNA, LNP-S, PF, 30 MCG/0.3 ML DOSE VACCINE (PFIZER): CPT | Mod: CV19,PBBFAC | Performed by: INTERNAL MEDICINE

## 2022-04-07 NOTE — TELEPHONE ENCOUNTER
Spoke to pt, informed her that her urine infection is sensitive to Augmentin and that a rx was sent to her local [facility]. I informed her that her EKG was stable compared to last tracing. Pt voiced understanding and asked for instructions for rx. I read sig to pt from Augmentin rx (1 twice daily for 7 days), she also asked for her lab report to be mailed to her. I printed and mailed to pt.

## 2022-04-14 ENCOUNTER — OFFICE VISIT (OUTPATIENT)
Dept: DERMATOLOGY | Facility: CLINIC | Age: 82
End: 2022-04-14
Payer: MEDICARE

## 2022-04-14 DIAGNOSIS — L30.0 NUMMULAR ECZEMA: Primary | ICD-10-CM

## 2022-04-14 DIAGNOSIS — L30.4 INTERTRIGO: ICD-10-CM

## 2022-04-14 PROCEDURE — 1160F PR REVIEW ALL MEDS BY PRESCRIBER/CLIN PHARMACIST DOCUMENTED: ICD-10-PCS | Mod: CPTII,S$GLB,, | Performed by: DERMATOLOGY

## 2022-04-14 PROCEDURE — 3288F FALL RISK ASSESSMENT DOCD: CPT | Mod: CPTII,S$GLB,, | Performed by: DERMATOLOGY

## 2022-04-14 PROCEDURE — 1101F PT FALLS ASSESS-DOCD LE1/YR: CPT | Mod: CPTII,S$GLB,, | Performed by: DERMATOLOGY

## 2022-04-14 PROCEDURE — 99204 PR OFFICE/OUTPT VISIT, NEW, LEVL IV, 45-59 MIN: ICD-10-PCS | Mod: S$GLB,,, | Performed by: DERMATOLOGY

## 2022-04-14 PROCEDURE — 99999 PR PBB SHADOW E&M-EST. PATIENT-LVL III: ICD-10-PCS | Mod: PBBFAC,,, | Performed by: DERMATOLOGY

## 2022-04-14 PROCEDURE — 99999 PR PBB SHADOW E&M-EST. PATIENT-LVL III: CPT | Mod: PBBFAC,,, | Performed by: DERMATOLOGY

## 2022-04-14 PROCEDURE — 1126F AMNT PAIN NOTED NONE PRSNT: CPT | Mod: CPTII,S$GLB,, | Performed by: DERMATOLOGY

## 2022-04-14 PROCEDURE — 1126F PR PAIN SEVERITY QUANTIFIED, NO PAIN PRESENT: ICD-10-PCS | Mod: CPTII,S$GLB,, | Performed by: DERMATOLOGY

## 2022-04-14 PROCEDURE — 1101F PR PT FALLS ASSESS DOC 0-1 FALLS W/OUT INJ PAST YR: ICD-10-PCS | Mod: CPTII,S$GLB,, | Performed by: DERMATOLOGY

## 2022-04-14 PROCEDURE — 1160F RVW MEDS BY RX/DR IN RCRD: CPT | Mod: CPTII,S$GLB,, | Performed by: DERMATOLOGY

## 2022-04-14 PROCEDURE — 3288F PR FALLS RISK ASSESSMENT DOCUMENTED: ICD-10-PCS | Mod: CPTII,S$GLB,, | Performed by: DERMATOLOGY

## 2022-04-14 PROCEDURE — 1159F PR MEDICATION LIST DOCUMENTED IN MEDICAL RECORD: ICD-10-PCS | Mod: CPTII,S$GLB,, | Performed by: DERMATOLOGY

## 2022-04-14 PROCEDURE — 1159F MED LIST DOCD IN RCRD: CPT | Mod: CPTII,S$GLB,, | Performed by: DERMATOLOGY

## 2022-04-14 PROCEDURE — 99204 OFFICE O/P NEW MOD 45 MIN: CPT | Mod: S$GLB,,, | Performed by: DERMATOLOGY

## 2022-04-14 RX ORDER — TRIAMCINOLONE ACETONIDE 1 MG/G
CREAM TOPICAL
Qty: 60 G | Refills: 3 | Status: SHIPPED | OUTPATIENT
Start: 2022-04-14

## 2022-04-14 RX ORDER — KETOCONAZOLE 20 MG/G
CREAM TOPICAL
Qty: 60 G | Refills: 3 | Status: SHIPPED | OUTPATIENT
Start: 2022-04-14

## 2022-04-14 NOTE — PATIENT INSTRUCTIONS
Discussed with patient good skin care regimen including avoiding fragranced products and very hot showers.  Recommended dove sensitive skin bar soap or cerave hydrating cleanser or bar.  Recommend Cerave cream  For moisturization daily -2x daily.       Pt educated that overuse of steroids- triamcinolone-  can lead to skin thinning/atrophy, hypopigmentation, striae.    Recommend white vinegar: water 1:1 compresses  nightly after bath/shower followed by cool blow dry and then application of prescription medication.     Use zeasorb or  Fresh breasts  powder for maintenance in the morning.     Wash affected areas 2x per week with Hibiclens wash which can be purchased over the counter

## 2022-04-14 NOTE — PROGRESS NOTES
Subjective:       Patient ID:  Gabriel Grace is a 82 y.o. female who presents for   Chief Complaint   Patient presents with    Rash     thighs     Pt states she has had the rash for severel weeks , was on bactrim and was changed to PCN but rash was present before any of the abx.  Not painful. Spreading on legs and now lesions on arms.   Uses dial liquid body soap and uses cerave for moisturizer.     Also has diff rash under her breasts. Flares with heat and sweat. No tx.     Rash - Initial  Affected locations: left lower leg, right lower leg, left upper leg and right upper leg  Signs / symptoms: itching  Severity: moderate  Timing: constant  Aggravated by: nothing  Relieving factors/Treatments tried: OTC hydrocortisone  Improvement on treatment: mild        Review of Systems   Constitutional: Negative for fever.   HENT: Negative for lip swelling and tongue swelling.    Skin: Positive for itching, rash and dry skin.        Objective:    Physical Exam   Constitutional: She appears well-developed and well-nourished. No distress.   Neurological: She is alert and oriented to person, place, and time. She is not disoriented.   Psychiatric: She has a normal mood and affect.   Skin:   Areas Examined (abnormalities noted in diagram):   Chest / Axilla Inspection Performed  Abdomen Inspection Performed  Back Inspection Performed  RUE Inspected  LUE Inspection Performed  RLE Inspected  LLE Inspection Performed              Diagram Legend     Erythematous scaling macule/papule c/w actinic keratosis       Vascular papule c/w angioma      Pigmented verrucoid papule/plaque c/w seborrheic keratosis      Yellow umbilicated papule c/w sebaceous hyperplasia      Irregularly shaped tan macule c/w lentigo     1-2 mm smooth white papules consistent with Milia      Movable subcutaneous cyst with punctum c/w epidermal inclusion cyst      Subcutaneous movable cyst c/w pilar cyst      Firm pink to brown papule c/w dermatofibroma       Pedunculated fleshy papule(s) c/w skin tag(s)      Evenly pigmented macule c/w junctional nevus     Mildly variegated pigmented, slightly irregular-bordered macule c/w mildly atypical nevus      Flesh colored to evenly pigmented papule c/w intradermal nevus       Pink pearly papule/plaque c/w basal cell carcinoma      Erythematous hyperkeratotic cursted plaque c/w SCC      Surgical scar with no sign of skin cancer recurrence      Open and closed comedones      Inflammatory papules and pustules      Verrucoid papule consistent consistent with wart     Erythematous eczematous patches and plaques     Dystrophic onycholytic nail with subungual debris c/w onychomycosis     Umbilicated papule    Erythematous-base heme-crusted tan verrucoid plaque consistent with inflamed seborrheic keratosis     Erythematous Silvery Scaling Plaque c/w Psoriasis     See annotation      Assessment / Plan:        Nummular eczema  -     triamcinolone acetonide 0.1% (KENALOG) 0.1 % cream; aaa on legs and arms qd- bid after moisturizing; not more than 2 weeks straight in the same location  Dispense: 60 g; Refill: 3    Discussed with patient good skin care regimen including avoiding fragranced products and very hot showers.  Recommended dove sensitive skin bar soap or cerave hydrating cleanser or bar.  Recommend Cerave cream  For moisturization daily -2x daily.       Pt educated that overuse of steroids- triamcinolone-  can lead to skin thinning/atrophy, hypopigmentation, striae.      Intertrigo  -     ketoconazole (NIZORAL) 2 % cream; AAA qhs under breasts after cool blow dry  Dispense: 60 g; Refill: 3    Recommend white vinegar: water 1:1 compresses  nightly after bath/shower followed by cool blow dry and then application of prescription medication.     Use zeasorb or  Fresh breasts  powder for maintenance in the morning.     Wash affected areas 2x per week with Hibiclens wash which can be purchased over the counter         Follow up if  symptoms worsen or fail to improve.

## 2022-07-11 ENCOUNTER — LAB VISIT (OUTPATIENT)
Dept: LAB | Facility: HOSPITAL | Age: 82
End: 2022-07-11
Payer: MEDICARE

## 2022-07-11 ENCOUNTER — OFFICE VISIT (OUTPATIENT)
Dept: CARDIOLOGY | Facility: CLINIC | Age: 82
End: 2022-07-11
Payer: MEDICARE

## 2022-07-11 VITALS
HEIGHT: 68 IN | SYSTOLIC BLOOD PRESSURE: 132 MMHG | DIASTOLIC BLOOD PRESSURE: 66 MMHG | WEIGHT: 201.06 LBS | HEART RATE: 92 BPM | BODY MASS INDEX: 30.47 KG/M2

## 2022-07-11 DIAGNOSIS — I10 ESSENTIAL HYPERTENSION: ICD-10-CM

## 2022-07-11 DIAGNOSIS — K50.00 CROHN'S DISEASE OF SMALL INTESTINE WITHOUT COMPLICATION: ICD-10-CM

## 2022-07-11 DIAGNOSIS — K51.919 ULCERATIVE COLITIS WITH COMPLICATION, UNSPECIFIED LOCATION: ICD-10-CM

## 2022-07-11 DIAGNOSIS — I70.0 AORTIC ATHEROSCLEROSIS: ICD-10-CM

## 2022-07-11 DIAGNOSIS — E78.5 HYPERLIPIDEMIA, UNSPECIFIED HYPERLIPIDEMIA TYPE: ICD-10-CM

## 2022-07-11 DIAGNOSIS — E78.00 PURE HYPERCHOLESTEROLEMIA: ICD-10-CM

## 2022-07-11 DIAGNOSIS — R53.81 PHYSICAL DECONDITIONING: ICD-10-CM

## 2022-07-11 DIAGNOSIS — R06.09 DOE (DYSPNEA ON EXERTION): ICD-10-CM

## 2022-07-11 DIAGNOSIS — R73.9 HYPERGLYCEMIA: ICD-10-CM

## 2022-07-11 DIAGNOSIS — R53.82 CHRONIC FATIGUE: Primary | ICD-10-CM

## 2022-07-11 DIAGNOSIS — R00.2 PALPITATIONS: ICD-10-CM

## 2022-07-11 DIAGNOSIS — I10 PRIMARY HYPERTENSION: ICD-10-CM

## 2022-07-11 DIAGNOSIS — E66.09 NON MORBID OBESITY DUE TO EXCESS CALORIES: ICD-10-CM

## 2022-07-11 DIAGNOSIS — R73.03 PRE-DIABETES: ICD-10-CM

## 2022-07-11 DIAGNOSIS — I26.99 PULMONARY EMBOLISM, UNSPECIFIED CHRONICITY, UNSPECIFIED PULMONARY EMBOLISM TYPE, UNSPECIFIED WHETHER ACUTE COR PULMONALE PRESENT: ICD-10-CM

## 2022-07-11 LAB
ALBUMIN SERPL BCP-MCNC: 3.9 G/DL (ref 3.5–5.2)
ALP SERPL-CCNC: 72 U/L (ref 55–135)
ALT SERPL W/O P-5'-P-CCNC: 10 U/L (ref 10–44)
ANION GAP SERPL CALC-SCNC: 8 MMOL/L (ref 8–16)
AST SERPL-CCNC: 18 U/L (ref 10–40)
BASOPHILS # BLD AUTO: 0.02 K/UL (ref 0–0.2)
BASOPHILS NFR BLD: 0.3 % (ref 0–1.9)
BILIRUB SERPL-MCNC: 0.4 MG/DL (ref 0.1–1)
BUN SERPL-MCNC: 33 MG/DL (ref 8–23)
CALCIUM SERPL-MCNC: 10.9 MG/DL (ref 8.7–10.5)
CHLORIDE SERPL-SCNC: 108 MMOL/L (ref 95–110)
CHOLEST SERPL-MCNC: 160 MG/DL (ref 120–199)
CHOLEST/HDLC SERPL: 3.9 {RATIO} (ref 2–5)
CO2 SERPL-SCNC: 25 MMOL/L (ref 23–29)
CREAT SERPL-MCNC: 1 MG/DL (ref 0.5–1.4)
DIFFERENTIAL METHOD: ABNORMAL
EOSINOPHIL # BLD AUTO: 0.2 K/UL (ref 0–0.5)
EOSINOPHIL NFR BLD: 3 % (ref 0–8)
ERYTHROCYTE [DISTWIDTH] IN BLOOD BY AUTOMATED COUNT: 12.8 % (ref 11.5–14.5)
EST. GFR  (AFRICAN AMERICAN): >60 ML/MIN/1.73 M^2
EST. GFR  (NON AFRICAN AMERICAN): 52.2 ML/MIN/1.73 M^2
ESTIMATED AVG GLUCOSE: 108 MG/DL (ref 68–131)
GLUCOSE SERPL-MCNC: 112 MG/DL (ref 70–110)
HBA1C MFR BLD: 5.4 % (ref 4–5.6)
HCT VFR BLD AUTO: 39 % (ref 37–48.5)
HDLC SERPL-MCNC: 41 MG/DL (ref 40–75)
HDLC SERPL: 25.6 % (ref 20–50)
HGB BLD-MCNC: 12.6 G/DL (ref 12–16)
IMM GRANULOCYTES # BLD AUTO: 0.01 K/UL (ref 0–0.04)
IMM GRANULOCYTES NFR BLD AUTO: 0.2 % (ref 0–0.5)
LDLC SERPL CALC-MCNC: 90.6 MG/DL (ref 63–159)
LYMPHOCYTES # BLD AUTO: 1.5 K/UL (ref 1–4.8)
LYMPHOCYTES NFR BLD: 23.4 % (ref 18–48)
MCH RBC QN AUTO: 32.6 PG (ref 27–31)
MCHC RBC AUTO-ENTMCNC: 32.3 G/DL (ref 32–36)
MCV RBC AUTO: 101 FL (ref 82–98)
MONOCYTES # BLD AUTO: 0.5 K/UL (ref 0.3–1)
MONOCYTES NFR BLD: 7.3 % (ref 4–15)
NEUTROPHILS # BLD AUTO: 4.3 K/UL (ref 1.8–7.7)
NEUTROPHILS NFR BLD: 65.8 % (ref 38–73)
NONHDLC SERPL-MCNC: 119 MG/DL
NRBC BLD-RTO: 0 /100 WBC
PLATELET # BLD AUTO: 187 K/UL (ref 150–450)
PMV BLD AUTO: 11.5 FL (ref 9.2–12.9)
POTASSIUM SERPL-SCNC: 4.7 MMOL/L (ref 3.5–5.1)
PROT SERPL-MCNC: 7.5 G/DL (ref 6–8.4)
RBC # BLD AUTO: 3.87 M/UL (ref 4–5.4)
SODIUM SERPL-SCNC: 141 MMOL/L (ref 136–145)
TRIGL SERPL-MCNC: 142 MG/DL (ref 30–150)
WBC # BLD AUTO: 6.59 K/UL (ref 3.9–12.7)

## 2022-07-11 PROCEDURE — 99214 OFFICE O/P EST MOD 30 MIN: CPT | Mod: S$GLB,,, | Performed by: INTERNAL MEDICINE

## 2022-07-11 PROCEDURE — 3288F FALL RISK ASSESSMENT DOCD: CPT | Mod: CPTII,S$GLB,, | Performed by: INTERNAL MEDICINE

## 2022-07-11 PROCEDURE — 1159F PR MEDICATION LIST DOCUMENTED IN MEDICAL RECORD: ICD-10-PCS | Mod: CPTII,S$GLB,, | Performed by: INTERNAL MEDICINE

## 2022-07-11 PROCEDURE — 1126F PR PAIN SEVERITY QUANTIFIED, NO PAIN PRESENT: ICD-10-PCS | Mod: CPTII,S$GLB,, | Performed by: INTERNAL MEDICINE

## 2022-07-11 PROCEDURE — 1101F PR PT FALLS ASSESS DOC 0-1 FALLS W/OUT INJ PAST YR: ICD-10-PCS | Mod: CPTII,S$GLB,, | Performed by: INTERNAL MEDICINE

## 2022-07-11 PROCEDURE — 1101F PT FALLS ASSESS-DOCD LE1/YR: CPT | Mod: CPTII,S$GLB,, | Performed by: INTERNAL MEDICINE

## 2022-07-11 PROCEDURE — 36415 COLL VENOUS BLD VENIPUNCTURE: CPT | Performed by: INTERNAL MEDICINE

## 2022-07-11 PROCEDURE — 1126F AMNT PAIN NOTED NONE PRSNT: CPT | Mod: CPTII,S$GLB,, | Performed by: INTERNAL MEDICINE

## 2022-07-11 PROCEDURE — 99999 PR PBB SHADOW E&M-EST. PATIENT-LVL IV: ICD-10-PCS | Mod: PBBFAC,,, | Performed by: INTERNAL MEDICINE

## 2022-07-11 PROCEDURE — 3075F SYST BP GE 130 - 139MM HG: CPT | Mod: CPTII,S$GLB,, | Performed by: INTERNAL MEDICINE

## 2022-07-11 PROCEDURE — 99999 PR PBB SHADOW E&M-EST. PATIENT-LVL IV: CPT | Mod: PBBFAC,,, | Performed by: INTERNAL MEDICINE

## 2022-07-11 PROCEDURE — 93005 ELECTROCARDIOGRAM TRACING: CPT | Mod: S$GLB,,, | Performed by: INTERNAL MEDICINE

## 2022-07-11 PROCEDURE — 93005 EKG 12-LEAD: ICD-10-PCS | Mod: S$GLB,,, | Performed by: INTERNAL MEDICINE

## 2022-07-11 PROCEDURE — 3078F PR MOST RECENT DIASTOLIC BLOOD PRESSURE < 80 MM HG: ICD-10-PCS | Mod: CPTII,S$GLB,, | Performed by: INTERNAL MEDICINE

## 2022-07-11 PROCEDURE — 99214 PR OFFICE/OUTPT VISIT, EST, LEVL IV, 30-39 MIN: ICD-10-PCS | Mod: S$GLB,,, | Performed by: INTERNAL MEDICINE

## 2022-07-11 PROCEDURE — 3075F PR MOST RECENT SYSTOLIC BLOOD PRESS GE 130-139MM HG: ICD-10-PCS | Mod: CPTII,S$GLB,, | Performed by: INTERNAL MEDICINE

## 2022-07-11 PROCEDURE — 93010 EKG 12-LEAD: ICD-10-PCS | Mod: S$GLB,,, | Performed by: INTERNAL MEDICINE

## 2022-07-11 PROCEDURE — 85025 COMPLETE CBC W/AUTO DIFF WBC: CPT | Performed by: INTERNAL MEDICINE

## 2022-07-11 PROCEDURE — 99499 RISK ADDL DX/OHS AUDIT: ICD-10-PCS | Mod: S$GLB,,, | Performed by: INTERNAL MEDICINE

## 2022-07-11 PROCEDURE — 99499 UNLISTED E&M SERVICE: CPT | Mod: S$GLB,,, | Performed by: INTERNAL MEDICINE

## 2022-07-11 PROCEDURE — 3078F DIAST BP <80 MM HG: CPT | Mod: CPTII,S$GLB,, | Performed by: INTERNAL MEDICINE

## 2022-07-11 PROCEDURE — 80053 COMPREHEN METABOLIC PANEL: CPT | Performed by: INTERNAL MEDICINE

## 2022-07-11 PROCEDURE — 3288F PR FALLS RISK ASSESSMENT DOCUMENTED: ICD-10-PCS | Mod: CPTII,S$GLB,, | Performed by: INTERNAL MEDICINE

## 2022-07-11 PROCEDURE — 1159F MED LIST DOCD IN RCRD: CPT | Mod: CPTII,S$GLB,, | Performed by: INTERNAL MEDICINE

## 2022-07-11 PROCEDURE — 80061 LIPID PANEL: CPT | Performed by: INTERNAL MEDICINE

## 2022-07-11 PROCEDURE — 93010 ELECTROCARDIOGRAM REPORT: CPT | Mod: S$GLB,,, | Performed by: INTERNAL MEDICINE

## 2022-07-11 PROCEDURE — 83036 HEMOGLOBIN GLYCOSYLATED A1C: CPT | Performed by: INTERNAL MEDICINE

## 2022-07-11 RX ORDER — CARVEDILOL 12.5 MG/1
12.5 TABLET ORAL 2 TIMES DAILY WITH MEALS
Qty: 180 TABLET | Refills: 3 | Status: SHIPPED | OUTPATIENT
Start: 2022-07-11 | End: 2023-01-17 | Stop reason: SDUPTHER

## 2022-07-11 RX ORDER — AMLODIPINE BESYLATE 10 MG/1
TABLET ORAL
Qty: 90 TABLET | Refills: 3 | Status: SHIPPED | OUTPATIENT
Start: 2022-07-11 | End: 2023-01-17 | Stop reason: SDUPTHER

## 2022-07-11 RX ORDER — SIMVASTATIN 10 MG/1
10 TABLET, FILM COATED ORAL NIGHTLY
Qty: 90 TABLET | Refills: 3 | Status: SHIPPED | OUTPATIENT
Start: 2022-07-11 | End: 2023-01-17 | Stop reason: SDUPTHER

## 2022-07-11 NOTE — PROGRESS NOTES
Subjective:    Patient ID:  Gabriel Grace is a 82 y.o. 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 She reports increased SALGADO as well but no chest pain. Chest CT 2020 revealed mild coronary calcifications EKG unchanged from April .  Lab Results   Component Value Date     07/11/2022    K 4.7 07/11/2022     07/11/2022    CO2 25 07/11/2022    BUN 33 (H) 07/11/2022    CREATININE 1.0 07/11/2022     (H) 07/11/2022    HGBA1C 5.4 07/11/2022    MG 2.0 10/06/2008    AST 18 07/11/2022    ALT 10 07/11/2022    ALBUMIN 3.9 07/11/2022    PROT 7.5 07/11/2022    BILITOT 0.4 07/11/2022    WBC 6.59 07/11/2022    HGB 12.6 07/11/2022    HCT 39.0 07/11/2022     (H) 07/11/2022     07/11/2022    INR 1.1 02/14/2015    TSH 1.859 02/27/2019         Lab Results   Component Value Date    CHOL 160 07/11/2022    HDL 41 07/11/2022    TRIG 142 07/11/2022       Lab Results   Component Value Date    LDLCALC 90.6 07/11/2022       Past Medical History:   Diagnosis Date    Cataract     Hyperglycemia 10/2/2013    Hyperlipidemia     Hypertension     Migraine headache     Pancreas cyst 1/17/2017    Thyroid disease     hypothyroidism    Ulcerative colitis, unspecified     Ulcerative colitis, unspecified     Visit for screening mammogram 1/12/2016       Current Outpatient Medications:     balsalazide (COLAZAL) 750 mg capsule, Take 2,250 mg by mouth once daily. , Disp: , Rfl:     biotin 2,500 mcg Cap, , Disp: , Rfl:     calcium-magnesium-zinc Tab, Take by mouth. 1  Oral Every day, Disp: , Rfl:     cholecalciferol, vitamin D3, (VITAMIN D3) 25 mcg (1,000 unit) capsule, Take 1,000 Units by mouth once daily., Disp: , Rfl:     cyanocobalamin, vitamin B-12, 50 mcg tablet, Take 50 mcg by mouth once daily., Disp: ,  Rfl:     cyclobenzaprine (FLEXERIL) 5 MG tablet, TAKE 1 TABLET THREE TIMES DAILY AS NEEDED FOR MUSCLE SPASM(S), Disp: 20 tablet, Rfl: 2    FLAXSEED OIL ORAL, Take 1,000 mg by mouth. 1  Oral Every day, Disp: , Rfl:     folic acid (FOLVITE) 400 MCG tablet, Take 800 mcg by mouth once daily. 4 Tablet Oral Every day, Disp: , Rfl:     ketoconazole (NIZORAL) 2 % cream, AAA qhs under breasts after cool blow dry, Disp: 60 g, Rfl: 3    multivitamin (THERAGRAN) per tablet, Take by mouth. 1  By mouth Every day, Disp: , Rfl:     omega-3 fatty acids-vitamin E 1,000 mg Cap, , Disp: , Rfl:     omeprazole (PRILOSEC) 20 MG capsule, Take 1 capsule (20 mg total) by mouth once daily., Disp: 90 capsule, Rfl: 3    sulfamethoxazole-trimethoprim 800-160mg (BACTRIM DS) 800-160 mg Tab, Take 1 tablet by mouth 2 (two) times daily., Disp: 14 tablet, Rfl: 0    triamcinolone acetonide 0.1% (KENALOG) 0.1 % cream, aaa on legs and arms qd- bid after moisturizing; not more than 2 weeks straight in the same location, Disp: 60 g, Rfl: 3    amLODIPine (NORVASC) 10 MG tablet, TAKE 1 TABLET EVERY DAY  .  STOP OLD RX, Disp: 90 tablet, Rfl: 3    carvediloL (COREG) 12.5 MG tablet, Take 1 tablet (12.5 mg total) by mouth 2 (two) times daily with meals., Disp: 180 tablet, Rfl: 3    FLUAD QUAD 2021-22,65Y UP,,PF, 60 mcg (15 mcg x 4)/0.5 mL Syrg, , Disp: , Rfl:     simvastatin (ZOCOR) 10 MG tablet, Take 1 tablet (10 mg total) by mouth every evening., Disp: 90 tablet, Rfl: 3          Review of Systems   Constitutional: Positive for malaise/fatigue and weight gain. Negative for decreased appetite, diaphoresis, fever and weight loss.   HENT: Negative for congestion, ear discharge, ear pain and nosebleeds.    Eyes: Negative for blurred vision, double vision and visual disturbance.   Cardiovascular: Positive for dyspnea on exertion. Negative for chest pain, claudication, cyanosis, irregular heartbeat, leg swelling, near-syncope, orthopnea, palpitations,  "paroxysmal nocturnal dyspnea and syncope.   Respiratory: Negative for cough, hemoptysis, shortness of breath, sleep disturbances due to breathing, snoring, sputum production and wheezing.    Endocrine: Negative for polydipsia, polyphagia and polyuria.   Hematologic/Lymphatic: Negative for adenopathy and bleeding problem. Does not bruise/bleed easily.   Skin: Negative for color change, nail changes, poor wound healing and rash.   Musculoskeletal: Negative for muscle cramps and muscle weakness.   Gastrointestinal: Negative for abdominal pain, anorexia, change in bowel habit, hematochezia, nausea and vomiting.   Genitourinary: Negative for dysuria, frequency and hematuria.   Neurological: Negative for brief paralysis, difficulty with concentration, excessive daytime sleepiness, dizziness, focal weakness, headaches, light-headedness, seizures, vertigo and weakness.   Psychiatric/Behavioral: Negative for altered mental status and depression.   Allergic/Immunologic: Negative for persistent infections.        Objective:/66 (BP Location: Left arm, Patient Position: Sitting, BP Method: Medium (Automatic))   Pulse 92   Ht 5' 8" (1.727 m)   Wt 91.2 kg (201 lb 1 oz)   LMP  (LMP Unknown)   BMI 30.57 kg/m²             Physical Exam  Constitutional:       Appearance: She is well-developed. She is obese.   HENT:      Head: Normocephalic.      Right Ear: External ear normal.      Left Ear: External ear normal.      Nose: Nose normal.   Eyes:      General: No scleral icterus.     Conjunctiva/sclera: Conjunctivae normal.      Pupils: Pupils are equal, round, and reactive to light.   Neck:      Thyroid: No thyromegaly.      Vascular: No JVD.      Trachea: No tracheal deviation.   Cardiovascular:      Rate and Rhythm: Normal rate and regular rhythm.      Pulses: Intact distal pulses.           Carotid pulses are 2+ on the right side and 2+ on the left side.       Dorsalis pedis pulses are 1+ on the right side and 1+ on the " left side.      Heart sounds: Normal heart sounds. No murmur heard.    No friction rub. No gallop.      Comments: JVP normal  No edema  Pulmonary:      Effort: Pulmonary effort is normal. No respiratory distress.      Breath sounds: Normal breath sounds. No wheezing or rales.   Chest:      Chest wall: No tenderness.   Abdominal:      General: Bowel sounds are normal. There is no distension.      Palpations: Abdomen is soft.      Tenderness: There is no abdominal tenderness. There is no guarding.   Musculoskeletal:         General: No tenderness. Normal range of motion.      Cervical back: Normal range of motion.   Lymphadenopathy:      Comments: Palpation of lymph nodes of neck and groin normal   Skin:     General: Skin is warm and dry.      Coloration: Skin is not pale.      Findings: No erythema or rash.      Comments: Palpation of skin normal   Neurological:      Mental Status: She is alert and oriented to person, place, and time.      Cranial Nerves: No cranial nerve deficit.      Motor: No abnormal muscle tone.      Coordination: Coordination normal.   Psychiatric:         Behavior: Behavior normal.         Thought Content: Thought content normal.         Judgment: Judgment normal.           Assessment:       1. Chronic fatigue    2. Aortic atherosclerosis    3. Pure hypercholesterolemia    4. Primary hypertension    5. Pulmonary embolism, unspecified chronicity, unspecified pulmonary embolism type, unspecified whether acute cor pulmonale present    6. Non morbid obesity due to excess calories    7. Crohn's disease of small intestine without complication    8. Ulcerative colitis with complication, unspecified location    9. Hyperglycemia    10. Essential hypertension    11. SALGADO (dyspnea on exertion)    12. Physical deconditioning    13. Hyperlipidemia, unspecified hyperlipidemia type         Plan:       Gabriel was seen today for follow-up and fatigue.    Diagnoses and all orders for this visit:    Chronic  fatigue    Aortic atherosclerosis  -     Lipid Panel; Future; Expected date: 01/10/2023  -     Lipid Panel; Future; Expected date: 01/10/2023    Pure hypercholesterolemia  -     simvastatin (ZOCOR) 10 MG tablet; Take 1 tablet (10 mg total) by mouth every evening.  -     Lipid Panel; Future; Expected date: 01/10/2023    Primary hypertension    Pulmonary embolism, unspecified chronicity, unspecified pulmonary embolism type, unspecified whether acute cor pulmonale present  -     D-Dimer (Thrombosis); Future; Expected date: 07/11/2022  -     D-Dimer (Thrombosis)    Non morbid obesity due to excess calories    Crohn's disease of small intestine without complication    Ulcerative colitis with complication, unspecified location    Hyperglycemia    Essential hypertension  -     amLODIPine (NORVASC) 10 MG tablet; TAKE 1 TABLET EVERY DAY  .  STOP OLD RX  -     carvediloL (COREG) 12.5 MG tablet; Take 1 tablet (12.5 mg total) by mouth 2 (two) times daily with meals.  -     Comprehensive Metabolic Panel; Future; Expected date: 01/10/2023  -     CBC Auto Differential; Future; Expected date: 01/10/2023    SALGADO (dyspnea on exertion)    Physical deconditioning    Hyperlipidemia, unspecified hyperlipidemia type  -     Lipid Panel; Future; Expected date: 01/10/2023

## 2022-07-13 ENCOUNTER — TELEPHONE (OUTPATIENT)
Dept: CARDIOLOGY | Facility: CLINIC | Age: 82
End: 2022-07-13
Payer: MEDICARE

## 2022-07-13 DIAGNOSIS — R00.2 PALPITATIONS: Primary | ICD-10-CM

## 2022-07-13 NOTE — TELEPHONE ENCOUNTER
----- Message from Blessing Grijalva RN sent at 7/13/2022  9:23 AM CDT -----  Regarding: FW: EKG ORDER    ----- Message -----  From: Asuncion Beltran  Sent: 7/13/2022   8:35 AM CDT  To: [facility] Cardiology Clinical Support Staff  Subject: EKG ORDER                                        Please place and link EKG order to the EKG or Doctor appointment scheduled on 07/11/22  thanks. Asuncion [id number]

## 2022-08-28 DIAGNOSIS — Z78.0 MENOPAUSE: ICD-10-CM

## 2022-09-12 ENCOUNTER — PATIENT OUTREACH (OUTPATIENT)
Dept: ADMINISTRATIVE | Facility: HOSPITAL | Age: 82
End: 2022-09-12
Payer: MEDICARE

## 2022-09-12 NOTE — PROGRESS NOTES
Osteo Improvement-[facility]    Non-compliant report chart audits for OSTEOPOROSIS SCREENING. Chart review completed for HM test overdue (mammograms, Colonoscopies, pap smears, DM labs, and/or EYE EXAMs)       DIS reviewed for test needed.  Dexa Scan      Outreach to patient in reference to dexa scan    RE:  Patient needs to schedule dexa scan.    No need to repeat BMD in near future unless clinical change

## 2022-09-17 ENCOUNTER — IMMUNIZATION (OUTPATIENT)
Dept: INTERNAL MEDICINE | Facility: CLINIC | Age: 82
End: 2022-09-17
Payer: MEDICARE

## 2022-09-17 DIAGNOSIS — Z23 NEED FOR VACCINATION: Primary | ICD-10-CM

## 2022-09-17 PROCEDURE — 0124A PR IMMUNIZ ADMIN, SARS-COV- 2 COVID-19 VACCINE, 30MCG/0.3ML, BIVALENT, BOOSTER DOSE: ICD-10-PCS | Mod: S$GLB,,, | Performed by: INTERNAL MEDICINE

## 2022-09-17 PROCEDURE — 91312 COVID-19, MRNA, LNP-S, BIVALENT BOOSTER, PF, 30 MCG/0.3 ML DOSE: ICD-10-PCS | Mod: S$GLB,,, | Performed by: INTERNAL MEDICINE

## 2022-09-17 PROCEDURE — 0124A COVID-19, MRNA, LNP-S, BIVALENT BOOSTER, PF, 30 MCG/0.3 ML DOSE: CPT | Mod: CV19,PBBFAC | Performed by: INTERNAL MEDICINE

## 2022-09-17 PROCEDURE — 0124A PR IMMUNIZ ADMIN, SARS-COV- 2 COVID-19 VACCINE, 30MCG/0.3ML, BIVALENT, BOOSTER DOSE: CPT | Mod: S$GLB,,, | Performed by: INTERNAL MEDICINE

## 2022-09-17 PROCEDURE — 91312 COVID-19, MRNA, LNP-S, BIVALENT BOOSTER, PF, 30 MCG/0.3 ML DOSE: CPT | Mod: S$GLB,,, | Performed by: INTERNAL MEDICINE

## 2022-10-25 ENCOUNTER — TELEPHONE (OUTPATIENT)
Dept: INTERNAL MEDICINE | Facility: CLINIC | Age: 82
End: 2022-10-25
Payer: MEDICARE

## 2022-10-25 DIAGNOSIS — Z12.31 ENCOUNTER FOR SCREENING MAMMOGRAM FOR MALIGNANT NEOPLASM OF BREAST: Primary | ICD-10-CM

## 2022-10-25 NOTE — TELEPHONE ENCOUNTER
----- Message from Robi Chang sent at 10/25/2022  3:02 PM CDT -----  Regarding: Order-Mammogram  Contact: PT @ [phone]  Pt is calling to speak to someone in the office to request an order for a 3-D mammogram. No active orders in Epic and pt did receive a letter in the mail. Please call pt when the order for a 3-D mammogram is put in so that she can schedule. Thanks.

## 2022-12-06 ENCOUNTER — TELEPHONE (OUTPATIENT)
Dept: OPTOMETRY | Facility: CLINIC | Age: 82
End: 2022-12-06
Payer: MEDICARE

## 2022-12-06 NOTE — TELEPHONE ENCOUNTER
Called patient and scheduled appt    ----- Message from Ivon Hester sent at 12/6/2022  8:15 AM CST -----  Regarding: URGENT  Contact: Gabriel @[phone]  Pt states her right eye is red, swollen, and painful, she is requesting to be seen today. Please give her a call back to further assist.

## 2022-12-07 ENCOUNTER — OFFICE VISIT (OUTPATIENT)
Dept: OPTOMETRY | Facility: CLINIC | Age: 82
End: 2022-12-07
Payer: MEDICARE

## 2022-12-07 DIAGNOSIS — H57.89 IRRITATION OF RIGHT EYE: Primary | ICD-10-CM

## 2022-12-07 DIAGNOSIS — Z13.5 SCREENING FOR EYE CONDITION: ICD-10-CM

## 2022-12-07 DIAGNOSIS — H25.13 NUCLEAR SCLEROSIS, BILATERAL: ICD-10-CM

## 2022-12-07 DIAGNOSIS — H26.9 CORTICAL CATARACT OF BOTH EYES: ICD-10-CM

## 2022-12-07 PROCEDURE — 99999 PR PBB SHADOW E&M-EST. PATIENT-LVL III: ICD-10-PCS | Mod: PBBFAC,HCNC,, | Performed by: OPTOMETRIST

## 2022-12-07 PROCEDURE — 92012 INTRM OPH EXAM EST PATIENT: CPT | Mod: HCNC,S$GLB,, | Performed by: OPTOMETRIST

## 2022-12-07 PROCEDURE — 1125F AMNT PAIN NOTED PAIN PRSNT: CPT | Mod: HCNC,CPTII,S$GLB, | Performed by: OPTOMETRIST

## 2022-12-07 PROCEDURE — 1101F PT FALLS ASSESS-DOCD LE1/YR: CPT | Mod: HCNC,CPTII,S$GLB, | Performed by: OPTOMETRIST

## 2022-12-07 PROCEDURE — 1159F PR MEDICATION LIST DOCUMENTED IN MEDICAL RECORD: ICD-10-PCS | Mod: HCNC,CPTII,S$GLB, | Performed by: OPTOMETRIST

## 2022-12-07 PROCEDURE — 3288F PR FALLS RISK ASSESSMENT DOCUMENTED: ICD-10-PCS | Mod: HCNC,CPTII,S$GLB, | Performed by: OPTOMETRIST

## 2022-12-07 PROCEDURE — 99999 PR PBB SHADOW E&M-EST. PATIENT-LVL III: CPT | Mod: PBBFAC,HCNC,, | Performed by: OPTOMETRIST

## 2022-12-07 PROCEDURE — 1125F PR PAIN SEVERITY QUANTIFIED, PAIN PRESENT: ICD-10-PCS | Mod: HCNC,CPTII,S$GLB, | Performed by: OPTOMETRIST

## 2022-12-07 PROCEDURE — 3288F FALL RISK ASSESSMENT DOCD: CPT | Mod: HCNC,CPTII,S$GLB, | Performed by: OPTOMETRIST

## 2022-12-07 PROCEDURE — 1159F MED LIST DOCD IN RCRD: CPT | Mod: HCNC,CPTII,S$GLB, | Performed by: OPTOMETRIST

## 2022-12-07 PROCEDURE — 92012 PR EYE EXAM, EST PATIENT,INTERMED: ICD-10-PCS | Mod: HCNC,S$GLB,, | Performed by: OPTOMETRIST

## 2022-12-07 PROCEDURE — 1101F PR PT FALLS ASSESS DOC 0-1 FALLS W/OUT INJ PAST YR: ICD-10-PCS | Mod: HCNC,CPTII,S$GLB, | Performed by: OPTOMETRIST

## 2022-12-07 NOTE — PATIENT INSTRUCTIONS
S/P surgery to upper eyelids. (levator resection).  Nuclear sclerosis of lens of both eyes, and cortical cataract in both eyes.     Prior diagnosis of posterior vitreous detachment in the right with Multani ring floater.    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    Recent history of foreign body sensation in the right eye, with redness of the eye, and itching.  No foreign body noted today on slit lamp examination.  1+ injection of the bulbar conjunctiva of the right eye.  No corneal or conjunctival abrasion noted.    Suggest use of Maxitrol ophthalmic suspension in the right eye three to four times per day for four to five days.  Sample issued  Okay to use Artificial tears in the right eye as needed.  Call/email if symptoms still persist in the right eye on 12/12/2022.    Otherwise, return for annual general eye examination in January, 2023

## 2022-12-07 NOTE — PROGRESS NOTES
HPI     Eye Problem            Comments: Foreign body sensation in the right eye for past three days.    Redness. Itching, and swelling of the right eye.  Used gentamicin ophthalmic ointment since the onset of symptoms.  Eye feels somewhat better now, but she would like to have it checked.           Comments    Patient's age: 82 y.o. WF  Occupation: retired RN  Approximate date of last eye examination: 01/18/2022  Name of last eye doctor seen:    City/State: [facility]  Wears glasses? Yes      If yes, wears  Full-time or part-time?  Full time  Approximate age of present glasses:  4 years      Any problem with [facility] with glasses?  States she feeld she needs an updated   glasses rx   Wears CLs?:  no  Headaches?  no  Eye pain/discomfort?  Right swollen, every time she blinked,it marjan, and   she reports itchiness . Eye feels somewhat better today.                                                                          Flashes?  no  Floaters?  No   Diplopia/Double vision?  no  Patient's Ocular History:         Any eye surgeries? no         Any eye injury?  no         Any treatment for eye disease?  no  Family history of eye disease?  no  Significant patient medical history:         1. Diabetes?  no       If yes, IDDM or NIDDM? n/a   2. HBP?  Yes controlled with med              3. Other (describe): High cholesterol, ulcerative colitis     ! OTC eyedrops currently using:  no   ! Prescription eye meds currently using:  ointment at home   ! Any history of allergy/adverse reaction to any eye meds used   previously?  no   ! Any history of allergy/adverse reaction to eyedrops used during prior   eye exam(s)? no   ! Any history of allergy/adverse reaction to Novacaine or similar meds?   no   ! Any history of allergy/adverse reaction to Epinephrine or similar meds?   no    ! Patient okay with use of anesthetic eyedrops to check eye pressure?    yes       ! Patient okay with use of eyedrops to dilate pupils today?  yes   !   "Allergies/Medications/Medical History/Family History reviewed today?    yes      PD =  68/65  Desired reading distance =  19"                                                                       Last edited by Darell Leigh, OD on 12/7/2022  8:56 AM.            Assessment /Plan     For exam results, see Encounter Report.    1. Irritation of right eye        2. Screening for eye condition        3. Nuclear sclerosis, bilateral        4. Cortical cataract of both eyes                       S/P surgery to upper eyelids. (levator resection).  Nuclear sclerosis of lens of both eyes, and cortical cataract in both eyes.     Prior diagnosis of posterior vitreous detachment in the right with Multani ring floater.    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    Recent history of foreign body sensation in the right eye, with redness of the eye, and itching.  No foreign body noted today on slit lamp examination.  1+ injection of the bulbar conjunctiva of the right eye.  No corneal or conjunctival abrasion noted.    Suggest use of Maxitrol ophthalmic suspension in the right eye three to four times per day for four to five days.  Sample issued  Okay to use Artificial tears in the right eye as needed.  Call/email if symptoms still persist in the right eye on 12/12/2022.    Otherwise, return for annual general eye examination in January, 2023              "

## 2022-12-08 ENCOUNTER — PES CALL (OUTPATIENT)
Dept: ADMINISTRATIVE | Facility: CLINIC | Age: 82
End: 2022-12-08
Payer: MEDICARE

## 2022-12-12 ENCOUNTER — TELEPHONE (OUTPATIENT)
Dept: OPHTHALMOLOGY | Facility: CLINIC | Age: 82
End: 2022-12-12
Payer: MEDICARE

## 2022-12-12 NOTE — TELEPHONE ENCOUNTER
Spoke with patient regarding her concerns     ----- Message from Ailyn Dominguez sent at 12/12/2022 10:34 AM CST -----  Contact: Lesly@[phone]  Pt called regarding an update on her right eye that is doing better.

## 2023-01-04 ENCOUNTER — HOSPITAL ENCOUNTER (OUTPATIENT)
Dept: RADIOLOGY | Facility: HOSPITAL | Age: 83
Discharge: HOME | End: 2023-01-04
Attending: INTERNAL MEDICINE
Payer: MEDICARE

## 2023-01-04 VITALS — BODY MASS INDEX: 30.41 KG/M2 | WEIGHT: 200 LBS

## 2023-01-04 DIAGNOSIS — Z12.31 ENCOUNTER FOR SCREENING MAMMOGRAM FOR MALIGNANT NEOPLASM OF BREAST: ICD-10-CM

## 2023-01-04 PROCEDURE — 77063 BREAST TOMOSYNTHESIS BI: CPT | Mod: 26,HCNC,, | Performed by: RADIOLOGY

## 2023-01-04 PROCEDURE — 77067 SCR MAMMO BI INCL CAD: CPT | Mod: TC,HCNC

## 2023-01-04 PROCEDURE — 77067 MAMMO DIGITAL SCREENING BILAT WITH TOMO: ICD-10-PCS | Mod: 26,HCNC,, | Performed by: RADIOLOGY

## 2023-01-04 PROCEDURE — 77063 MAMMO DIGITAL SCREENING BILAT WITH TOMO: ICD-10-PCS | Mod: 26,HCNC,, | Performed by: RADIOLOGY

## 2023-01-04 PROCEDURE — 77067 SCR MAMMO BI INCL CAD: CPT | Mod: 26,HCNC,, | Performed by: RADIOLOGY

## 2023-01-13 ENCOUNTER — LAB VISIT (OUTPATIENT)
Dept: LAB | Facility: HOSPITAL | Age: 83
End: 2023-01-13
Attending: INTERNAL MEDICINE
Payer: MEDICARE

## 2023-01-13 DIAGNOSIS — I70.0 AORTIC ATHEROSCLEROSIS: ICD-10-CM

## 2023-01-13 DIAGNOSIS — Z79.01 LONG TERM (CURRENT) USE OF ANTICOAGULANTS: ICD-10-CM

## 2023-01-13 DIAGNOSIS — I10 ESSENTIAL HYPERTENSION: ICD-10-CM

## 2023-01-13 DIAGNOSIS — E78.5 HYPERLIPIDEMIA, UNSPECIFIED HYPERLIPIDEMIA TYPE: ICD-10-CM

## 2023-01-13 LAB
ALBUMIN SERPL BCP-MCNC: 3.7 G/DL (ref 3.5–5.2)
ALP SERPL-CCNC: 69 U/L (ref 55–135)
ALT SERPL W/O P-5'-P-CCNC: 11 U/L (ref 10–44)
ANION GAP SERPL CALC-SCNC: 8 MMOL/L (ref 8–16)
AST SERPL-CCNC: 16 U/L (ref 10–40)
BASOPHILS # BLD AUTO: 0.02 K/UL (ref 0–0.2)
BASOPHILS NFR BLD: 0.3 % (ref 0–1.9)
BILIRUB SERPL-MCNC: 0.5 MG/DL (ref 0.1–1)
BUN SERPL-MCNC: 23 MG/DL (ref 8–23)
CALCIUM SERPL-MCNC: 10.5 MG/DL (ref 8.7–10.5)
CHLORIDE SERPL-SCNC: 108 MMOL/L (ref 95–110)
CHOLEST SERPL-MCNC: 158 MG/DL (ref 120–199)
CHOLEST/HDLC SERPL: 4 {RATIO} (ref 2–5)
CO2 SERPL-SCNC: 23 MMOL/L (ref 23–29)
CREAT SERPL-MCNC: 0.9 MG/DL (ref 0.5–1.4)
DIFFERENTIAL METHOD: ABNORMAL
EOSINOPHIL # BLD AUTO: 0.4 K/UL (ref 0–0.5)
EOSINOPHIL NFR BLD: 6.8 % (ref 0–8)
ERYTHROCYTE [DISTWIDTH] IN BLOOD BY AUTOMATED COUNT: 13.3 % (ref 11.5–14.5)
EST. GFR  (NO RACE VARIABLE): >60 ML/MIN/1.73 M^2
GLUCOSE SERPL-MCNC: 130 MG/DL (ref 70–110)
HCT VFR BLD AUTO: 35.9 % (ref 37–48.5)
HDLC SERPL-MCNC: 40 MG/DL (ref 40–75)
HDLC SERPL: 25.3 % (ref 20–50)
HGB BLD-MCNC: 11.4 G/DL (ref 12–16)
IMM GRANULOCYTES # BLD AUTO: 0.01 K/UL (ref 0–0.04)
IMM GRANULOCYTES NFR BLD AUTO: 0.2 % (ref 0–0.5)
LDLC SERPL CALC-MCNC: 94.2 MG/DL (ref 63–159)
LYMPHOCYTES # BLD AUTO: 1.3 K/UL (ref 1–4.8)
LYMPHOCYTES NFR BLD: 20.7 % (ref 18–48)
MCH RBC QN AUTO: 31.6 PG (ref 27–31)
MCHC RBC AUTO-ENTMCNC: 31.8 G/DL (ref 32–36)
MCV RBC AUTO: 99 FL (ref 82–98)
MONOCYTES # BLD AUTO: 0.4 K/UL (ref 0.3–1)
MONOCYTES NFR BLD: 6.5 % (ref 4–15)
NEUTROPHILS # BLD AUTO: 4 K/UL (ref 1.8–7.7)
NEUTROPHILS NFR BLD: 65.5 % (ref 38–73)
NONHDLC SERPL-MCNC: 118 MG/DL
NRBC BLD-RTO: 0 /100 WBC
PLATELET # BLD AUTO: 168 K/UL (ref 150–450)
PMV BLD AUTO: 11.6 FL (ref 9.2–12.9)
POTASSIUM SERPL-SCNC: 4 MMOL/L (ref 3.5–5.1)
PROT SERPL-MCNC: 7.3 G/DL (ref 6–8.4)
RBC # BLD AUTO: 3.61 M/UL (ref 4–5.4)
SODIUM SERPL-SCNC: 139 MMOL/L (ref 136–145)
TRIGL SERPL-MCNC: 119 MG/DL (ref 30–150)
WBC # BLD AUTO: 6.17 K/UL (ref 3.9–12.7)

## 2023-01-13 PROCEDURE — 85025 COMPLETE CBC W/AUTO DIFF WBC: CPT | Mod: HCNC | Performed by: INTERNAL MEDICINE

## 2023-01-13 PROCEDURE — 80053 COMPREHEN METABOLIC PANEL: CPT | Mod: HCNC | Performed by: INTERNAL MEDICINE

## 2023-01-13 PROCEDURE — 80061 LIPID PANEL: CPT | Mod: HCNC | Performed by: INTERNAL MEDICINE

## 2023-01-13 PROCEDURE — 36415 COLL VENOUS BLD VENIPUNCTURE: CPT | Mod: HCNC | Performed by: INTERNAL MEDICINE

## 2023-01-17 ENCOUNTER — LAB VISIT (OUTPATIENT)
Dept: LAB | Facility: HOSPITAL | Age: 83
End: 2023-01-17
Attending: INTERNAL MEDICINE
Payer: MEDICARE

## 2023-01-17 ENCOUNTER — OFFICE VISIT (OUTPATIENT)
Dept: CARDIOLOGY | Facility: CLINIC | Age: 83
End: 2023-01-17
Payer: MEDICARE

## 2023-01-17 VITALS
HEART RATE: 85 BPM | BODY MASS INDEX: 31.34 KG/M2 | OXYGEN SATURATION: 95 % | HEIGHT: 68 IN | SYSTOLIC BLOOD PRESSURE: 157 MMHG | WEIGHT: 206.81 LBS | DIASTOLIC BLOOD PRESSURE: 83 MMHG

## 2023-01-17 DIAGNOSIS — I10 PRIMARY HYPERTENSION: Primary | ICD-10-CM

## 2023-01-17 DIAGNOSIS — I51.7 ATRIAL ENLARGEMENT, BILATERAL: ICD-10-CM

## 2023-01-17 DIAGNOSIS — I26.99 PULMONARY EMBOLISM, UNSPECIFIED CHRONICITY, UNSPECIFIED PULMONARY EMBOLISM TYPE, UNSPECIFIED WHETHER ACUTE COR PULMONALE PRESENT: ICD-10-CM

## 2023-01-17 DIAGNOSIS — R73.9 HYPERGLYCEMIA: ICD-10-CM

## 2023-01-17 DIAGNOSIS — I10 ESSENTIAL HYPERTENSION: ICD-10-CM

## 2023-01-17 DIAGNOSIS — K50.00 CROHN'S DISEASE OF SMALL INTESTINE WITHOUT COMPLICATION: ICD-10-CM

## 2023-01-17 DIAGNOSIS — I70.0 AORTIC ATHEROSCLEROSIS: ICD-10-CM

## 2023-01-17 DIAGNOSIS — E78.00 PURE HYPERCHOLESTEROLEMIA: ICD-10-CM

## 2023-01-17 DIAGNOSIS — I70.0 ATHEROSCLEROSIS OF AORTA: ICD-10-CM

## 2023-01-17 DIAGNOSIS — E66.09 NON MORBID OBESITY DUE TO EXCESS CALORIES: ICD-10-CM

## 2023-01-17 DIAGNOSIS — M62.838 MUSCLE SPASM: ICD-10-CM

## 2023-01-17 LAB
ESTIMATED AVG GLUCOSE: 103 MG/DL (ref 68–131)
HBA1C MFR BLD: 5.2 % (ref 4–5.6)

## 2023-01-17 PROCEDURE — 1101F PT FALLS ASSESS-DOCD LE1/YR: CPT | Mod: HCNC,CPTII,S$GLB, | Performed by: INTERNAL MEDICINE

## 2023-01-17 PROCEDURE — 3288F PR FALLS RISK ASSESSMENT DOCUMENTED: ICD-10-PCS | Mod: HCNC,CPTII,S$GLB, | Performed by: INTERNAL MEDICINE

## 2023-01-17 PROCEDURE — 99999 PR PBB SHADOW E&M-EST. PATIENT-LVL III: ICD-10-PCS | Mod: PBBFAC,HCNC,, | Performed by: INTERNAL MEDICINE

## 2023-01-17 PROCEDURE — 99213 PR OFFICE/OUTPT VISIT, EST, LEVL III, 20-29 MIN: ICD-10-PCS | Mod: HCNC,S$GLB,, | Performed by: INTERNAL MEDICINE

## 2023-01-17 PROCEDURE — 1126F PR PAIN SEVERITY QUANTIFIED, NO PAIN PRESENT: ICD-10-PCS | Mod: HCNC,CPTII,S$GLB, | Performed by: INTERNAL MEDICINE

## 2023-01-17 PROCEDURE — 3079F PR MOST RECENT DIASTOLIC BLOOD PRESSURE 80-89 MM HG: ICD-10-PCS | Mod: HCNC,CPTII,S$GLB, | Performed by: INTERNAL MEDICINE

## 2023-01-17 PROCEDURE — 36415 COLL VENOUS BLD VENIPUNCTURE: CPT | Mod: HCNC | Performed by: INTERNAL MEDICINE

## 2023-01-17 PROCEDURE — 99999 PR PBB SHADOW E&M-EST. PATIENT-LVL III: CPT | Mod: PBBFAC,HCNC,, | Performed by: INTERNAL MEDICINE

## 2023-01-17 PROCEDURE — 99499 UNLISTED E&M SERVICE: CPT | Mod: HCNC,S$GLB,, | Performed by: INTERNAL MEDICINE

## 2023-01-17 PROCEDURE — 83036 HEMOGLOBIN GLYCOSYLATED A1C: CPT | Mod: HCNC | Performed by: INTERNAL MEDICINE

## 2023-01-17 PROCEDURE — 3077F SYST BP >= 140 MM HG: CPT | Mod: HCNC,CPTII,S$GLB, | Performed by: INTERNAL MEDICINE

## 2023-01-17 PROCEDURE — 99213 OFFICE O/P EST LOW 20 MIN: CPT | Mod: HCNC,S$GLB,, | Performed by: INTERNAL MEDICINE

## 2023-01-17 PROCEDURE — 99499 RISK ADDL DX/OHS AUDIT: ICD-10-PCS | Mod: HCNC,S$GLB,, | Performed by: INTERNAL MEDICINE

## 2023-01-17 PROCEDURE — 3288F FALL RISK ASSESSMENT DOCD: CPT | Mod: HCNC,CPTII,S$GLB, | Performed by: INTERNAL MEDICINE

## 2023-01-17 PROCEDURE — 1101F PR PT FALLS ASSESS DOC 0-1 FALLS W/OUT INJ PAST YR: ICD-10-PCS | Mod: HCNC,CPTII,S$GLB, | Performed by: INTERNAL MEDICINE

## 2023-01-17 PROCEDURE — 3079F DIAST BP 80-89 MM HG: CPT | Mod: HCNC,CPTII,S$GLB, | Performed by: INTERNAL MEDICINE

## 2023-01-17 PROCEDURE — 1126F AMNT PAIN NOTED NONE PRSNT: CPT | Mod: HCNC,CPTII,S$GLB, | Performed by: INTERNAL MEDICINE

## 2023-01-17 PROCEDURE — 3077F PR MOST RECENT SYSTOLIC BLOOD PRESSURE >= 140 MM HG: ICD-10-PCS | Mod: HCNC,CPTII,S$GLB, | Performed by: INTERNAL MEDICINE

## 2023-01-17 RX ORDER — CARVEDILOL 12.5 MG/1
12.5 TABLET ORAL 2 TIMES DAILY WITH MEALS
Qty: 180 TABLET | Refills: 3 | Status: SHIPPED | OUTPATIENT
Start: 2023-01-17 | End: 2023-12-06

## 2023-01-17 RX ORDER — AMLODIPINE BESYLATE 10 MG/1
TABLET ORAL
Qty: 90 TABLET | Refills: 3 | Status: SHIPPED | OUTPATIENT
Start: 2023-01-17 | End: 2023-08-07 | Stop reason: SDUPTHER

## 2023-01-17 RX ORDER — CYCLOBENZAPRINE HCL 5 MG
TABLET ORAL
Qty: 20 TABLET | Refills: 2 | Status: SHIPPED | OUTPATIENT
Start: 2023-01-17 | End: 2023-12-18 | Stop reason: SDUPTHER

## 2023-01-17 RX ORDER — SIMVASTATIN 10 MG/1
10 TABLET, FILM COATED ORAL NIGHTLY
Qty: 90 TABLET | Refills: 3 | Status: SHIPPED | OUTPATIENT
Start: 2023-01-17 | End: 2023-08-07 | Stop reason: SDUPTHER

## 2023-01-17 RX ORDER — FOLIC ACID 0.4 MG
800 TABLET ORAL DAILY
Qty: 90 TABLET | Refills: 3 | Status: SHIPPED | OUTPATIENT
Start: 2023-01-17 | End: 2023-01-19 | Stop reason: SDUPTHER

## 2023-01-17 NOTE — PROGRESS NOTES
"Subjective:    Patient ID:  Gabriel Grace is a 82 y.o. 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 She continues to do well except more fatigue but is not exercising "as much as  should".    Lab Results   Component Value Date     01/13/2023    K 4.0 01/13/2023     01/13/2023    CO2 23 01/13/2023    BUN 23 01/13/2023    CREATININE 0.9 01/13/2023     (H) 01/13/2023    HGBA1C 5.4 07/11/2022    MG 2.0 10/06/2008    AST 16 01/13/2023    ALT 11 01/13/2023    ALBUMIN 3.7 01/13/2023    PROT 7.3 01/13/2023    BILITOT 0.5 01/13/2023    WBC 6.17 01/13/2023    HGB 11.4 (L) 01/13/2023    HCT 35.9 (L) 01/13/2023    MCV 99 (H) 01/13/2023     01/13/2023    INR 1.1 02/14/2015    TSH 1.859 02/27/2019         Lab Results   Component Value Date    CHOL 158 01/13/2023    HDL 40 01/13/2023    TRIG 119 01/13/2023       Lab Results   Component Value Date    LDLCALC 94.2 01/13/2023       Past Medical History:   Diagnosis Date    Cataract     Hyperglycemia 10/2/2013    Hyperlipidemia     Hypertension     Migraine headache     Pancreas cyst 1/17/2017    Thyroid disease     hypothyroidism    Ulcerative colitis, unspecified     Ulcerative colitis, unspecified     Visit for screening mammogram 1/12/2016       Current Outpatient Medications:     balsalazide (COLAZAL) 750 mg capsule, Take 2,250 mg by mouth once daily. , Disp: , Rfl:     biotin 2,500 mcg Cap, , Disp: , Rfl:     calcium-magnesium-zinc Tab, Take by mouth. 1  Oral Every day, Disp: , Rfl:     cholecalciferol, vitamin D3, (VITAMIN D3) 25 mcg (1,000 unit) capsule, Take 1,000 Units by mouth once daily., Disp: , Rfl:     FLAXSEED OIL ORAL, Take 1,000 mg by mouth. 1  Oral Every day, Disp: , Rfl:     FLUAD QUAD 2021-22,65Y UP,,PF, 60 mcg (15 mcg x 4)/0.5 mL Syrg, , Disp: , Rfl:     " ketoconazole (NIZORAL) 2 % cream, AAA qhs under breasts after cool blow dry, Disp: 60 g, Rfl: 3    multivitamin (THERAGRAN) per tablet, Take by mouth. 1  By mouth Every day, Disp: , Rfl:     omega-3 fatty acids-vitamin E 1,000 mg Cap, , Disp: , Rfl:     omeprazole (PRILOSEC) 20 MG capsule, Take 1 capsule (20 mg total) by mouth once daily., Disp: 90 capsule, Rfl: 3    sulfamethoxazole-trimethoprim 800-160mg (BACTRIM DS) 800-160 mg Tab, Take 1 tablet by mouth 2 (two) times daily., Disp: 14 tablet, Rfl: 0    triamcinolone acetonide 0.1% (KENALOG) 0.1 % cream, aaa on legs and arms qd- bid after moisturizing; not more than 2 weeks straight in the same location, Disp: 60 g, Rfl: 3    amLODIPine (NORVASC) 10 MG tablet, TAKE 1 TABLET EVERY DAY  .  STOP OLD RX, Disp: 90 tablet, Rfl: 3    carvediloL (COREG) 12.5 MG tablet, Take 1 tablet (12.5 mg total) by mouth 2 (two) times daily with meals., Disp: 180 tablet, Rfl: 3    cyanocobalamin, vitamin B-12, 50 mcg tablet, Take 1 tablet (50 mcg total) by mouth once daily., Disp: 90 tablet, Rfl: 3    cyclobenzaprine (FLEXERIL) 5 MG tablet, TAKE 1 TABLET THREE TIMES DAILY AS NEEDED FOR MUSCLE SPASM(S), Disp: 20 tablet, Rfl: 2    folic acid (FOLVITE) 400 MCG tablet, Take 2 tablets (800 mcg total) by mouth once daily. 4 Tablet Oral Every day, Disp: 90 tablet, Rfl: 3    simvastatin (ZOCOR) 10 MG tablet, Take 1 tablet (10 mg total) by mouth every evening., Disp: 90 tablet, Rfl: 3          Review of Systems   Constitutional: Negative for decreased appetite, diaphoresis, fever, malaise/fatigue, weight gain and weight loss.   HENT:  Negative for congestion, ear discharge, ear pain and nosebleeds.    Eyes:  Negative for blurred vision, double vision and visual disturbance.   Cardiovascular:  Negative for chest pain, claudication, cyanosis, dyspnea on exertion, irregular heartbeat, leg swelling, near-syncope, orthopnea, palpitations, paroxysmal nocturnal dyspnea and syncope.   Respiratory:   "Negative for cough, hemoptysis, shortness of breath, sleep disturbances due to breathing, snoring, sputum production and wheezing.    Endocrine: Negative for polydipsia, polyphagia and polyuria.   Hematologic/Lymphatic: Negative for adenopathy and bleeding problem. Does not bruise/bleed easily.   Skin:  Negative for color change, nail changes, poor wound healing and rash.   Musculoskeletal:  Negative for muscle cramps and muscle weakness.   Gastrointestinal:  Negative for abdominal pain, anorexia, change in bowel habit, hematochezia, nausea and vomiting.   Genitourinary:  Negative for dysuria, frequency and hematuria.   Neurological:  Positive for disturbances in coordination (mild) and light-headedness. Negative for brief paralysis, difficulty with concentration, excessive daytime sleepiness, dizziness, focal weakness, headaches, seizures, vertigo and weakness.   Psychiatric/Behavioral:  Negative for altered mental status and depression.    Allergic/Immunologic: Negative for persistent infections.      Objective:BP (!) 157/83   Pulse 85   Ht 5' 8" (1.727 m)   Wt 93.8 kg (206 lb 12.7 oz)   LMP  (LMP Unknown)   SpO2 95%   BMI 31.44 kg/m²             Physical Exam  Constitutional:       Appearance: She is well-developed. She is obese.   HENT:      Head: Normocephalic.      Right Ear: External ear normal.      Left Ear: External ear normal.      Nose: Nose normal.   Eyes:      General: No scleral icterus.     Conjunctiva/sclera: Conjunctivae normal.      Pupils: Pupils are equal, round, and reactive to light.   Neck:      Thyroid: No thyromegaly.      Vascular: No JVD.      Trachea: No tracheal deviation.   Cardiovascular:      Rate and Rhythm: Normal rate and regular rhythm.      Pulses: Intact distal pulses.           Carotid pulses are 2+ on the right side and 2+ on the left side.       Dorsalis pedis pulses are 2+ on the right side and 2+ on the left side.      Heart sounds: Normal heart sounds. No murmur " heard.    No friction rub. No gallop.      Comments: No edema  Pulmonary:      Effort: Pulmonary effort is normal. No respiratory distress.      Breath sounds: Normal breath sounds. No wheezing or rales.          Comments: Few crackles  Chest:      Chest wall: No tenderness.   Abdominal:      General: Bowel sounds are normal. There is no distension.      Palpations: Abdomen is soft.      Tenderness: There is no abdominal tenderness. There is no guarding.   Musculoskeletal:         General: No tenderness. Normal range of motion.      Cervical back: Normal range of motion.   Lymphadenopathy:      Comments: Palpation of lymph nodes of neck and groin normal   Skin:     General: Skin is warm and dry.      Coloration: Skin is not pale.      Findings: No erythema or rash.      Comments: Palpation of skin normal   Neurological:      Mental Status: She is alert and oriented to person, place, and time.      Cranial Nerves: No cranial nerve deficit.      Motor: No abnormal muscle tone.      Coordination: Coordination normal.   Psychiatric:         Behavior: Behavior normal.         Thought Content: Thought content normal.         Judgment: Judgment normal.       Assessment:       1. Primary hypertension    2. Pulmonary embolism, unspecified chronicity, unspecified pulmonary embolism type, unspecified whether acute cor pulmonale present    3. Atrial enlargement, bilateral    4. Pure hypercholesterolemia    5. Atherosclerosis of aorta    6. Aortic atherosclerosis    7. Non morbid obesity due to excess calories    8. Hyperglycemia    9. Crohn's disease of small intestine without complication    10. Muscle spasm    11. Essential hypertension         Plan:       Diagnoses and all orders for this visit:    Primary hypertension  -     CBC Auto Differential; Future; Expected date: 07/19/2023  -     Comprehensive Metabolic Panel; Future; Expected date: 07/19/2023    Pulmonary embolism, unspecified chronicity, unspecified pulmonary  embolism type, unspecified whether acute cor pulmonale present    Atrial enlargement, bilateral    Pure hypercholesterolemia  -     simvastatin (ZOCOR) 10 MG tablet; Take 1 tablet (10 mg total) by mouth every evening.  -     Lipid Panel; Future; Expected date: 07/19/2023    Atherosclerosis of aorta    Aortic atherosclerosis    Non morbid obesity due to excess calories    Hyperglycemia  -     HEMOGLOBIN A1C; Future; Expected date: 01/17/2023    Crohn's disease of small intestine without complication  -     folic acid (FOLVITE) 400 MCG tablet; Take 2 tablets (800 mcg total) by mouth once daily. 4 Tablet Oral Every day  -     cyanocobalamin, vitamin B-12, 50 mcg tablet; Take 1 tablet (50 mcg total) by mouth once daily.    Muscle spasm  -     cyclobenzaprine (FLEXERIL) 5 MG tablet; TAKE 1 TABLET THREE TIMES DAILY AS NEEDED FOR MUSCLE SPASM(S)    Essential hypertension  -     amLODIPine (NORVASC) 10 MG tablet; TAKE 1 TABLET EVERY DAY  .  STOP OLD RX  -     carvediloL (COREG) 12.5 MG tablet; Take 1 tablet (12.5 mg total) by mouth 2 (two) times daily with meals.

## 2023-01-19 DIAGNOSIS — K50.00 CROHN'S DISEASE OF SMALL INTESTINE WITHOUT COMPLICATION: ICD-10-CM

## 2023-01-19 RX ORDER — FOLIC ACID 0.4 MG
800 TABLET ORAL DAILY
Qty: 180 TABLET | Refills: 3 | Status: SHIPPED | OUTPATIENT
Start: 2023-01-19

## 2023-05-02 ENCOUNTER — IMMUNIZATION (OUTPATIENT)
Dept: INTERNAL MEDICINE | Facility: CLINIC | Age: 83
End: 2023-05-02
Payer: MEDICARE

## 2023-05-02 DIAGNOSIS — Z23 NEED FOR VACCINATION: Primary | ICD-10-CM

## 2023-05-02 PROCEDURE — 0124A COVID-19, MRNA, LNP-S, BIVALENT BOOSTER, PF, 30 MCG/0.3 ML DOSE: CPT | Mod: HCNC,CV19,PBBFAC | Performed by: INTERNAL MEDICINE

## 2023-05-02 PROCEDURE — 91312 COVID-19, MRNA, LNP-S, BIVALENT BOOSTER, PF, 30 MCG/0.3 ML DOSE: CPT | Mod: HCNC,S$GLB,, | Performed by: INTERNAL MEDICINE

## 2023-05-02 PROCEDURE — 91312 COVID-19, MRNA, LNP-S, BIVALENT BOOSTER, PF, 30 MCG/0.3 ML DOSE: ICD-10-PCS | Mod: HCNC,S$GLB,, | Performed by: INTERNAL MEDICINE

## 2023-05-11 ENCOUNTER — OFFICE VISIT (OUTPATIENT)
Dept: OPTOMETRY | Facility: CLINIC | Age: 83
End: 2023-05-11
Payer: COMMERCIAL

## 2023-05-11 DIAGNOSIS — H52.4 PRESBYOPIA OF BOTH EYES: ICD-10-CM

## 2023-05-11 DIAGNOSIS — H52.203 ASTIGMATISM OF BOTH EYES, UNSPECIFIED TYPE: ICD-10-CM

## 2023-05-11 DIAGNOSIS — H25.13 NUCLEAR SCLEROSIS, BILATERAL: Primary | ICD-10-CM

## 2023-05-11 DIAGNOSIS — H35.431 COBBLESTONE RETINAL DEGENERATION, RIGHT: ICD-10-CM

## 2023-05-11 DIAGNOSIS — H26.9 CORTICAL CATARACT OF BOTH EYES: ICD-10-CM

## 2023-05-11 PROCEDURE — 92015 DETERMINE REFRACTIVE STATE: CPT | Mod: S$GLB,,, | Performed by: OPTOMETRIST

## 2023-05-11 PROCEDURE — 99999 PR PBB SHADOW E&M-EST. PATIENT-LVL III: CPT | Mod: PBBFAC,,, | Performed by: OPTOMETRIST

## 2023-05-11 PROCEDURE — 92015 PR REFRACTION: ICD-10-PCS | Mod: S$GLB,,, | Performed by: OPTOMETRIST

## 2023-05-11 PROCEDURE — 92014 PR EYE EXAM, EST PATIENT,COMPREHESV: ICD-10-PCS | Mod: S$GLB,,, | Performed by: OPTOMETRIST

## 2023-05-11 PROCEDURE — 92014 COMPRE OPH EXAM EST PT 1/>: CPT | Mod: S$GLB,,, | Performed by: OPTOMETRIST

## 2023-05-11 PROCEDURE — 99999 PR PBB SHADOW E&M-EST. PATIENT-LVL III: ICD-10-PCS | Mod: PBBFAC,,, | Performed by: OPTOMETRIST

## 2023-05-11 NOTE — PROGRESS NOTES
"HPI    Patient's age: 83 y.o. WF  Occupation: retired RN  Approximate date of last eye examination: 12/07/2022  Name of last eye doctor seen:    City/State: [facility]  Wears glasses? Yes      If yes, wears  Full-time or part-time?  Full time  Approximate age of present glasses:  5 years      Any problem with VA with glasses?  States she feeld she needs an updated   glasses rx   Wears CLs?:  no  Headaches?  no  Eye pain/discomfort? No                                                                          Flashes?  no  Floaters?  No   Diplopia/Double vision?  no  Patient's Ocular History:         Any eye surgeries? no         Any eye injury?  no         Any treatment for eye disease?  no  Family history of eye disease?  no  Significant patient medical history:         1. Diabetes?  no       If yes, IDDM or NIDDM? n/a   2. HBP?  Yes controlled with med              3. Other (describe): High cholesterol, ulcerative colitis     ! OTC eyedrops currently using:  no   ! Prescription eye meds currently using:  ointment at home   ! Any history of allergy/adverse reaction to any eye meds used   previously?  no   ! Any history of allergy/adverse reaction to eyedrops used during prior   eye exam(s)? no   ! Any history of allergy/adverse reaction to Novacaine or similar meds?   no   ! Any history of allergy/adverse reaction to Epinephrine or similar meds?   no    ! Patient okay with use of anesthetic eyedrops to check eye pressure?    yes       ! Patient okay with use of eyedrops to dilate pupils today?  yes   !  Allergies/Medications/Medical History/Family History reviewed today?    yes      PD =  68/65  Desired reading distance =  19"                                                               Last edited by Alberto James MA on 5/11/2023 10:59 AM.            Assessment /Plan     For exam results, see Encounter Report.    1. Nuclear sclerosis, bilateral        2. Cortical cataract of both eyes        3. Cobblestone " retinal degeneration, right        4. Astigmatism of both eyes, unspecified type        5. Presbyopia of both eyes                       S/P surgery to upper eyelids. (levator resection).  Nuclear sclerosis of lens of both eyes, and early cortical cataract in both eyes.  Still no need for cataract surgery in either eye, based on the best-corrected VA achieved today with refraction.     Prior diagnosis of posterior vitreous detachment in the right with Multani ring floater.  Floater noted on dilated fundus examination.   No evidence of secondary retinal tear/hole/break.     Peripheral cobblestone retinal degeneration inferiorly in the right eye, as noted previously, but no evidence of retinal tear/hole/break otherwise.     Astigmatic refractive error in each eye, greater in the left eye than in the right eye.  Satisfactory best-corrected VA in each eye.  Presbyopia consistent with age  New spectacle lens Rx issued for use as desired.  Recommend full-time wear.     Recheck in one year - or prior if any problems noted in the interim

## 2023-05-30 ENCOUNTER — PES CALL (OUTPATIENT)
Dept: ADMINISTRATIVE | Facility: CLINIC | Age: 83
End: 2023-05-30
Payer: MEDICARE

## 2023-06-26 ENCOUNTER — PATIENT OUTREACH (OUTPATIENT)
Dept: ADMINISTRATIVE | Facility: HOSPITAL | Age: 83
End: 2023-06-26
Payer: MEDICARE

## 2023-06-26 NOTE — PROGRESS NOTES
Health Maintenance Due   Topic Date Due    TETANUS VACCINE  Never done    Shingles Vaccine (1 of 2) Never done        No

## 2023-07-19 ENCOUNTER — LAB VISIT (OUTPATIENT)
Dept: LAB | Facility: HOSPITAL | Age: 83
End: 2023-07-19
Attending: INTERNAL MEDICINE
Payer: MEDICARE

## 2023-07-19 ENCOUNTER — TELEPHONE (OUTPATIENT)
Dept: CARDIOLOGY | Facility: CLINIC | Age: 83
End: 2023-07-19
Payer: MEDICARE

## 2023-07-19 DIAGNOSIS — I10 PRIMARY HYPERTENSION: ICD-10-CM

## 2023-07-19 DIAGNOSIS — E78.00 PURE HYPERCHOLESTEROLEMIA: ICD-10-CM

## 2023-07-19 LAB
ALBUMIN SERPL BCP-MCNC: 3.8 G/DL (ref 3.5–5.2)
ALP SERPL-CCNC: 70 U/L (ref 55–135)
ALT SERPL W/O P-5'-P-CCNC: 9 U/L (ref 10–44)
ANION GAP SERPL CALC-SCNC: 11 MMOL/L (ref 8–16)
AST SERPL-CCNC: 16 U/L (ref 10–40)
BASOPHILS # BLD AUTO: 0.02 K/UL (ref 0–0.2)
BASOPHILS NFR BLD: 0.3 % (ref 0–1.9)
BILIRUB SERPL-MCNC: 0.4 MG/DL (ref 0.1–1)
BUN SERPL-MCNC: 25 MG/DL (ref 8–23)
CALCIUM SERPL-MCNC: 10.4 MG/DL (ref 8.7–10.5)
CHLORIDE SERPL-SCNC: 109 MMOL/L (ref 95–110)
CHOLEST SERPL-MCNC: 160 MG/DL (ref 120–199)
CHOLEST/HDLC SERPL: 3.7 {RATIO} (ref 2–5)
CO2 SERPL-SCNC: 23 MMOL/L (ref 23–29)
CREAT SERPL-MCNC: 0.8 MG/DL (ref 0.5–1.4)
DIFFERENTIAL METHOD: ABNORMAL
EOSINOPHIL # BLD AUTO: 0.2 K/UL (ref 0–0.5)
EOSINOPHIL NFR BLD: 4.1 % (ref 0–8)
ERYTHROCYTE [DISTWIDTH] IN BLOOD BY AUTOMATED COUNT: 13.5 % (ref 11.5–14.5)
EST. GFR  (NO RACE VARIABLE): >60 ML/MIN/1.73 M^2
GLUCOSE SERPL-MCNC: 134 MG/DL (ref 70–110)
HCT VFR BLD AUTO: 37.2 % (ref 37–48.5)
HDLC SERPL-MCNC: 43 MG/DL (ref 40–75)
HDLC SERPL: 26.9 % (ref 20–50)
HGB BLD-MCNC: 12.2 G/DL (ref 12–16)
IMM GRANULOCYTES # BLD AUTO: 0.02 K/UL (ref 0–0.04)
IMM GRANULOCYTES NFR BLD AUTO: 0.3 % (ref 0–0.5)
LDLC SERPL CALC-MCNC: 96.8 MG/DL (ref 63–159)
LYMPHOCYTES # BLD AUTO: 1.3 K/UL (ref 1–4.8)
LYMPHOCYTES NFR BLD: 22.7 % (ref 18–48)
MCH RBC QN AUTO: 32.7 PG (ref 27–31)
MCHC RBC AUTO-ENTMCNC: 32.8 G/DL (ref 32–36)
MCV RBC AUTO: 100 FL (ref 82–98)
MONOCYTES # BLD AUTO: 0.4 K/UL (ref 0.3–1)
MONOCYTES NFR BLD: 7 % (ref 4–15)
NEUTROPHILS # BLD AUTO: 3.8 K/UL (ref 1.8–7.7)
NEUTROPHILS NFR BLD: 65.6 % (ref 38–73)
NONHDLC SERPL-MCNC: 117 MG/DL
NRBC BLD-RTO: 0 /100 WBC
PLATELET # BLD AUTO: 208 K/UL (ref 150–450)
PMV BLD AUTO: 10.8 FL (ref 9.2–12.9)
POTASSIUM SERPL-SCNC: 4.4 MMOL/L (ref 3.5–5.1)
PROT SERPL-MCNC: 7.1 G/DL (ref 6–8.4)
RBC # BLD AUTO: 3.73 M/UL (ref 4–5.4)
SODIUM SERPL-SCNC: 143 MMOL/L (ref 136–145)
TRIGL SERPL-MCNC: 101 MG/DL (ref 30–150)
WBC # BLD AUTO: 5.82 K/UL (ref 3.9–12.7)

## 2023-07-19 PROCEDURE — 85025 COMPLETE CBC W/AUTO DIFF WBC: CPT | Mod: HCNC | Performed by: INTERNAL MEDICINE

## 2023-07-19 PROCEDURE — 80061 LIPID PANEL: CPT | Mod: HCNC | Performed by: INTERNAL MEDICINE

## 2023-07-19 PROCEDURE — 36415 COLL VENOUS BLD VENIPUNCTURE: CPT | Mod: HCNC | Performed by: INTERNAL MEDICINE

## 2023-07-19 PROCEDURE — 80053 COMPREHEN METABOLIC PANEL: CPT | Mod: HCNC | Performed by: INTERNAL MEDICINE

## 2023-08-07 ENCOUNTER — OFFICE VISIT (OUTPATIENT)
Dept: CARDIOLOGY | Facility: CLINIC | Age: 83
End: 2023-08-07
Payer: MEDICARE

## 2023-08-07 VITALS
HEART RATE: 88 BPM | WEIGHT: 203.25 LBS | DIASTOLIC BLOOD PRESSURE: 69 MMHG | BODY MASS INDEX: 30.8 KG/M2 | SYSTOLIC BLOOD PRESSURE: 141 MMHG | HEIGHT: 68 IN | OXYGEN SATURATION: 95 %

## 2023-08-07 DIAGNOSIS — K51.00 ULCERATIVE PANCOLITIS WITHOUT COMPLICATION: ICD-10-CM

## 2023-08-07 DIAGNOSIS — E78.00 PURE HYPERCHOLESTEROLEMIA: ICD-10-CM

## 2023-08-07 DIAGNOSIS — I70.0 ATHEROSCLEROSIS OF AORTA: ICD-10-CM

## 2023-08-07 DIAGNOSIS — I10 ESSENTIAL HYPERTENSION: ICD-10-CM

## 2023-08-07 DIAGNOSIS — I10 PRIMARY HYPERTENSION: ICD-10-CM

## 2023-08-07 DIAGNOSIS — R73.9 HYPERGLYCEMIA: ICD-10-CM

## 2023-08-07 DIAGNOSIS — K50.00 CROHN'S DISEASE OF SMALL INTESTINE WITHOUT COMPLICATION: ICD-10-CM

## 2023-08-07 DIAGNOSIS — I70.0 AORTIC ATHEROSCLEROSIS: Primary | ICD-10-CM

## 2023-08-07 DIAGNOSIS — I51.7 ATRIAL ENLARGEMENT, BILATERAL: ICD-10-CM

## 2023-08-07 PROCEDURE — 3078F DIAST BP <80 MM HG: CPT | Mod: HCNC,CPTII,S$GLB, | Performed by: INTERNAL MEDICINE

## 2023-08-07 PROCEDURE — 99213 PR OFFICE/OUTPT VISIT, EST, LEVL III, 20-29 MIN: ICD-10-PCS | Mod: HCNC,S$GLB,, | Performed by: INTERNAL MEDICINE

## 2023-08-07 PROCEDURE — 1159F MED LIST DOCD IN RCRD: CPT | Mod: HCNC,CPTII,S$GLB, | Performed by: INTERNAL MEDICINE

## 2023-08-07 PROCEDURE — 1126F PR PAIN SEVERITY QUANTIFIED, NO PAIN PRESENT: ICD-10-PCS | Mod: HCNC,CPTII,S$GLB, | Performed by: INTERNAL MEDICINE

## 2023-08-07 PROCEDURE — 1126F AMNT PAIN NOTED NONE PRSNT: CPT | Mod: HCNC,CPTII,S$GLB, | Performed by: INTERNAL MEDICINE

## 2023-08-07 PROCEDURE — 3288F PR FALLS RISK ASSESSMENT DOCUMENTED: ICD-10-PCS | Mod: HCNC,CPTII,S$GLB, | Performed by: INTERNAL MEDICINE

## 2023-08-07 PROCEDURE — 3077F SYST BP >= 140 MM HG: CPT | Mod: HCNC,CPTII,S$GLB, | Performed by: INTERNAL MEDICINE

## 2023-08-07 PROCEDURE — 1159F PR MEDICATION LIST DOCUMENTED IN MEDICAL RECORD: ICD-10-PCS | Mod: HCNC,CPTII,S$GLB, | Performed by: INTERNAL MEDICINE

## 2023-08-07 PROCEDURE — 1101F PR PT FALLS ASSESS DOC 0-1 FALLS W/OUT INJ PAST YR: ICD-10-PCS | Mod: HCNC,CPTII,S$GLB, | Performed by: INTERNAL MEDICINE

## 2023-08-07 PROCEDURE — 3288F FALL RISK ASSESSMENT DOCD: CPT | Mod: HCNC,CPTII,S$GLB, | Performed by: INTERNAL MEDICINE

## 2023-08-07 PROCEDURE — 99213 OFFICE O/P EST LOW 20 MIN: CPT | Mod: HCNC,S$GLB,, | Performed by: INTERNAL MEDICINE

## 2023-08-07 PROCEDURE — 3078F PR MOST RECENT DIASTOLIC BLOOD PRESSURE < 80 MM HG: ICD-10-PCS | Mod: HCNC,CPTII,S$GLB, | Performed by: INTERNAL MEDICINE

## 2023-08-07 PROCEDURE — 99999 PR PBB SHADOW E&M-EST. PATIENT-LVL III: ICD-10-PCS | Mod: PBBFAC,HCNC,, | Performed by: INTERNAL MEDICINE

## 2023-08-07 PROCEDURE — 99999 PR PBB SHADOW E&M-EST. PATIENT-LVL III: CPT | Mod: PBBFAC,HCNC,, | Performed by: INTERNAL MEDICINE

## 2023-08-07 PROCEDURE — 3077F PR MOST RECENT SYSTOLIC BLOOD PRESSURE >= 140 MM HG: ICD-10-PCS | Mod: HCNC,CPTII,S$GLB, | Performed by: INTERNAL MEDICINE

## 2023-08-07 PROCEDURE — 1101F PT FALLS ASSESS-DOCD LE1/YR: CPT | Mod: HCNC,CPTII,S$GLB, | Performed by: INTERNAL MEDICINE

## 2023-08-07 RX ORDER — SIMVASTATIN 10 MG/1
10 TABLET, FILM COATED ORAL NIGHTLY
Qty: 90 TABLET | Refills: 3 | Status: SHIPPED | OUTPATIENT
Start: 2023-08-07

## 2023-08-07 RX ORDER — SIMVASTATIN 10 MG/1
10 TABLET, FILM COATED ORAL NIGHTLY
Qty: 90 TABLET | Refills: 3 | Status: SHIPPED | OUTPATIENT
Start: 2023-08-07 | End: 2023-08-07 | Stop reason: SDUPTHER

## 2023-08-07 RX ORDER — AMLODIPINE BESYLATE 10 MG/1
TABLET ORAL
Qty: 90 TABLET | Refills: 3 | Status: SHIPPED | OUTPATIENT
Start: 2023-08-07

## 2023-08-07 RX ORDER — OMEPRAZOLE 20 MG/1
20 CAPSULE, DELAYED RELEASE ORAL DAILY
Qty: 90 CAPSULE | Refills: 3 | Status: SHIPPED | OUTPATIENT
Start: 2023-08-07

## 2023-08-07 NOTE — PROGRESS NOTES
Subjective:    Patient ID:  Gabriel Grace is a 83 y.o. 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 She continues to do well and remains active, She has no chest pain , SALGADO or edema  Lab Results   Component Value Date     07/19/2023    K 4.4 07/19/2023     07/19/2023    CO2 23 07/19/2023    BUN 25 (H) 07/19/2023    CREATININE 0.8 07/19/2023     (H) 07/19/2023    HGBA1C 5.2 01/17/2023    MG 2.0 10/06/2008    AST 16 07/19/2023    ALT 9 (L) 07/19/2023    ALBUMIN 3.8 07/19/2023    PROT 7.1 07/19/2023    BILITOT 0.4 07/19/2023    WBC 5.82 07/19/2023    HGB 12.2 07/19/2023    HCT 37.2 07/19/2023     (H) 07/19/2023     07/19/2023    INR 1.1 02/14/2015    TSH 1.859 02/27/2019         Lab Results   Component Value Date    CHOL 160 07/19/2023    HDL 43 07/19/2023    TRIG 101 07/19/2023       Lab Results   Component Value Date    LDLCALC 96.8 07/19/2023       Past Medical History:   Diagnosis Date    Cataract     Hyperglycemia 10/2/2013    Hyperlipidemia     Hypertension     Migraine headache     Pancreas cyst 1/17/2017    Thyroid disease     hypothyroidism    Ulcerative colitis, unspecified     Ulcerative colitis, unspecified     Visit for screening mammogram 1/12/2016       Current Outpatient Medications:     amLODIPine (NORVASC) 10 MG tablet, TAKE 1 TABLET EVERY DAY  .  STOP OLD RX, Disp: 90 tablet, Rfl: 3    balsalazide (COLAZAL) 750 mg capsule, Take 2,250 mg by mouth once daily. , Disp: , Rfl:     biotin 2,500 mcg Cap, , Disp: , Rfl:     calcium-magnesium-zinc Tab, Take by mouth. 1  Oral Every day, Disp: , Rfl:     carvediloL (COREG) 12.5 MG tablet, Take 1 tablet (12.5 mg total) by mouth 2 (two) times daily with meals., Disp: 180 tablet, Rfl: 3    cholecalciferol, vitamin D3, (VITAMIN D3) 25 mcg  (1,000 unit) capsule, Take 1,000 Units by mouth once daily., Disp: , Rfl:     cyanocobalamin, vitamin B-12, 50 mcg tablet, Take 1 tablet (50 mcg total) by mouth once daily., Disp: 90 tablet, Rfl: 3    cyclobenzaprine (FLEXERIL) 5 MG tablet, TAKE 1 TABLET THREE TIMES DAILY AS NEEDED FOR MUSCLE SPASM(S), Disp: 20 tablet, Rfl: 2    FLAXSEED OIL ORAL, Take 1,000 mg by mouth. 1  Oral Every day, Disp: , Rfl:     FLUAD QUAD 2021-22,65Y UP,,PF, 60 mcg (15 mcg x 4)/0.5 mL Syrg, , Disp: , Rfl:     folic acid (FOLVITE) 400 MCG tablet, Take 2 tablets (800 mcg total) by mouth once daily., Disp: 180 tablet, Rfl: 3    ketoconazole (NIZORAL) 2 % cream, AAA qhs under breasts after cool blow dry, Disp: 60 g, Rfl: 3    multivitamin (THERAGRAN) per tablet, Take by mouth. 1  By mouth Every day, Disp: , Rfl:     omega-3 fatty acids-vitamin E 1,000 mg Cap, , Disp: , Rfl:     omeprazole (PRILOSEC) 20 MG capsule, Take 1 capsule (20 mg total) by mouth once daily., Disp: 90 capsule, Rfl: 3    simvastatin (ZOCOR) 10 MG tablet, Take 1 tablet (10 mg total) by mouth every evening., Disp: 90 tablet, Rfl: 3    sulfamethoxazole-trimethoprim 800-160mg (BACTRIM DS) 800-160 mg Tab, Take 1 tablet by mouth 2 (two) times daily., Disp: 14 tablet, Rfl: 0    triamcinolone acetonide 0.1% (KENALOG) 0.1 % cream, aaa on legs and arms qd- bid after moisturizing; not more than 2 weeks straight in the same location, Disp: 60 g, Rfl: 3          Review of Systems   Constitutional: Negative for decreased appetite, diaphoresis, fever, malaise/fatigue, weight gain and weight loss.   HENT:  Negative for congestion, ear discharge, ear pain and nosebleeds.    Eyes:  Negative for blurred vision, double vision and visual disturbance.   Cardiovascular:  Negative for chest pain, claudication, cyanosis, dyspnea on exertion, irregular heartbeat, leg swelling, near-syncope, orthopnea, palpitations, paroxysmal nocturnal dyspnea and syncope.   Respiratory:  Negative for cough,  "hemoptysis, shortness of breath, sleep disturbances due to breathing, snoring, sputum production and wheezing.    Endocrine: Negative for polydipsia, polyphagia and polyuria.   Hematologic/Lymphatic: Negative for adenopathy and bleeding problem. Does not bruise/bleed easily.   Skin:  Negative for color change, nail changes, poor wound healing and rash.   Musculoskeletal:  Negative for muscle cramps and muscle weakness.   Gastrointestinal:  Negative for abdominal pain, anorexia, change in bowel habit, hematochezia, nausea and vomiting.   Genitourinary:  Negative for dysuria, frequency and hematuria.   Neurological:  Negative for brief paralysis, difficulty with concentration, excessive daytime sleepiness, dizziness, focal weakness, headaches, light-headedness, seizures, vertigo and weakness.   Psychiatric/Behavioral:  Negative for altered mental status and depression.    Allergic/Immunologic: Negative for persistent infections.        Objective:BP (!) 141/69   Pulse 88   Ht 5' 8" (1.727 m)   Wt 92.2 kg (203 lb 4.2 oz)   LMP  (LMP Unknown)   SpO2 95%   BMI 30.91 kg/m²             Physical Exam  Constitutional:       Appearance: She is well-developed. She is obese.   HENT:      Head: Normocephalic.      Right Ear: External ear normal.      Left Ear: External ear normal.      Nose: Nose normal.   Eyes:      General: No scleral icterus.     Conjunctiva/sclera: Conjunctivae normal.      Pupils: Pupils are equal, round, and reactive to light.   Neck:      Thyroid: No thyromegaly.      Vascular: No JVD.      Trachea: No tracheal deviation.   Cardiovascular:      Rate and Rhythm: Normal rate and regular rhythm.      Pulses: Intact distal pulses.           Carotid pulses are 2+ on the right side and 2+ on the left side.       Dorsalis pedis pulses are 2+ on the right side and 2+ on the left side.        Posterior tibial pulses are 2+ on the right side and 2+ on the left side.      Heart sounds: Normal heart sounds. No " murmur heard.     No friction rub. No gallop.   Pulmonary:      Effort: Pulmonary effort is normal. No respiratory distress.      Breath sounds: Normal breath sounds. No wheezing or rales.   Chest:      Chest wall: No tenderness.   Abdominal:      General: Bowel sounds are normal. There is no distension.      Palpations: Abdomen is soft.      Tenderness: There is no abdominal tenderness. There is no guarding.   Musculoskeletal:         General: No tenderness. Normal range of motion.      Cervical back: Normal range of motion.   Lymphadenopathy:      Comments: Palpation of lymph nodes of neck and groin normal   Skin:     General: Skin is warm and dry.      Coloration: Skin is not pale.      Findings: No erythema or rash.      Comments: Palpation of skin normal   Neurological:      Mental Status: She is alert and oriented to person, place, and time.      Cranial Nerves: No cranial nerve deficit.      Motor: No abnormal muscle tone.      Coordination: Coordination normal.   Psychiatric:         Behavior: Behavior normal.         Thought Content: Thought content normal.         Judgment: Judgment normal.           Assessment:       1. Pure hypercholesterolemia    2. Primary hypertension    3. Aortic atherosclerosis    4. Atherosclerosis of aorta    5. Atrial enlargement, bilateral    6. Hyperglycemia    7. Crohn's disease of small intestine without complication    8. Ulcerative pancolitis without complication         Plan:       Gabriel was seen today for hyperlipidemia.    Diagnoses and all orders for this visit:    Pure hypercholesterolemia    Primary hypertension    Aortic atherosclerosis    Atherosclerosis of aorta    Atrial enlargement, bilateral    Hyperglycemia    Crohn's disease of small intestine without complication    Ulcerative pancolitis without complication

## 2023-12-05 DIAGNOSIS — M62.838 MUSCLE SPASM: ICD-10-CM

## 2023-12-05 DIAGNOSIS — I10 ESSENTIAL HYPERTENSION: ICD-10-CM

## 2023-12-06 RX ORDER — CARVEDILOL 12.5 MG/1
12.5 TABLET ORAL 2 TIMES DAILY WITH MEALS
Qty: 180 TABLET | Refills: 3 | Status: SHIPPED | OUTPATIENT
Start: 2023-12-06

## 2023-12-06 RX ORDER — CYCLOBENZAPRINE HCL 5 MG
TABLET ORAL
Qty: 20 TABLET | Refills: 3 | OUTPATIENT
Start: 2023-12-06

## 2023-12-18 DIAGNOSIS — M62.838 MUSCLE SPASM: ICD-10-CM

## 2023-12-22 ENCOUNTER — TELEPHONE (OUTPATIENT)
Dept: INTERNAL MEDICINE | Facility: CLINIC | Age: 83
End: 2023-12-22
Payer: MEDICARE

## 2023-12-22 DIAGNOSIS — Z12.31 BREAST CANCER SCREENING BY MAMMOGRAM: Primary | ICD-10-CM

## 2023-12-22 NOTE — TELEPHONE ENCOUNTER
Ordered mammo     Called pt; pt did not answer    Left message for pt informing that mammo has been ordered and is available to be scheduled

## 2023-12-22 NOTE — TELEPHONE ENCOUNTER
----- Message from Roman Manrique sent at 12/22/2023  2:29 PM CST -----  Type:  orders    Who Called: pt  Would the patient rather a call back or a response via [facility]? call  Best Call Back Number:  [phone]  Additional Information: calling to request a 3D mammogram order

## 2023-12-29 RX ORDER — CYCLOBENZAPRINE HCL 5 MG
TABLET ORAL
Qty: 20 TABLET | Refills: 2 | Status: SHIPPED | OUTPATIENT
Start: 2023-12-29

## 2024-01-22 ENCOUNTER — HOSPITAL ENCOUNTER (OUTPATIENT)
Dept: RADIOLOGY | Facility: HOSPITAL | Age: 84
Discharge: HOME | End: 2024-01-22
Attending: INTERNAL MEDICINE
Payer: MEDICARE

## 2024-01-22 DIAGNOSIS — Z12.31 BREAST CANCER SCREENING BY MAMMOGRAM: ICD-10-CM

## 2024-01-22 PROCEDURE — 77067 SCR MAMMO BI INCL CAD: CPT | Mod: 26,HCNC,, | Performed by: RADIOLOGY

## 2024-01-22 PROCEDURE — 77067 SCR MAMMO BI INCL CAD: CPT | Mod: TC,HCNC

## 2024-01-22 PROCEDURE — 77063 BREAST TOMOSYNTHESIS BI: CPT | Mod: 26,HCNC,, | Performed by: RADIOLOGY

## 2024-02-09 ENCOUNTER — PATIENT OUTREACH (OUTPATIENT)
Dept: ADMINISTRATIVE | Facility: HOSPITAL | Age: 84
End: 2024-02-09
Payer: MEDICARE

## 2024-03-18 ENCOUNTER — HOSPITAL ENCOUNTER (OUTPATIENT)
Dept: CARDIOLOGY | Facility: HOSPITAL | Age: 84
Discharge: HOME | End: 2024-03-18
Attending: INTERNAL MEDICINE
Payer: MEDICARE

## 2024-03-18 ENCOUNTER — OFFICE VISIT (OUTPATIENT)
Dept: CARDIOLOGY | Facility: CLINIC | Age: 84
End: 2024-03-18
Payer: MEDICARE

## 2024-03-18 VITALS
HEART RATE: 85 BPM | HEIGHT: 68 IN | WEIGHT: 201.94 LBS | SYSTOLIC BLOOD PRESSURE: 120 MMHG | DIASTOLIC BLOOD PRESSURE: 80 MMHG | BODY MASS INDEX: 30.61 KG/M2 | OXYGEN SATURATION: 98 %

## 2024-03-18 VITALS
WEIGHT: 201 LBS | HEIGHT: 68 IN | BODY MASS INDEX: 30.46 KG/M2 | SYSTOLIC BLOOD PRESSURE: 120 MMHG | DIASTOLIC BLOOD PRESSURE: 80 MMHG | HEART RATE: 78 BPM

## 2024-03-18 DIAGNOSIS — I26.99 PULMONARY EMBOLISM, UNSPECIFIED CHRONICITY, UNSPECIFIED PULMONARY EMBOLISM TYPE, UNSPECIFIED WHETHER ACUTE COR PULMONALE PRESENT: ICD-10-CM

## 2024-03-18 DIAGNOSIS — E66.09 NON MORBID OBESITY DUE TO EXCESS CALORIES: ICD-10-CM

## 2024-03-18 DIAGNOSIS — I51.7 ATRIAL ENLARGEMENT, BILATERAL: ICD-10-CM

## 2024-03-18 DIAGNOSIS — K50.00 CROHN'S DISEASE OF SMALL INTESTINE WITHOUT COMPLICATION: ICD-10-CM

## 2024-03-18 DIAGNOSIS — I10 PRIMARY HYPERTENSION: ICD-10-CM

## 2024-03-18 DIAGNOSIS — E78.00 PURE HYPERCHOLESTEROLEMIA: ICD-10-CM

## 2024-03-18 DIAGNOSIS — R06.09 DOE (DYSPNEA ON EXERTION): Primary | ICD-10-CM

## 2024-03-18 DIAGNOSIS — I70.0 AORTIC ATHEROSCLEROSIS: ICD-10-CM

## 2024-03-18 DIAGNOSIS — I70.0 ATHEROSCLEROSIS OF AORTA: ICD-10-CM

## 2024-03-18 LAB
ASCENDING AORTA: 2.96 CM
AV INDEX (PROSTH): 0.62
AV MEAN GRADIENT: 5 MMHG
AV PEAK GRADIENT: 10 MMHG
AV VALVE AREA BY VELOCITY RATIO: 2.71 CM²
AV VALVE AREA: 2.71 CM²
AV VELOCITY RATIO: 0.63
BSA FOR ECHO PROCEDURE: 2.09 M2
CV ECHO LV RWT: 0.37 CM
DOP CALC AO PEAK VEL: 1.55 M/S
DOP CALC AO VTI: 32.4 CM
DOP CALC LVOT AREA: 4.3 CM2
DOP CALC LVOT DIAMETER: 2.35 CM
DOP CALC LVOT PEAK VEL: 0.97 M/S
DOP CALC LVOT STROKE VOLUME: 87.66 CM3
DOP CALCLVOT PEAK VEL VTI: 20.22 CM
E WAVE DECELERATION TIME: 198.91 MSEC
E/A RATIO: 0.59
E/E' RATIO: 10.43 M/S
ECHO LV POSTERIOR WALL: 0.87 CM (ref 0.6–1.1)
FRACTIONAL SHORTENING: 36 % (ref 28–44)
INTERVENTRICULAR SEPTUM: 1 CM (ref 0.6–1.1)
LA MAJOR: 6.15 CM
LA MINOR: 5.91 CM
LA WIDTH: 4.13 CM
LEFT ATRIUM SIZE: 4.06 CM
LEFT ATRIUM VOLUME INDEX MOD: 35.5 ML/M2
LEFT ATRIUM VOLUME INDEX: 41.9 ML/M2
LEFT ATRIUM VOLUME MOD: 72.69 CM3
LEFT ATRIUM VOLUME: 85.91 CM3
LEFT INTERNAL DIMENSION IN SYSTOLE: 3.07 CM (ref 2.1–4)
LEFT VENTRICLE DIASTOLIC VOLUME INDEX: 51.39 ML/M2
LEFT VENTRICLE DIASTOLIC VOLUME: 105.34 ML
LEFT VENTRICLE MASS INDEX: 75 G/M2
LEFT VENTRICLE SYSTOLIC VOLUME INDEX: 18.1 ML/M2
LEFT VENTRICLE SYSTOLIC VOLUME: 37.12 ML
LEFT VENTRICULAR INTERNAL DIMENSION IN DIASTOLE: 4.76 CM (ref 3.5–6)
LEFT VENTRICULAR MASS: 153.34 G
LV LATERAL E/E' RATIO: 9.13 M/S
LV SEPTAL E/E' RATIO: 12.17 M/S
MV A" WAVE DURATION": 10.85 MSEC
MV PEAK A VEL: 1.23 M/S
MV PEAK E VEL: 0.73 M/S
MV STENOSIS PRESSURE HALF TIME: 57.69 MS
MV VALVE AREA P 1/2 METHOD: 3.81 CM2
PISA TR MAX VEL: 2.54 M/S
PULM VEIN S/D RATIO: 1.8
PV PEAK D VEL: 0.15 M/S
PV PEAK S VEL: 0.27 M/S
RA MAJOR: 5.57 CM
RA PRESSURE ESTIMATED: 3 MMHG
RA WIDTH: 3.53 CM
RIGHT VENTRICULAR END-DIASTOLIC DIMENSION: 4.06 CM
RV TB RVSP: 6 MMHG
SINUS: 3.1 CM
STJ: 2.66 CM
TDI LATERAL: 0.08 M/S
TDI SEPTAL: 0.06 M/S
TDI: 0.07 M/S
TR MAX PG: 26 MMHG
TRICUSPID ANNULAR PLANE SYSTOLIC EXCURSION: 2.34 CM
TV REST PULMONARY ARTERY PRESSURE: 29 MMHG
Z-SCORE OF LEFT VENTRICULAR DIMENSION IN END DIASTOLE: -2.56
Z-SCORE OF LEFT VENTRICULAR DIMENSION IN END SYSTOLE: -1.62

## 2024-03-18 PROCEDURE — 1126F AMNT PAIN NOTED NONE PRSNT: CPT | Mod: HCNC,CPTII,S$GLB, | Performed by: INTERNAL MEDICINE

## 2024-03-18 PROCEDURE — 99214 OFFICE O/P EST MOD 30 MIN: CPT | Mod: HCNC,S$GLB,, | Performed by: INTERNAL MEDICINE

## 2024-03-18 PROCEDURE — 3074F SYST BP LT 130 MM HG: CPT | Mod: HCNC,CPTII,S$GLB, | Performed by: INTERNAL MEDICINE

## 2024-03-18 PROCEDURE — 1159F MED LIST DOCD IN RCRD: CPT | Mod: HCNC,CPTII,S$GLB, | Performed by: INTERNAL MEDICINE

## 2024-03-18 PROCEDURE — 1101F PT FALLS ASSESS-DOCD LE1/YR: CPT | Mod: HCNC,CPTII,S$GLB, | Performed by: INTERNAL MEDICINE

## 2024-03-18 PROCEDURE — 93306 TTE W/DOPPLER COMPLETE: CPT | Mod: HCNC

## 2024-03-18 PROCEDURE — 93306 TTE W/DOPPLER COMPLETE: CPT | Mod: 26,HCNC,, | Performed by: INTERNAL MEDICINE

## 2024-03-18 PROCEDURE — 3288F FALL RISK ASSESSMENT DOCD: CPT | Mod: HCNC,CPTII,S$GLB, | Performed by: INTERNAL MEDICINE

## 2024-03-18 PROCEDURE — 3079F DIAST BP 80-89 MM HG: CPT | Mod: HCNC,CPTII,S$GLB, | Performed by: INTERNAL MEDICINE

## 2024-03-18 PROCEDURE — 99999 PR PBB SHADOW E&M-EST. PATIENT-LVL IV: CPT | Mod: PBBFAC,HCNC,, | Performed by: INTERNAL MEDICINE

## 2024-03-18 NOTE — PROGRESS NOTES
Subjective:    Patient ID:  Gabriel Grace is a 84 y.o. 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 She reports increased SALGADO but no orthopnea or PND. Her weight is stable and no edema  Lab Results   Component Value Date     07/19/2023    K 4.4 07/19/2023     07/19/2023    CO2 23 07/19/2023    BUN 25 (H) 07/19/2023    CREATININE 0.8 07/19/2023     (H) 07/19/2023    HGBA1C 5.2 01/17/2023    MG 2.0 10/06/2008    AST 16 07/19/2023    ALT 9 (L) 07/19/2023    ALBUMIN 3.8 07/19/2023    PROT 7.1 07/19/2023    BILITOT 0.4 07/19/2023    WBC 5.82 07/19/2023    HGB 12.2 07/19/2023    HCT 37.2 07/19/2023     (H) 07/19/2023     07/19/2023    INR 1.1 02/14/2015    TSH 1.859 02/27/2019         Lab Results   Component Value Date    CHOL 160 07/19/2023    HDL 43 07/19/2023    TRIG 101 07/19/2023       Lab Results   Component Value Date    LDLCALC 96.8 07/19/2023       Past Medical History:   Diagnosis Date    Cataract     Hyperglycemia 10/2/2013    Hyperlipidemia     Hypertension     Migraine headache     Pancreas cyst 1/17/2017    Thyroid disease     hypothyroidism    Ulcerative colitis, unspecified     Ulcerative colitis, unspecified     Visit for screening mammogram 1/12/2016       Current Outpatient Medications:     amLODIPine (NORVASC) 10 MG tablet, TAKE 1 TABLET EVERY DAY  .  STOP OLD RX, Disp: 90 tablet, Rfl: 3    balsalazide (COLAZAL) 750 mg capsule, Take 2,250 mg by mouth once daily. , Disp: , Rfl:     biotin 2,500 mcg Cap, , Disp: , Rfl:     calcium-magnesium-zinc Tab, Take by mouth. 1  Oral Every day, Disp: , Rfl:     carvediloL (COREG) 12.5 MG tablet, TAKE 1 TABLET TWICE DAILY WITH MEALS, Disp: 180 tablet, Rfl: 3    cholecalciferol, vitamin D3, (VITAMIN D3) 25 mcg (1,000 unit) capsule, Take 1,000 Units by  mouth once daily., Disp: , Rfl:     cyanocobalamin, vitamin B-12, 50 mcg tablet, Take 1 tablet (50 mcg total) by mouth once daily., Disp: 90 tablet, Rfl: 3    cyclobenzaprine (FLEXERIL) 5 MG tablet, TAKE 1 TABLET THREE TIMES DAILY AS NEEDED FOR MUSCLE SPASM(S), Disp: 20 tablet, Rfl: 2    FLAXSEED OIL ORAL, Take 1,000 mg by mouth. 1  Oral Every day, Disp: , Rfl:     FLUAD QUAD 2021-22,65Y UP,,PF, 60 mcg (15 mcg x 4)/0.5 mL Syrg, , Disp: , Rfl:     folic acid (FOLVITE) 400 MCG tablet, Take 2 tablets (800 mcg total) by mouth once daily., Disp: 180 tablet, Rfl: 3    ketoconazole (NIZORAL) 2 % cream, AAA qhs under breasts after cool blow dry, Disp: 60 g, Rfl: 3    multivitamin (THERAGRAN) per tablet, Take by mouth. 1  By mouth Every day, Disp: , Rfl:     omega-3 fatty acids-vitamin E 1,000 mg Cap, , Disp: , Rfl:     omeprazole (PRILOSEC) 20 MG capsule, Take 1 capsule (20 mg total) by mouth once daily., Disp: 90 capsule, Rfl: 3    simvastatin (ZOCOR) 10 MG tablet, Take 1 tablet (10 mg total) by mouth every evening., Disp: 90 tablet, Rfl: 3    triamcinolone acetonide 0.1% (KENALOG) 0.1 % cream, aaa on legs and arms qd- bid after moisturizing; not more than 2 weeks straight in the same location, Disp: 60 g, Rfl: 3    sulfamethoxazole-trimethoprim 800-160mg (BACTRIM DS) 800-160 mg Tab, Take 1 tablet by mouth 2 (two) times daily. (Patient not taking: Reported on 3/18/2024), Disp: 14 tablet, Rfl: 0          Review of Systems   Constitutional: Positive for malaise/fatigue. Negative for decreased appetite, diaphoresis, fever, weight gain and weight loss.   HENT:  Negative for congestion, ear discharge, ear pain and nosebleeds.    Eyes:  Negative for blurred vision, double vision and visual disturbance.   Cardiovascular:  Positive for dyspnea on exertion. Negative for chest pain, claudication, cyanosis, irregular heartbeat, leg swelling, near-syncope, orthopnea, palpitations, paroxysmal nocturnal dyspnea and syncope.  "  Respiratory:  Positive for shortness of breath. Negative for cough, hemoptysis, sleep disturbances due to breathing, snoring, sputum production and wheezing.    Endocrine: Negative for polydipsia, polyphagia and polyuria.   Hematologic/Lymphatic: Negative for adenopathy and bleeding problem. Does not bruise/bleed easily.   Skin:  Negative for color change, nail changes, poor wound healing and rash.   Musculoskeletal:  Negative for muscle cramps and muscle weakness.   Gastrointestinal:  Negative for abdominal pain, anorexia, change in bowel habit, hematochezia, nausea and vomiting.   Genitourinary:  Negative for dysuria, frequency and hematuria.   Neurological:  Negative for brief paralysis, difficulty with concentration, excessive daytime sleepiness, dizziness, focal weakness, headaches, light-headedness, seizures, vertigo and weakness.   Psychiatric/Behavioral:  Negative for altered mental status and depression.    Allergic/Immunologic: Negative for persistent infections.        Objective:/80   Pulse 85   Ht 5' 8" (1.727 m)   Wt 91.6 kg (201 lb 15.1 oz)   LMP  (LMP Unknown)   SpO2 98%   BMI 30.71 kg/m²             Physical Exam  Constitutional:       Appearance: She is well-developed. She is obese.   HENT:      Head: Normocephalic.      Right Ear: External ear normal.      Left Ear: External ear normal.      Nose: Nose normal.   Eyes:      General: No scleral icterus.     Conjunctiva/sclera: Conjunctivae normal.      Pupils: Pupils are equal, round, and reactive to light.   Neck:      Thyroid: No thyromegaly.      Vascular: No JVD.      Trachea: No tracheal deviation.   Cardiovascular:      Rate and Rhythm: Normal rate and regular rhythm.      Pulses: Intact distal pulses.           Carotid pulses are 2+ on the right side and 2+ on the left side.       Femoral pulses are 2+ on the right side and 2+ on the left side.       Dorsalis pedis pulses are 0 on the right side and 0 on the left side.        " Posterior tibial pulses are 0 on the right side and 0 on the left side.      Heart sounds: Normal heart sounds, S1 normal and S2 normal. No murmur heard.     No friction rub. No gallop.   Pulmonary:      Effort: Pulmonary effort is normal. No respiratory distress.      Breath sounds: Normal breath sounds. No wheezing or rales.          Comments: Few crackles  Chest:      Chest wall: No tenderness.   Abdominal:      General: Bowel sounds are normal. There is no distension.      Palpations: Abdomen is soft.      Tenderness: There is no abdominal tenderness. There is no guarding.   Musculoskeletal:         General: No tenderness. Normal range of motion.      Cervical back: Normal range of motion.   Lymphadenopathy:      Comments: Palpation of lymph nodes of neck and groin normal   Skin:     General: Skin is warm and dry.      Coloration: Skin is not pale.      Findings: No erythema or rash.      Comments: No ankle nor pretibial edema   Neurological:      Mental Status: She is alert and oriented to person, place, and time.      Cranial Nerves: No cranial nerve deficit.      Motor: No abnormal muscle tone.      Coordination: Coordination normal.   Psychiatric:         Behavior: Behavior normal.         Thought Content: Thought content normal.         Judgment: Judgment normal.           Assessment:       1. SALGADO (dyspnea on exertion)    2. Pulmonary embolism, unspecified chronicity, unspecified pulmonary embolism type, unspecified whether acute cor pulmonale present    3. Pure hypercholesterolemia    4. Primary hypertension    5. Aortic atherosclerosis    6. Atherosclerosis of aorta    7. Atrial enlargement, bilateral    8. Non morbid obesity due to excess calories    9. Crohn's disease of small intestine without complication         Plan:       Gabriel was seen today for hypertension.    Diagnoses and all orders for this visit:    SALGADO (dyspnea on exertion)    Pulmonary embolism, unspecified chronicity, unspecified pulmonary  embolism type, unspecified whether acute cor pulmonale present    Pure hypercholesterolemia    Primary hypertension    Aortic atherosclerosis    Atherosclerosis of aorta    Atrial enlargement, bilateral    Non morbid obesity due to excess calories    Crohn's disease of small intestine without complication

## 2024-03-19 ENCOUNTER — LAB VISIT (OUTPATIENT)
Dept: LAB | Facility: HOSPITAL | Age: 84
End: 2024-03-19
Attending: INTERNAL MEDICINE
Payer: MEDICARE

## 2024-03-19 ENCOUNTER — TELEPHONE (OUTPATIENT)
Dept: CARDIOLOGY | Facility: CLINIC | Age: 84
End: 2024-03-19
Payer: MEDICARE

## 2024-03-19 DIAGNOSIS — I10 PRIMARY HYPERTENSION: ICD-10-CM

## 2024-03-19 DIAGNOSIS — E78.00 PURE HYPERCHOLESTEROLEMIA: ICD-10-CM

## 2024-03-19 LAB
ALBUMIN SERPL BCP-MCNC: 4 G/DL (ref 3.5–5.2)
ALP SERPL-CCNC: 65 U/L (ref 55–135)
ALT SERPL W/O P-5'-P-CCNC: 12 U/L (ref 10–44)
ANION GAP SERPL CALC-SCNC: 9 MMOL/L (ref 8–16)
AST SERPL-CCNC: 15 U/L (ref 10–40)
BILIRUB SERPL-MCNC: 0.5 MG/DL (ref 0.1–1)
BUN SERPL-MCNC: 28 MG/DL (ref 8–23)
CALCIUM SERPL-MCNC: 11.4 MG/DL (ref 8.7–10.5)
CHLORIDE SERPL-SCNC: 111 MMOL/L (ref 95–110)
CHOLEST SERPL-MCNC: 159 MG/DL (ref 120–199)
CHOLEST/HDLC SERPL: 3.8 {RATIO} (ref 2–5)
CO2 SERPL-SCNC: 23 MMOL/L (ref 23–29)
CREAT SERPL-MCNC: 0.9 MG/DL (ref 0.5–1.4)
EST. GFR  (NO RACE VARIABLE): >60 ML/MIN/1.73 M^2
GLUCOSE SERPL-MCNC: 139 MG/DL (ref 70–110)
HDLC SERPL-MCNC: 42 MG/DL (ref 40–75)
HDLC SERPL: 26.4 % (ref 20–50)
LDLC SERPL CALC-MCNC: 96.4 MG/DL (ref 63–159)
NONHDLC SERPL-MCNC: 117 MG/DL
POTASSIUM SERPL-SCNC: 4.2 MMOL/L (ref 3.5–5.1)
PROT SERPL-MCNC: 7.3 G/DL (ref 6–8.4)
SODIUM SERPL-SCNC: 143 MMOL/L (ref 136–145)
TRIGL SERPL-MCNC: 103 MG/DL (ref 30–150)

## 2024-03-19 PROCEDURE — 36415 COLL VENOUS BLD VENIPUNCTURE: CPT | Mod: HCNC | Performed by: INTERNAL MEDICINE

## 2024-03-19 PROCEDURE — 80061 LIPID PANEL: CPT | Mod: HCNC | Performed by: INTERNAL MEDICINE

## 2024-03-19 PROCEDURE — 80053 COMPREHEN METABOLIC PANEL: CPT | Mod: HCNC | Performed by: INTERNAL MEDICINE

## 2024-07-22 DIAGNOSIS — I10 ESSENTIAL HYPERTENSION: ICD-10-CM

## 2024-07-23 RX ORDER — AMLODIPINE BESYLATE 10 MG/1
TABLET ORAL
Qty: 90 TABLET | Refills: 3 | Status: SHIPPED | OUTPATIENT
Start: 2024-07-23

## 2024-08-15 ENCOUNTER — TELEPHONE (OUTPATIENT)
Dept: DERMATOLOGY | Facility: CLINIC | Age: 84
End: 2024-08-15
Payer: MEDICARE

## 2024-08-15 NOTE — TELEPHONE ENCOUNTER
----- Message from Hal Gold sent at 8/15/2024 11:26 AM CDT -----  Regarding: appt access  PATIENT CALL    Pt called regarding yesterday's appt request. States that she has 2 skin lesions that needs to be seen. Please call back at [phone], this is her 2nd time calling

## 2024-09-08 ENCOUNTER — NURSE TRIAGE (OUTPATIENT)
Dept: ADMINISTRATIVE | Facility: CLINIC | Age: 84
End: 2024-09-08
Payer: MEDICARE
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
## 2024-10-03 ENCOUNTER — OFFICE VISIT (OUTPATIENT)
Dept: DERMATOLOGY | Facility: CLINIC | Age: 84
End: 2024-10-03
Payer: MEDICARE

## 2024-10-03 DIAGNOSIS — I10 ESSENTIAL HYPERTENSION: ICD-10-CM

## 2024-10-03 DIAGNOSIS — L82.1 SK (SEBORRHEIC KERATOSIS): ICD-10-CM

## 2024-10-03 DIAGNOSIS — L29.9 SCALP PRURITUS: ICD-10-CM

## 2024-10-03 DIAGNOSIS — D48.5 NEOPLASM OF UNCERTAIN BEHAVIOR OF SKIN: Primary | ICD-10-CM

## 2024-10-03 DIAGNOSIS — L30.4 INTERTRIGO: ICD-10-CM

## 2024-10-03 PROCEDURE — 99999 PR PBB SHADOW E&M-EST. PATIENT-LVL III: CPT | Mod: PBBFAC,HCNC,, | Performed by: DERMATOLOGY

## 2024-10-03 PROCEDURE — 3288F FALL RISK ASSESSMENT DOCD: CPT | Mod: HCNC,CPTII,S$GLB, | Performed by: DERMATOLOGY

## 2024-10-03 PROCEDURE — 1159F MED LIST DOCD IN RCRD: CPT | Mod: HCNC,CPTII,S$GLB, | Performed by: DERMATOLOGY

## 2024-10-03 PROCEDURE — 1101F PT FALLS ASSESS-DOCD LE1/YR: CPT | Mod: HCNC,CPTII,S$GLB, | Performed by: DERMATOLOGY

## 2024-10-03 PROCEDURE — 11102 TANGNTL BX SKIN SINGLE LES: CPT | Mod: HCNC,S$GLB,, | Performed by: DERMATOLOGY

## 2024-10-03 PROCEDURE — 11103 TANGNTL BX SKIN EA SEP/ADDL: CPT | Mod: HCNC,S$GLB,, | Performed by: DERMATOLOGY

## 2024-10-03 PROCEDURE — 99213 OFFICE O/P EST LOW 20 MIN: CPT | Mod: 25,HCNC,S$GLB, | Performed by: DERMATOLOGY

## 2024-10-03 PROCEDURE — 1160F RVW MEDS BY RX/DR IN RCRD: CPT | Mod: HCNC,CPTII,S$GLB, | Performed by: DERMATOLOGY

## 2024-10-03 PROCEDURE — 1126F AMNT PAIN NOTED NONE PRSNT: CPT | Mod: HCNC,CPTII,S$GLB, | Performed by: DERMATOLOGY

## 2024-10-03 RX ORDER — FLUCONAZOLE 200 MG/1
TABLET ORAL
Qty: 6 TABLET | Refills: 1 | Status: SHIPPED | OUTPATIENT
Start: 2024-10-03

## 2024-10-03 RX ORDER — KETOCONAZOLE 20 MG/G
CREAM TOPICAL
Qty: 60 G | Refills: 3 | Status: SHIPPED | OUTPATIENT
Start: 2024-10-03

## 2024-10-03 RX ORDER — MOMETASONE FUROATE 1 MG/ML
SOLUTION TOPICAL
Qty: 60 ML | Refills: 3 | Status: SHIPPED | OUTPATIENT
Start: 2024-10-03

## 2024-10-03 RX ORDER — CARVEDILOL 12.5 MG/1
12.5 TABLET ORAL 2 TIMES DAILY WITH MEALS
Qty: 180 TABLET | Refills: 3 | Status: SHIPPED | OUTPATIENT
Start: 2024-10-03

## 2024-10-03 NOTE — PROGRESS NOTES
Subjective:      Patient ID:  Gabriel Grace is a 84 y.o. female who presents for   Chief Complaint   Patient presents with    Lesion     Mid chest      Pt here for lesion on lower neck , that has been there for 6-8mos , pt has lesion on scalp that has been there for 6-8mos months,   Pt also having itching under both breast, pt used hydrocortisone cream     Lesion      Review of Systems   Skin:  Positive for itching, rash and dry skin.       Objective:   Physical Exam   Constitutional: She appears well-developed and well-nourished. No distress.   Neurological: She is alert and oriented to person, place, and time. She is not disoriented.   Psychiatric: She has a normal mood and affect.   Skin:   Areas Examined (abnormalities noted in diagram):   Scalp / Hair Palpated and Inspected  Head / Face Inspection Performed  Chest / Axilla Inspection Performed  Abdomen Inspection Performed  Back Inspection Performed                               Diagram Legend     Erythematous scaling macule/papule c/w actinic keratosis       Vascular papule c/w angioma      Pigmented verrucoid papule/plaque c/w seborrheic keratosis      Yellow umbilicated papule c/w sebaceous hyperplasia      Irregularly shaped tan macule c/w lentigo     1-2 mm smooth white papules consistent with Milia      Movable subcutaneous cyst with punctum c/w epidermal inclusion cyst      Subcutaneous movable cyst c/w pilar cyst      Firm pink to brown papule c/w dermatofibroma      Pedunculated fleshy papule(s) c/w skin tag(s)      Evenly pigmented macule c/w junctional nevus     Mildly variegated pigmented, slightly irregular-bordered macule c/w mildly atypical nevus      Flesh colored to evenly pigmented papule c/w intradermal nevus       Pink pearly papule/plaque c/w basal cell carcinoma      Erythematous hyperkeratotic cursted plaque c/w SCC      Surgical scar with no sign of skin cancer recurrence      Open and closed comedones      Inflammatory papules and  pustules      Verrucoid papule consistent consistent with wart     Erythematous eczematous patches and plaques     Dystrophic onycholytic nail with subungual debris c/w onychomycosis     Umbilicated papule    Erythematous-base heme-crusted tan verrucoid plaque consistent with inflamed seborrheic keratosis     Erythematous Silvery Scaling Plaque c/w Psoriasis     See annotation      Assessment / Plan:      Pathology Orders:       Normal Orders This Visit    Specimen to Pathology, Dermatology     Questions:    Procedure Type: Dermatology and skin neoplasms    Number of Specimens: 2    ------------------------: -------------------------    Spec 1 Procedure: Biopsy    Spec 1 Clinical Impression: r/o BCC    Spec 1 Source: upper nasal bridge    ------------------------: -------------------------    Spec 2 Procedure: Biopsy    Spec 2 Clinical Impression: r/o inflamed keratosis    Spec 2 Source: mid upper chest    Release to patient:           Neoplasm of uncertain behavior of skin x 2   Shave biopsy procedure note:    Shave biopsy performed after verbal consent including risk of infection, scar, recurrence, need for additional treatment of site. Area prepped with alcohol, anesthetized with approximately 1.0cc of 1% lidocaine with epinephrine. Lesional tissue shaved with razor blade. Hemostasis achieved with application of aluminum chloride followed by hyfrecation. No complications. Dressing applied. Wound care explained.    If biopsy positive for malignancy, refer to Dr. Gan for Mohs surgery consultation. - nose  -     Specimen to Pathology, Dermatology    SK (seborrheic keratosis)  These are benign inherited growths without a malignant potential. Reassurance given to patient. No treatment is necessary.     Intertrigo  -     ketoconazole (NIZORAL) 2 % cream; AAA qhs under breasts after cool blow dry prn flare  Dispense: 60 g; Refill: 3  -     fluconazole (DIFLUCAN) 200 MG Tab; Sig 1 pill po qd x 3 days  and repeat in 1  week  Dispense: 6 tablet; Refill: 1  Recommend white vinegar: water 1:1 compresses  nightly after bath/shower followed by cool blow dry and then application of prescription medication.     Use powder for maintenance in the morning.     Wash affected areas 2x per week with Hibiclens wash which can be purchased over the counter      Scalp pruritus  -     mometasone (ELOCON) 0.1 % solution; aaa on scalp qhs prn flare  Dispense: 60 mL; Refill: 3  Mometasone solution to scalp for itching.  Can mix  a few drops in a bland moisturizer like cerave and apply for itching on neck or back     Can use cerave anti itch cream to back            Follow up for prn bx report.

## 2024-10-03 NOTE — PATIENT INSTRUCTIONS
Recommend white vinegar: water 1:1 compresses  nightly after bath/shower followed by cool blow dry and then application of prescription medication.     Use powder for maintenance in the morning.     Wash affected areas 2x per week with Hibiclens wash which can be purchased over the counter      Mometasone solution to scalp for itching.  Can mix  a few drops in a bland moisturizer like cerave and apply for itching     Your doctor performed a skin biopsy today as she is concerned the lesion may be a skin cancer. Skin cancer is different than other cancers in that if it is addressed early, it can be cured with skin surgery.  As discussed at your appointment, if the lesion is a skin cancer your will be referred for Mohs Surgery.  Your pathology report, photos, and note will be messaged to your Mohs Department. Mohs surgery is a dermatologic subspecialty.  The surgeon is specially trained to remove the cancerous tissue, process it under the microscope while you wait, and ensure the skin cancer is removed with clear margins. The surgeon is then trained to repair the defect in a cosmetically acceptable way.  This is the standard of care for many skin cancers and [facility] has an excellent team that works with our dermatologists to offer this service.

## 2024-10-09 ENCOUNTER — OFFICE VISIT (OUTPATIENT)
Dept: CARDIOLOGY | Facility: CLINIC | Age: 84
End: 2024-10-09
Payer: MEDICARE

## 2024-10-09 VITALS
BODY MASS INDEX: 30.58 KG/M2 | DIASTOLIC BLOOD PRESSURE: 72 MMHG | SYSTOLIC BLOOD PRESSURE: 148 MMHG | HEIGHT: 68 IN | HEART RATE: 83 BPM | WEIGHT: 201.75 LBS

## 2024-10-09 DIAGNOSIS — I70.0 AORTIC ATHEROSCLEROSIS: Primary | ICD-10-CM

## 2024-10-09 DIAGNOSIS — I10 PRIMARY HYPERTENSION: ICD-10-CM

## 2024-10-09 DIAGNOSIS — E78.00 PURE HYPERCHOLESTEROLEMIA: ICD-10-CM

## 2024-10-09 DIAGNOSIS — I10 ESSENTIAL HYPERTENSION: ICD-10-CM

## 2024-10-09 PROBLEM — I51.7 ATRIAL ENLARGEMENT, BILATERAL: Status: RESOLVED | Noted: 2020-03-30 | Resolved: 2024-10-09

## 2024-10-09 PROCEDURE — 3288F FALL RISK ASSESSMENT DOCD: CPT | Mod: HCNC,CPTII,S$GLB, | Performed by: STUDENT IN AN ORGANIZED HEALTH CARE EDUCATION/TRAINING PROGRAM

## 2024-10-09 PROCEDURE — 3077F SYST BP >= 140 MM HG: CPT | Mod: HCNC,CPTII,S$GLB, | Performed by: STUDENT IN AN ORGANIZED HEALTH CARE EDUCATION/TRAINING PROGRAM

## 2024-10-09 PROCEDURE — 1101F PT FALLS ASSESS-DOCD LE1/YR: CPT | Mod: HCNC,CPTII,S$GLB, | Performed by: STUDENT IN AN ORGANIZED HEALTH CARE EDUCATION/TRAINING PROGRAM

## 2024-10-09 PROCEDURE — 99214 OFFICE O/P EST MOD 30 MIN: CPT | Mod: HCNC,S$GLB,, | Performed by: STUDENT IN AN ORGANIZED HEALTH CARE EDUCATION/TRAINING PROGRAM

## 2024-10-09 PROCEDURE — 99999 PR PBB SHADOW E&M-EST. PATIENT-LVL III: CPT | Mod: PBBFAC,HCNC,, | Performed by: STUDENT IN AN ORGANIZED HEALTH CARE EDUCATION/TRAINING PROGRAM

## 2024-10-09 PROCEDURE — 3078F DIAST BP <80 MM HG: CPT | Mod: HCNC,CPTII,S$GLB, | Performed by: STUDENT IN AN ORGANIZED HEALTH CARE EDUCATION/TRAINING PROGRAM

## 2024-10-09 PROCEDURE — 1159F MED LIST DOCD IN RCRD: CPT | Mod: HCNC,CPTII,S$GLB, | Performed by: STUDENT IN AN ORGANIZED HEALTH CARE EDUCATION/TRAINING PROGRAM

## 2024-10-09 RX ORDER — AMLODIPINE BESYLATE 10 MG/1
TABLET ORAL
Qty: 90 TABLET | Refills: 3 | Status: SHIPPED | OUTPATIENT
Start: 2024-10-09

## 2024-10-09 RX ORDER — SIMVASTATIN 10 MG/1
10 TABLET, FILM COATED ORAL NIGHTLY
Qty: 90 TABLET | Refills: 3 | Status: SHIPPED | OUTPATIENT
Start: 2024-10-09

## 2024-10-09 RX ORDER — CARVEDILOL 12.5 MG/1
12.5 TABLET ORAL 2 TIMES DAILY WITH MEALS
Qty: 180 TABLET | Refills: 3 | Status: SHIPPED | OUTPATIENT
Start: 2024-10-09

## 2024-10-09 NOTE — PROGRESS NOTES
"    PCP - Prosper Mancilla MD  Referring Physician:     Subjective:   Patient ID:  Gabriel Grace is a 84 y.o. female with past medical history of:     Former patient of Dr. Monreal. 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 She reports increased SALGADO but no orthopnea or PND. Her weight is stable and no edema"    Patient is here today for follow-up.  She is doing well overall; however, she states that she is chronically fatigued.  She has been fatigued for several years.  She has been taking the same medication regimen over that period of time which includes carvedilol.  She also reports today that she is not sleeping well at night.  She denies chest pain, shortness of breath, palpitations, PND.    History:     Social History     Tobacco Use    Smoking status: Never    Smokeless tobacco: Never   Substance Use Topics    Alcohol use: Yes     Comment: rare     Family History   Problem Relation Name Age of Onset    Hypertension Mother      Stroke Mother      Diabetes Mother          iddm    Heart disease Father           from mi    Heart attack Father      Parkinsonism Sister      Diabetes Sister      Diabetes Paternal Grandmother      Cancer Paternal Grandfather      Skin cancer Paternal Grandfather      No Known Problems Brother      No Known Problems Maternal Aunt      No Known Problems Maternal Uncle      No Known Problems Paternal Aunt      No Known Problems Paternal Uncle      No Known Problems Maternal Grandmother      No Known Problems Maternal Grandfather      Psoriasis Sister      Ovarian cancer Neg Hx      Uterine cancer Neg Hx      Breast cancer Neg Hx      Colon cancer Neg Hx      Amblyopia Neg Hx      Blindness Neg Hx      Cataracts Neg Hx      Glaucoma Neg Hx      Macular degeneration Neg Hx      Retinal detachment Neg Hx      Strabismus Neg Hx      Thyroid " disease Neg Hx      Melanoma Neg Hx         Meds:   Review of patient's allergies indicates:  No Known Allergies    Current Outpatient Medications:     balsalazide (COLAZAL) 750 mg capsule, Take 2,250 mg by mouth once daily. , Disp: , Rfl:     biotin 2,500 mcg Cap, , Disp: , Rfl:     calcium-magnesium-zinc Tab, Take by mouth. 1  Oral Every day, Disp: , Rfl:     cholecalciferol, vitamin D3, (VITAMIN D3) 25 mcg (1,000 unit) capsule, Take 1,000 Units by mouth once daily., Disp: , Rfl:     cyanocobalamin, vitamin B-12, 50 mcg tablet, Take 1 tablet (50 mcg total) by mouth once daily., Disp: 90 tablet, Rfl: 3    cyclobenzaprine (FLEXERIL) 5 MG tablet, TAKE 1 TABLET THREE TIMES DAILY AS NEEDED FOR MUSCLE SPASM(S), Disp: 20 tablet, Rfl: 2    FLAXSEED OIL ORAL, Take 1,000 mg by mouth. 1  Oral Every day, Disp: , Rfl:     folic acid (FOLVITE) 400 MCG tablet, Take 2 tablets (800 mcg total) by mouth once daily., Disp: 180 tablet, Rfl: 3    mometasone (ELOCON) 0.1 % solution, aaa on scalp qhs prn flare, Disp: 60 mL, Rfl: 3    multivitamin (THERAGRAN) per tablet, Take by mouth. 1  By mouth Every day, Disp: , Rfl:     omega-3 fatty acids-vitamin E 1,000 mg Cap, , Disp: , Rfl:     omeprazole (PRILOSEC) 20 MG capsule, Take 1 capsule (20 mg total) by mouth once daily., Disp: 90 capsule, Rfl: 3    sulfamethoxazole-trimethoprim 800-160mg (BACTRIM DS) 800-160 mg Tab, Take 1 tablet by mouth 2 (two) times daily., Disp: 14 tablet, Rfl: 0    triamcinolone acetonide 0.1% (KENALOG) 0.1 % cream, aaa on legs and arms qd- bid after moisturizing; not more than 2 weeks straight in the same location, Disp: 60 g, Rfl: 3    amLODIPine (NORVASC) 10 MG tablet, TAKE 1 TABLET EVERY DAY  .  STOP OLD RX, Disp: 90 tablet, Rfl: 3    carvediloL (COREG) 12.5 MG tablet, Take 1 tablet (12.5 mg total) by mouth 2 (two) times daily with meals., Disp: 180 tablet, Rfl: 3    FLUAD QUAD 2021-22,65Y UP,,PF, 60 mcg (15 mcg x 4)/0.5 mL Syrg, , Disp: , Rfl:     fluconazole  "(DIFLUCAN) 200 MG Tab, Sig 1 pill po qd x 3 days  and repeat in 1 week (Patient not taking: Reported on 10/9/2024), Disp: 6 tablet, Rfl: 1    ketoconazole (NIZORAL) 2 % cream, AAA qhs under breasts after cool blow dry (Patient not taking: Reported on 10/9/2024), Disp: 60 g, Rfl: 3    ketoconazole (NIZORAL) 2 % cream, AAA qhs under breasts after cool blow dry prn flare (Patient not taking: Reported on 10/9/2024), Disp: 60 g, Rfl: 3    simvastatin (ZOCOR) 10 MG tablet, Take 1 tablet (10 mg total) by mouth every evening., Disp: 90 tablet, Rfl: 3      Objective:   BP (!) 148/72 (BP Location: Right arm, Patient Position: Sitting)   Pulse 83   Ht 5' 8" (1.727 m)   Wt 91.5 kg (201 lb 11.5 oz)   BMI 30.67 kg/m²     Physical Exam  Gen: No apparent distress, resting comfortably  HEENT: Pupils equal and reactive to light  Cardio: Regular rate, point of maximal impulse not displaced, no murmur noted, 2+ radial pulses bilaterally, 2+ DP pulses bilaterally  Resp: CTAB, no wheezing  Abd: Soft, non-tender, non-distended  Skin: Warm, dry, no peripheral edema noted  Neuro: Alert and oriented x3  Psych: Normal mood and affect      Labs:     Lab Results   Component Value Date     03/19/2024    K 4.2 03/19/2024     (H) 03/19/2024    CO2 23 03/19/2024    BUN 28 (H) 03/19/2024    CREATININE 0.9 03/19/2024    ANIONGAP 9 03/19/2024     Lab Results   Component Value Date    HGBA1C 5.2 01/17/2023     Lab Results   Component Value Date     (H) 03/18/2024    BNP 50 02/28/2020    BNP 70 05/12/2018       Lab Results   Component Value Date    WBC 7.17 03/18/2024    HGB 12.6 03/18/2024    HCT 39.6 03/18/2024     03/18/2024    GRAN 5.1 03/18/2024    GRAN 70.8 03/18/2024     Lab Results   Component Value Date    CHOL 159 03/19/2024    HDL 42 03/19/2024    LDLCALC 96.4 03/19/2024    TRIG 103 03/19/2024       Lab Results   Component Value Date     03/19/2024    K 4.2 03/19/2024     (H) 03/19/2024    CO2 23 " 03/19/2024    BUN 28 (H) 03/19/2024    CREATININE 0.9 03/19/2024    ANIONGAP 9 03/19/2024     Lab Results   Component Value Date    HGBA1C 5.2 01/17/2023     Lab Results   Component Value Date     (H) 03/18/2024    BNP 50 02/28/2020    BNP 70 05/12/2018    Lab Results   Component Value Date    WBC 7.17 03/18/2024    HGB 12.6 03/18/2024    HCT 39.6 03/18/2024     03/18/2024    GRAN 5.1 03/18/2024    GRAN 70.8 03/18/2024     Lab Results   Component Value Date    CHOL 159 03/19/2024    HDL 42 03/19/2024    LDLCALC 96.4 03/19/2024    TRIG 103 03/19/2024                Cardiovascular Imaging:     Echo 3/18/24:    Left Ventricle: There is normal systolic function with a visually estimated ejection fraction of 60 - 65%. There is normal diastolic function.    Right Ventricle: Normal right ventricular cavity size. Wall thickness is normal. Right ventricle wall motion  is normal. Systolic function is normal.    Left Atrium: Left atrium is mildly dilated.    Right Atrium: Right atrium is mildly dilated.    Aortic Valve: The aortic valve is a trileaflet valve. There is mild aortic valve sclerosis.    Tricuspid Valve: There is mild regurgitation.    Pulmonary Artery: The estimated pulmonary artery systolic pressure is 29 mmHg.    IVC/SVC: Normal venous pressure at 3 mmHg.    Pericardium: There is a trivial circumferential effusion. Echolucent, round structure in the liver suggestive of liver cyst. Clinical correlation is required.     Stress test:     LHC:    Assessment & Plan:       Hyperlipidemia  LDL currently <100  Continue statin    Hypertension  BP high today but likely due to white coat hypertension  Keep BP log at home  Continue current meds    Aortic atherosclerosis  Continue statin    RTC in 6 months-1 year    Signed:  Luis Alberto Dawson MD  [facility]

## 2024-10-09 NOTE — ASSESSMENT & PLAN NOTE
BP high today but likely due to white coat hypertension  Keep BP log at home  Continue current meds

## 2024-10-10 ENCOUNTER — TELEPHONE (OUTPATIENT)
Dept: DERMATOLOGY | Facility: CLINIC | Age: 84
End: 2024-10-10
Payer: MEDICARE

## 2024-10-10 NOTE — TELEPHONE ENCOUNTER
----- Message from Joselyn Gagnon MD sent at 10/10/2024  1:18 PM CDT -----  Regarding: RE: Refill  Contact: Pt  [phone]  You can print path and she can come pick it up  I can call in aldara.  Let her know it only works about 70% of the time .    Risk of scarring, brisk reaction, no response and need for additional treatment     Aldara/imiquimod- nose one time per day for 4 weeks  Your doctor has prescribed a medication that can stimulate the immune system to fix the atypical cells. This medication is dispensed in small packets. Open the packet, use  only the amount needed to apply a thin film to the affected area and then store the packet in a ziploc bag. If this same area has been treated with cryosurgery/freezing, wait until the area is healed before starting the medication. The potential  side effects of aldara/imiquimod including redness, scaling, and irritation. This is a normal reaction and means the medication is working. If the area becomes ulcerated or is bleeding stop the medication and message your doctor. Long term side effects can include white scarring. More rare side effects include a flu like illness.  Discontinue medication and call the office if any of these symptoms occur. For some patients , this medication is not effective and other treatment options will need to be explored. You will need follow up with me in 3months.  ----- Message -----  From: Alda Jenkins LPN  Sent: 10/10/2024   1:11 PM CDT  To: Joselyn Gagnon MD  Subject: FW: Refill                                       Spoke with pt. Pt states she would like to try aldara to her nose. States she was talking with her sister and was told about it.    Pt would also like copies of path of her nose and chest  ----- Message -----  From: Alexandria Perry  Sent: 10/10/2024  10:51 AM CDT  To: Chelsi ESCOBAR Staff  Subject: Refill                                           Pt is calling to get a prescription for rx: aldara cream please call        [id number] - [place], [place] - [address] AT [facility] [address]  [address]  [place] [address]  Phone: [phone] Fax: [phone]

## 2024-10-16 ENCOUNTER — TELEPHONE (OUTPATIENT)
Dept: DERMATOLOGY | Facility: CLINIC | Age: 84
End: 2024-10-16
Payer: MEDICARE

## 2024-10-16 NOTE — TELEPHONE ENCOUNTER
----- Message from Nurse Lizama sent at 10/11/2024  8:27 AM CDT -----  Regarding: FW: pt advice    ----- Message -----  From: Hal Gold  Sent: 10/11/2024   8:23 AM CDT  To: Chelsi ESCOBAR Staff  Subject: pt advice                                        PATIENT CALL    Pt called regarding CHAYO hayley. States that she did research with the pharmacist and is not comfortable using it due to potential side effects. Please call back at [phone] to assist further.

## 2024-10-18 ENCOUNTER — TELEPHONE (OUTPATIENT)
Dept: CARDIOLOGY | Facility: CLINIC | Age: 84
End: 2024-10-18
Payer: MEDICARE

## 2024-10-18 LAB
OHS QRS DURATION: 86 MS
OHS QTC CALCULATION: 431 MS

## 2024-11-25 ENCOUNTER — PROCEDURE VISIT (OUTPATIENT)
Dept: DERMATOLOGY | Facility: CLINIC | Age: 84
End: 2024-11-25
Payer: MEDICARE

## 2024-11-25 VITALS
SYSTOLIC BLOOD PRESSURE: 170 MMHG | HEIGHT: 68 IN | BODY MASS INDEX: 30.58 KG/M2 | DIASTOLIC BLOOD PRESSURE: 79 MMHG | HEART RATE: 72 BPM | WEIGHT: 201.75 LBS

## 2024-11-25 DIAGNOSIS — C44.311 BASAL CELL CARCINOMA OF DORSUM OF NOSE: Primary | ICD-10-CM

## 2024-11-25 NOTE — PROGRESS NOTES
PROCEDURE: Mohs' Micrographic Surgery    INDICATION: Location in mask areas of face including central face, nose, eyelids, eyebrows, lips, chin, preauricular, temple, and ear. Biopsy-proven skin cancer of cosmetically and functionally important areas, including head, neck, genital, hand, foot, or areas known for having difficulty in healing, such as the lower anterior legs. Tumor with ill-defined borders.    REFERRING PROVIDER: Joselyn Gagnon M.D.    CASE NUMBER:     ANESTHETIC: 1.5 cc 0.5% Lidocaine with Epi 1:200,000 mixed 1:1 with 0.5% Bupivacaine    SURGICAL PREP: Hibiclens    SURGEON: Elle Gan MD    ASSISTANTS: Margaux Chilel PA-C and Sol Rock Surg Katalina    PREOPERATIVE DIAGNOSIS: basal cell carcinoma- nodular    POSTOPERATIVE DIAGNOSIS: basal cell carcinoma- nodular, infiltrating    PATHOLOGIC DIAGNOSIS: basal cell carcinoma- nodular, infiltrating    HISTOLOGY OF SPECIMENS IN FIRST STAGE:   Debulking tumor confirms nodular and infiltrating basal cell carcinoma.  Tumor Type:  No tumor seen.    STAGES OF MOHS' SURGERY PERFORMED: 1    TUMOR-FREE PLANE ACHIEVED: Yes    HEMOSTASIS: electrocoagulation     SPECIMENS: 2     LOCATION: upper nasal bridge. Location verified with Dr. Gagnon's clinical photograph. Patient also verified location with hand held mirror.    INITIAL LESION SIZE: 0.5 x 0.5 cm    FINAL DEFECT SIZE: 0.7 x 0.9 cm    WOUND REPAIR/DISPOSITION: The patient tolerated Mohs' Micrographic Surgery for a basal cell carcinoma very well. When the tumor was completely removed, a repair of the surgical defect was undertaken.    PROCEDURE: Complex Linear Repair    INDICATION: Status post Mohs' Micrographic Surgery for basal cell carcinoma.    CASE NUMBER:     SURGEON: Elle Gan MD    ASSISTANTS: Margaux Chilel PA-C and Sol Rock Surg Katalina    ANESTHETIC: 1.5 cc 1% Lidocaine with Epinephrine 1:100,000    SURGICAL PREP: Hibiclens, prepped by [facility] Surg Tech    LOCATION: upper nasal  "bridge    DEFECT SIZE: 0.7 x 0.9 cm    WOUND REPAIR/DISPOSITION:  After the patient's carcinoma had been completely removed with Mohs' Micrographic Surgery, a repair of the surgical defect was undertaken. The patient was returned to the operating suite where the area of upper nasal bridge was prepped, draped, and anesthetized in the usual sterile fashion. The wound was widely undermined in all directions. The wound was undermined to a distance at least the maximum width of the defect as measured perpendicular to the closure line along at least one entire edge of the defect, in this case 1.5 cm. Then, electrocoagulation was used to obtain meticulous hemostasis. 5-0 Vicryl buried vertical mattress sutures were placed into the subcutaneous and dermal plane to close the wound and isaac the cutaneous wound edge. Bilateral dog ears were identified and were removed by a standard Burow's triangle technique. The cutaneous wound edges were closed using interrupted 5-0 Prolene suture.    The patient tolerated the procedure well.    The area was cleaned and dressed appropriately and the patient was given wound care instructions, as well as appointment for follow-up evaluation and suture removal in 7 days.    LENGTH OF REPAIR: 2.3 cm    Vitals:    11/25/24 0714 11/25/24 0956   BP: (!) 168/78 (!) 170/79   BP Location: Left arm Left arm   Patient Position: Sitting Sitting   Pulse: 72 (P) 72   Weight: 91.5 kg (201 lb 11.5 oz)    Height: 5' 8" (1.727 m)          "

## 2024-11-27 NOTE — MR AVS SNAPSHOT
[facility]  [address]  [facility] [address]  Phone: [phone]  Fax: [phone]                  Gabriel Grace   3/8/2017 1:00 PM   Office Visit    Description:  Female : 1940   Provider:  Darell Leigh OD   Department:  [facility]           Reason for Visit     Concerns About Ocular Health                To Do List           Future Appointments        Provider Department Dept Phone    3/13/2017 1:30 PM Wayne Monreal MD [facility] [phone]      Goals (5 Years of Data)     None      [facility]     [facility] Care [facility] -  Assistance  Registered nurses in the [facility] provide clinical advisement, health education, appointment booking, and other advisory services.  Call for this free service at [phone].             Medications           Message regarding Medications     Verify the changes and/or additions to your medication regime listed below are the same as discussed with your clinician today.  If any of these changes or additions are incorrect, please notify your healthcare provider.             Verify that the below list of medications is an accurate representation of the medications you are currently taking.  If none reported, the list may be blank. If incorrect, please contact your healthcare provider. Carry this list with you in case of emergency.           Current Medications     amlodipine (NORVASC) 10 MG tablet Take 1 tablet (10 mg total) by mouth once daily.    aspirin (ECOTRIN) 81 MG EC tablet Take 81 mg by mouth. 1 Tablet, Delayed Release (E.C.) Oral Every day    biotin 2,500 mcg Cap     calcium-magnesium-zinc Tab Take by mouth. 1  Oral Every day    carvedilol (COREG) 12.5 MG tablet Take 1 tablet (12.5 mg total) by mouth 2 (two) times daily with meals.    cholecalciferol, vitamin D3, (VITAMIN D3) 1,000 unit capsule Take 1,000 Units by mouth once daily.      cyanocobalamin, vitamin B-12, (VITAMIN  B-12) 50 mcg tablet Take 50 mcg by mouth once daily.      FLAXSEED OIL ORAL Take 1,000 mg by mouth. 1  Oral Every day    folic acid (FOLVITE) 400 MCG tablet Take 400 mcg by mouth. 4 Tablet Oral Every day    mesalamine (ASACOL) 400 mg EC tablet Take 400 mg by mouth. 4 Tablet, Delayed Release (E.C.) Oral Twice a day     multivitamin (THERAGRAN) per tablet Take by mouth. 1  By mouth Every day    omega-3 fatty acids-vitamin E (FISH OIL) 1,000 mg Cap     omeprazole (PRILOSEC) 20 MG capsule Take 1 capsule (20 mg total) by mouth once daily.    ondansetron (ZOFRAN-ODT) 4 MG TbDL DISSOLVE 1 TABLET ON THE TONGUE EVERY EIGHT HOURS AS NEEDED    simvastatin (ZOCOR) 10 MG tablet Take 1 tablet (10 mg total) by mouth every evening.    vitamin E 1000 UNIT capsule Take by mouth. 1 Capsule Oral Every day           Clinical Reference Information           Allergies as of 3/8/2017     No Known Allergies      Immunizations Administered on Date of Encounter - 3/8/2017     None      [facility] Sign-Up     Activating your [facility] account is as easy as 1-2-3!     1) Visit [email or web address], select Sign Up Now, enter this activation code and your date of birth, then select Next.  [email or web address]  Expires: 4/22/2017  2:46 PM      2) Create a username and password to use when you visit [facility] in the future and select a security question in case you lose your password and select Next.    3) Enter your e-mail address and click Sign Up!    Additional Information  If you have questions, please e-mail [email or web address] or call [phone] to talk to our [facility] staff. Remember, [facility] is NOT to be used for urgent needs. For medical emergencies, dial 911.         Instructions    New spec lens Rx issued.  Recent h/o PVD in OD.  presistent floaers and occaxional flashes - no evience retinal tear.hole.breaik/  refrerred to [facility] for remake of left lens - or both lenses if lens shape changed.   Repeat DFE in three months - or prior if any  BMI: BMI (kg/m2): 29.7 (10-21-24 @ 16:58)  HbA1c: A1C with Estimated Average Glucose Result: 7.3 % (09-29-24 @ 17:15)    Glucose: POCT Blood Glucose.: 291 mg/dL (11-26-24 @ 15:52)    BP: --Vital Signs Last 24 Hrs  T(C): --  T(F): --  HR: --  BP: --  BP(mean): --  RR: --  SpO2: --    Orthostatic VS  11-25-24 @ 20:28  Lying BP: --/-- HR: --  Sitting BP: 143/93 HR: 106  Standing BP: --/-- HR: --  Site: --  Mode: --    Lipid Panel: Date/Time: 09-30-24 @ 04:45  Cholesterol, Serum: 148  LDL Cholesterol Calculated: 64  HDL Cholesterol, Serum: 56  Total Cholesterol/HDL Ration Measurement: --  Triglycerides, Serum: 138   increase in flashes or floaters, or any decrease in VA in the interim        Language Assistance Services     ATTENTION: Language assistance services are available, free of charge. Please call [phone].      ATENCIÓN: Si habla lois, tiene a jiang disposición servicios gratuitos de asistencia lingüística. Llame al [phone].     CHÚ Ý: N?u b?n nói Ti?ng Vi?t, có các d?ch v? h? tr? ngôn ng? mi?n phí dành cho b?n. G?i s? [phone].         [facility] complies with applicable Federal civil rights laws and does not discriminate on the basis of race, color, national origin, age, disability, or sex.

## 2024-12-02 ENCOUNTER — OFFICE VISIT (OUTPATIENT)
Dept: DERMATOLOGY | Facility: CLINIC | Age: 84
End: 2024-12-02
Payer: MEDICARE

## 2024-12-02 DIAGNOSIS — Z09 POSTOP CHECK: Primary | ICD-10-CM

## 2024-12-02 PROCEDURE — 1159F MED LIST DOCD IN RCRD: CPT | Mod: HCNC,CPTII,S$GLB, | Performed by: DERMATOLOGY

## 2024-12-02 PROCEDURE — 99024 POSTOP FOLLOW-UP VISIT: CPT | Mod: HCNC,S$GLB,, | Performed by: DERMATOLOGY

## 2024-12-02 PROCEDURE — 1126F AMNT PAIN NOTED NONE PRSNT: CPT | Mod: HCNC,CPTII,S$GLB, | Performed by: DERMATOLOGY

## 2024-12-02 PROCEDURE — 1160F RVW MEDS BY RX/DR IN RCRD: CPT | Mod: HCNC,CPTII,S$GLB, | Performed by: DERMATOLOGY

## 2024-12-02 PROCEDURE — 99999 PR PBB SHADOW E&M-EST. PATIENT-LVL III: CPT | Mod: PBBFAC,HCNC,, | Performed by: DERMATOLOGY

## 2024-12-02 PROCEDURE — 1101F PT FALLS ASSESS-DOCD LE1/YR: CPT | Mod: HCNC,CPTII,S$GLB, | Performed by: DERMATOLOGY

## 2024-12-02 PROCEDURE — 3288F FALL RISK ASSESSMENT DOCD: CPT | Mod: HCNC,CPTII,S$GLB, | Performed by: DERMATOLOGY

## 2024-12-02 NOTE — PROGRESS NOTES
84 y.o. female patient is here for suture removal following Mohs' surgery.    Patient reports no problems upper nasal bridge.    WOUND PE:  The upper nasal bridge sutures intact. Wound healing well. Good skin edges. No signs or symptoms of infection.    IMPRESSION:  Healing operative site from Mohs' surgery. BCC upper nasal bridge s/p Mohs with CLC postop day #7.    PLAN:  Sutures removed today by Nithya Gonzalez MA. Steri-strips applied.  Continue wound care.  Keep moist with Aquaphor.  Call if any issues arise, or can send in photo in 1 month or come in person if needed.     RTC:  In 3-6 months with Dr. Joselyn Gagnon for skin check or sooner if new concern arises.

## 2024-12-12 ENCOUNTER — NURSE TRIAGE (OUTPATIENT)
Dept: ADMINISTRATIVE | Facility: CLINIC | Age: 84
End: 2024-12-12
Payer: MEDICARE

## 2024-12-13 ENCOUNTER — HOSPITAL ENCOUNTER (EMERGENCY)
Facility: HOSPITAL | Age: 84
Discharge: HOME | End: 2024-12-13
Attending: EMERGENCY MEDICINE
Payer: MEDICARE

## 2024-12-13 ENCOUNTER — TELEPHONE (OUTPATIENT)
Dept: SURGERY | Facility: CLINIC | Age: 84
End: 2024-12-13
Payer: MEDICARE

## 2024-12-13 VITALS
HEART RATE: 72 BPM | HEIGHT: 68 IN | BODY MASS INDEX: 31.37 KG/M2 | WEIGHT: 207 LBS | DIASTOLIC BLOOD PRESSURE: 72 MMHG | SYSTOLIC BLOOD PRESSURE: 170 MMHG | TEMPERATURE: 98 F | OXYGEN SATURATION: 100 % | RESPIRATION RATE: 20 BRPM

## 2024-12-13 DIAGNOSIS — K64.4 INFLAMED EXTERNAL HEMORRHOID: ICD-10-CM

## 2024-12-13 DIAGNOSIS — K62.89 ACUTE PROCTITIS: Primary | ICD-10-CM

## 2024-12-13 DIAGNOSIS — N39.0 URINARY TRACT INFECTION WITHOUT HEMATURIA, SITE UNSPECIFIED: ICD-10-CM

## 2024-12-13 DIAGNOSIS — K62.89 RECTAL PAIN: ICD-10-CM

## 2024-12-13 LAB
ALBUMIN SERPL BCP-MCNC: 3.6 G/DL (ref 3.5–5.2)
ALP SERPL-CCNC: 69 U/L (ref 40–150)
ALT SERPL W/O P-5'-P-CCNC: 13 U/L (ref 10–44)
ANION GAP SERPL CALC-SCNC: 11 MMOL/L (ref 8–16)
AST SERPL-CCNC: 20 U/L (ref 10–40)
BACTERIA #/AREA URNS AUTO: ABNORMAL /HPF
BASOPHILS # BLD AUTO: 0.03 K/UL (ref 0–0.2)
BASOPHILS NFR BLD: 0.3 % (ref 0–1.9)
BILIRUB SERPL-MCNC: 0.6 MG/DL (ref 0.1–1)
BILIRUB UR QL STRIP: NEGATIVE
BUN SERPL-MCNC: 18 MG/DL (ref 8–23)
CALCIUM SERPL-MCNC: 10.5 MG/DL (ref 8.7–10.5)
CHLORIDE SERPL-SCNC: 112 MMOL/L (ref 95–110)
CLARITY UR REFRACT.AUTO: ABNORMAL
CO2 SERPL-SCNC: 19 MMOL/L (ref 23–29)
COLOR UR AUTO: YELLOW
CREAT SERPL-MCNC: 0.8 MG/DL (ref 0.5–1.4)
DIFFERENTIAL METHOD BLD: ABNORMAL
EOSINOPHIL # BLD AUTO: 0.1 K/UL (ref 0–0.5)
EOSINOPHIL NFR BLD: 0.5 % (ref 0–8)
ERYTHROCYTE [DISTWIDTH] IN BLOOD BY AUTOMATED COUNT: 13.3 % (ref 11.5–14.5)
EST. GFR  (NO RACE VARIABLE): >60 ML/MIN/1.73 M^2
GLUCOSE SERPL-MCNC: 132 MG/DL (ref 70–110)
GLUCOSE UR QL STRIP: NEGATIVE
HCT VFR BLD AUTO: 34.6 % (ref 37–48.5)
HCV AB SERPL QL IA: NORMAL
HGB BLD-MCNC: 11.7 G/DL (ref 12–16)
HGB UR QL STRIP: ABNORMAL
HIV 1+2 AB+HIV1 P24 AG SERPL QL IA: NORMAL
HYALINE CASTS UR QL AUTO: 0 /LPF
IMM GRANULOCYTES # BLD AUTO: 0.07 K/UL (ref 0–0.04)
IMM GRANULOCYTES NFR BLD AUTO: 0.6 % (ref 0–0.5)
KETONES UR QL STRIP: ABNORMAL
LEUKOCYTE ESTERASE UR QL STRIP: ABNORMAL
LYMPHOCYTES # BLD AUTO: 1.1 K/UL (ref 1–4.8)
LYMPHOCYTES NFR BLD: 9.7 % (ref 18–48)
MCH RBC QN AUTO: 33 PG (ref 27–31)
MCHC RBC AUTO-ENTMCNC: 33.8 G/DL (ref 32–36)
MCV RBC AUTO: 98 FL (ref 82–98)
MICROSCOPIC COMMENT: ABNORMAL
MONOCYTES # BLD AUTO: 0.5 K/UL (ref 0.3–1)
MONOCYTES NFR BLD: 4.9 % (ref 4–15)
NEUTROPHILS # BLD AUTO: 9.2 K/UL (ref 1.8–7.7)
NEUTROPHILS NFR BLD: 84 % (ref 38–73)
NITRITE UR QL STRIP: POSITIVE
NRBC BLD-RTO: 0 /100 WBC
OHS QRS DURATION: 72 MS
OHS QTC CALCULATION: 457 MS
PH UR STRIP: 6 [PH] (ref 5–8)
PLATELET # BLD AUTO: 257 K/UL (ref 150–450)
PMV BLD AUTO: 10.6 FL (ref 9.2–12.9)
POTASSIUM SERPL-SCNC: 3.4 MMOL/L (ref 3.5–5.1)
PROT SERPL-MCNC: 7.4 G/DL (ref 6–8.4)
PROT UR QL STRIP: ABNORMAL
RBC # BLD AUTO: 3.55 M/UL (ref 4–5.4)
RBC #/AREA URNS AUTO: 9 /HPF (ref 0–4)
SODIUM SERPL-SCNC: 142 MMOL/L (ref 136–145)
SP GR UR STRIP: >1.03 (ref 1–1.03)
SQUAMOUS #/AREA URNS AUTO: 1 /HPF
URN SPEC COLLECT METH UR: ABNORMAL
WBC # BLD AUTO: 10.91 K/UL (ref 3.9–12.7)
WBC #/AREA URNS AUTO: >100 /HPF (ref 0–5)

## 2024-12-13 PROCEDURE — 87389 HIV-1 AG W/HIV-1&-2 AB AG IA: CPT | Mod: HCNC | Performed by: PHYSICIAN ASSISTANT

## 2024-12-13 PROCEDURE — 87086 URINE CULTURE/COLONY COUNT: CPT | Mod: HCNC | Performed by: EMERGENCY MEDICINE

## 2024-12-13 PROCEDURE — 87186 SC STD MICRODIL/AGAR DIL: CPT | Mod: HCNC | Performed by: EMERGENCY MEDICINE

## 2024-12-13 PROCEDURE — 87088 URINE BACTERIA CULTURE: CPT | Mod: HCNC | Performed by: EMERGENCY MEDICINE

## 2024-12-13 PROCEDURE — 25500020 PHARM REV CODE 255: Mod: HCNC | Performed by: EMERGENCY MEDICINE

## 2024-12-13 PROCEDURE — 80053 COMPREHEN METABOLIC PANEL: CPT | Mod: HCNC | Performed by: EMERGENCY MEDICINE

## 2024-12-13 PROCEDURE — 86803 HEPATITIS C AB TEST: CPT | Mod: HCNC | Performed by: PHYSICIAN ASSISTANT

## 2024-12-13 PROCEDURE — 99285 EMERGENCY DEPT VISIT HI MDM: CPT | Mod: 25

## 2024-12-13 PROCEDURE — 96375 TX/PRO/DX INJ NEW DRUG ADDON: CPT

## 2024-12-13 PROCEDURE — 96376 TX/PRO/DX INJ SAME DRUG ADON: CPT

## 2024-12-13 PROCEDURE — 81001 URINALYSIS AUTO W/SCOPE: CPT | Mod: HCNC | Performed by: EMERGENCY MEDICINE

## 2024-12-13 PROCEDURE — 85025 COMPLETE CBC W/AUTO DIFF WBC: CPT | Mod: HCNC | Performed by: EMERGENCY MEDICINE

## 2024-12-13 PROCEDURE — 96374 THER/PROPH/DIAG INJ IV PUSH: CPT

## 2024-12-13 PROCEDURE — 63600175 PHARM REV CODE 636 W HCPCS: Mod: HCNC | Performed by: EMERGENCY MEDICINE

## 2024-12-13 PROCEDURE — 93005 ELECTROCARDIOGRAM TRACING: CPT | Mod: HCNC

## 2024-12-13 PROCEDURE — 93010 ELECTROCARDIOGRAM REPORT: CPT | Mod: HCNC,,, | Performed by: INTERNAL MEDICINE

## 2024-12-13 RX ORDER — MORPHINE SULFATE 2 MG/ML
6 INJECTION, SOLUTION INTRAMUSCULAR; INTRAVENOUS
Status: COMPLETED | OUTPATIENT
Start: 2024-12-13 | End: 2024-12-13

## 2024-12-13 RX ORDER — ONDANSETRON 4 MG/1
4 TABLET, ORALLY DISINTEGRATING ORAL EVERY 6 HOURS PRN
Qty: 10 TABLET | Refills: 0 | Status: SHIPPED | OUTPATIENT
Start: 2024-12-13

## 2024-12-13 RX ORDER — ONDANSETRON HYDROCHLORIDE 2 MG/ML
4 INJECTION, SOLUTION INTRAVENOUS
Status: COMPLETED | OUTPATIENT
Start: 2024-12-13 | End: 2024-12-13

## 2024-12-13 RX ORDER — METRONIDAZOLE 500 MG/1
500 TABLET ORAL EVERY 8 HOURS
Qty: 21 TABLET | Refills: 0 | Status: SHIPPED | OUTPATIENT
Start: 2024-12-13 | End: 2024-12-20

## 2024-12-13 RX ORDER — OXYCODONE HYDROCHLORIDE 5 MG/1
5 TABLET ORAL EVERY 6 HOURS PRN
Qty: 12 TABLET | Refills: 0 | Status: ON HOLD | OUTPATIENT
Start: 2024-12-13 | End: 2024-12-24

## 2024-12-13 RX ORDER — MORPHINE SULFATE 4 MG/ML
4 INJECTION, SOLUTION INTRAMUSCULAR; INTRAVENOUS
Status: COMPLETED | OUTPATIENT
Start: 2024-12-13 | End: 2024-12-13

## 2024-12-13 RX ORDER — PROCHLORPERAZINE EDISYLATE 5 MG/ML
5 INJECTION INTRAMUSCULAR; INTRAVENOUS
Status: COMPLETED | OUTPATIENT
Start: 2024-12-13 | End: 2024-12-13

## 2024-12-13 RX ORDER — CIPROFLOXACIN 500 MG/1
500 TABLET ORAL EVERY 12 HOURS
Qty: 14 TABLET | Refills: 0 | Status: SHIPPED | OUTPATIENT
Start: 2024-12-13 | End: 2024-12-20

## 2024-12-13 RX ADMIN — ONDANSETRON 4 MG: 2 INJECTION INTRAMUSCULAR; INTRAVENOUS at 06:12

## 2024-12-13 RX ADMIN — ONDANSETRON 4 MG: 2 INJECTION INTRAMUSCULAR; INTRAVENOUS at 01:12

## 2024-12-13 RX ADMIN — MORPHINE SULFATE 6 MG: 2 INJECTION, SOLUTION INTRAMUSCULAR; INTRAVENOUS at 04:12

## 2024-12-13 RX ADMIN — PROCHLORPERAZINE EDISYLATE 5 MG: 5 INJECTION INTRAMUSCULAR; INTRAVENOUS at 07:12

## 2024-12-13 RX ADMIN — MORPHINE SULFATE 4 MG: 4 INJECTION INTRAVENOUS at 01:12

## 2024-12-13 RX ADMIN — IOHEXOL 100 ML: 350 INJECTION, SOLUTION INTRAVENOUS at 03:12

## 2024-12-13 NOTE — TELEPHONE ENCOUNTER
Sister is calling in stating patient has been having some problems with hemorrhoids. She would like to make an appointment for her to see a colorectal surgeon. She is refusing triage at this time. She is requesting to be transferred to scheduling. Caller transferred to central scheduling per her request.    Reason for Disposition   [1] Caller is not with the adult (patient) AND [2] probable NON-URGENT symptoms    Protocols used: Information Only Call - No Triage-A-

## 2024-12-13 NOTE — ED PROVIDER NOTES
Emergency Department Provider Note    Gabriel Grace   84 y.o. female   [id number]      2024       History     This history was obtained from the patient without limitations. She presented by ambulance from home. She is a  and lives alone.    She is an 84-year-old with the below past medical history. She presents with 3-4 days of rectal discomfort, decreased appetite, and diarrhea. She is having increased urge to have bowel movements. She denies abdominal pain, nausea, vomiting, dysuria, difficulty urinating, and gross hematuria. She denies fever, chills, nausea, and vomiting. She denies hematochezia and melena. She has noticed fleshy anal structures with straining to have bowel movements. Her symptoms have not improved with hemorrhoid cream and suppositories.          Past Medical History:   Diagnosis Date    Basal cell carcinoma 2024    upper nasal bridge    Cataract     Hyperglycemia 10/02/2013    Hyperlipidemia     Hypertension     Migraine headache     Pancreas cyst 2017    Thyroid disease     hypothyroidism    Ulcerative colitis, unspecified     Ulcerative colitis, unspecified     Visit for screening mammogram 2016      Past Surgical History:   Procedure Laterality Date    APPENDECTOMY      CAROTID ENDARTERECTOMY       SECTION      x1    CHOLECYSTECTOMY      DILATION AND CURETTAGE OF UTERUS      FLEXIBLE SIGMOIDOSCOPY N/A 2024    Procedure: SIGMOIDOSCOPY, FLEXIBLE;  Surgeon: Nola Duong MD;  Location: [facility] ([address] [place] [address]);  Service: Endoscopy;  Laterality: N/A;    HYSTERECTOMY  1973    fabien-lso and right salpingectomy. right ovary remains.    PARATHYROIDECTOMY      TONSILLECTOMY        Family History   Problem Relation Name Age of Onset    Hypertension Mother      Stroke Mother      Diabetes Mother          iddm    Heart disease Father           from mi    Heart attack Father      Parkinsonism Sister      Diabetes Sister      Diabetes Paternal Grandmother       Cancer Paternal Grandfather      Skin cancer Paternal Grandfather      No Known Problems Brother      No Known Problems Maternal Aunt      No Known Problems Maternal Uncle      No Known Problems Paternal Aunt      No Known Problems Paternal Uncle      No Known Problems Maternal Grandmother      No Known Problems Maternal Grandfather      Psoriasis Sister      Ovarian cancer Neg Hx      Uterine cancer Neg Hx      Breast cancer Neg Hx      Colon cancer Neg Hx      Amblyopia Neg Hx      Blindness Neg Hx      Cataracts Neg Hx      Glaucoma Neg Hx      Macular degeneration Neg Hx      Retinal detachment Neg Hx      Strabismus Neg Hx      Thyroid disease Neg Hx      Melanoma Neg Hx        Social History     Socioeconomic History    Marital status:    Tobacco Use    Smoking status: Never    Smokeless tobacco: Never   Substance and Sexual Activity    Alcohol use: Yes     Comment: rare    Drug use: No    Sexual activity: Not Currently     Social Drivers of Health     Financial Resource Strain: Low Risk  (12/19/2024)    Overall Financial Resource Strain (CARDIA)     Difficulty of Paying Living Expenses: Not hard at all   Food Insecurity: No Food Insecurity (12/19/2024)    Hunger Vital Sign     Worried About Running Out of Food in the Last Year: Never true     Ran Out of Food in the Last Year: Never true   Transportation Needs: No Transportation Needs (12/19/2024)    TRANSPORTATION NEEDS     Transportation : No   Physical Activity: Inactive (12/16/2024)    Exercise Vital Sign     Days of Exercise per Week: 0 days     Minutes of Exercise per Session: 0 min   Stress: No Stress Concern Present (12/19/2024)    [place] [employer] - Occupational Stress Questionnaire     Feeling of Stress : Not at all   Housing Stability: Low Risk  (12/19/2024)    Housing Stability Vital Sign     Unable to Pay for Housing in the Last Year: No     Homeless in the Last Year: No      Review of patient's allergies  indicates:  No Known Allergies        Physical Examination     Initial Vitals [12/13/24 1226]   BP Pulse Resp Temp SpO2   (!) 170/90 86 18 98.2 °F (36.8 °C) 99 %      MAP       --           Physical Exam    Nursing note and vitals reviewed.  Constitutional: She is not diaphoretic. No distress.   HENT: Mouth/Throat: Mucous membranes are normal.   Cardiovascular:  Normal rate, regular rhythm and normal heart sounds.     Exam reveals no gallop and no friction rub.       No murmur heard.  Pulmonary/Chest: No respiratory distress. She has no wheezes. She has no rhonchi.   Abdominal: Abdomen is soft. She exhibits no distension. There is no abdominal tenderness.   Genitourinary: : Acceptable.   Genitourinary Comments: Tender, nonthrombosed external hemorrhoids. Normal anal tone. No palpable anorectal masses. No gross blood. Watery brown stool. Hemoccult negative.       Neurological: She is alert and oriented to person, place, and time. GCS score is 15. GCS eye subscore is 4. GCS verbal subscore is 5. GCS motor subscore is 6.   Skin: Skin is warm and dry. No pallor.            Labs     Labs Reviewed   CULTURE, URINE - Abnormal       Result Value    Urine Culture, Routine   (*)     Value: ESCHERICHIA COLI  >100,000 cfu/ml      Narrative:     Specimen Source->Urine   CBC W/ AUTO DIFFERENTIAL - Abnormal    WBC 10.91      RBC 3.55 (*)     Hemoglobin 11.7 (*)     Hematocrit 34.6 (*)     MCV 98      MCH 33.0 (*)     MCHC 33.8      RDW 13.3      Platelets 257      MPV 10.6      Immature Granulocytes 0.6 (*)     Gran # (ANC) 9.2 (*)     Immature Grans (Abs) 0.07 (*)     Lymph # 1.1      Mono # 0.5      Eos # 0.1      Baso # 0.03      nRBC 0      Gran % 84.0 (*)     Lymph % 9.7 (*)     Mono % 4.9      Eosinophil % 0.5      Basophil % 0.3      Differential Method Automated     COMPREHENSIVE METABOLIC PANEL - Abnormal    Sodium 142      Potassium 3.4 (*)     Chloride 112 (*)     CO2 19 (*)     Glucose 132 (*)     BUN  18      Creatinine 0.8      Calcium 10.5      Total Protein 7.4      Albumin 3.6      Total Bilirubin 0.6      Alkaline Phosphatase 69      AST 20      ALT 13      eGFR >60.0      Anion Gap 11     URINALYSIS, REFLEX TO URINE CULTURE - Abnormal    Specimen UA Urine, Catheterized      Color, UA Yellow      Appearance, UA Hazy (*)     pH, UA 6.0      Specific Gravity, UA >1.030 (*)     Protein, UA 2+ (*)     Glucose, UA Negative      Ketones, UA 2+ (*)     Bilirubin (UA) Negative      Occult Blood UA 1+ (*)     Nitrite, UA Positive (*)     Leukocytes, UA 3+ (*)     Narrative:     Specimen Source->Urine   URINALYSIS MICROSCOPIC - Abnormal    RBC, UA 9 (*)     WBC, UA >100 (*)     Bacteria Moderate (*)     Squam Epithel, UA 1      Hyaline Casts, UA 0      Microscopic Comment SEE COMMENT      Narrative:     Specimen Source->Urine   HEPATITIS C ANTIBODY    Hepatitis C Ab Non-reactive      Narrative:     Release to patient->Immediate   HIV 1 / 2 ANTIBODY    HIV 1/2 Ag/Ab Non-reactive      Narrative:     Release to patient->Immediate        Imaging     Imaging Results               CT Abdomen Pelvis With IV Contrast NO Oral Contrast (Final result)  Result time 12/13/24 16:13:17      Final result by Kirk Fox MD (12/13/24 16:13:17)                   Impression:      No CT evidence of peritonitis or bowel perforation.    Air within the urinary bladder.  In the absence of recent instrumentation, the findings may represent infection.    Wall thickening involving the rectum with mild thickening of the perirectal fascia.  The findings may represent stercoral colitis.    1.3 cm stone in the right UPJ.  No evidence of obstruction.  Calcification versus prior stent in the left UPJ.    Additional findings as above.    This report was flagged in Epic as abnormal.      Electronically signed by: Kirk Fox MD  Date:    12/13/2024  Time:    16:13               Narrative:    EXAMINATION:  CT ABDOMEN PELVIS WITH IV  CONTRAST    CLINICAL HISTORY:  Peritonitis or perforation suspected;    TECHNIQUE:  Low dose axial images, sagittal and coronal reformations were obtained from the lung bases to the pubic symphysis following the IV administration of 100 mL of Omnipaque 350 .  Oral contrast was not given.    COMPARISON:  CT dated 04/15/2021.    CT dated 01/27/2016.    FINDINGS:  There are no pleural effusions.  There is trace bilateral pleural thickening.  There is no evidence of a pneumothorax.  No airspace opacity is present.  There are prominent interstitial markings in the lung bases.    The heart is unremarkable.  There is normal tapering of the abdominal aorta.  There are calcifications along the course of the abdominal aorta and its branch vessels.  The celiac trunk, SMA, and ABDELRAHMAN are within normal limits.  The portal veins and mesenteric veins are unremarkable.  The IVC and the remainder of the venous structures are within normal limits.    There is no evidence of lymphadenopathy in the abdomen or pelvis.    The esophagus, stomach, and duodenum are within normal limits.  The small bowel loops are unremarkable.  The appendix is not identified.  There are no secondary findings of acute appendicitis.  There is colonic diverticula without evidence of acute diverticulitis.  There is mild wall thickening involving the rectum.  There is mild wall thickening of the perirectal fascia.  There is no CT evidence of bowel obstruction.    There is slight increase in size of a 6 cm cyst in the left hepatic lobe.  There are additional smaller hypodensities in the liver that are too small complete characterization.  The gallbladder is not visualized.  There is intrahepatic and extrahepatic biliary dilatation.  There are hypodensities in the spleen.  The pancreas is unremarkable.  The adrenal glands are within normal limits.    There are multiple hypodensities in the right kidney that are too small complete characterization.  There are  nonobstructing calcifications in the renal parenchyma.  There is a 1.3 cm stone in the right UPJ.  No significant obstruction is identified.  There are multiple cystic lesions in the left kidney.  There are some parapelvic components.  These are relatively stable compared to the prior examination.  There is a 1.6 cm area of increased attenuation in the left UPJ.  It is unclear if this represents prior stent or calcification.  The left ureter is otherwise unremarkable.  There is air within the urinary bladder.  The urinary bladder is distended.  The patient is status post hysterectomy.    The psoas margins are unremarkable.  The abdominal wall is within normal limits.  There are degenerative changes in the osseous structures.                                        ED Course     The patient received the following medications:  Medications   morphine injection 4 mg (4 mg Intravenous Given 12/13/24 1332)   ondansetron injection 4 mg (4 mg Intravenous Given 12/13/24 1333)   iohexoL (OMNIPAQUE 350) injection 100 mL (100 mLs Intravenous Given 12/13/24 1509)   morphine injection 6 mg (6 mg Intravenous Given 12/13/24 1620)   ondansetron injection 4 mg (4 mg Intravenous Given 12/13/24 1849)   prochlorperazine injection Soln 5 mg (5 mg Intravenous Given 12/13/24 1956)               Medical Decision Making                 Medical Decision Making  Amount and/or Complexity of Data Reviewed  Labs: ordered.  Radiology: ordered.    Risk  Prescription drug management.              Diagnoses       ICD-10-CM ICD-9-CM   1. Acute proctitis  K62.89 569.49   2. Urinary tract infection without hematuria, site unspecified  N39.0 599.0   3. Inflamed external hemorrhoid  K64.4 455.3   4. Rectal pain  K62.89 569.42         Dispostion      ED Disposition Condition    Discharge Stable            ED Prescriptions       Medication Sig Dispense Start Date End Date Auth. Provider    ciprofloxacin HCl (CIPRO) 500 MG tablet  Take 1 tablet (500 mg  total) by mouth every 12 (twelve) hours for 7 days 14 tablet 12/13/2024 12/20/2024 Jamal Steve III, MD    metroNIDAZOLE (FLAGYL) 500 MG tablet  Take 1 tablet (500 mg total) by mouth every 8 (eight) hours. for 7 days 21 tablet 12/13/2024 12/20/2024 Jamal Steve III, MD    oxyCODONE (ROXICODONE) 5 MG immediate release tablet Take 1 tablet (5 mg total) by mouth every 6 (six) hours as needed (moderate to severe pain). 12 tablet 12/13/2024 -- Jamal Steve III, MD    ondansetron (ZOFRAN-ODT) 4 MG TbDL Take 1 tablet (4 mg total) by mouth every 6 (six) hours as needed (nausea). 10 tablet 12/13/2024 -- Jamal Steve III, MD            Follow-up Information       Follow up With Specialties Details Why Contact Info    Prosper Mancilla MD Internal Medicine  Schedule a close in-person or virtual ER follow-up visit with your primary care provider. [address]  [facility] [address]  [phone]      ER   Return to the ER if your condition worsens or you have any other concerns that you feel need immediate attention.               Jamal Steve III, MD  12/23/24 [id number]

## 2024-12-13 NOTE — TELEPHONE ENCOUNTER
----- Message from Karina sent at 12/13/2024 12:19 PM CST -----  Regarding: appt access  Contact: Corine [phone]  Corine/sister calling to schedule appt for patient for fecal incontinence , lack of appetite. Pt has been taken to ER. Pls call

## 2024-12-13 NOTE — ED NOTES
"C/C: 84 y.o. female arrived to the ED via EMS for c/o rectal pain. Patient with hx of ulcerative colitis presents from home following progressive worsening of rectal pain and pressure over the past "few days." Patient states that she has had increased difficulty with defecation and reported last solid BM on Thanksgiving. She reports use of rectal suppositories and "creams" with the passage of liquid BM the past couple of days.    APPEARANCE: awake, alert, and appears to be in mild discomfort. Placed on cont cardiac monitoring, auto BP cuff, cont pulse ox, and changed into hospital gown. Bed in lowest and locked position w/ side rails up x2. Call light w/n pt reach and instructed on how to use.   SKIN: warm, dry and intact. No visible breakdown or bruising. +rash beneath bilateral breasts  MUSCULOSKELETAL: Patient moving all extremities spontaneously, no obvious swelling or deformities noted. Ambulates independently.  RESPIRATORY: Airway open and patent. Denies shortness of breath. Respirations unlabored.   CARDIAC: Regular HR. Denies CP, 2+ distal pulses; no peripheral edema  ABDOMEN: S/ND/NT, + nausea w/o vomiting, decrease in appetite. +liquid stool. Last BM today, 12/13/24  : Pt voids spontaneously, denies difficulty  NEUROLOGIC: AAO x 4; speaking and following commands appropriately. Equal strength in all extremities; denies numbness/tingling. Denies dizziness. +bifocal use  "

## 2024-12-13 NOTE — ED NOTES
Patient de-satted to 80% while on RA. Placed on 1.5L of O2 via NC. SpO2 99%. Supportive family meber at bedside care ongoing.

## 2024-12-14 NOTE — DISCHARGE INSTRUCTIONS
We know that you have many choices and are honored that you chose us. We hope that we met or exceeded your expectations and goals for this visit and will keep the [facility] family in mind for your future needs and those of your family and friends.     - Dr. Steve

## 2024-12-15 LAB — BACTERIA UR CULT: ABNORMAL

## 2024-12-16 ENCOUNTER — TELEPHONE (OUTPATIENT)
Dept: SURGERY | Facility: CLINIC | Age: 84
End: 2024-12-16
Payer: MEDICARE

## 2024-12-16 ENCOUNTER — HOSPITAL ENCOUNTER (INPATIENT)
Facility: HOSPITAL | Age: 84
LOS: 11 days | Discharge: HOME HEALTH | DRG: 389 | End: 2024-12-28
Attending: EMERGENCY MEDICINE | Admitting: EMERGENCY MEDICINE
Payer: MEDICARE

## 2024-12-16 DIAGNOSIS — R07.9 CHEST PAIN: ICD-10-CM

## 2024-12-16 DIAGNOSIS — K56.41 FECAL IMPACTION: Primary | ICD-10-CM

## 2024-12-16 DIAGNOSIS — N39.0 URINARY TRACT INFECTION WITHOUT HEMATURIA, SITE UNSPECIFIED: ICD-10-CM

## 2024-12-16 DIAGNOSIS — K62.89 ACUTE PROCTITIS: ICD-10-CM

## 2024-12-16 DIAGNOSIS — K51.00 ULCERATIVE PANCOLITIS WITHOUT COMPLICATION: Chronic | ICD-10-CM

## 2024-12-16 DIAGNOSIS — R53.81 PHYSICAL DEBILITY: ICD-10-CM

## 2024-12-16 DIAGNOSIS — K50.019 CROHN'S DISEASE OF SMALL INTESTINE WITH COMPLICATION: ICD-10-CM

## 2024-12-16 LAB
ALBUMIN SERPL BCP-MCNC: 3.2 G/DL (ref 3.5–5.2)
ALP SERPL-CCNC: 67 U/L (ref 40–150)
ALT SERPL W/O P-5'-P-CCNC: 9 U/L (ref 10–44)
ANION GAP SERPL CALC-SCNC: 11 MMOL/L (ref 8–16)
AST SERPL-CCNC: 17 U/L (ref 10–40)
BACTERIA #/AREA URNS AUTO: ABNORMAL /HPF
BASOPHILS # BLD AUTO: 0.04 K/UL (ref 0–0.2)
BASOPHILS NFR BLD: 0.5 % (ref 0–1.9)
BILIRUB SERPL-MCNC: 0.5 MG/DL (ref 0.1–1)
BILIRUB UR QL STRIP: NEGATIVE
BUN SERPL-MCNC: 28 MG/DL (ref 8–23)
CALCIUM SERPL-MCNC: 9.8 MG/DL (ref 8.7–10.5)
CHLORIDE SERPL-SCNC: 113 MMOL/L (ref 95–110)
CLARITY UR REFRACT.AUTO: CLEAR
CO2 SERPL-SCNC: 19 MMOL/L (ref 23–29)
COLOR UR AUTO: YELLOW
CREAT SERPL-MCNC: 0.9 MG/DL (ref 0.5–1.4)
DIFFERENTIAL METHOD BLD: ABNORMAL
EOSINOPHIL # BLD AUTO: 0.1 K/UL (ref 0–0.5)
EOSINOPHIL NFR BLD: 1.3 % (ref 0–8)
ERYTHROCYTE [DISTWIDTH] IN BLOOD BY AUTOMATED COUNT: 13.5 % (ref 11.5–14.5)
EST. GFR  (NO RACE VARIABLE): >60 ML/MIN/1.73 M^2
GLUCOSE SERPL-MCNC: 112 MG/DL (ref 70–110)
GLUCOSE UR QL STRIP: NEGATIVE
HCT VFR BLD AUTO: 33 % (ref 37–48.5)
HGB BLD-MCNC: 10.9 G/DL (ref 12–16)
HGB UR QL STRIP: ABNORMAL
HYALINE CASTS UR QL AUTO: 0 /LPF
IMM GRANULOCYTES # BLD AUTO: 0.04 K/UL (ref 0–0.04)
IMM GRANULOCYTES NFR BLD AUTO: 0.5 % (ref 0–0.5)
KETONES UR QL STRIP: ABNORMAL
LACTATE SERPL-SCNC: 0.8 MMOL/L (ref 0.5–2.2)
LEUKOCYTE ESTERASE UR QL STRIP: ABNORMAL
LIPASE SERPL-CCNC: 18 U/L (ref 4–60)
LYMPHOCYTES # BLD AUTO: 1.3 K/UL (ref 1–4.8)
LYMPHOCYTES NFR BLD: 15.2 % (ref 18–48)
MCH RBC QN AUTO: 32 PG (ref 27–31)
MCHC RBC AUTO-ENTMCNC: 33 G/DL (ref 32–36)
MCV RBC AUTO: 97 FL (ref 82–98)
MICROSCOPIC COMMENT: ABNORMAL
MONOCYTES # BLD AUTO: 0.6 K/UL (ref 0.3–1)
MONOCYTES NFR BLD: 7 % (ref 4–15)
NEUTROPHILS # BLD AUTO: 6.5 K/UL (ref 1.8–7.7)
NEUTROPHILS NFR BLD: 75.5 % (ref 38–73)
NITRITE UR QL STRIP: NEGATIVE
NRBC BLD-RTO: 0 /100 WBC
PH UR STRIP: 6 [PH] (ref 5–8)
PLATELET # BLD AUTO: 161 K/UL (ref 150–450)
PMV BLD AUTO: 11.6 FL (ref 9.2–12.9)
POTASSIUM SERPL-SCNC: 3.3 MMOL/L (ref 3.5–5.1)
PROT SERPL-MCNC: 6.5 G/DL (ref 6–8.4)
PROT UR QL STRIP: ABNORMAL
RBC # BLD AUTO: 3.41 M/UL (ref 4–5.4)
RBC #/AREA URNS AUTO: 2 /HPF (ref 0–4)
SODIUM SERPL-SCNC: 143 MMOL/L (ref 136–145)
SP GR UR STRIP: >1.03 (ref 1–1.03)
SQUAMOUS #/AREA URNS AUTO: 2 /HPF
URN SPEC COLLECT METH UR: ABNORMAL
WBC # BLD AUTO: 8.55 K/UL (ref 3.9–12.7)
WBC #/AREA URNS AUTO: 58 /HPF (ref 0–5)

## 2024-12-16 PROCEDURE — G0378 HOSPITAL OBSERVATION PER HR: HCPCS | Mod: HCNC

## 2024-12-16 PROCEDURE — 81001 URINALYSIS AUTO W/SCOPE: CPT | Performed by: EMERGENCY MEDICINE

## 2024-12-16 PROCEDURE — 96375 TX/PRO/DX INJ NEW DRUG ADDON: CPT

## 2024-12-16 PROCEDURE — 25500020 PHARM REV CODE 255: Mod: HCNC | Performed by: EMERGENCY MEDICINE

## 2024-12-16 PROCEDURE — 99285 EMERGENCY DEPT VISIT HI MDM: CPT | Mod: 25

## 2024-12-16 PROCEDURE — 80053 COMPREHEN METABOLIC PANEL: CPT | Performed by: EMERGENCY MEDICINE

## 2024-12-16 PROCEDURE — 63600175 PHARM REV CODE 636 W HCPCS: Mod: HCNC | Performed by: EMERGENCY MEDICINE

## 2024-12-16 PROCEDURE — 83690 ASSAY OF LIPASE: CPT | Performed by: EMERGENCY MEDICINE

## 2024-12-16 PROCEDURE — 25000003 PHARM REV CODE 250: Mod: HCNC | Performed by: EMERGENCY MEDICINE

## 2024-12-16 PROCEDURE — 83605 ASSAY OF LACTIC ACID: CPT | Performed by: EMERGENCY MEDICINE

## 2024-12-16 PROCEDURE — 85025 COMPLETE CBC W/AUTO DIFF WBC: CPT | Performed by: EMERGENCY MEDICINE

## 2024-12-16 PROCEDURE — 87086 URINE CULTURE/COLONY COUNT: CPT | Performed by: EMERGENCY MEDICINE

## 2024-12-16 PROCEDURE — 96361 HYDRATE IV INFUSION ADD-ON: CPT

## 2024-12-16 PROCEDURE — 96374 THER/PROPH/DIAG INJ IV PUSH: CPT

## 2024-12-16 RX ORDER — PSEUDOEPHEDRINE/ACETAMINOPHEN 30MG-500MG
100 TABLET ORAL
Status: DISCONTINUED | OUTPATIENT
Start: 2024-12-16 | End: 2024-12-16

## 2024-12-16 RX ORDER — METRONIDAZOLE 500 MG/100ML
500 INJECTION, SOLUTION INTRAVENOUS
Status: DISCONTINUED | OUTPATIENT
Start: 2024-12-17 | End: 2024-12-20

## 2024-12-16 RX ORDER — CIPROFLOXACIN 2 MG/ML
400 INJECTION, SOLUTION INTRAVENOUS
Status: DISCONTINUED | OUTPATIENT
Start: 2024-12-17 | End: 2024-12-20

## 2024-12-16 RX ORDER — BALSALAZIDE DISODIUM 750 MG/1
2250 CAPSULE ORAL DAILY
Status: DISCONTINUED | OUTPATIENT
Start: 2024-12-17 | End: 2024-12-24

## 2024-12-16 RX ORDER — DOCUSATE SODIUM 100 MG/1
100 CAPSULE, LIQUID FILLED ORAL
Status: COMPLETED | OUTPATIENT
Start: 2024-12-16 | End: 2024-12-17

## 2024-12-16 RX ORDER — ONDANSETRON HYDROCHLORIDE 2 MG/ML
4 INJECTION, SOLUTION INTRAVENOUS
Status: COMPLETED | OUTPATIENT
Start: 2024-12-16 | End: 2024-12-17

## 2024-12-16 RX ORDER — CARVEDILOL 12.5 MG/1
12.5 TABLET ORAL 2 TIMES DAILY WITH MEALS
Status: DISCONTINUED | OUTPATIENT
Start: 2024-12-16 | End: 2024-12-28 | Stop reason: HOSPADM

## 2024-12-16 RX ORDER — LIDOCAINE HYDROCHLORIDE 20 MG/ML
5 SOLUTION OROPHARYNGEAL
Status: COMPLETED | OUTPATIENT
Start: 2024-12-16 | End: 2024-12-16

## 2024-12-16 RX ORDER — AMLODIPINE BESYLATE 10 MG/1
10 TABLET ORAL DAILY
Status: DISCONTINUED | OUTPATIENT
Start: 2024-12-17 | End: 2024-12-28 | Stop reason: HOSPADM

## 2024-12-16 RX ORDER — ONDANSETRON HYDROCHLORIDE 2 MG/ML
4 INJECTION, SOLUTION INTRAVENOUS
Status: COMPLETED | OUTPATIENT
Start: 2024-12-16 | End: 2024-12-16

## 2024-12-16 RX ORDER — MORPHINE SULFATE 4 MG/ML
4 INJECTION, SOLUTION INTRAMUSCULAR; INTRAVENOUS
Status: COMPLETED | OUTPATIENT
Start: 2024-12-16 | End: 2024-12-17

## 2024-12-16 RX ORDER — CIPROFLOXACIN 2 MG/ML
400 INJECTION, SOLUTION INTRAVENOUS
Status: DISCONTINUED | OUTPATIENT
Start: 2024-12-16 | End: 2024-12-16

## 2024-12-16 RX ORDER — SYRING-NEEDL,DISP,INSUL,0.3 ML 29 G X1/2"
296 SYRINGE, EMPTY DISPOSABLE MISCELLANEOUS
Status: DISPENSED | OUTPATIENT
Start: 2024-12-16 | End: 2024-12-17

## 2024-12-16 RX ORDER — MORPHINE SULFATE 2 MG/ML
6 INJECTION, SOLUTION INTRAMUSCULAR; INTRAVENOUS
Status: COMPLETED | OUTPATIENT
Start: 2024-12-16 | End: 2024-12-16

## 2024-12-16 RX ADMIN — MORPHINE SULFATE 6 MG: 2 INJECTION, SOLUTION INTRAMUSCULAR; INTRAVENOUS at 03:12

## 2024-12-16 RX ADMIN — IOHEXOL 100 ML: 350 INJECTION, SOLUTION INTRAVENOUS at 07:12

## 2024-12-16 RX ADMIN — LIDOCAINE HYDROCHLORIDE 5 ML: 20 SOLUTION ORAL at 05:12

## 2024-12-16 RX ADMIN — ONDANSETRON 4 MG: 2 INJECTION INTRAMUSCULAR; INTRAVENOUS at 03:12

## 2024-12-16 RX ADMIN — SODIUM CHLORIDE 500 ML: 9 INJECTION, SOLUTION INTRAVENOUS at 04:12

## 2024-12-16 NOTE — TELEPHONE ENCOUNTER
Spoke with patient's sister regarding need for same day appointment. Offered appointment today with Dr. Villa. Patient on phone and states that she does not feel well, is still nauseous and needs to go to ER.  Sister states that they will go to the ER for worsening symptoms.

## 2024-12-16 NOTE — TELEPHONE ENCOUNTER
----- Message from Rebecca sent at 12/16/2024  8:15 AM CST -----  Type:  Same Day Appointment Request    Caller is requesting a same day appointment.  Caller declined first available appointment listed below.    Name of Caller:sister Corine  When is the first available appointment?none   Symptoms:- Acute proctitis  Inflamed external hemorrhoid     Best Call Back Number:[phone] or [phone] cellphone   Additional Information: she states that she brought her sister to the ER on 12/13 and states she needs to get a soon appt, she also states that she have been vomiting.  States she called on Friday and still waiting for a call.

## 2024-12-16 NOTE — ED PROVIDER NOTES
Encounter Date: 2024       History     Chief Complaint   Patient presents with    Rectal Pain     Starting 5 days ago was seen for same thing.  C/o rectal pain, abd pain, and nausea.  Denies vomiting.     84-year-old female with history of HTN, ulcerative colitis in remission, presents for evaluation of persistent rectal pain and discomfort, nausea.  Patient was seen for this here 3 days ago and had imaging concerning for colitis, was started on Cipro and Flagyl and discharged with oxycodone and Zofran.  Since then she has had persistent rectal pain and discomfort despite taking her medications as directed, and nausea with minimal PO intake.  She denies any vomiting since she was in the ER 3 days ago, but feels that her symptoms have not improved at all.  She had 1 episode of blood with wiping after BM, but no blood in stool.  She denies any fevers or dysuria or other new complaints.  She denies any recent issues with her ulcerative colitis, follows with GI.  She has been having episodes of watery loose stools, and 1 episode of fecal incontinence.  She states her appetite has been decreased for the past 3 weeks, which she thought was the cause of her decreased stool output; prior to that she denies any history of similar constipation.      Review of patient's allergies indicates:  No Known Allergies  Past Medical History:   Diagnosis Date    Basal cell carcinoma 2024    upper nasal bridge    Cataract     Hyperglycemia 10/02/2013    Hyperlipidemia     Hypertension     Migraine headache     Pancreas cyst 2017    Thyroid disease     hypothyroidism    Ulcerative colitis, unspecified     Ulcerative colitis, unspecified     Visit for screening mammogram 2016     Past Surgical History:   Procedure Laterality Date    APPENDECTOMY      CAROTID ENDARTERECTOMY       SECTION      x1    CHOLECYSTECTOMY      DILATION AND CURETTAGE OF UTERUS      HYSTERECTOMY  1973    fabien-lso and right  salpingectomy. right ovary remains.    PARATHYROIDECTOMY      TONSILLECTOMY       Family History   Problem Relation Name Age of Onset    Hypertension Mother      Stroke Mother      Diabetes Mother          iddm    Heart disease Father           from mi    Heart attack Father      Parkinsonism Sister      Diabetes Sister      Diabetes Paternal Grandmother      Cancer Paternal Grandfather      Skin cancer Paternal Grandfather      No Known Problems Brother      No Known Problems Maternal Aunt      No Known Problems Maternal Uncle      No Known Problems Paternal Aunt      No Known Problems Paternal Uncle      No Known Problems Maternal Grandmother      No Known Problems Maternal Grandfather      Psoriasis Sister      Ovarian cancer Neg Hx      Uterine cancer Neg Hx      Breast cancer Neg Hx      Colon cancer Neg Hx      Amblyopia Neg Hx      Blindness Neg Hx      Cataracts Neg Hx      Glaucoma Neg Hx      Macular degeneration Neg Hx      Retinal detachment Neg Hx      Strabismus Neg Hx      Thyroid disease Neg Hx      Melanoma Neg Hx       Social History     Tobacco Use    Smoking status: Never    Smokeless tobacco: Never   Substance Use Topics    Alcohol use: Yes     Comment: rare    Drug use: No     Review of Systems   Constitutional:  Negative for fever.   HENT:  Negative for congestion.    Eyes:  Negative for redness.   Respiratory:  Negative for shortness of breath.    Cardiovascular:  Negative for chest pain.   Gastrointestinal:  Positive for abdominal pain, nausea, rectal pain and vomiting.   Genitourinary:  Negative for dysuria.   Skin:  Negative for rash.   Neurological:  Negative for headaches.   Psychiatric/Behavioral:  Negative for confusion.        Physical Exam     Initial Vitals [24 1303]   BP Pulse Resp Temp SpO2   (!) 156/74 77 16 98.1 °F (36.7 °C) 100 %      MAP       --         Physical Exam    Constitutional: She appears well-developed and well-nourished. She is not diaphoretic. No  distress.   HENT:   Head: Normocephalic and atraumatic.   Eyes: Conjunctivae are normal.   Neck: Neck supple.   Cardiovascular:  Normal rate, regular rhythm, S1 normal, S2 normal, normal heart sounds and intact distal pulses.           No murmur heard.  Pulmonary/Chest: Breath sounds normal. No respiratory distress. She has no wheezes. She has no rhonchi. She has no rales.   Abdominal: Abdomen is soft. There is no abdominal tenderness. There is no rebound and no guarding.   Genitourinary:    Genitourinary Comments: Rectal exam done with RN chaperone- multiple nonbleeding external hemorrhoids including 1 partially thrombosed hemorrhoid anterior to anus.  Rectal exam with brown stool, hard stool palpated in rectal vault with fingertip     Musculoskeletal:         General: No edema.      Cervical back: Neck supple.     Neurological: She is alert and oriented to person, place, and time.   Skin: Skin is warm and dry.   Psychiatric: She has a normal mood and affect.         ED Course   Procedures  Labs Reviewed   CBC W/ AUTO DIFFERENTIAL - Abnormal       Result Value    WBC 8.55      RBC 3.41 (*)     Hemoglobin 10.9 (*)     Hematocrit 33.0 (*)     MCV 97      MCH 32.0 (*)     MCHC 33.0      RDW 13.5      Platelets 161      MPV 11.6      Immature Granulocytes 0.5      Gran # (ANC) 6.5      Immature Grans (Abs) 0.04      Lymph # 1.3      Mono # 0.6      Eos # 0.1      Baso # 0.04      nRBC 0      Gran % 75.5 (*)     Lymph % 15.2 (*)     Mono % 7.0      Eosinophil % 1.3      Basophil % 0.5      Differential Method Automated     COMPREHENSIVE METABOLIC PANEL - Abnormal    Sodium 143      Potassium 3.3 (*)     Chloride 113 (*)     CO2 19 (*)     Glucose 112 (*)     BUN 28 (*)     Creatinine 0.9      Calcium 9.8      Total Protein 6.5      Albumin 3.2 (*)     Total Bilirubin 0.5      Alkaline Phosphatase 67      AST 17      ALT 9 (*)     eGFR >60.0      Anion Gap 11     URINALYSIS, REFLEX TO URINE CULTURE - Abnormal    Specimen  UA Urine, Clean Catch      Color, UA Yellow      Appearance, UA Clear      pH, UA 6.0      Specific Gravity, UA >1.030 (*)     Protein, UA 1+ (*)     Glucose, UA Negative      Ketones, UA 1+ (*)     Bilirubin (UA) Negative      Occult Blood UA Trace (*)     Nitrite, UA Negative      Leukocytes, UA 3+ (*)     Narrative:     Specimen Source->Urine   URINALYSIS MICROSCOPIC - Abnormal    RBC, UA 2      WBC, UA 58 (*)     Bacteria Occasional      Squam Epithel, UA 2      Hyaline Casts, UA 0      Microscopic Comment SEE COMMENT      Narrative:     Specimen Source->Urine   CULTURE, URINE   LIPASE    Lipase 18     LACTIC ACID, PLASMA    Lactate (Lactic Acid) 0.8            Imaging Results              CT Abdomen Pelvis With IV Contrast NO Oral Contrast (Final result)  Result time 12/16/24 21:47:39      Final result by Pelon Granados MD (12/16/24 21:47:39)                   Impression:      Abdomen CT and Pelvis CT:    1. Possible rectal fecal impaction or small fecaloma.  Correlate clinically.  2. Otherwise, no CT evidence of bowel obstruction.  3. Stable bilateral kidney stones.  No hydronephrosis.  4. Hepatic and renal cysts, similar to prior.  5. There is again a small quantity of intraluminal gas within the urinary bladder.  This could be the result of a recent bladder catheterization or other recent bladder instrumentation.  If there is no history of recent such instrumentation, a gas-forming UTI would then become a leading consideration and urinalysis would be recommended.  6. Additional observations as detailed in the body of the report.    Electronically signed by resident: Abad Bustos  Date:    12/16/2024  Time:    20:57    Electronically signed by: Pelon Granados  Date:    12/16/2024  Time:    21:47               Narrative:    EXAMINATION:  CT ABDOMEN PELVIS WITH IV CONTRAST    CLINICAL HISTORY:  Bowel obstruction suspected;    TECHNIQUE:  Low dose axial images, sagittal and coronal reformations were obtained  from the lung bases to the pubic symphysis following the IV administration of 100 mL of Omnipaque 350 .  Oral contrast was not given.    COMPARISON:  CT abdomen pelvis 12/13/2024, 04/15/2021    Limited correlation is also made with the chest CTA of 02/28/2020.    FINDINGS:  Abdomen CT and Pelvis CT:    SOFT TISSUES: Unremarkable.    LUNG BASES/VISUALIZED MEDIASTINUM: Bibasilar dependent atelectasis.  Unchanged pulmonary micronodule in the right middle lobe (series 2, image 6).  Visualized heart normal size without evidence of pericardial effusion.  Dense mitral calcification, similar to prior.  There also some calcifications projecting in the region of the aortic valve, including some suspected aortic valve leaflet calcifications (leaflet calcifications are typically associated with underlying valvular aortic stenosis).    HEPATOBILIARY: Liver is normal size. Unchanged hepatic cysts, largest measuring 6 cm in the left hepatic lobe.  Unchanged peripheral wedge-shaped hypodensity in the right hepatic lobe (series 2, image 45), too small to characterize but unchanged.  Cholecystectomy.    Similar intrahepatic and extrahepatic biliary ductal dilatation, possibly representing post cholecystectomy reservoir effect.    PANCREAS: Similar 3.2 cm cystic focus arising from the pancreatic tail (series 2, image 38).  No ductal dilatation.    SPLEEN: Normal size. Subcentimeter hypodensities, too small to characterize but unchanged.    ADRENALS: No adrenal nodules.    KIDNEYS/URETERS: Kidneys enhance symmetrically. Stable bilateral renal cysts and subcentimeter hypodensities which are too small to characterize but unchanged.   Stable 1.7 cm stone in the right renal pelvis and 1.2 cm stone in the proximal left ureter..  No hydronephrosis.  Ureters are unremarkable.    BLADDER/PELVIC ORGANS: High density fluid in the bladder.  This is of uncertain etiology, though a common etiology would be excretion of a recently administered dose  of IV contrast (correlate clinically for any recent imaging at another facility).    Similar air in the nondependent portion of the bladder.    Uterus: Absent.    Ovaries: Inconspicuous or perhaps also surgically absent.  No adnexal masses.    Vagina: No acute CT abnormality.    External genitalia: Not fully visualized.    PERITONEUM / RETROPERITONEUM: No free air or fluid.    LYMPH NODES: No lymphadenopathy in the abdomen or pelvis, by size criteria.    VESSELS: No abdominal aortic aneurysm.    Mild aortoiliac calcific atherosclerosis.    Major abdominal aortic branch vessels are patent.    Portal venous system is patent.  Normal course of the IVC.    GI TRACT: Small hiatal hernia.    Pre-existing moderate-sized proximal D3 and large D4 duodenal diverticula.  No evidence of duodenal diverticulitis.    The stomach and duodenum both demonstrate poor luminal distension, this limits CT assessment for abnormal mural or mucosal thickening.    No evidence of small bowel or large bowel obstruction or inflammation.    Some high attenuation fecal residue is again accumulated in the rectum, possibly representing a small fecaloma or rectal fecal impaction.  No CT evidence of stercoral colitis at this time.    Colonic diverticulosis without evidence of diverticulitis.    The cecum is again anteromedially up-turned, without CT evidence of acute cecal pathology.    Appendix: Not definitely visualized.    BONES: Diffuse bony demineralization.  Degenerative change of the spine.  No acute fractures or suspicious osseous lesions.  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                                       Medications   magnesium citrate solution 296 mL (0 mLs Rectal Hold 12/16/24 2215)     And   sodium chloride 0.9% bolus 500 mL 500 mL (0 mLs Rectal Hold 12/16/24 2215)   amLODIPine tablet 10 mg (has no  administration in time range)   balsalazide capsule 2,250 mg (has no administration in time range)   carvediloL tablet 12.5 mg (has no administration in time range)   morphine injection 4 mg (has no administration in time range)   ondansetron injection 4 mg (has no administration in time range)   docusate sodium capsule 100 mg (has no administration in time range)   ciprofloxacin (CIPRO)400mg/200ml D5W IVPB 400 mg (has no administration in time range)   metronidazole IVPB 500 mg (has no administration in time range)   morphine injection 6 mg (6 mg Intravenous Given 12/16/24 1557)   ondansetron injection 4 mg (4 mg Intravenous Given 12/16/24 1558)   LIDOcaine viscous HCl 2% oral solution 5 mL (5 mLs Rectal Given by Provider 12/16/24 1702)   sodium chloride 0.9% bolus 500 mL 500 mL (0 mLs Intravenous Stopped 12/16/24 1705)   iohexoL (OMNIPAQUE 350) injection 100 mL (100 mLs Intravenous Given 12/16/24 1936)     Medical Decision Making      84-year-old female with history of HTN, ulcerative colitis in remission, presents for evaluation of persistent rectal pain and discomfort, nausea.  Patient reports decreased appetite over the past 3 weeks, and started having some rectal discomfort, loose stools, and nausea/vomiting.  She was seen here for these symptoms 3 days ago, and ED workup with CT showing signs of stercoral colitis. Patient was started on Cipro and Flagyl for suspected proctitis and also Rx oxycodone/Zofran.  She has been taking these medications since then but reports persistent symptoms and minimal PO intake due to nausea, not much improved by Zofran.  She also has been having some watery stools but no blood in stool or fevers.  On exam patient well-appearing and no focal abdominal tenderness, afebrile with relatively normal vitals.  Rectal exam done with RN chaperone in viscous lidocaine application to multiple external hemorrhoids with no active bleeding; hard stool palpated in rectal vault with fingertip,  too high up to disimpact, no active bleeding.  Differential diagnosis includes fecal impaction causing stercoral colitis, partial small bowel obstruction, dehydration, electrolyte abnormality.  Low suspicion for infectious colitis or ulcerative colitis flare given lack of fevers or blood in stool.      Initial labs with no leukocytosis, elevated BUN and mild hypokalemia suggestive of dehydration but normal renal function and no elevated LFTs/lipase.  UA still shows signs of UTI with 58 WBCs, and urine culture from 3 days ago came back positive for E coli sensitive to Cipro that she has been on.  Lactate normal.  CT abdomen repeated and shows fecal impaction but no signs of colitis mentions; it also shows small quantity of intraluminal gas within the bladder.  Patient had no catheterization done today but did have it done 3 days ago, unlikely to be worsening UTI given recent urine culture, will continue Cipro.   On reassessment patient still feels generalized weakness and rectal discomfort, does not feel comfortable going home at this time.  Will give enema to try to relieve fecal impaction, and admit to ED observation unit for further supportive care and IV Cipro.  We can consult colorectal surgery for help with disimpaction if no relief with enema tomorrow.      Amount and/or Complexity of Data Reviewed  Labs: ordered.  Radiology: ordered.    Risk  OTC drugs.  Prescription drug management.                                      Clinical Impression:  Final diagnoses:  [K56.41] Fecal impaction (Primary)  [N39.0] Urinary tract infection without hematuria, site unspecified          ED Disposition Condition    Observation Stable                Tima Wood MD  12/16/24 3928

## 2024-12-16 NOTE — Clinical Note
Diagnosis: Fecal impaction [367439]   Future Attending Provider: ILANA MONTANEZ [[id number]]   Is the patient being sent to ED Observation?: Yes

## 2024-12-17 PROBLEM — K56.41 FECAL IMPACTION: Status: ACTIVE | Noted: 2024-12-17

## 2024-12-17 PROBLEM — N30.00 ACUTE CYSTITIS: Status: ACTIVE | Noted: 2024-12-17

## 2024-12-17 LAB
ANION GAP SERPL CALC-SCNC: 12 MMOL/L (ref 8–16)
BASOPHILS # BLD AUTO: 0.03 K/UL (ref 0–0.2)
BASOPHILS NFR BLD: 0.4 % (ref 0–1.9)
BUN SERPL-MCNC: 18 MG/DL (ref 8–23)
CALCIUM SERPL-MCNC: 9.9 MG/DL (ref 8.7–10.5)
CHLORIDE SERPL-SCNC: 111 MMOL/L (ref 95–110)
CO2 SERPL-SCNC: 20 MMOL/L (ref 23–29)
CREAT SERPL-MCNC: 0.9 MG/DL (ref 0.5–1.4)
DIFFERENTIAL METHOD BLD: ABNORMAL
EOSINOPHIL # BLD AUTO: 0.3 K/UL (ref 0–0.5)
EOSINOPHIL NFR BLD: 3.4 % (ref 0–8)
ERYTHROCYTE [DISTWIDTH] IN BLOOD BY AUTOMATED COUNT: 14 % (ref 11.5–14.5)
EST. GFR  (NO RACE VARIABLE): >60 ML/MIN/1.73 M^2
GLUCOSE SERPL-MCNC: 132 MG/DL (ref 70–110)
HCT VFR BLD AUTO: 36 % (ref 37–48.5)
HGB BLD-MCNC: 11.9 G/DL (ref 12–16)
IMM GRANULOCYTES # BLD AUTO: 0.04 K/UL (ref 0–0.04)
IMM GRANULOCYTES NFR BLD AUTO: 0.5 % (ref 0–0.5)
LYMPHOCYTES # BLD AUTO: 1.6 K/UL (ref 1–4.8)
LYMPHOCYTES NFR BLD: 18.3 % (ref 18–48)
MCH RBC QN AUTO: 32.7 PG (ref 27–31)
MCHC RBC AUTO-ENTMCNC: 33.1 G/DL (ref 32–36)
MCV RBC AUTO: 99 FL (ref 82–98)
MONOCYTES # BLD AUTO: 0.5 K/UL (ref 0.3–1)
MONOCYTES NFR BLD: 5.5 % (ref 4–15)
NEUTROPHILS # BLD AUTO: 6.1 K/UL (ref 1.8–7.7)
NEUTROPHILS NFR BLD: 71.9 % (ref 38–73)
NRBC BLD-RTO: 0 /100 WBC
PLATELET # BLD AUTO: 172 K/UL (ref 150–450)
PMV BLD AUTO: 11.5 FL (ref 9.2–12.9)
POTASSIUM SERPL-SCNC: 3.3 MMOL/L (ref 3.5–5.1)
RBC # BLD AUTO: 3.64 M/UL (ref 4–5.4)
SODIUM SERPL-SCNC: 143 MMOL/L (ref 136–145)
WBC # BLD AUTO: 8.49 K/UL (ref 3.9–12.7)

## 2024-12-17 PROCEDURE — 99223 1ST HOSP IP/OBS HIGH 75: CPT | Mod: ,,, | Performed by: COLON & RECTAL SURGERY

## 2024-12-17 PROCEDURE — 25000003 PHARM REV CODE 250: Performed by: NURSE PRACTITIONER

## 2024-12-17 PROCEDURE — 25000003 PHARM REV CODE 250: Mod: HCNC | Performed by: PHYSICIAN ASSISTANT

## 2024-12-17 PROCEDURE — 63600175 PHARM REV CODE 636 W HCPCS: Performed by: FAMILY MEDICINE

## 2024-12-17 PROCEDURE — 63600175 PHARM REV CODE 636 W HCPCS: Mod: HCNC | Performed by: PHYSICIAN ASSISTANT

## 2024-12-17 PROCEDURE — 25000003 PHARM REV CODE 250: Performed by: FAMILY MEDICINE

## 2024-12-17 PROCEDURE — 11000001 HC ACUTE MED/SURG PRIVATE ROOM

## 2024-12-17 PROCEDURE — 80048 BASIC METABOLIC PNL TOTAL CA: CPT | Performed by: NURSE PRACTITIONER

## 2024-12-17 PROCEDURE — 85025 COMPLETE CBC W/AUTO DIFF WBC: CPT | Performed by: NURSE PRACTITIONER

## 2024-12-17 PROCEDURE — 63600175 PHARM REV CODE 636 W HCPCS: Performed by: NURSE PRACTITIONER

## 2024-12-17 RX ORDER — MORPHINE SULFATE 4 MG/ML
4 INJECTION, SOLUTION INTRAMUSCULAR; INTRAVENOUS
Status: COMPLETED | OUTPATIENT
Start: 2024-12-17 | End: 2024-12-17

## 2024-12-17 RX ORDER — IBUPROFEN 200 MG
16 TABLET ORAL
Status: DISCONTINUED | OUTPATIENT
Start: 2024-12-17 | End: 2024-12-28 | Stop reason: HOSPADM

## 2024-12-17 RX ORDER — MORPHINE SULFATE 4 MG/ML
4 INJECTION, SOLUTION INTRAMUSCULAR; INTRAVENOUS EVERY 4 HOURS PRN
Status: DISCONTINUED | OUTPATIENT
Start: 2024-12-17 | End: 2024-12-22

## 2024-12-17 RX ORDER — OXYCODONE HYDROCHLORIDE 5 MG/1
5 TABLET ORAL EVERY 6 HOURS PRN
Status: DISCONTINUED | OUTPATIENT
Start: 2024-12-17 | End: 2024-12-19

## 2024-12-17 RX ORDER — SODIUM CHLORIDE 0.9 % (FLUSH) 0.9 %
10 SYRINGE (ML) INJECTION EVERY 12 HOURS PRN
Status: DISCONTINUED | OUTPATIENT
Start: 2024-12-17 | End: 2024-12-28 | Stop reason: HOSPADM

## 2024-12-17 RX ORDER — ATORVASTATIN CALCIUM 10 MG/1
10 TABLET, FILM COATED ORAL NIGHTLY
Status: DISCONTINUED | OUTPATIENT
Start: 2024-12-17 | End: 2024-12-28 | Stop reason: HOSPADM

## 2024-12-17 RX ORDER — ALBUTEROL SULFATE 90 UG/1
2 INHALANT RESPIRATORY (INHALATION) EVERY 6 HOURS PRN
Status: DISCONTINUED | OUTPATIENT
Start: 2024-12-17 | End: 2024-12-28 | Stop reason: HOSPADM

## 2024-12-17 RX ORDER — SYRING-NEEDL,DISP,INSUL,0.3 ML 29 G X1/2"
296 SYRINGE, EMPTY DISPOSABLE MISCELLANEOUS
Status: COMPLETED | OUTPATIENT
Start: 2024-12-17 | End: 2024-12-17

## 2024-12-17 RX ORDER — IBUPROFEN 200 MG
24 TABLET ORAL
Status: DISCONTINUED | OUTPATIENT
Start: 2024-12-17 | End: 2024-12-28 | Stop reason: HOSPADM

## 2024-12-17 RX ORDER — GLUCAGON 1 MG
1 KIT INJECTION
Status: DISCONTINUED | OUTPATIENT
Start: 2024-12-17 | End: 2024-12-22

## 2024-12-17 RX ORDER — IBUPROFEN 600 MG/1
600 TABLET ORAL
Status: COMPLETED | OUTPATIENT
Start: 2024-12-17 | End: 2024-12-17

## 2024-12-17 RX ORDER — HYDROCORTISONE 25 MG/G
CREAM TOPICAL 2 TIMES DAILY
Status: DISCONTINUED | OUTPATIENT
Start: 2024-12-17 | End: 2024-12-28 | Stop reason: HOSPADM

## 2024-12-17 RX ORDER — PSEUDOEPHEDRINE/ACETAMINOPHEN 30MG-500MG
100 TABLET ORAL
Status: COMPLETED | OUTPATIENT
Start: 2024-12-17 | End: 2024-12-17

## 2024-12-17 RX ORDER — POLYETHYLENE GLYCOL 3350 17 G/17G
17 POWDER, FOR SOLUTION ORAL 2 TIMES DAILY
Status: DISCONTINUED | OUTPATIENT
Start: 2024-12-17 | End: 2024-12-25

## 2024-12-17 RX ORDER — ONDANSETRON HYDROCHLORIDE 2 MG/ML
4 INJECTION, SOLUTION INTRAVENOUS EVERY 8 HOURS PRN
Status: DISCONTINUED | OUTPATIENT
Start: 2024-12-17 | End: 2024-12-21

## 2024-12-17 RX ORDER — MORPHINE SULFATE 2 MG/ML
2 INJECTION, SOLUTION INTRAMUSCULAR; INTRAVENOUS
Status: COMPLETED | OUTPATIENT
Start: 2024-12-17 | End: 2024-12-17

## 2024-12-17 RX ORDER — TALC
6 POWDER (GRAM) TOPICAL NIGHTLY PRN
Status: DISCONTINUED | OUTPATIENT
Start: 2024-12-17 | End: 2024-12-28 | Stop reason: HOSPADM

## 2024-12-17 RX ORDER — NALOXONE HCL 0.4 MG/ML
0.02 VIAL (ML) INJECTION
Status: DISCONTINUED | OUTPATIENT
Start: 2024-12-17 | End: 2024-12-22

## 2024-12-17 RX ORDER — LIDOCAINE HYDROCHLORIDE 20 MG/ML
JELLY TOPICAL EVERY 4 HOURS PRN
Status: DISCONTINUED | OUTPATIENT
Start: 2024-12-17 | End: 2024-12-28 | Stop reason: HOSPADM

## 2024-12-17 RX ADMIN — IBUPROFEN 600 MG: 600 TABLET, FILM COATED ORAL at 02:12

## 2024-12-17 RX ADMIN — ONDANSETRON 4 MG: 2 INJECTION INTRAMUSCULAR; INTRAVENOUS at 02:12

## 2024-12-17 RX ADMIN — ATORVASTATIN CALCIUM 10 MG: 10 TABLET, FILM COATED ORAL at 08:12

## 2024-12-17 RX ADMIN — HYDROCORTISONE: 25 CREAM TOPICAL at 08:12

## 2024-12-17 RX ADMIN — CIPROFLOXACIN 400 MG: 2 INJECTION, SOLUTION INTRAVENOUS at 02:12

## 2024-12-17 RX ADMIN — MORPHINE SULFATE 4 MG: 4 INJECTION INTRAVENOUS at 03:12

## 2024-12-17 RX ADMIN — DOCUSATE SODIUM 100 MG: 100 CAPSULE, LIQUID FILLED ORAL at 02:12

## 2024-12-17 RX ADMIN — Medication 100 ML: at 12:12

## 2024-12-17 RX ADMIN — METRONIDAZOLE 500 MG: 5 INJECTION, SOLUTION INTRAVENOUS at 02:12

## 2024-12-17 RX ADMIN — MORPHINE SULFATE 2 MG: 2 INJECTION, SOLUTION INTRAMUSCULAR; INTRAVENOUS at 04:12

## 2024-12-17 RX ADMIN — METRONIDAZOLE 500 MG: 5 INJECTION, SOLUTION INTRAVENOUS at 04:12

## 2024-12-17 RX ADMIN — SODIUM CHLORIDE 500 ML: 9 INJECTION, SOLUTION INTRAVENOUS at 12:12

## 2024-12-17 RX ADMIN — CIPROFLOXACIN 400 MG: 2 INJECTION, SOLUTION INTRAVENOUS at 11:12

## 2024-12-17 RX ADMIN — METRONIDAZOLE 500 MG: 5 INJECTION, SOLUTION INTRAVENOUS at 08:12

## 2024-12-17 RX ADMIN — MORPHINE SULFATE 4 MG: 4 INJECTION INTRAVENOUS at 10:12

## 2024-12-17 RX ADMIN — BALSALAZIDE DISODIUM 2250 MG: 750 CAPSULE ORAL at 11:12

## 2024-12-17 RX ADMIN — CARVEDILOL 12.5 MG: 12.5 TABLET, FILM COATED ORAL at 08:12

## 2024-12-17 RX ADMIN — AMLODIPINE BESYLATE 10 MG: 10 TABLET ORAL at 08:12

## 2024-12-17 RX ADMIN — HYDROCORTISONE: 25 CREAM TOPICAL at 04:12

## 2024-12-17 RX ADMIN — CARVEDILOL 12.5 MG: 12.5 TABLET, FILM COATED ORAL at 04:12

## 2024-12-17 RX ADMIN — MAGNESIUM CITRATE 296 ML: 1.75 LIQUID ORAL at 12:12

## 2024-12-17 RX ADMIN — MORPHINE SULFATE 4 MG: 4 INJECTION INTRAVENOUS at 02:12

## 2024-12-17 NOTE — ED NOTES
Daksha MENDOZA at bedside. Pt stating that she does not want to do the enema tonight because she is too weak and she is having pain in her rectum. Educated by PA that the stool burden is causing the pain in her rectum and that the enema should give some relief. Pt states that she would still like to wait until the morning.

## 2024-12-17 NOTE — PLAN OF CARE
[facility] - Emergency Dept  Initial Discharge Assessment       Primary Care Provider: Prosper Mancilla MD    Admission Diagnosis: Fecal impaction [K56.41]    Admission Date: 12/16/2024  Expected Discharge Date:     Transition of Care Barriers: (P) None    Payor: [employer] MANAGED MEDICARE / Plan: HU[employer] MEDICARE HMO / Product Type: Capitation /     Extended Emergency Contact Information  Primary Emergency Contact: Corine Greco   [facility]  Home Phone: [phone]  Mobile Phone: [phone]  Relation: Sister    Discharge Plan A: (P) Home  Discharge Plan B: (P) Home      [facility] - [place], [place] - [address]  [address] [address]  Phone: [phone] Fax: [phone]    [id number] [address] & [address] [address]  Phone: [phone] Fax: [phone]    [facility]  [address] [address]  Phone: [phone] Fax: [phone]    [id number] - [place] [id number] [facility] & [facility]  [address] [address]  Phone: [phone] Fax: [phone]      Initial Assessment (most recent)       Adult Discharge Assessment - 12/16/24 2217          Discharge Assessment    Assessment Type Discharge Planning Assessment (P)      Confirmed/corrected address, phone number and insurance Yes (P)      Confirmed Demographics Correct on Facesheet (P)      Source of Information patient (P)      When was your last doctors appointment? -- (P)    a year    Does patient/caregiver understand observation status Yes (P)      Communicated JELANI with patient/caregiver Yes (P)      Reason For Admission constipation and rectal pain lost of appeptite (P)      People in Home alone (P)      Facility Arrived From: home (P)      Do you expect to return to your current living  situation? Yes (P)      Do you have help at home or someone to help you manage your care at home? No (P)      Prior to hospitilization cognitive status: Alert/Oriented (P)      Current cognitive status: Alert/Oriented (P)      Walking or Climbing Stairs Difficulty yes (P)      Walking or Climbing Stairs ambulation difficulty, requires equipment (P)      Mobility Management walker (P)      Dressing/Bathing Difficulty no (P)      Home Accessibility wheelchair accessible (P)      Home Layout Able to live on 1st floor (P)      Equipment Currently Used at Home walker, standard (P)      Readmission within 30 days? No (P)      Patient currently being followed by outpatient case management? No (P)      Do you currently have service(s) that help you manage your care at home? No (P)      Do you take prescription medications? Yes (P)      Do you have prescription coverage? Yes (P)      Coverage medicare (P)      Do you have any problems affording any of your prescribed medications? No (P)      Is the patient taking medications as prescribed? yes (P)      Who is going to help you get home at discharge? ambulance (P)      How do you get to doctors appointments? car, drives self (P)      Are you on dialysis? No (P)      Do you take coumadin? No (P)      Discharge Plan A Home (P)      Discharge Plan B Home (P)      DME Needed Upon Discharge  none (P)      Discharge Plan discussed with: Patient (P)      Transition of Care Barriers None (P)         Physical Activity    On average, how many days per week do you engage in moderate to strenuous exercise (like a brisk walk)? 0 days (P)      On average, how many minutes do you engage in exercise at this level? 0 min (P)         Financial Resource Strain    How hard is it for you to pay for the very basics like food, housing, medical care, and heating? Not hard at all (P)         Housing Stability    In the last 12 months, was there a time when you were not able to pay the mortgage or  rent on time? No (P)      At any time in the past 12 months, were you homeless or living in a shelter (including now)? No (P)         Transportation Needs    Has the lack of transportation kept you from medical appointments, meetings, work or from getting things needed for daily living? No (P)         Food Insecurity    Within the past 12 months, you worried that your food would run out before you got the money to buy more. Never true (P)      Within the past 12 months, the food you bought just didn't last and you didn't have money to get more. Never true (P)         Stress    Do you feel stress - tense, restless, nervous, or anxious, or unable to sleep at night because your mind is troubled all the time - these days? Not at all (P)         Social Isolation    How often do you feel lonely or isolated from those around you?  Never (P)         Alcohol Use    Q1: How often do you have a drink containing alcohol? Never (P)      Q2: How many drinks containing alcohol do you have on a typical day when you are drinking? Patient does not drink (P)      Q3: How often do you have six or more drinks on one occasion? Never (P)         Utilities    In the past 12 months has the electric, gas, oil, or water company threatened to shut off services in your home? No (P)         Health Literacy    How often do you need to have someone help you when you read instructions, pamphlets, or other written material from your doctor or pharmacy? Never (P)

## 2024-12-17 NOTE — PLAN OF CARE
Inpatient Upgrade Note    Gabriel Grace has warranted treatment spanning two or more midnights of hospital level care for the management of  Intractable rectal pain . She continues to require daily labs, further testing/imaging, monitoring of vital signs, and IV pain medication. Her condition is also complicated by the following comorbidities:  HTN, ulcerative colitis in remission, .

## 2024-12-17 NOTE — H&P
ED Observation Unit  History and Physical      I assumed care of this patient from the Main ED at onset of observation time, 22:12 on 12/16/2024.       History of Present Illness:  84-year-old female with history of HTN, ulcerative colitis in remission, presents for evaluation of persistent rectal pain and discomfort, nausea. Patient was seen for this here 3 days ago and had imaging concerning for colitis, was started on Cipro and Flagyl and discharged with oxycodone and Zofran. Since then she has had persistent rectal pain and discomfort despite taking her medications as directed, and nausea with minimal PO intake. She denies any vomiting since she was in the ER 3 days ago, but feels that her symptoms have not improved at all. She had 1 episode of blood with wiping after BM, but no blood in stool. She denies any fevers or dysuria or other new complaints. She denies any recent issues with her ulcerative colitis, follows with GI. She has been having episodes of watery loose stools, and 1 episode of fecal incontinence. She states her appetite has been decreased for the past 3 weeks, which she thought was the cause of her decreased stool output; prior to that she denies any history of similar constipation.     I reviewed the ED Provider Note dated 12/16/2024 prior to my evaluation of this patient.  I reviewed all labs and imaging performed in the Main ED, prior to patient being placed in Observation. Patient was placed in the ED Observation Unit for fecal impaction.    PMHx   Past Medical History:   Diagnosis Date    Basal cell carcinoma 11/25/2024    upper nasal bridge    Cataract     Hyperglycemia 10/02/2013    Hyperlipidemia     Hypertension     Migraine headache     Pancreas cyst 01/17/2017    Thyroid disease     hypothyroidism    Ulcerative colitis, unspecified     Ulcerative colitis, unspecified     Visit for screening mammogram 01/12/2016      Past Surgical History:   Procedure Laterality Date    APPENDECTOMY       CAROTID ENDARTERECTOMY       SECTION      x1    CHOLECYSTECTOMY      DILATION AND CURETTAGE OF UTERUS      HYSTERECTOMY  1973    fabien-lso and right salpingectomy. right ovary remains.    PARATHYROIDECTOMY      TONSILLECTOMY          Family Hx   Family History   Problem Relation Name Age of Onset    Hypertension Mother      Stroke Mother      Diabetes Mother          iddm    Heart disease Father           from mi    Heart attack Father      Parkinsonism Sister      Diabetes Sister      Diabetes Paternal Grandmother      Cancer Paternal Grandfather      Skin cancer Paternal Grandfather      No Known Problems Brother      No Known Problems Maternal Aunt      No Known Problems Maternal Uncle      No Known Problems Paternal Aunt      No Known Problems Paternal Uncle      No Known Problems Maternal Grandmother      No Known Problems Maternal Grandfather      Psoriasis Sister      Ovarian cancer Neg Hx      Uterine cancer Neg Hx      Breast cancer Neg Hx      Colon cancer Neg Hx      Amblyopia Neg Hx      Blindness Neg Hx      Cataracts Neg Hx      Glaucoma Neg Hx      Macular degeneration Neg Hx      Retinal detachment Neg Hx      Strabismus Neg Hx      Thyroid disease Neg Hx      Melanoma Neg Hx          Social Hx   Social History     Socioeconomic History    Marital status:    Tobacco Use    Smoking status: Never    Smokeless tobacco: Never   Substance and Sexual Activity    Alcohol use: Yes     Comment: rare    Drug use: No    Sexual activity: Not Currently     Social Drivers of Health     Financial Resource Strain: Low Risk  (2024)    Overall Financial Resource Strain (CARDIA)     Difficulty of Paying Living Expenses: Not hard at all   Food Insecurity: No Food Insecurity (2024)    Hunger Vital Sign     Worried About Running Out of Food in the Last Year: Never true     Ran Out of Food in the Last Year: Never true   Transportation Needs: No Transportation Needs (2024)     TRANSPORTATION NEEDS     Transportation : No   Physical Activity: Inactive (12/16/2024)    Exercise Vital Sign     Days of Exercise per Week: 0 days     Minutes of Exercise per Session: 0 min   Stress: No Stress Concern Present (12/16/2024)    [employer] - Occupational Stress Questionnaire     Feeling of Stress : Not at all   Housing Stability: Low Risk  (12/16/2024)    Housing Stability Vital Sign     Unable to Pay for Housing in the Last Year: No     Homeless in the Last Year: No        Vital Signs   Vitals:    12/16/24 2100 12/16/24 2108 12/16/24 2115 12/16/24 2315   BP: (!) 143/70  (!) 149/75 (!) 169/73   BP Location:       Pulse: 71 73 72 72   Resp:  12 17 18   Temp:    98.6 °F (37 °C)   TempSrc:    Oral   SpO2: 98% (!) 94% 95% 98%   Weight:       Height:            Review of Systems  Review of Systems   Constitutional:  Negative for fever.   Cardiovascular:  Negative for chest pain.   Gastrointestinal:  Positive for nausea. Negative for abdominal pain and vomiting.       Physical Exam  Physical Exam  Vitals and nursing note reviewed.   Constitutional:       General: She is not in acute distress.     Appearance: She is not diaphoretic.   HENT:      Head: Normocephalic and atraumatic.   Cardiovascular:      Rate and Rhythm: Normal rate and regular rhythm.      Heart sounds: Normal heart sounds. No murmur heard.     No friction rub. No gallop.   Pulmonary:      Effort: Pulmonary effort is normal. No respiratory distress.      Breath sounds: Normal breath sounds. No wheezing or rales.   Chest:      Chest wall: No tenderness.   Abdominal:      General: There is no distension.      Tenderness: There is no abdominal tenderness.   Musculoskeletal:         General: Normal range of motion.      Cervical back: Normal range of motion and neck supple.   Skin:     General: Skin is warm and dry.   Neurological:      Mental Status: She is alert and oriented to person, place, and time.    Psychiatric:         Mood and Affect: Affect normal.         Medications:   Scheduled Meds:   [START ON 12/17/2024] amLODIPine  10 mg Oral Daily    [START ON 12/17/2024] balsalazide  2,250 mg Oral Daily    carvediloL  12.5 mg Oral BID WM    ciprofloxacin  400 mg Intravenous ED 1 Time    docusate sodium  100 mg Oral ED 1 Time    magnesium citrate  296 mL Rectal ED 1 Time    And    sodium chloride 0.9%  500 mL Rectal ED 1 Time    morphine  4 mg Intravenous ED 1 Time    ondansetron  4 mg Intravenous ED 1 Time     Continuous Infusions:  PRN Meds:.      Assessment/Plan:  Initial plan to give patient an enema however upon my assessment she states she feels too weak at this time and would like to try in the morning.  Will attempt enema in the morning, if this is unsuccessful, may need CRS evaluation for disimpaction.    Additionally, she has been treating for UTI and was being treated for proctitis with Cipro/Flagyl.  Will continue antibiotics IV.    Case was discussed with the ED provider.

## 2024-12-18 ENCOUNTER — TELEPHONE (OUTPATIENT)
Dept: ENDOSCOPY | Facility: HOSPITAL | Age: 84
End: 2024-12-18
Payer: MEDICARE

## 2024-12-18 ENCOUNTER — ANESTHESIA EVENT (OUTPATIENT)
Dept: ENDOSCOPY | Facility: HOSPITAL | Age: 84
End: 2024-12-18
Payer: MEDICARE

## 2024-12-18 ENCOUNTER — ANESTHESIA (OUTPATIENT)
Dept: ENDOSCOPY | Facility: HOSPITAL | Age: 84
End: 2024-12-18
Payer: MEDICARE

## 2024-12-18 PROBLEM — K51.90 ULCERATIVE COLITIS: Chronic | Status: ACTIVE | Noted: 2020-02-28

## 2024-12-18 PROBLEM — B96.20 E. COLI UTI: Status: ACTIVE | Noted: 2024-12-13

## 2024-12-18 PROBLEM — N39.0 E. COLI UTI: Status: ACTIVE | Noted: 2024-12-13

## 2024-12-18 LAB
ALBUMIN SERPL BCP-MCNC: 3.1 G/DL (ref 3.5–5.2)
ALP SERPL-CCNC: 63 U/L (ref 40–150)
ALT SERPL W/O P-5'-P-CCNC: 13 U/L (ref 10–44)
ANION GAP SERPL CALC-SCNC: 11 MMOL/L (ref 8–16)
AST SERPL-CCNC: 19 U/L (ref 10–40)
BACTERIA UR CULT: NORMAL
BASOPHILS # BLD AUTO: 0.03 K/UL (ref 0–0.2)
BASOPHILS NFR BLD: 0.4 % (ref 0–1.9)
BILIRUB SERPL-MCNC: 0.5 MG/DL (ref 0.1–1)
BUN SERPL-MCNC: 19 MG/DL (ref 8–23)
CALCIUM SERPL-MCNC: 9.4 MG/DL (ref 8.7–10.5)
CHLORIDE SERPL-SCNC: 113 MMOL/L (ref 95–110)
CO2 SERPL-SCNC: 18 MMOL/L (ref 23–29)
CREAT SERPL-MCNC: 0.8 MG/DL (ref 0.5–1.4)
DIFFERENTIAL METHOD BLD: ABNORMAL
EOSINOPHIL # BLD AUTO: 0.4 K/UL (ref 0–0.5)
EOSINOPHIL NFR BLD: 5.3 % (ref 0–8)
ERYTHROCYTE [DISTWIDTH] IN BLOOD BY AUTOMATED COUNT: 14.1 % (ref 11.5–14.5)
EST. GFR  (NO RACE VARIABLE): >60 ML/MIN/1.73 M^2
GLUCOSE SERPL-MCNC: 91 MG/DL (ref 70–110)
HCT VFR BLD AUTO: 34.2 % (ref 37–48.5)
HGB BLD-MCNC: 10.9 G/DL (ref 12–16)
IMM GRANULOCYTES # BLD AUTO: 0.03 K/UL (ref 0–0.04)
IMM GRANULOCYTES NFR BLD AUTO: 0.4 % (ref 0–0.5)
LYMPHOCYTES # BLD AUTO: 1.9 K/UL (ref 1–4.8)
LYMPHOCYTES NFR BLD: 24.3 % (ref 18–48)
MAGNESIUM SERPL-MCNC: 1.7 MG/DL (ref 1.6–2.6)
MCH RBC QN AUTO: 32.3 PG (ref 27–31)
MCHC RBC AUTO-ENTMCNC: 31.9 G/DL (ref 32–36)
MCV RBC AUTO: 102 FL (ref 82–98)
MONOCYTES # BLD AUTO: 0.7 K/UL (ref 0.3–1)
MONOCYTES NFR BLD: 8.8 % (ref 4–15)
NEUTROPHILS # BLD AUTO: 4.7 K/UL (ref 1.8–7.7)
NEUTROPHILS NFR BLD: 60.8 % (ref 38–73)
NRBC BLD-RTO: 0 /100 WBC
PHOSPHATE SERPL-MCNC: 2.6 MG/DL (ref 2.7–4.5)
PLATELET # BLD AUTO: 184 K/UL (ref 150–450)
PMV BLD AUTO: 10.9 FL (ref 9.2–12.9)
POTASSIUM SERPL-SCNC: 3 MMOL/L (ref 3.5–5.1)
PROT SERPL-MCNC: 6.1 G/DL (ref 6–8.4)
RBC # BLD AUTO: 3.37 M/UL (ref 4–5.4)
SODIUM SERPL-SCNC: 142 MMOL/L (ref 136–145)
WBC # BLD AUTO: 7.69 K/UL (ref 3.9–12.7)

## 2024-12-18 PROCEDURE — 11000001 HC ACUTE MED/SURG PRIVATE ROOM

## 2024-12-18 PROCEDURE — 63600175 PHARM REV CODE 636 W HCPCS: Performed by: FAMILY MEDICINE

## 2024-12-18 PROCEDURE — 63600175 PHARM REV CODE 636 W HCPCS

## 2024-12-18 PROCEDURE — 25000003 PHARM REV CODE 250: Performed by: PHYSICIAN ASSISTANT

## 2024-12-18 PROCEDURE — 45330 DIAGNOSTIC SIGMOIDOSCOPY: CPT | Mod: 52,,, | Performed by: COLON & RECTAL SURGERY

## 2024-12-18 PROCEDURE — 45330 DIAGNOSTIC SIGMOIDOSCOPY: CPT | Mod: 74 | Performed by: COLON & RECTAL SURGERY

## 2024-12-18 PROCEDURE — 0DJD8ZZ INSPECTION OF LOWER INTESTINAL TRACT, VIA NATURAL OR ARTIFICIAL OPENING ENDOSCOPIC: ICD-10-PCS | Performed by: COLON & RECTAL SURGERY

## 2024-12-18 PROCEDURE — 63600175 PHARM REV CODE 636 W HCPCS: Mod: JZ,JG | Performed by: PHYSICIAN ASSISTANT

## 2024-12-18 PROCEDURE — 27000221 HC OXYGEN, UP TO 24 HOURS

## 2024-12-18 PROCEDURE — 25000003 PHARM REV CODE 250: Performed by: FAMILY MEDICINE

## 2024-12-18 PROCEDURE — 36415 COLL VENOUS BLD VENIPUNCTURE: CPT | Performed by: FAMILY MEDICINE

## 2024-12-18 PROCEDURE — 0DCP7ZZ EXTIRPATION OF MATTER FROM RECTUM, VIA NATURAL OR ARTIFICIAL OPENING: ICD-10-PCS | Performed by: COLON & RECTAL SURGERY

## 2024-12-18 PROCEDURE — 83735 ASSAY OF MAGNESIUM: CPT | Performed by: FAMILY MEDICINE

## 2024-12-18 PROCEDURE — 85025 COMPLETE CBC W/AUTO DIFF WBC: CPT | Performed by: FAMILY MEDICINE

## 2024-12-18 PROCEDURE — 25000003 PHARM REV CODE 250: Performed by: NURSE ANESTHETIST, CERTIFIED REGISTERED

## 2024-12-18 PROCEDURE — 84100 ASSAY OF PHOSPHORUS: CPT | Performed by: FAMILY MEDICINE

## 2024-12-18 PROCEDURE — 99900035 HC TECH TIME PER 15 MIN (STAT)

## 2024-12-18 PROCEDURE — 94761 N-INVAS EAR/PLS OXIMETRY MLT: CPT

## 2024-12-18 PROCEDURE — 37000009 HC ANESTHESIA EA ADD 15 MINS: Performed by: COLON & RECTAL SURGERY

## 2024-12-18 PROCEDURE — 80053 COMPREHEN METABOLIC PANEL: CPT | Performed by: FAMILY MEDICINE

## 2024-12-18 PROCEDURE — 63600175 PHARM REV CODE 636 W HCPCS: Performed by: NURSE ANESTHETIST, CERTIFIED REGISTERED

## 2024-12-18 PROCEDURE — 63600175 PHARM REV CODE 636 W HCPCS: Performed by: ANESTHESIOLOGY

## 2024-12-18 PROCEDURE — 37000008 HC ANESTHESIA 1ST 15 MINUTES: Performed by: COLON & RECTAL SURGERY

## 2024-12-18 RX ORDER — HALOPERIDOL 5 MG/ML
0.5 INJECTION INTRAMUSCULAR EVERY 10 MIN PRN
Status: COMPLETED | OUTPATIENT
Start: 2024-12-18 | End: 2024-12-18

## 2024-12-18 RX ORDER — LIDOCAINE HYDROCHLORIDE 20 MG/ML
INJECTION INTRAVENOUS
Status: DISCONTINUED | OUTPATIENT
Start: 2024-12-18 | End: 2024-12-18

## 2024-12-18 RX ORDER — FENTANYL CITRATE 50 UG/ML
25 INJECTION, SOLUTION INTRAMUSCULAR; INTRAVENOUS EVERY 5 MIN PRN
Status: DISCONTINUED | OUTPATIENT
Start: 2024-12-18 | End: 2024-12-18 | Stop reason: HOSPADM

## 2024-12-18 RX ORDER — FENTANYL CITRATE 50 UG/ML
INJECTION, SOLUTION INTRAMUSCULAR; INTRAVENOUS
Status: COMPLETED
Start: 2024-12-18 | End: 2024-12-18

## 2024-12-18 RX ORDER — SUCCINYLCHOLINE CHLORIDE 20 MG/ML
INJECTION INTRAMUSCULAR; INTRAVENOUS
Status: DISCONTINUED | OUTPATIENT
Start: 2024-12-18 | End: 2024-12-18

## 2024-12-18 RX ORDER — GLUCAGON 1 MG
1 KIT INJECTION
Status: DISCONTINUED | OUTPATIENT
Start: 2024-12-18 | End: 2024-12-18 | Stop reason: HOSPADM

## 2024-12-18 RX ORDER — SODIUM CHLORIDE 0.9 % (FLUSH) 0.9 %
10 SYRINGE (ML) INJECTION
Status: DISCONTINUED | OUTPATIENT
Start: 2024-12-18 | End: 2024-12-18 | Stop reason: HOSPADM

## 2024-12-18 RX ORDER — ONDANSETRON HYDROCHLORIDE 2 MG/ML
INJECTION, SOLUTION INTRAVENOUS
Status: DISCONTINUED | OUTPATIENT
Start: 2024-12-18 | End: 2024-12-18

## 2024-12-18 RX ORDER — PROPOFOL 10 MG/ML
VIAL (ML) INTRAVENOUS
Status: DISCONTINUED | OUTPATIENT
Start: 2024-12-18 | End: 2024-12-18

## 2024-12-18 RX ORDER — DOCUSATE SODIUM 100 MG/1
100 CAPSULE, LIQUID FILLED ORAL DAILY
COMMUNITY
Start: 2024-12-18 | End: 2024-12-23 | Stop reason: HOSPADM

## 2024-12-18 RX ADMIN — CIPROFLOXACIN 400 MG: 2 INJECTION, SOLUTION INTRAVENOUS at 06:12

## 2024-12-18 RX ADMIN — ONDANSETRON 4 MG: 2 INJECTION INTRAMUSCULAR; INTRAVENOUS at 03:12

## 2024-12-18 RX ADMIN — METRONIDAZOLE 500 MG: 5 INJECTION, SOLUTION INTRAVENOUS at 06:12

## 2024-12-18 RX ADMIN — LIDOCAINE HYDROCHLORIDE 100 MG: 20 INJECTION INTRAVENOUS at 02:12

## 2024-12-18 RX ADMIN — ATORVASTATIN CALCIUM 10 MG: 10 TABLET, FILM COATED ORAL at 08:12

## 2024-12-18 RX ADMIN — FENTANYL CITRATE 25 MCG: 50 INJECTION, SOLUTION INTRAMUSCULAR; INTRAVENOUS at 03:12

## 2024-12-18 RX ADMIN — CARVEDILOL 12.5 MG: 12.5 TABLET, FILM COATED ORAL at 06:12

## 2024-12-18 RX ADMIN — POLYETHYLENE GLYCOL 3350 17 G: 17 POWDER, FOR SOLUTION ORAL at 08:12

## 2024-12-18 RX ADMIN — CIPROFLOXACIN 400 MG: 2 INJECTION, SOLUTION INTRAVENOUS at 08:12

## 2024-12-18 RX ADMIN — HALOPERIDOL LACTATE 0.5 MG: 5 INJECTION, SOLUTION INTRAMUSCULAR at 03:12

## 2024-12-18 RX ADMIN — PROPOFOL 150 MG: 10 INJECTION, EMULSION INTRAVENOUS at 02:12

## 2024-12-18 RX ADMIN — SODIUM CHLORIDE: 0.9 INJECTION, SOLUTION INTRAVENOUS at 01:12

## 2024-12-18 RX ADMIN — METRONIDAZOLE 500 MG: 5 INJECTION, SOLUTION INTRAVENOUS at 08:12

## 2024-12-18 RX ADMIN — ONDANSETRON 8 MG: 2 INJECTION INTRAMUSCULAR; INTRAVENOUS at 02:12

## 2024-12-18 RX ADMIN — SUCCINYLCHOLINE CHLORIDE 120 MG: 20 INJECTION, SOLUTION INTRAMUSCULAR; INTRAVENOUS at 02:12

## 2024-12-18 RX ADMIN — MORPHINE SULFATE 4 MG: 4 INJECTION INTRAVENOUS at 06:12

## 2024-12-18 RX ADMIN — MORPHINE SULFATE 4 MG: 4 INJECTION INTRAVENOUS at 10:12

## 2024-12-18 NOTE — NURSING
Pt stated I do not have to call sister because the doctor spoke with her. I assured patient that I would also assist her with getting her things together when it is time to leave. Housekeeping came to mop floor. Pt is also clean. All questions answered.

## 2024-12-18 NOTE — HPI
85yo F with fecal impaction causing mild stercoral proctitis.  Hx of Normally has a soft formed BM daily without hematochezia or straining with daily miralax. Has had decreased appetite the past few weeks and worsening weakness, using her walked more often, and overall having less bowel movements, stopped taking miralax as regularly.  The past few days has had worsening rectal pain and nausea. No fevers or chills, no abdominal pain or emesis. She had a CT scan on 12/13 with mild proctitis and rectal stool burden, given cipro and flagyl. She had one episode of fecal incontinence, liquid stool, and wanted an appointment with CRS to evaluate, but due to pain went to the ED with repeat CT on 12/16. Baseline has full control of flatus and bowel movements, no incontinence.  Received docusate and miralax plus one enema with significant liquid stool output. Refusing further enemas due to pain but is not obstructed. CRS consulted for rectal pain. Does have a history of UC on balsalazide, not on biologics, last cscope a few years ago without polyps or ulcerations per patient who follows with GI. Retired OB nurse.

## 2024-12-18 NOTE — ANESTHESIA PROCEDURE NOTES
Intubation    Date/Time: 12/18/2024 2:15 PM    Performed by: Emilia Veras CRNA  Authorized by: Emilia Veras CRNA    Intubation:     Induction:  Intravenous    Intubated:  Postinduction    Mask Ventilation:  Easy mask    Attempts:  1    Attempted By:  CRNA    Method of Intubation:  Video laryngoscopy    Blade:  Velarde 3    Laryngeal View Grade: Grade I - full view of cords      Difficult Airway Encountered?: No      Complications:  None    Airway Device:  Oral endotracheal tube    Airway Device Size:  7.5    Style/Cuff Inflation:  Cuffed (inflated to minimal occlusive pressure)    Tube secured:  22    Secured at:  The lips    Placement Verified By:  Capnometry    Complicating Factors:  None    Findings Post-Intubation:  BS equal bilateral and atraumatic/condition of teeth unchanged

## 2024-12-18 NOTE — NURSING
Spoke to MD about pt Murelax order. MD stated it is okay for her to hold the current dose if patient does not want to take it. She has had 4 liquid stools. Pt to be NPO at midnight for procedure.

## 2024-12-18 NOTE — PROGRESS NOTES
[facility] Medicine Tele[facility]  Colorectal Surgery  Progress Note    Patient Name: Gabriel Grace  MRN: [id number]  Admission Date: 12/16/2024  Hospital Length of Stay: 1 days  Attending Physician: Christian Zelaya MD    Subjective:     Interval History: no changes, continues to have rectal pain, passing flatus and liquid stool    Post-Op Info:  * No surgery found *          Medications:  Continuous Infusions:  Scheduled Meds:   amLODIPine  10 mg Oral Daily    atorvastatin  10 mg Oral QHS    balsalazide  2,250 mg Oral Daily    carvediloL  12.5 mg Oral BID WM    ciprofloxacin  400 mg Intravenous Q12H    hydrocortisone   Rectal BID    metroNIDAZOLE IV (PEDS and ADULTS)  500 mg Intravenous Q8H    polyethylene glycol  17 g Oral BID     PRN Meds:   amLODIPine tablet 10 mg    atorvastatin tablet 10 mg    balsalazide capsule 2,250 mg    carvediloL tablet 12.5 mg    ciprofloxacin (CIPRO)400mg/200ml D5W IVPB 400 mg    hydrocortisone 2.5 % rectal cream    metronidazole IVPB 500 mg    polyethylene glycol packet 17 g        Objective:     Vital Signs (Most Recent):  Temp: 97.8 °F (36.6 °C) (12/18/24 0750)  Pulse: 73 (12/18/24 0750)  Resp: 17 (12/18/24 0750)  BP: 139/61 (12/18/24 0750)  SpO2: (!) 93 % (12/18/24 0750) Vital Signs (24h Range):  Temp:  [97.4 °F (36.3 °C)-98.1 °F (36.7 °C)] 97.8 °F (36.6 °C)  Pulse:  [64-92] 73  Resp:  [15-18] 17  SpO2:  [93 %-99 %] 93 %  BP: (124-147)/(61-73) 139/61     Intake/Output - Last 3 Shifts         12/16 0700  12/17 0659 12/17 0700 12/18 0659 12/18 0700  12/19 0659    P.O. 240 240     Total Intake(mL/kg) 240 (2.6) 240 (2.6)     Urine (mL/kg/hr) 350      Total Output 350      Net -110 +240            Urine Occurrence 1 x      Stool Occurrence  4 x          General: alert, awake, in no acute distress  HEENT: EOMI, no scleral icterus, CN grossly intact  Cardiac: RRR, BP stable, warm well perfused  Pulm: non labored breathing on room air, no audible wheeze  Abdomen: soft, non distended,  non TTP  Extremities: moving all four extremities  Skin: no obvious rashes  Neuro: no obvious deficits  Psych: cooperative, appropriate affect  Anorectal: deferred    Significant Labs:  BMP:   Recent Labs   Lab 12/18/24  0520   GLU 91      K 3.0*   *   CO2 18*   BUN 19   CREATININE 0.8   CALCIUM 9.4   MG 1.7     CBC:   Recent Labs   Lab 12/18/24  0520   WBC 7.69   RBC 3.37*   HGB 10.9*   HCT 34.2*      *   MCH 32.3*   MCHC 31.9*       Significant Diagnostics:  None    Assessment/Plan:     Acute cystitis  Keep NPO, awaiting OR vs endo availability for disimpaction today.  Please replete K>4 to optimize bowel function.  Fluid and pain management per primary team.      Monica Stinson MD  Colorectal Surgery  [facility] - [facility] Tele[facility]

## 2024-12-18 NOTE — H&P
HISTORY AND PHYSICAL EXAM    Procedure : Flexible sigmoidoscopy      INDICATIONS: Fecal impaction       Past Medical History:   Diagnosis Date    Basal cell carcinoma 2024    upper nasal bridge    Cataract     Hyperglycemia 10/02/2013    Hyperlipidemia     Hypertension     Migraine headache     Pancreas cyst 2017    Thyroid disease     hypothyroidism    Ulcerative colitis, unspecified     Ulcerative colitis, unspecified     Visit for screening mammogram 2016     Sedation Problems: NO  Family History   Problem Relation Name Age of Onset    Hypertension Mother      Stroke Mother      Diabetes Mother          iddm    Heart disease Father           from mi    Heart attack Father      Parkinsonism Sister      Diabetes Sister      Diabetes Paternal Grandmother      Cancer Paternal Grandfather      Skin cancer Paternal Grandfather      No Known Problems Brother      No Known Problems Maternal Aunt      No Known Problems Maternal Uncle      No Known Problems Paternal Aunt      No Known Problems Paternal Uncle      No Known Problems Maternal Grandmother      No Known Problems Maternal Grandfather      Psoriasis Sister      Ovarian cancer Neg Hx      Uterine cancer Neg Hx      Breast cancer Neg Hx      Colon cancer Neg Hx      Amblyopia Neg Hx      Blindness Neg Hx      Cataracts Neg Hx      Glaucoma Neg Hx      Macular degeneration Neg Hx      Retinal detachment Neg Hx      Strabismus Neg Hx      Thyroid disease Neg Hx      Melanoma Neg Hx       Fam Hx of Sedation Problems: NO  Social History     Socioeconomic History    Marital status:    Tobacco Use    Smoking status: Never    Smokeless tobacco: Never   Substance and Sexual Activity    Alcohol use: Yes     Comment: rare    Drug use: No    Sexual activity: Not Currently     Social Drivers of Health     Financial Resource Strain: Low Risk  (2024)    Overall Financial Resource Strain (CARDIA)     Difficulty of Paying Living Expenses: Not  "hard at all   Food Insecurity: No Food Insecurity (12/16/2024)    Hunger Vital Sign     Worried About Running Out of Food in the Last Year: Never true     Ran Out of Food in the Last Year: Never true   Transportation Needs: No Transportation Needs (12/16/2024)    TRANSPORTATION NEEDS     Transportation : No   Physical Activity: Inactive (12/16/2024)    Exercise Vital Sign     Days of Exercise per Week: 0 days     Minutes of Exercise per Session: 0 min   Stress: No Stress Concern Present (12/16/2024)    [employer] - Occupational Stress Questionnaire     Feeling of Stress : Not at all   Housing Stability: Low Risk  (12/16/2024)    Housing Stability Vital Sign     Unable to Pay for Housing in the Last Year: No     Homeless in the Last Year: No       Review of Systems - Negative except   Respiratory ROS: no dyspnea  Cardiovascular ROS: no exertional chest pain  Gastrointestinal ROS: NO abdominal discomfort,  NO rectal bleeding  Musculoskeletal ROS: no muscular pain  Neurological ROS: no recent stroke    Physical Exam:  BP (!) 183/88 (BP Location: Right arm, Patient Position: Lying)   Pulse 79   Temp 97.5 °F (36.4 °C) (Temporal)   Resp 16   Ht 5' 8" (1.727 m)   Wt 93.9 kg (207 lb)   SpO2 95%   BMI 31.47 kg/m²   General: no distress  Head: normocephalic  Mallampati Score   Neck: supple, symmetrical, trachea midline  Lungs:  clear to auscultation bilaterally and normal respiratory effort  Heart: regular rate and rhythm and no murmur  Abdomen: soft, non-tender non-distented; bowel sounds normal; no masses,  no organomegaly  Extremities: no cyanosis or edema, or clubbing    PLAN  Flexible sigmoidoscopy.    SedationPlan :MAC    The details of the procedure, the possible need for biopsy or polypectomy and the potential risks including bleeding, perforation, missed polyps were discussed in detail.    "

## 2024-12-18 NOTE — TRANSFER OF CARE
"Anesthesia Transfer of Care Note    Patient: Gabriel Grace    Procedure(s) Performed: Procedure(s) (LRB):  SIGMOIDOSCOPY, FLEXIBLE (N/A)    Patient location: PACU    Anesthesia Type: general    Transport from OR: Transported from OR on 6-10 L/min O2 by face mask with adequate spontaneous ventilation    Post pain: adequate analgesia    Post assessment: no apparent anesthetic complications    Post vital signs: stable    Level of consciousness: sedated    Nausea/Vomiting: no nausea/vomiting    Complications: none    Transfer of care protocol was followed      Last vitals: Visit Vitals  BP (!) 183/88 (BP Location: Right arm, Patient Position: Lying)   Pulse 79   Temp 36.4 °C (97.5 °F) (Temporal)   Resp 16   Ht 5' 8" (1.727 m)   Wt 93.9 kg (207 lb)   SpO2 95%   BMI 31.47 kg/m²     "

## 2024-12-18 NOTE — ANESTHESIA PREPROCEDURE EVALUATION
12/18/2024  Gabriel Grace is a 84 y.o., female.    Pre-operative evaluation for Procedure(s) (LRB):  SIGMOIDOSCOPY, FLEXIBLE (N/A)    Gabriel Grace is a 84 y.o. female     Patient Active Problem List   Diagnosis    Hyperlipidemia    Hypertension    Migraine headache    Nuclear sclerosis - Both Eyes    Crohn's disease of small intestine    Hyperglycemia    Well woman exam with routine gynecological exam    Aortic atherosclerosis    LLQ pain    Visit for screening mammogram    Ptosis    Pancreas cyst    Posterior vitreous detachment, right eye    Non morbid obesity due to excess calories    Thrombocytopenia    Pulmonary embolism    Cancer screening    Ulcerative colitis    E. coli UTI    Fecal impaction       Review of patient's allergies indicates:  No Known Allergies    No current facility-administered medications on file prior to encounter.     Current Outpatient Medications on File Prior to Encounter   Medication Sig Dispense Refill    amLODIPine (NORVASC) 10 MG tablet TAKE 1 TABLET EVERY DAY  .  STOP OLD RX 90 tablet 3    balsalazide (COLAZAL) 750 mg capsule Take 2,250 mg by mouth once daily.       biotin 2,500 mcg Cap       calcium-magnesium-zinc Tab Take by mouth. 1  Oral Every day      carvediloL (COREG) 12.5 MG tablet Take 1 tablet (12.5 mg total) by mouth 2 (two) times daily with meals. 180 tablet 3    ciprofloxacin HCl (CIPRO) 500 MG tablet Take 1 tablet (500 mg total) by mouth every 12 (twelve) hours for 7 days 14 tablet 0    metroNIDAZOLE (FLAGYL) 500 MG tablet Take 1 tablet (500 mg total) by mouth every 8 (eight) hours. for 7 days 21 tablet 0    omeprazole (PRILOSEC) 20 MG capsule Take 1 capsule (20 mg total) by mouth once daily. 90 capsule 3    ondansetron (ZOFRAN-ODT) 4 MG TbDL Take 1 tablet (4 mg total) by mouth every 6 (six) hours as needed (nausea). 10 tablet 0    oxyCODONE (ROXICODONE) 5 MG  immediate release tablet Take 1 tablet (5 mg total) by mouth every 6 (six) hours as needed (moderate to severe pain). 12 tablet 0    simvastatin (ZOCOR) 10 MG tablet Take 1 tablet (10 mg total) by mouth every evening. 90 tablet 3    cholecalciferol, vitamin D3, (VITAMIN D3) 25 mcg (1,000 unit) capsule Take 1,000 Units by mouth once daily.      cyanocobalamin, vitamin B-12, 50 mcg tablet Take 1 tablet (50 mcg total) by mouth once daily. 90 tablet 3    cyclobenzaprine (FLEXERIL) 5 MG tablet TAKE 1 TABLET THREE TIMES DAILY AS NEEDED FOR MUSCLE SPASM(S) 20 tablet 2    FLAXSEED OIL ORAL Take 1,000 mg by mouth. 1  Oral Every day      FLUAD QUAD -,65Y UP,,PF, 60 mcg (15 mcg x 4)/0.5 mL Syrg       folic acid (FOLVITE) 400 MCG tablet Take 2 tablets (800 mcg total) by mouth once daily. 180 tablet 3    ketoconazole (NIZORAL) 2 % cream AAA qhs under breasts after cool blow dry 60 g 3    ketoconazole (NIZORAL) 2 % cream AAA qhs under breasts after cool blow dry prn flare 60 g 3    mometasone (ELOCON) 0.1 % solution aaa on scalp qhs prn flare 60 mL 3    multivitamin (THERAGRAN) per tablet Take by mouth. 1  By mouth Every day      omega-3 fatty acids-vitamin E 1,000 mg Cap       triamcinolone acetonide 0.1% (KENALOG) 0.1 % cream aaa on legs and arms qd- bid after moisturizing; not more than 2 weeks straight in the same location 60 g 3       Past Surgical History:   Procedure Laterality Date    APPENDECTOMY      CAROTID ENDARTERECTOMY       SECTION      x1    CHOLECYSTECTOMY      DILATION AND CURETTAGE OF UTERUS      HYSTERECTOMY  1973    fabien-lso and right salpingectomy. right ovary remains.    PARATHYROIDECTOMY      TONSILLECTOMY         Social History     Socioeconomic History    Marital status:    Tobacco Use    Smoking status: Never    Smokeless tobacco: Never   Substance and Sexual Activity    Alcohol use: Yes     Comment: rare    Drug use: No    Sexual activity: Not Currently     Social Drivers of Health  "    Financial Resource Strain: Low Risk  (2024)    Overall Financial Resource Strain (CARDIA)     Difficulty of Paying Living Expenses: Not hard at all   Food Insecurity: No Food Insecurity (2024)    Hunger Vital Sign     Worried About Running Out of Food in the Last Year: Never true     Ran Out of Food in the Last Year: Never true   Transportation Needs: No Transportation Needs (2024)    TRANSPORTATION NEEDS     Transportation : No   Physical Activity: Inactive (2024)    Exercise Vital Sign     Days of Exercise per Week: 0 days     Minutes of Exercise per Session: 0 min   Stress: No Stress Concern Present (2024)    [employer] - Occupational Stress Questionnaire     Feeling of Stress : Not at all   Housing Stability: Low Risk  (2024)    Housing Stability Vital Sign     Unable to Pay for Housing in the Last Year: No     Homeless in the Last Year: No         CBC:   Recent Labs     24  0831 24  0520   WBC 8.49 7.69   RBC 3.64* 3.37*   HGB 11.9* 10.9*   HCT 36.0* 34.2*    184   MCV 99* 102*   MCH 32.7* 32.3*   MCHC 33.1 31.9*       CMP:   Recent Labs     24  1557 24  0831 24  0520    143 142   K 3.3* 3.3* 3.0*   * 111* 113*   CO2 19* 20* 18*   BUN 28* 18 19   CREATININE 0.9 0.9 0.8   * 132* 91   MG  --   --  1.7   PHOS  --   --  2.6*   CALCIUM 9.8 9.9 9.4   ALBUMIN 3.2*  --  3.1*   PROT 6.5  --  6.1   ALKPHOS 67  --  63   ALT 9*  --  13   AST 17  --  19   BILITOT 0.5  --  0.5       INR  No results for input(s): "PT", "INR", "PROTIME", "APTT" in the last 72 hours.        Diagnostic Studies:      EKD Echo:  No results found for this or any previous visit.        Pre-op Assessment    I have reviewed the Patient Summary Reports.    I have reviewed the NPO Status.   I have reviewed the Medications.     Review of Systems  Anesthesia Hx:  No problems with previous Anesthesia               Denies " Personal Hx of Anesthesia complications.                    Cardiovascular:  Exercise tolerance: good   Hypertension                                          Hepatic/GI:        Fecal impaction with N/V/abd pain             Neurological:    Denies CVA.    Denies Seizures.                                Endocrine:        Obesity / BMI > 30      Physical Exam  General: Cooperative, Alert and Oriented    Airway:  Mallampati: III   Mouth Opening: Small, but > 3cm  TM Distance: 4 - 6 cm  Tongue: Normal  Neck ROM: Normal ROM    Dental:  Periodontal disease        Anesthesia Plan  Type of Anesthesia, risks & benefits discussed:    Anesthesia Type: Gen ETT  Intra-op Monitoring Plan: Standard ASA Monitors  Post Op Pain Control Plan: multimodal analgesia and IV/PO Opioids PRN  Induction:  rapid sequence and IV  Airway Plan: Video, Post-Induction  Informed Consent: Informed consent signed with the Patient and all parties understand the risks and agree with anesthesia plan.  All questions answered.   ASA Score: 3  Day of Surgery Review of History & Physical: H&P Update referred to the surgeon/provider.    Ready For Surgery From Anesthesia Perspective.     .

## 2024-12-18 NOTE — ASSESSMENT & PLAN NOTE
Will make NPO at midnight, fluids and pain management per medicine team. Plan for endoscopic decompression under anesthesia, time pending availability. Afterward she will need to continue with a bowel regimen to prevent recurrence.

## 2024-12-18 NOTE — SUBJECTIVE & OBJECTIVE
Review of patient's allergies indicates:  No Known Allergies    Past Medical History:   Diagnosis Date    Basal cell carcinoma 2024    upper nasal bridge    Cataract     Hyperglycemia 10/02/2013    Hyperlipidemia     Hypertension     Migraine headache     Pancreas cyst 2017    Thyroid disease     hypothyroidism    Ulcerative colitis, unspecified     Ulcerative colitis, unspecified     Visit for screening mammogram 2016     Past Surgical History:   Procedure Laterality Date    APPENDECTOMY      CAROTID ENDARTERECTOMY       SECTION      x1    CHOLECYSTECTOMY      DILATION AND CURETTAGE OF UTERUS      HYSTERECTOMY  1973    fabien-lso and right salpingectomy. right ovary remains.    PARATHYROIDECTOMY      TONSILLECTOMY       Family History       Problem Relation (Age of Onset)    Cancer Paternal Grandfather    Diabetes Mother, Sister, Paternal Grandmother    Heart attack Father    Heart disease Father    Hypertension Mother    No Known Problems Brother, Maternal Aunt, Maternal Uncle, Paternal Aunt, Paternal Uncle, Maternal Grandmother, Maternal Grandfather    Parkinsonism Sister    Psoriasis Sister    Skin cancer Paternal Grandfather    Stroke Mother          Tobacco Use    Smoking status: Never    Smokeless tobacco: Never   Substance and Sexual Activity    Alcohol use: Yes     Comment: rare    Drug use: No    Sexual activity: Not Currently     Review of Systems   Constitutional:  Positive for appetite change.   Gastrointestinal:  Positive for constipation, diarrhea and rectal pain.   Neurological:  Positive for weakness.     Objective:     Vital Signs (Most Recent):  Temp: 97.8 °F (36.6 °C) (24)  Pulse: 92 (24)  Resp: 15 (24)  BP: 131/65 (24)  SpO2: 97 % (24) Vital Signs (24h Range):  Temp:  [97.5 °F (36.4 °C)-98.6 °F (37 °C)] 97.8 °F (36.6 °C)  Pulse:  [69-92] 92  Resp:  [12-18] 15  SpO2:  [94 %-98 %] 97 %  BP: (111-169)/(61-75) 131/65      Weight: 93.9 kg (207 lb)  Body mass index is 31.47 kg/m².     Physical Exam  Vitals reviewed.   Constitutional:       General: She is not in acute distress.     Appearance: Normal appearance. She is not ill-appearing or toxic-appearing.   HENT:      Head: Normocephalic.      Mouth/Throat:      Mouth: Mucous membranes are moist.      Pharynx: Oropharynx is clear.   Eyes:      Extraocular Movements: Extraocular movements intact.      Conjunctiva/sclera: Conjunctivae normal.   Cardiovascular:      Rate and Rhythm: Normal rate and regular rhythm.   Pulmonary:      Effort: Pulmonary effort is normal. No respiratory distress.   Abdominal:      General: Abdomen is flat. There is no distension.      Palpations: Abdomen is soft.      Tenderness: There is no abdominal tenderness. There is no guarding.   Genitourinary:     Comments: No perianal tenderness, no excoriated skin, redundant skin from external hemorrhoids and skin tags without stigmata of bleeding, liquid brown stool around anus, passing flatus, patient refused KYLE  Musculoskeletal:         General: No swelling.   Skin:     General: Skin is warm and dry.   Neurological:      General: No focal deficit present.      Mental Status: She is alert.   Psychiatric:         Mood and Affect: Mood normal.         Behavior: Behavior normal.         Thought Content: Thought content normal.         Judgment: Judgment normal.        Significant Labs:  BMP:   Recent Labs   Lab 12/17/24  0831   *      K 3.3*   *   CO2 20*   BUN 18   CREATININE 0.9   CALCIUM 9.9     CBC:   Recent Labs   Lab 12/17/24  0831   WBC 8.49   RBC 3.64*   HGB 11.9*   HCT 36.0*      MCV 99*   MCH 32.7*   MCHC 33.1       Significant Diagnostics:  CT: I have reviewed all pertinent results/findings within the past 24 hours:  stool burden moved more distally in rectum but with greater volume, perirectal fat stranding, no free air

## 2024-12-18 NOTE — CONSULTS
[facility] - Emergency Dept  Colorectal Surgery  Consult Note    Patient Name: Gabriel Grace  MRN: [id number]  Admission Date: 12/16/2024  Hospital Length of Stay: 0 days  Attending Physician: Robin Whitfield MD  Primary Care Provider: Prosper Mancilla MD    Inpatient consult to Colorectal Surgery  Consult performed by: Monica Stinson MD  Consult ordered by: Leelee Byrd NP        Subjective:     Chief Complaint/Reason for Admission: stercoral proctitis    History of Present Illness:  85yo F with fecal impaction causing mild stercoral proctitis.  Hx of Normally has a soft formed BM daily without hematochezia or straining with daily miralax. Has had decreased appetite the past few weeks and worsening weakness, using her walked more often, and overall having less bowel movements, stopped taking miralax as regularly.  The past few days has had worsening rectal pain and nausea. No fevers or chills, no abdominal pain or emesis. She had a CT scan on 12/13 with mild proctitis and rectal stool burden, given cipro and flagyl. She had one episode of fecal incontinence, liquid stool, and wanted an appointment with CRS to evaluate, but due to pain went to the ED with repeat CT on 12/16. Baseline has full control of flatus and bowel movements, no incontinence.  Received docusate and miralax plus one enema with significant liquid stool output. Refusing further enemas due to pain but is not obstructed. CRS consulted for rectal pain. Does have a history of UC on balsalazide, not on biologics, last cscope a few years ago without polyps or ulcerations per patient who follows with GI. Retired OB nurse.      Review of patient's allergies indicates:  No Known Allergies    Past Medical History:   Diagnosis Date    Basal cell carcinoma 11/25/2024    upper nasal bridge    Cataract     Hyperglycemia 10/02/2013    Hyperlipidemia     Hypertension     Migraine headache     Pancreas cyst 01/17/2017    Thyroid disease     hypothyroidism     Ulcerative colitis, unspecified     Ulcerative colitis, unspecified     Visit for screening mammogram 2016     Past Surgical History:   Procedure Laterality Date    APPENDECTOMY      CAROTID ENDARTERECTOMY       SECTION      x1    CHOLECYSTECTOMY      DILATION AND CURETTAGE OF UTERUS      HYSTERECTOMY  1973    fabien-lso and right salpingectomy. right ovary remains.    PARATHYROIDECTOMY      TONSILLECTOMY       Family History       Problem Relation (Age of Onset)    Cancer Paternal Grandfather    Diabetes Mother, Sister, Paternal Grandmother    Heart attack Father    Heart disease Father    Hypertension Mother    No Known Problems Brother, Maternal Aunt, Maternal Uncle, Paternal Aunt, Paternal Uncle, Maternal Grandmother, Maternal Grandfather    Parkinsonism Sister    Psoriasis Sister    Skin cancer Paternal Grandfather    Stroke Mother          Tobacco Use    Smoking status: Never    Smokeless tobacco: Never   Substance and Sexual Activity    Alcohol use: Yes     Comment: rare    Drug use: No    Sexual activity: Not Currently     Review of Systems   Constitutional:  Positive for appetite change.   Gastrointestinal:  Positive for constipation, diarrhea and rectal pain.   Neurological:  Positive for weakness.     Objective:     Vital Signs (Most Recent):  Temp: 97.8 °F (36.6 °C) (24)  Pulse: 92 (24)  Resp: 15 (24)  BP: 131/65 (24)  SpO2: 97 % (24) Vital Signs (24h Range):  Temp:  [97.5 °F (36.4 °C)-98.6 °F (37 °C)] 97.8 °F (36.6 °C)  Pulse:  [69-92] 92  Resp:  [12-18] 15  SpO2:  [94 %-98 %] 97 %  BP: (111-169)/(61-75) 131/65     Weight: 93.9 kg (207 lb)  Body mass index is 31.47 kg/m².     Physical Exam  Vitals reviewed.   Constitutional:       General: She is not in acute distress.     Appearance: Normal appearance. She is not ill-appearing or toxic-appearing.   HENT:      Head: Normocephalic.      Mouth/Throat:      Mouth: Mucous membranes are moist.       Pharynx: Oropharynx is clear.   Eyes:      Extraocular Movements: Extraocular movements intact.      Conjunctiva/sclera: Conjunctivae normal.   Cardiovascular:      Rate and Rhythm: Normal rate and regular rhythm.   Pulmonary:      Effort: Pulmonary effort is normal. No respiratory distress.   Abdominal:      General: Abdomen is flat. There is no distension.      Palpations: Abdomen is soft.      Tenderness: There is no abdominal tenderness. There is no guarding.   Genitourinary:     Comments: No perianal tenderness, no excoriated skin, redundant skin from external hemorrhoids and skin tags without stigmata of bleeding, liquid brown stool around anus, passing flatus, patient refused KYLE  Musculoskeletal:         General: No swelling.   Skin:     General: Skin is warm and dry.   Neurological:      General: No focal deficit present.      Mental Status: She is alert.   Psychiatric:         Mood and Affect: Mood normal.         Behavior: Behavior normal.         Thought Content: Thought content normal.         Judgment: Judgment normal.        Significant Labs:  BMP:   Recent Labs   Lab 12/17/24  0831   *      K 3.3*   *   CO2 20*   BUN 18   CREATININE 0.9   CALCIUM 9.9     CBC:   Recent Labs   Lab 12/17/24  0831   WBC 8.49   RBC 3.64*   HGB 11.9*   HCT 36.0*      MCV 99*   MCH 32.7*   MCHC 33.1       Significant Diagnostics:  CT: I have reviewed all pertinent results/findings within the past 24 hours:  stool burden moved more distally in rectum but with greater volume, perirectal fat stranding, no free air  Assessment/Plan:     Renal/  Acute cystitis  Will make NPO at midnight, fluids and pain management per medicine team. Plan for endoscopic decompression under anesthesia, time pending availability. Afterward she will need to continue with a bowel regimen to prevent recurrence.      Thank you for your consult. I will follow-up with patient. Please contact us if you have any additional  questions.    Monica Stinson MD  Colorectal Surgery  [facility] - Emergency Dept

## 2024-12-18 NOTE — PROVATION PATIENT INSTRUCTIONS
Discharge Summary/Instructions after an Endoscopic Procedure  Patient Name: Gabriel Grace  Patient MRN: [id number]  Patient YOB: 1940  Wednesday, December 18, 2024  Nola Duong MD  Dear patient,  As a result of recent federal legislation (The Federal Cures Act), you may   receive lab or pathology results from your procedure in your [facility]   account before your physician is able to contact you. Your physician or   their representative will relay the results to you with their   recommendations at their soonest availability.  Thank you,  RESTRICTIONS:  During your procedure today, you received medications for sedation.  These   medications may affect your judgment, balance and coordination.  Therefore,   for 24 hours, you have the following restrictions:   - DO NOT drive a car, operate machinery, make legal/financial decisions,   sign important papers or drink alcohol.    ACTIVITY:  Today: no heavy lifting, straining or running due to procedural   sedation/anesthesia.  The following day: return to full activity including work.  DIET:  Eat and drink normally unless instructed otherwise.     TREATMENT FOR COMMON SIDE EFFECTS:  - Mild abdominal pain, nausea, belching, bloating or excessive gas:  rest,   eat lightly and use a heating pad.  - Sore Throat: treat with throat lozenges and/or gargle with warm salt   water.  - Because air was used during the procedure, expelling large amounts of air   from your rectum or belching is normal.  - If a bowel prep was taken, you may not have a bowel movement for 1-3 days.    This is normal.  SYMPTOMS TO WATCH FOR AND REPORT TO YOUR PHYSICIAN:  1. Abdominal pain or bloating, other than gas cramps.  2. Chest pain.  3. Back pain.  4. Signs of infection such as: chills or fever occurring within 24 hours   after the procedure.  5. Rectal bleeding, which would show as bright red, maroon, or black stools.   (A tablespoon of blood from the rectum is not serious, especially if    hemorrhoids are present.)  6. Vomiting.  7. Weakness or dizziness.  GO DIRECTLY TO THE NEAREST EMERGENCY ROOM IF YOU HAVE ANY OF THE FOLLOWING:      Difficulty breathing              Chills and/or fever over 101 F   Persistent vomiting and/or vomiting blood   Severe abdominal pain   Severe chest pain   Black, tarry stools   Bleeding- more than one tablespoon   Any other symptom or condition that you feel may need urgent attention  Your doctor recommends these additional instructions:  If any biopsies were taken, your doctors clinic will contact you in 1 to 2   weeks with any results.  - Return patient to hospital [facility] for ongoing care.  For questions, problems or results please call your physician - Nola Duong MD at Work:  [phone].  [facility], EMERGENCY ROOM PHONE NUMBER: [phone]  IF A COMPLICATION OR EMERGENCY SITUATION ARISES AND YOU ARE UNABLE TO REACH   YOUR PHYSICIAN - GO DIRECTLY TO THE EMERGENCY ROOM.  Nola Duong MD  12/18/2024 2:36:50 PM  This report has been verified and signed electronically.  Dear patient,  As a result of recent federal legislation (The Federal Cures Act), you may   receive lab or pathology results from your procedure in your [facility]   account before your physician is able to contact you. Your physician or   their representative will relay the results to you with their   recommendations at their soonest availability.  Thank you,  PROVATION

## 2024-12-18 NOTE — TELEPHONE ENCOUNTER
Patient's sister Corine Greco called to find out what time pt's procedure will be today.  Stated she called the nurse's station this morning but they were in change of shift.  Informed sister that her appointment is for 2:20 on the 2nd floor of the hospital with Dr. Duong.  Ms. Greco verbalized an understanding.

## 2024-12-18 NOTE — ASSESSMENT & PLAN NOTE
Keep NPO, awaiting OR vs endo availability for disimpaction today.  Please replete K>4 to optimize bowel function.  Fluid and pain management per primary team.

## 2024-12-18 NOTE — ASSESSMENT & PLAN NOTE
- unable to manually disimpact in ED  - having loose BM's following brown bomb x1  - start miralax BID  - full liquid diet  - CRS consulted ; appreciate recs  - pain control  - further management pending clinical course and future study review

## 2024-12-18 NOTE — HPI
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 She lives in [place], [place]. She is . Her primary care physician is Dr. Prosper Mancilla.    She was seen at [facility] on 12/13/2024 for proctitis with rectal pain, diarrhea, inflamed external hemorrhoid, nausea, vomiting, decreased oral intake, and fecal incontinence as well as urinary tract infection. Potassium was mildly low at 3.4 mmol/L. She was prescribed ciprofloxacin, metronidazole, ondansetron, and oxycodone.    Her sister called [facility] to make an appointment.   She presented to [facility] on 12/16/2024 for persistent rectal pain, nausea, and decreased oral intake. She had 1 episode of blood with wiping after a bowel movement. She had been having watery loose stools and an episode of fecal incontinence. Her appetite had been decreased for the past 3 weeks. She was found to have a fecal impaction. Potassium was 3.3.    She was admitted to the Emergency Department Observation Unit. She was given a brown bomb enema and began to have bowel movements but had persistent severe rectal pain. Urine culture taken on 12/13/2024 grew E coli sensitive to ciprofloxacin, so her prescribed antibiotics were continued. On 12/17/2024, she was upgraded to admission to Hospital Medicine Team B with Colorectal Surgery consult. Potassium was still  3.3.

## 2024-12-18 NOTE — H&P
[facility] - Emergency Dept  [facility]  History & Physical    Patient Name: Gabriel Grace  MRN: [id number]  Patient Class: IP- Inpatient  Admission Date: 12/16/2024  Attending Physician: Robin Whitfield MD   Primary Care Provider: Prosper Mancilla MD         Patient information was obtained from patient, past medical records, and ER records.     Subjective:     Principal Problem:Fecal impaction    Chief Complaint:   Chief Complaint   Patient presents with    Rectal Pain     Starting 5 days ago was seen for same thing.  C/o rectal pain, abd pain, and nausea.  Denies vomiting.        HPI: 84-year-old female with history of HTN, ulcerative colitis in remission, presents for evaluation of persistent rectal pain and discomfort, nausea. Patient was seen for this here 3 days ago and had imaging concerning for colitis, was started on Cipro and Flagyl and discharged with oxycodone and Zofran. Since then she has had persistent rectal pain and discomfort despite taking her medications as directed, and nausea with minimal PO intake. She denies any vomiting since she was in the ER 3 days ago, but feels that her symptoms have not improved at all. She had 1 episode of blood with wiping after BM, but no blood in stool. She denies any fevers or dysuria or other new complaints. She denies any recent issues with her ulcerative colitis, follows with GI. She has been having episodes of watery loose stools, and 1 episode of fecal incontinence. She states her appetite has been decreased for the past 3 weeks, which she thought was the cause of her decreased stool output; prior to that she denies any history of similar constipation.      I reviewed the ED Provider Note dated 12/16/2024 prior to my evaluation of this patient.  I reviewed all labs and imaging performed in the Main ED, prior to patient being placed in Observation. Patient was placed in the ED Observation Unit for fecal impaction..      Patient admitted initially to  EDOU  and following brown bomb enema did begin to have BM's with persistent severe rectal pain. Continued on outpatient cipro/flagy initally given for proctitis but Urine culture  E Coli sensitive to Cipro so continuing abx for UTI as well. Patient upgraded to the care of medicine  with continued bowel regimen and CRS consult.    Past Medical History:   Diagnosis Date    Basal cell carcinoma 2024    upper nasal bridge    Cataract     Hyperglycemia 10/02/2013    Hyperlipidemia     Hypertension     Migraine headache     Pancreas cyst 2017    Thyroid disease     hypothyroidism    Ulcerative colitis, unspecified     Ulcerative colitis, unspecified     Visit for screening mammogram 2016       Past Surgical History:   Procedure Laterality Date    APPENDECTOMY      CAROTID ENDARTERECTOMY       SECTION      x1    CHOLECYSTECTOMY      DILATION AND CURETTAGE OF UTERUS      HYSTERECTOMY  1973    fabien-lso and right salpingectomy. right ovary remains.    PARATHYROIDECTOMY      TONSILLECTOMY         Review of patient's allergies indicates:  No Known Allergies    No current facility-administered medications on file prior to encounter.     Current Outpatient Medications on File Prior to Encounter   Medication Sig    amLODIPine (NORVASC) 10 MG tablet TAKE 1 TABLET EVERY DAY  .  STOP OLD RX    balsalazide (COLAZAL) 750 mg capsule Take 2,250 mg by mouth once daily.     biotin 2,500 mcg Cap     calcium-magnesium-zinc Tab Take by mouth. 1  Oral Every day    carvediloL (COREG) 12.5 MG tablet Take 1 tablet (12.5 mg total) by mouth 2 (two) times daily with meals.    ciprofloxacin HCl (CIPRO) 500 MG tablet Take 1 tablet (500 mg total) by mouth every 12 (twelve) hours for 7 days    metroNIDAZOLE (FLAGYL) 500 MG tablet Take 1 tablet (500 mg total) by mouth every 8 (eight) hours. for 7 days    omeprazole (PRILOSEC) 20 MG capsule Take 1 capsule (20 mg total) by mouth once daily.    ondansetron  (ZOFRAN-ODT) 4 MG TbDL Take 1 tablet (4 mg total) by mouth every 6 (six) hours as needed (nausea).    oxyCODONE (ROXICODONE) 5 MG immediate release tablet Take 1 tablet (5 mg total) by mouth every 6 (six) hours as needed (moderate to severe pain).    simvastatin (ZOCOR) 10 MG tablet Take 1 tablet (10 mg total) by mouth every evening.    cholecalciferol, vitamin D3, (VITAMIN D3) 25 mcg (1,000 unit) capsule Take 1,000 Units by mouth once daily.    cyanocobalamin, vitamin B-12, 50 mcg tablet Take 1 tablet (50 mcg total) by mouth once daily.    cyclobenzaprine (FLEXERIL) 5 MG tablet TAKE 1 TABLET THREE TIMES DAILY AS NEEDED FOR MUSCLE SPASM(S)    FLAXSEED OIL ORAL Take 1,000 mg by mouth. 1  Oral Every day    FLUAD QUAD 2021-22,65Y UP,,PF, 60 mcg (15 mcg x 4)/0.5 mL Syrg     folic acid (FOLVITE) 400 MCG tablet Take 2 tablets (800 mcg total) by mouth once daily.    ketoconazole (NIZORAL) 2 % cream AAA qhs under breasts after cool blow dry    ketoconazole (NIZORAL) 2 % cream AAA qhs under breasts after cool blow dry prn flare    mometasone (ELOCON) 0.1 % solution aaa on scalp qhs prn flare    multivitamin (THERAGRAN) per tablet Take by mouth. 1  By mouth Every day    omega-3 fatty acids-vitamin E 1,000 mg Cap     triamcinolone acetonide 0.1% (KENALOG) 0.1 % cream aaa on legs and arms qd- bid after moisturizing; not more than 2 weeks straight in the same location     Family History       Problem Relation (Age of Onset)    Cancer Paternal Grandfather    Diabetes Mother, Sister, Paternal Grandmother    Heart attack Father    Heart disease Father    Hypertension Mother    No Known Problems Brother, Maternal Aunt, Maternal Uncle, Paternal Aunt, Paternal Uncle, Maternal Grandmother, Maternal Grandfather    Parkinsonism Sister    Psoriasis Sister    Skin cancer Paternal Grandfather    Stroke Mother          Tobacco Use    Smoking status: Never    Smokeless tobacco: Never   Substance and Sexual Activity    Alcohol use: Yes      Comment: rare    Drug use: No    Sexual activity: Not Currently     Review of Systems   Constitutional:  Positive for appetite change. Negative for chills and fever.   HENT:  Negative for sore throat and trouble swallowing.    Eyes:  Negative for photophobia and visual disturbance.   Respiratory:  Negative for cough, shortness of breath and wheezing.    Cardiovascular:  Negative for chest pain, palpitations and leg swelling.   Gastrointestinal:  Positive for constipation and rectal pain. Negative for abdominal distention, abdominal pain, nausea and vomiting.   Genitourinary:  Negative for dysuria and hematuria.   Musculoskeletal:  Negative for neck pain and neck stiffness.   Skin:  Negative for rash and wound.   Neurological:  Negative for syncope, weakness, light-headedness and headaches.   Psychiatric/Behavioral:  Negative for confusion and decreased concentration.      Objective:     Vital Signs (Most Recent):  Temp: 98.1 °F (36.7 °C) (12/17/24 1613)  Pulse: 75 (12/17/24 1613)  Resp: 18 (12/17/24 1614)  BP: 132/69 (12/17/24 1613)  SpO2: 98 % (12/17/24 1613) Vital Signs (24h Range):  Temp:  [97.5 °F (36.4 °C)-98.6 °F (37 °C)] 98.1 °F (36.7 °C)  Pulse:  [69-91] 75  Resp:  [12-18] 18  SpO2:  [93 %-100 %] 98 %  BP: (111-175)/(61-79) 132/69     Weight: 93.9 kg (207 lb)  Body mass index is 31.47 kg/m².     Physical Exam  Constitutional:       General: She is not in acute distress.     Appearance: She is obese. She is diaphoretic. She is not toxic-appearing.   HENT:      Head: Normocephalic and atraumatic.      Nose: Nose normal.   Eyes:      General: No scleral icterus.     Extraocular Movements: Extraocular movements intact.      Pupils: Pupils are equal, round, and reactive to light.   Cardiovascular:      Rate and Rhythm: Normal rate and regular rhythm.   Pulmonary:      Effort: Pulmonary effort is normal. No respiratory distress.      Breath sounds: No wheezing or rales.   Abdominal:      General: Abdomen is flat.  There is no distension.      Palpations: Abdomen is soft.      Tenderness: There is abdominal tenderness. There is no guarding.   Musculoskeletal:         General: Normal range of motion.      Cervical back: Normal range of motion and neck supple. No rigidity.      Right lower leg: No edema.      Left lower leg: No edema.   Skin:     General: Skin is warm.      Coloration: Skin is not jaundiced or pale.   Neurological:      General: No focal deficit present.      Mental Status: She is alert and oriented to person, place, and time.      Cranial Nerves: No cranial nerve deficit.   Psychiatric:         Mood and Affect: Mood normal.         Behavior: Behavior normal.              CRANIAL NERVES     CN III, IV, VI   Pupils are equal, round, and reactive to light.       Significant Labs: All pertinent labs within the past 24 hours have been reviewed.  CBC:   Recent Labs   Lab 12/16/24  1557 12/17/24  0831   WBC 8.55 8.49   HGB 10.9* 11.9*   HCT 33.0* 36.0*    172     CMP:   Recent Labs   Lab 12/16/24  1557 12/17/24  0831    143   K 3.3* 3.3*   * 111*   CO2 19* 20*   * 132*   BUN 28* 18   CREATININE 0.9 0.9   CALCIUM 9.8 9.9   PROT 6.5  --    ALBUMIN 3.2*  --    BILITOT 0.5  --    ALKPHOS 67  --    AST 17  --    ALT 9*  --    ANIONGAP 11 12     Urine Studies:   Recent Labs   Lab 12/16/24 2001   COLORU Yellow   APPEARANCEUA Clear   PHUR 6.0   SPECGRAV >1.030*   PROTEINUA 1+*   GLUCUA Negative   KETONESU 1+*   BILIRUBINUA Negative   OCCULTUA Trace*   NITRITE Negative   LEUKOCYTESUR 3+*   RBCUA 2   WBCUA 58*   BACTERIA Occasional   SQUAMEPITHEL 2   HYALINECASTS 0       Significant Imaging: I have reviewed all pertinent imaging results/findings within the past 24 hours.  Assessment/Plan:     * Fecal impaction  - unable to manually disimpact in ED  - having loose BM's following brown bomb x1  - start miralax BID  - full liquid diet  - CRS consulted ; appreciate recs  - pain control  - further  management pending clinical course and future study review      Acute cystitis  - continue cipro and flagyl  - Urine culture 12/13 E coli sensitive to cipro      Ulcerative colitis  - continue home balsalazide  - no clear evidence of acute flair on CT 12/16      Hypertension  - continue home amlodipine and coreg    Hyperlipidemia  - continue home statin        VTE Risk Mitigation (From admission, onward)           Ordered     IP VTE HIGH RISK PATIENT  Once         12/17/24 1843     Place sequential compression device  Until discontinued         12/17/24 1843                                    Robin Whitfield MD  Department of Hospital Medicine  [facility]

## 2024-12-18 NOTE — NURSING TRANSFER
Nursing Transfer Note      12/18/2024   5:52 PM    Nurse giving handoff: Clinton MORILLO PACU  Nurse receiving handoff:Vivien VAUGHAN    Reason patient is being transferred: post procedure    Transfer To: 1125    Transfer via stretcher    Transfer with oxygen 2L NC    Transported by [id number]    Transfer Vital Signs:  Please see flow sheet    Telemetry: Box Number N/A  Order for Tele Monitor? No    Additional Lines: Oxygen    Medicines sent: none    Any special needs or follow-up needed: routine    Patient belongings transferred with patient: No    Chart send with patient: Yes    Notified: family via surgical [facility] system     Patient reassessed at: 12/18/2024 at 1700  1  Upon arrival to floor: cardiac monitor applied, patient oriented to room, and bed in lowest position

## 2024-12-18 NOTE — SUBJECTIVE & OBJECTIVE
Interval History: Going to endoscopy suite for disimpaction this afternoon.    Review of Systems   Constitutional:  Negative for chills.   Neurological:  Negative for seizures and syncope.     Objective:     Vital Signs (Most Recent):  Temp: 98.5 °F (36.9 °C) (12/18/24 1131)  Pulse: 72 (12/18/24 1131)  Resp: 19 (12/18/24 1131)  BP: (!) 151/70 (12/18/24 1131)  SpO2: 98 % (12/18/24 1131) Vital Signs (24h Range):  Temp:  [97.4 °F (36.3 °C)-98.5 °F (36.9 °C)] 98.5 °F (36.9 °C)  Pulse:  [64-92] 72  Resp:  [15-19] 19  SpO2:  [93 %-99 %] 98 %  BP: (124-151)/(61-73) 151/70     Weight: 93.9 kg (207 lb)  Body mass index is 31.47 kg/m².    Intake/Output Summary (Last 24 hours) at 12/18/2024 1232  Last data filed at 12/17/2024 2107  Gross per 24 hour   Intake 240 ml   Output --   Net 240 ml         Physical Exam  Vitals and nursing note reviewed.   Constitutional:       General: She is not in acute distress.     Appearance: She is well-developed. She is obese. She is not diaphoretic.      Interventions: She is not intubated.  Pulmonary:      Effort: Pulmonary effort is normal. No accessory muscle usage or respiratory distress. She is not intubated.   Neurological:      Mental Status: She is alert and oriented to person, place, and time. Mental status is at baseline.      Motor: No seizure activity.   Psychiatric:         Attention and Perception: Attention normal.         Mood and Affect: Affect normal.         Behavior: Behavior is not agitated. Behavior is cooperative.             Significant Labs: All pertinent labs within the past 24 hours have been reviewed.    Significant Imaging: I have reviewed all pertinent imaging results/findings within the past 24 hours.  CT Abdomen Pelvis With IV Contrast NO Oral Contrast 12/16/24: FINDINGS:   Abdomen CT and Pelvis CT:   SOFT TISSUES: Unremarkable.   LUNG BASES/VISUALIZED MEDIASTINUM: Bibasilar dependent atelectasis.  Unchanged pulmonary micronodule in the right middle lobe (series  2, image 6).  Visualized heart normal size without evidence of pericardial effusion.  Dense mitral calcification, similar to prior.  There also some calcifications projecting in the region of the aortic valve, including some suspected aortic valve leaflet calcifications (leaflet calcifications are typically associated with underlying valvular aortic stenosis).   HEPATOBILIARY: Liver is normal size. Unchanged hepatic cysts, largest measuring 6 cm in the left hepatic lobe.  Unchanged peripheral wedge-shaped hypodensity in the right hepatic lobe (series 2, image 45), too small to characterize but unchanged.  Cholecystectomy.   Similar intrahepatic and extrahepatic biliary ductal dilatation, possibly representing post cholecystectomy reservoir effect.   PANCREAS: Similar 3.2 cm cystic focus arising from the pancreatic tail (series 2, image 38).  No ductal dilatation.   SPLEEN: Normal size. Subcentimeter hypodensities, too small to characterize but unchanged.   ADRENALS: No adrenal nodules.   KIDNEYS/URETERS: Kidneys enhance symmetrically. Stable bilateral renal cysts and subcentimeter hypodensities which are too small to characterize but unchanged.   Stable 1.7 cm stone in the right renal pelvis and 1.2 cm stone in the proximal left ureter..  No hydronephrosis.  Ureters are unremarkable.   BLADDER/PELVIC ORGANS: High density fluid in the bladder.  This is of uncertain etiology, though a common etiology would be excretion of a recently administered dose of IV contrast (correlate clinically for any recent imaging at another facility).   Similar air in the nondependent portion of the bladder.   Uterus: Absent.   Ovaries: Inconspicuous or perhaps also surgically absent.  No adnexal masses.   Vagina: No acute CT abnormality.   External genitalia: Not fully visualized.   PERITONEUM / RETROPERITONEUM: No free air or fluid.   LYMPH NODES: No lymphadenopathy in the abdomen or pelvis, by size criteria.   VESSELS: No abdominal  aortic aneurysm.   Mild aortoiliac calcific atherosclerosis.   Major abdominal aortic branch vessels are patent.   Portal venous system is patent.  Normal course of the IVC.   GI TRACT: Small hiatal hernia.   Pre-existing moderate-sized proximal D3 and large D4 duodenal diverticula.  No evidence of duodenal diverticulitis.   The stomach and duodenum both demonstrate poor luminal distension, this limits CT assessment for abnormal mural or mucosal thickening.   No evidence of small bowel or large bowel obstruction or inflammation.   Some high attenuation fecal residue is again accumulated in the rectum, possibly representing a small fecaloma or rectal fecal impaction.  No CT evidence of stercoral colitis at this time.   Colonic diverticulosis without evidence of diverticulitis.   The cecum is again anteromedially up-turned, without CT evidence of acute cecal pathology.   Appendix: Not definitely visualized.   BONES: Diffuse bony demineralization.  Degenerative change of the spine.  No acute fractures or suspicious osseous lesions.  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   Impression:  1. Possible rectal fecal impaction or small fecaloma.  Correlate clinically.   2. Otherwise, no CT evidence of bowel obstruction.   3. Stable bilateral kidney stones.  No hydronephrosis.   4. Hepatic and renal cysts, similar to prior.   5. There is again a small quantity of intraluminal gas within the urinary bladder.  This could be the result of a recent bladder catheterization or other recent bladder instrumentation.  If there is no history of recent such instrumentation, a gas-forming UTI would then become a leading consideration and urinalysis would be recommended.   6. Additional observations as detailed in the body of the report.

## 2024-12-18 NOTE — CARE UPDATE
Flexible sigmoidoscopy and fecal disimpaction performed.  No anal stenosis or mass. Rectal mucosa healthy and without evidence of ulceration.  No anal fissure or thrombosed hemorrhoids.    Ok for general diet, restart MiraLax, and outpatient follow-up with her regular GI team.    Nola Duong

## 2024-12-18 NOTE — DISCHARGE INSTRUCTIONS
Our goal at [facility] is to always give you outstanding care and exceptional service. You may receive a survey by mail, text or e-mail in the next 7-10 days from Ashlyn Kebede and our leadership team asking about the care you received with us. The survey should only take 5-10 minutes to complete and is very important to us.     Your feedback provides us with a way to recognize our staff who work tirelessly to provide the best care! Also, your responses help us learn how to improve when your experience was below our aspiration of excellence. We WILL use your feedback to continue making improvements to help us provide the highest quality care. We keep your personal information and feedback confidential. We appreciate your time completing this survey and can't wait to hear from you!!!     We want you to leave us today feeling like you can DEFINITELY recommend us to others! We look forward to your continued care with us! Thanks so much for choosing [facility] for your healthcare needs!

## 2024-12-18 NOTE — PROGRESS NOTES
[facility] - Internal Medicine [facility] Medicine  Progress Note    Patient Name: Gabriel Grace  MRN: [id number]  Patient Class: IP- Inpatient   Admission Date: 12/16/2024  Length of Stay: 1 days  Attending Physician: Christian Zelaya MD  Primary Care Provider: Prosper Mancilla MD        Subjective     Principal Problem:Fecal impaction        HPI:  Gabriel Grace is an 84 year old white woman with hypertension, hyperlipidemia, ulcerative colitis, migraine headaches, hypothyroidism, aortic atherosclerosis, history of pulmonary embolism, history of pancreatitic cyst in 2017, history of basal cell carcinoma of nasal bridge status post Mohs's surgery on 11/25/2024, history of total abdominal hysterectomy, left salpingo-oophorectomy, and right salpingectomy in 1973, history of cholecystectomy, 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 She lives in [place], [place]. She is . Her primary care physician is Dr. Prosper Mancilla.    She was seen at [facility] on 12/13/2024 for proctitis with rectal pain, diarrhea, inflamed external hemorrhoid, nausea, vomiting, decreased oral intake, and fecal incontinence as well as urinary tract infection. She was prescribed ciprofloxacin, metronidazole, ondansetron, and oxycodone.    Her sister called [facility] to make an appointment.   She presented to [facility] on 12/16/2024 for persistent rectal pain, nausea, and decreased oral intake. She had 1 episode of blood with wiping after a bowel movement. She had been having watery loose stools and an episode of fecal incontinence. Her appetite had been decreased for the past 3 weeks. She was found to have a fecal impaction.   She was admitted to the Emergency Department Observation Unit. She was given a brown bomb enema and began to have  bowel movements but had persistent severe rectal pain. Urine culture taken on 12/13/2024 grew E coli sensitive to ciprofloxacin, so her prescribed antibiotics were continued. On 12/17/2024, she was upgraded to admission to Hospital Medicine Team B with Colorectal Surgery consult.    Overview/Hospital Course:  Colorectal Surgery planned disimpaction.     Interval History: Going to endoscopy suite for disimpaction this afternoon.    Review of Systems   Constitutional:  Negative for chills.   Neurological:  Negative for seizures and syncope.     Objective:     Vital Signs (Most Recent):  Temp: 98.5 °F (36.9 °C) (12/18/24 1131)  Pulse: 72 (12/18/24 1131)  Resp: 19 (12/18/24 1131)  BP: (!) 151/70 (12/18/24 1131)  SpO2: 98 % (12/18/24 1131) Vital Signs (24h Range):  Temp:  [97.4 °F (36.3 °C)-98.5 °F (36.9 °C)] 98.5 °F (36.9 °C)  Pulse:  [64-92] 72  Resp:  [15-19] 19  SpO2:  [93 %-99 %] 98 %  BP: (124-151)/(61-73) 151/70     Weight: 93.9 kg (207 lb)  Body mass index is 31.47 kg/m².    Intake/Output Summary (Last 24 hours) at 12/18/2024 1232  Last data filed at 12/17/2024 2107  Gross per 24 hour   Intake 240 ml   Output --   Net 240 ml         Physical Exam  Vitals and nursing note reviewed.   Constitutional:       General: She is not in acute distress.     Appearance: She is well-developed. She is obese. She is not diaphoretic.      Interventions: She is not intubated.  Pulmonary:      Effort: Pulmonary effort is normal. No accessory muscle usage or respiratory distress. She is not intubated.   Neurological:      Mental Status: She is alert and oriented to person, place, and time. Mental status is at baseline.      Motor: No seizure activity.   Psychiatric:         Attention and Perception: Attention normal.         Mood and Affect: Affect normal.         Behavior: Behavior is not agitated. Behavior is cooperative.             Significant Labs: All pertinent labs within the past 24 hours have been reviewed.    Significant  Imaging: I have reviewed all pertinent imaging results/findings within the past 24 hours.  CT Abdomen Pelvis With IV Contrast NO Oral Contrast 12/16/24: FINDINGS:   Abdomen CT and Pelvis CT:   SOFT TISSUES: Unremarkable.   LUNG BASES/VISUALIZED MEDIASTINUM: Bibasilar dependent atelectasis.  Unchanged pulmonary micronodule in the right middle lobe (series 2, image 6).  Visualized heart normal size without evidence of pericardial effusion.  Dense mitral calcification, similar to prior.  There also some calcifications projecting in the region of the aortic valve, including some suspected aortic valve leaflet calcifications (leaflet calcifications are typically associated with underlying valvular aortic stenosis).   HEPATOBILIARY: Liver is normal size. Unchanged hepatic cysts, largest measuring 6 cm in the left hepatic lobe.  Unchanged peripheral wedge-shaped hypodensity in the right hepatic lobe (series 2, image 45), too small to characterize but unchanged.  Cholecystectomy.   Similar intrahepatic and extrahepatic biliary ductal dilatation, possibly representing post cholecystectomy reservoir effect.   PANCREAS: Similar 3.2 cm cystic focus arising from the pancreatic tail (series 2, image 38).  No ductal dilatation.   SPLEEN: Normal size. Subcentimeter hypodensities, too small to characterize but unchanged.   ADRENALS: No adrenal nodules.   KIDNEYS/URETERS: Kidneys enhance symmetrically. Stable bilateral renal cysts and subcentimeter hypodensities which are too small to characterize but unchanged.   Stable 1.7 cm stone in the right renal pelvis and 1.2 cm stone in the proximal left ureter..  No hydronephrosis.  Ureters are unremarkable.   BLADDER/PELVIC ORGANS: High density fluid in the bladder.  This is of uncertain etiology, though a common etiology would be excretion of a recently administered dose of IV contrast (correlate clinically for any recent imaging at another facility).   Similar air in the nondependent  portion of the bladder.   Uterus: Absent.   Ovaries: Inconspicuous or perhaps also surgically absent.  No adnexal masses.   Vagina: No acute CT abnormality.   External genitalia: Not fully visualized.   PERITONEUM / RETROPERITONEUM: No free air or fluid.   LYMPH NODES: No lymphadenopathy in the abdomen or pelvis, by size criteria.   VESSELS: No abdominal aortic aneurysm.   Mild aortoiliac calcific atherosclerosis.   Major abdominal aortic branch vessels are patent.   Portal venous system is patent.  Normal course of the IVC.   GI TRACT: Small hiatal hernia.   Pre-existing moderate-sized proximal D3 and large D4 duodenal diverticula.  No evidence of duodenal diverticulitis.   The stomach and duodenum both demonstrate poor luminal distension, this limits CT assessment for abnormal mural or mucosal thickening.   No evidence of small bowel or large bowel obstruction or inflammation.   Some high attenuation fecal residue is again accumulated in the rectum, possibly representing a small fecaloma or rectal fecal impaction.  No CT evidence of stercoral colitis at this time.   Colonic diverticulosis without evidence of diverticulitis.   The cecum is again anteromedially up-turned, without CT evidence of acute cecal pathology.   Appendix: Not definitely visualized.   BONES: Diffuse bony demineralization.  Degenerative change of the spine.  No acute fractures or suspicious osseous lesions.  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   Impression:  1. Possible rectal fecal impaction or small fecaloma.  Correlate clinically.   2. Otherwise, no CT evidence of bowel obstruction.   3. Stable bilateral kidney stones.  No hydronephrosis.   4. Hepatic and renal cysts, similar to prior.   5. There is again a small quantity of intraluminal gas within the urinary bladder.  This could be the result of a recent bladder  catheterization or other recent bladder instrumentation.  If there is no history of recent such instrumentation, a gas-forming UTI would then become a leading consideration and urinalysis would be recommended.   6. Additional observations as detailed in the body of the report.     Assessment and Plan     * Fecal impaction  Appreciate CRS.      E. coli UTI  Continue recently prescribed ciprofloxacin.      Ulcerative colitis  Continue home balsalazide.    Hypertension  Chronic. Continue home amlodipine and carvedilol. Monitor BP.    Hyperlipidemia  Continue home simvastatin        VTE Risk Mitigation (From admission, onward)           Ordered     IP VTE HIGH RISK PATIENT  Once         12/17/24 1843     Place sequential compression device  Until discontinued         12/17/24 1843                    Discharge Planning   JELANI: 12/20/2024     Code Status: Full Code   Medical Readiness for Discharge Date:   Discharge Plan A: Home                        Christian Zelaya MD  Department of Hospital Medicine   [facility] - Internal Medicine Telemetry

## 2024-12-18 NOTE — HOSPITAL COURSE
Stool disimpaction was performed in the endoscopy suite by Colorectal Surgery. She was started on regular stool softener. She tolerated oral intake and was medically ready to discharge, however she had concerns returning home alone due to reported weakness. PT and OT were consulted and advised moderate-intensity therapy. Rectal pain subsided, but she had persistent nausea (no vomiting & otherwise tolerating oral intake). Antiemetic regimen was adjusted with scheduled metoclopramide. She had a facial rash that she attributed to dry skin, for which she uses topical emollients at home. She was accepted to a skilled nursing facility but awaited insurance authorization. [id number] discussion was done with her insurance, who denied skilled nursing facility based on her ability to walk down the hallway with a walker and contact guard assistance by therapists. She was discharged home with home health on 12/27/2024. She appealed the discharge.

## 2024-12-18 NOTE — SUBJECTIVE & OBJECTIVE
Subjective:     Interval History: no changes, continues to have rectal pain, passing flatus and liquid stool    Post-Op Info:  * No surgery found *          Medications:  Continuous Infusions:  Scheduled Meds:   amLODIPine  10 mg Oral Daily    atorvastatin  10 mg Oral QHS    balsalazide  2,250 mg Oral Daily    carvediloL  12.5 mg Oral BID WM    ciprofloxacin  400 mg Intravenous Q12H    hydrocortisone   Rectal BID    metroNIDAZOLE IV (PEDS and ADULTS)  500 mg Intravenous Q8H    polyethylene glycol  17 g Oral BID     PRN Meds:   amLODIPine tablet 10 mg    atorvastatin tablet 10 mg    balsalazide capsule 2,250 mg    carvediloL tablet 12.5 mg    ciprofloxacin (CIPRO)400mg/200ml D5W IVPB 400 mg    hydrocortisone 2.5 % rectal cream    metronidazole IVPB 500 mg    polyethylene glycol packet 17 g        Objective:     Vital Signs (Most Recent):  Temp: 97.8 °F (36.6 °C) (12/18/24 0750)  Pulse: 73 (12/18/24 0750)  Resp: 17 (12/18/24 0750)  BP: 139/61 (12/18/24 0750)  SpO2: (!) 93 % (12/18/24 0750) Vital Signs (24h Range):  Temp:  [97.4 °F (36.3 °C)-98.1 °F (36.7 °C)] 97.8 °F (36.6 °C)  Pulse:  [64-92] 73  Resp:  [15-18] 17  SpO2:  [93 %-99 %] 93 %  BP: (124-147)/(61-73) 139/61     Intake/Output - Last 3 Shifts         12/16 0700  12/17 0659 12/17 0700 12/18 0659 12/18 0700  12/19 0659    P.O. 240 240     Total Intake(mL/kg) 240 (2.6) 240 (2.6)     Urine (mL/kg/hr) 350      Total Output 350      Net -110 +240            Urine Occurrence 1 x      Stool Occurrence  4 x          General: alert, awake, in no acute distress  HEENT: EOMI, no scleral icterus, CN grossly intact  Cardiac: RRR, BP stable, warm well perfused  Pulm: non labored breathing on room air, no audible wheeze  Abdomen: soft, non distended, non TTP  Extremities: moving all four extremities  Skin: no obvious rashes  Neuro: no obvious deficits  Psych: cooperative, appropriate affect  Anorectal: deferred    Significant Labs:  BMP:   Recent Labs   Lab  12/18/24  0520   GLU 91      K 3.0*   *   CO2 18*   BUN 19   CREATININE 0.8   CALCIUM 9.4   MG 1.7     CBC:   Recent Labs   Lab 12/18/24  0520   WBC 7.69   RBC 3.37*   HGB 10.9*   HCT 34.2*      *   MCH 32.3*   MCHC 31.9*       Significant Diagnostics:  None

## 2024-12-18 NOTE — SUBJECTIVE & OBJECTIVE
Past Medical History:   Diagnosis Date    Basal cell carcinoma 2024    upper nasal bridge    Cataract     Hyperglycemia 10/02/2013    Hyperlipidemia     Hypertension     Migraine headache     Pancreas cyst 2017    Thyroid disease     hypothyroidism    Ulcerative colitis, unspecified     Ulcerative colitis, unspecified     Visit for screening mammogram 2016       Past Surgical History:   Procedure Laterality Date    APPENDECTOMY      CAROTID ENDARTERECTOMY       SECTION      x1    CHOLECYSTECTOMY      DILATION AND CURETTAGE OF UTERUS      HYSTERECTOMY  1973    fabien-lso and right salpingectomy. right ovary remains.    PARATHYROIDECTOMY      TONSILLECTOMY         Review of patient's allergies indicates:  No Known Allergies    No current facility-administered medications on file prior to encounter.     Current Outpatient Medications on File Prior to Encounter   Medication Sig    amLODIPine (NORVASC) 10 MG tablet TAKE 1 TABLET EVERY DAY  .  STOP OLD RX    balsalazide (COLAZAL) 750 mg capsule Take 2,250 mg by mouth once daily.     biotin 2,500 mcg Cap     calcium-magnesium-zinc Tab Take by mouth. 1  Oral Every day    carvediloL (COREG) 12.5 MG tablet Take 1 tablet (12.5 mg total) by mouth 2 (two) times daily with meals.    ciprofloxacin HCl (CIPRO) 500 MG tablet Take 1 tablet (500 mg total) by mouth every 12 (twelve) hours for 7 days    metroNIDAZOLE (FLAGYL) 500 MG tablet Take 1 tablet (500 mg total) by mouth every 8 (eight) hours. for 7 days    omeprazole (PRILOSEC) 20 MG capsule Take 1 capsule (20 mg total) by mouth once daily.    ondansetron (ZOFRAN-ODT) 4 MG TbDL Take 1 tablet (4 mg total) by mouth every 6 (six) hours as needed (nausea).    oxyCODONE (ROXICODONE) 5 MG immediate release tablet Take 1 tablet (5 mg total) by mouth every 6 (six) hours as needed (moderate to severe pain).    simvastatin (ZOCOR) 10 MG tablet Take 1 tablet (10 mg total) by mouth every evening.    cholecalciferol,  vitamin D3, (VITAMIN D3) 25 mcg (1,000 unit) capsule Take 1,000 Units by mouth once daily.    cyanocobalamin, vitamin B-12, 50 mcg tablet Take 1 tablet (50 mcg total) by mouth once daily.    cyclobenzaprine (FLEXERIL) 5 MG tablet TAKE 1 TABLET THREE TIMES DAILY AS NEEDED FOR MUSCLE SPASM(S)    FLAXSEED OIL ORAL Take 1,000 mg by mouth. 1  Oral Every day    FLUAD QUAD 2021-22,65Y UP,,PF, 60 mcg (15 mcg x 4)/0.5 mL Syrg     folic acid (FOLVITE) 400 MCG tablet Take 2 tablets (800 mcg total) by mouth once daily.    ketoconazole (NIZORAL) 2 % cream AAA qhs under breasts after cool blow dry    ketoconazole (NIZORAL) 2 % cream AAA qhs under breasts after cool blow dry prn flare    mometasone (ELOCON) 0.1 % solution aaa on scalp qhs prn flare    multivitamin (THERAGRAN) per tablet Take by mouth. 1  By mouth Every day    omega-3 fatty acids-vitamin E 1,000 mg Cap     triamcinolone acetonide 0.1% (KENALOG) 0.1 % cream aaa on legs and arms qd- bid after moisturizing; not more than 2 weeks straight in the same location     Family History       Problem Relation (Age of Onset)    Cancer Paternal Grandfather    Diabetes Mother, Sister, Paternal Grandmother    Heart attack Father    Heart disease Father    Hypertension Mother    No Known Problems Brother, Maternal Aunt, Maternal Uncle, Paternal Aunt, Paternal Uncle, Maternal Grandmother, Maternal Grandfather    Parkinsonism Sister    Psoriasis Sister    Skin cancer Paternal Grandfather    Stroke Mother          Tobacco Use    Smoking status: Never    Smokeless tobacco: Never   Substance and Sexual Activity    Alcohol use: Yes     Comment: rare    Drug use: No    Sexual activity: Not Currently     Review of Systems   Constitutional:  Positive for appetite change. Negative for chills and fever.   HENT:  Negative for sore throat and trouble swallowing.    Eyes:  Negative for photophobia and visual disturbance.   Respiratory:  Negative for cough, shortness of breath and wheezing.     Cardiovascular:  Negative for chest pain, palpitations and leg swelling.   Gastrointestinal:  Positive for constipation and rectal pain. Negative for abdominal distention, abdominal pain, nausea and vomiting.   Genitourinary:  Negative for dysuria and hematuria.   Musculoskeletal:  Negative for neck pain and neck stiffness.   Skin:  Negative for rash and wound.   Neurological:  Negative for syncope, weakness, light-headedness and headaches.   Psychiatric/Behavioral:  Negative for confusion and decreased concentration.      Objective:     Vital Signs (Most Recent):  Temp: 98.1 °F (36.7 °C) (12/17/24 1613)  Pulse: 75 (12/17/24 1613)  Resp: 18 (12/17/24 1614)  BP: 132/69 (12/17/24 1613)  SpO2: 98 % (12/17/24 1613) Vital Signs (24h Range):  Temp:  [97.5 °F (36.4 °C)-98.6 °F (37 °C)] 98.1 °F (36.7 °C)  Pulse:  [69-91] 75  Resp:  [12-18] 18  SpO2:  [93 %-100 %] 98 %  BP: (111-175)/(61-79) 132/69     Weight: 93.9 kg (207 lb)  Body mass index is 31.47 kg/m².     Physical Exam  Constitutional:       General: She is not in acute distress.     Appearance: She is obese. She is diaphoretic. She is not toxic-appearing.   HENT:      Head: Normocephalic and atraumatic.      Nose: Nose normal.   Eyes:      General: No scleral icterus.     Extraocular Movements: Extraocular movements intact.      Pupils: Pupils are equal, round, and reactive to light.   Cardiovascular:      Rate and Rhythm: Normal rate and regular rhythm.   Pulmonary:      Effort: Pulmonary effort is normal. No respiratory distress.      Breath sounds: No wheezing or rales.   Abdominal:      General: Abdomen is flat. There is no distension.      Palpations: Abdomen is soft.      Tenderness: There is abdominal tenderness. There is no guarding.   Musculoskeletal:         General: Normal range of motion.      Cervical back: Normal range of motion and neck supple. No rigidity.      Right lower leg: No edema.      Left lower leg: No edema.   Skin:     General: Skin is  warm.      Coloration: Skin is not jaundiced or pale.   Neurological:      General: No focal deficit present.      Mental Status: She is alert and oriented to person, place, and time.      Cranial Nerves: No cranial nerve deficit.   Psychiatric:         Mood and Affect: Mood normal.         Behavior: Behavior normal.              CRANIAL NERVES     CN III, IV, VI   Pupils are equal, round, and reactive to light.       Significant Labs: All pertinent labs within the past 24 hours have been reviewed.  CBC:   Recent Labs   Lab 12/16/24  1557 12/17/24  0831   WBC 8.55 8.49   HGB 10.9* 11.9*   HCT 33.0* 36.0*    172     CMP:   Recent Labs   Lab 12/16/24  1557 12/17/24  0831    143   K 3.3* 3.3*   * 111*   CO2 19* 20*   * 132*   BUN 28* 18   CREATININE 0.9 0.9   CALCIUM 9.8 9.9   PROT 6.5  --    ALBUMIN 3.2*  --    BILITOT 0.5  --    ALKPHOS 67  --    AST 17  --    ALT 9*  --    ANIONGAP 11 12     Urine Studies:   Recent Labs   Lab 12/16/24 2001   COLORU Yellow   APPEARANCEUA Clear   PHUR 6.0   SPECGRAV >1.030*   PROTEINUA 1+*   GLUCUA Negative   KETONESU 1+*   BILIRUBINUA Negative   OCCULTUA Trace*   NITRITE Negative   LEUKOCYTESUR 3+*   RBCUA 2   WBCUA 58*   BACTERIA Occasional   SQUAMEPITHEL 2   HYALINECASTS 0       Significant Imaging: I have reviewed all pertinent imaging results/findings within the past 24 hours.

## 2024-12-19 ENCOUNTER — TELEPHONE (OUTPATIENT)
Dept: SURGERY | Facility: CLINIC | Age: 84
End: 2024-12-19
Payer: MEDICARE

## 2024-12-19 PROBLEM — K56.41 FECAL IMPACTION: Status: RESOLVED | Noted: 2024-12-17 | Resolved: 2024-12-19

## 2024-12-19 LAB
ALBUMIN SERPL BCP-MCNC: 2.9 G/DL (ref 3.5–5.2)
ALP SERPL-CCNC: 57 U/L (ref 40–150)
ALT SERPL W/O P-5'-P-CCNC: 11 U/L (ref 10–44)
ANION GAP SERPL CALC-SCNC: 10 MMOL/L (ref 8–16)
AST SERPL-CCNC: 19 U/L (ref 10–40)
BASOPHILS # BLD AUTO: 0.02 K/UL (ref 0–0.2)
BASOPHILS NFR BLD: 0.3 % (ref 0–1.9)
BILIRUB SERPL-MCNC: 0.5 MG/DL (ref 0.1–1)
BUN SERPL-MCNC: 13 MG/DL (ref 8–23)
CALCIUM SERPL-MCNC: 8.9 MG/DL (ref 8.7–10.5)
CHLORIDE SERPL-SCNC: 113 MMOL/L (ref 95–110)
CO2 SERPL-SCNC: 20 MMOL/L (ref 23–29)
CREAT SERPL-MCNC: 0.8 MG/DL (ref 0.5–1.4)
DIFFERENTIAL METHOD BLD: ABNORMAL
EOSINOPHIL # BLD AUTO: 0.2 K/UL (ref 0–0.5)
EOSINOPHIL NFR BLD: 3.2 % (ref 0–8)
ERYTHROCYTE [DISTWIDTH] IN BLOOD BY AUTOMATED COUNT: 14.3 % (ref 11.5–14.5)
EST. GFR  (NO RACE VARIABLE): >60 ML/MIN/1.73 M^2
GLUCOSE SERPL-MCNC: 96 MG/DL (ref 70–110)
HCT VFR BLD AUTO: 31.7 % (ref 37–48.5)
HGB BLD-MCNC: 10.6 G/DL (ref 12–16)
IMM GRANULOCYTES # BLD AUTO: 0.03 K/UL (ref 0–0.04)
IMM GRANULOCYTES NFR BLD AUTO: 0.4 % (ref 0–0.5)
LYMPHOCYTES # BLD AUTO: 1 K/UL (ref 1–4.8)
LYMPHOCYTES NFR BLD: 13.5 % (ref 18–48)
MAGNESIUM SERPL-MCNC: 1.7 MG/DL (ref 1.6–2.6)
MCH RBC QN AUTO: 33.7 PG (ref 27–31)
MCHC RBC AUTO-ENTMCNC: 33.4 G/DL (ref 32–36)
MCV RBC AUTO: 101 FL (ref 82–98)
MONOCYTES # BLD AUTO: 0.6 K/UL (ref 0.3–1)
MONOCYTES NFR BLD: 8.2 % (ref 4–15)
NEUTROPHILS # BLD AUTO: 5.5 K/UL (ref 1.8–7.7)
NEUTROPHILS NFR BLD: 74.4 % (ref 38–73)
NRBC BLD-RTO: 0 /100 WBC
PHOSPHATE SERPL-MCNC: 2.3 MG/DL (ref 2.7–4.5)
PLATELET # BLD AUTO: 141 K/UL (ref 150–450)
PMV BLD AUTO: 12.1 FL (ref 9.2–12.9)
POTASSIUM SERPL-SCNC: 3.4 MMOL/L (ref 3.5–5.1)
PROT SERPL-MCNC: 5.7 G/DL (ref 6–8.4)
RBC # BLD AUTO: 3.15 M/UL (ref 4–5.4)
SODIUM SERPL-SCNC: 143 MMOL/L (ref 136–145)
WBC # BLD AUTO: 7.4 K/UL (ref 3.9–12.7)

## 2024-12-19 PROCEDURE — 63600175 PHARM REV CODE 636 W HCPCS: Performed by: STUDENT IN AN ORGANIZED HEALTH CARE EDUCATION/TRAINING PROGRAM

## 2024-12-19 PROCEDURE — 25000003 PHARM REV CODE 250: Performed by: HOSPITALIST

## 2024-12-19 PROCEDURE — 63600175 PHARM REV CODE 636 W HCPCS: Performed by: FAMILY MEDICINE

## 2024-12-19 PROCEDURE — 63600175 PHARM REV CODE 636 W HCPCS: Mod: JZ,JG | Performed by: PHYSICIAN ASSISTANT

## 2024-12-19 PROCEDURE — 11000001 HC ACUTE MED/SURG PRIVATE ROOM

## 2024-12-19 PROCEDURE — 85025 COMPLETE CBC W/AUTO DIFF WBC: CPT | Performed by: FAMILY MEDICINE

## 2024-12-19 PROCEDURE — 80053 COMPREHEN METABOLIC PANEL: CPT | Performed by: FAMILY MEDICINE

## 2024-12-19 PROCEDURE — 25000003 PHARM REV CODE 250: Performed by: STUDENT IN AN ORGANIZED HEALTH CARE EDUCATION/TRAINING PROGRAM

## 2024-12-19 PROCEDURE — 84100 ASSAY OF PHOSPHORUS: CPT | Performed by: FAMILY MEDICINE

## 2024-12-19 PROCEDURE — 99900035 HC TECH TIME PER 15 MIN (STAT)

## 2024-12-19 PROCEDURE — 27000221 HC OXYGEN, UP TO 24 HOURS

## 2024-12-19 PROCEDURE — 83735 ASSAY OF MAGNESIUM: CPT | Performed by: FAMILY MEDICINE

## 2024-12-19 PROCEDURE — 25000003 PHARM REV CODE 250: Performed by: NURSE PRACTITIONER

## 2024-12-19 PROCEDURE — 36415 COLL VENOUS BLD VENIPUNCTURE: CPT | Performed by: FAMILY MEDICINE

## 2024-12-19 PROCEDURE — 25000003 PHARM REV CODE 250: Performed by: PHYSICIAN ASSISTANT

## 2024-12-19 PROCEDURE — 25000003 PHARM REV CODE 250: Performed by: FAMILY MEDICINE

## 2024-12-19 PROCEDURE — 94761 N-INVAS EAR/PLS OXIMETRY MLT: CPT

## 2024-12-19 RX ORDER — SODIUM,POTASSIUM PHOSPHATES 280-250MG
2 POWDER IN PACKET (EA) ORAL ONCE
Status: DISCONTINUED | OUTPATIENT
Start: 2024-12-19 | End: 2024-12-19

## 2024-12-19 RX ORDER — MORPHINE SULFATE 2 MG/ML
2 INJECTION, SOLUTION INTRAMUSCULAR; INTRAVENOUS ONCE
Status: COMPLETED | OUTPATIENT
Start: 2024-12-19 | End: 2024-12-19

## 2024-12-19 RX ORDER — OXYCODONE HYDROCHLORIDE 5 MG/1
5 TABLET ORAL EVERY 6 HOURS PRN
Status: DISCONTINUED | OUTPATIENT
Start: 2024-12-19 | End: 2024-12-28 | Stop reason: HOSPADM

## 2024-12-19 RX ORDER — SODIUM,POTASSIUM PHOSPHATES 280-250MG
2 POWDER IN PACKET (EA) ORAL ONCE
Status: COMPLETED | OUTPATIENT
Start: 2024-12-19 | End: 2024-12-19

## 2024-12-19 RX ORDER — MAGNESIUM SULFATE HEPTAHYDRATE 40 MG/ML
2 INJECTION, SOLUTION INTRAVENOUS ONCE
Status: COMPLETED | OUTPATIENT
Start: 2024-12-19 | End: 2024-12-19

## 2024-12-19 RX ADMIN — MORPHINE SULFATE 4 MG: 4 INJECTION INTRAVENOUS at 10:12

## 2024-12-19 RX ADMIN — POTASSIUM BICARBONATE 40 MEQ: 391 TABLET, EFFERVESCENT ORAL at 10:12

## 2024-12-19 RX ADMIN — MORPHINE SULFATE 4 MG: 4 INJECTION INTRAVENOUS at 04:12

## 2024-12-19 RX ADMIN — POTASSIUM & SODIUM PHOSPHATES POWDER PACK 280-160-250 MG 2 PACKET: 280-160-250 PACK at 10:12

## 2024-12-19 RX ADMIN — BALSALAZIDE DISODIUM 2250 MG: 750 CAPSULE ORAL at 10:12

## 2024-12-19 RX ADMIN — MORPHINE SULFATE 2 MG: 2 INJECTION, SOLUTION INTRAMUSCULAR; INTRAVENOUS at 12:12

## 2024-12-19 RX ADMIN — POTASSIUM BICARBONATE 40 MEQ: 391 TABLET, EFFERVESCENT ORAL at 01:12

## 2024-12-19 RX ADMIN — MORPHINE SULFATE 4 MG: 4 INJECTION INTRAVENOUS at 09:12

## 2024-12-19 RX ADMIN — ATORVASTATIN CALCIUM 10 MG: 10 TABLET, FILM COATED ORAL at 09:12

## 2024-12-19 RX ADMIN — CARVEDILOL 12.5 MG: 12.5 TABLET, FILM COATED ORAL at 06:12

## 2024-12-19 RX ADMIN — METRONIDAZOLE 500 MG: 5 INJECTION, SOLUTION INTRAVENOUS at 02:12

## 2024-12-19 RX ADMIN — HYDROCORTISONE: 25 CREAM TOPICAL at 10:12

## 2024-12-19 RX ADMIN — CIPROFLOXACIN 400 MG: 2 INJECTION, SOLUTION INTRAVENOUS at 12:12

## 2024-12-19 RX ADMIN — CARVEDILOL 12.5 MG: 12.5 TABLET, FILM COATED ORAL at 10:12

## 2024-12-19 RX ADMIN — ONDANSETRON 4 MG: 2 INJECTION INTRAMUSCULAR; INTRAVENOUS at 09:12

## 2024-12-19 RX ADMIN — ONDANSETRON 4 MG: 2 INJECTION INTRAMUSCULAR; INTRAVENOUS at 04:12

## 2024-12-19 RX ADMIN — MAGNESIUM SULFATE IN WATER 2 G: 40 INJECTION, SOLUTION INTRAVENOUS at 02:12

## 2024-12-19 RX ADMIN — METRONIDAZOLE 500 MG: 5 INJECTION, SOLUTION INTRAVENOUS at 10:12

## 2024-12-19 RX ADMIN — AMLODIPINE BESYLATE 10 MG: 10 TABLET ORAL at 10:12

## 2024-12-19 NOTE — SUBJECTIVE & OBJECTIVE
Interval History: NAEON. Moving bowels but reports continued rectal pain. Repeatedly reports significant weakness & concerns for going home alone. PT/OT ordered. Pt otherwise medically ready for discharge.     Review of Systems   Constitutional:  Negative for chills, diaphoresis and fever.   HENT:  Negative for congestion, rhinorrhea, sore throat and trouble swallowing.    Eyes:  Negative for photophobia and visual disturbance.   Respiratory:  Negative for cough, shortness of breath and wheezing.    Cardiovascular:  Negative for chest pain, palpitations and leg swelling.   Gastrointestinal:  Positive for constipation, diarrhea and rectal pain. Negative for abdominal distention, abdominal pain, nausea and vomiting.   Genitourinary:  Negative for difficulty urinating, dysuria and hematuria.   Musculoskeletal:  Negative for arthralgias, joint swelling and neck pain.   Skin:  Negative for rash and wound.   Neurological:  Positive for weakness. Negative for dizziness, light-headedness and headaches.   Psychiatric/Behavioral:  Negative for agitation, confusion and sleep disturbance. The patient is nervous/anxious.      Objective:     Vital Signs (Most Recent):  Temp: 99 °F (37.2 °C) (12/19/24 1532)  Pulse: 81 (12/19/24 1532)  Resp: 17 (12/19/24 1532)  BP: 118/67 (12/19/24 1532)  SpO2: 97 % (12/19/24 1532) Vital Signs (24h Range):  Temp:  [97.7 °F (36.5 °C)-99.9 °F (37.7 °C)] 99 °F (37.2 °C)  Pulse:  [67-81] 81  Resp:  [12-19] 17  SpO2:  [96 %-100 %] 97 %  BP: (101-171)/(52-74) 118/67     Weight: 93.9 kg (207 lb)  Body mass index is 31.47 kg/m².    Intake/Output Summary (Last 24 hours) at 12/19/2024 1612  Last data filed at 12/19/2024 0624  Gross per 24 hour   Intake 1664.64 ml   Output 850 ml   Net 814.64 ml         Physical Exam  Constitutional:       General: She is not in acute distress.     Appearance: She is not toxic-appearing or diaphoretic.   HENT:      Head: Normocephalic and atraumatic.      Mouth/Throat:       Mouth: Mucous membranes are moist.   Eyes:      Conjunctiva/sclera: Conjunctivae normal.   Cardiovascular:      Rate and Rhythm: Normal rate and regular rhythm.      Heart sounds: Normal heart sounds.   Pulmonary:      Effort: Pulmonary effort is normal. No respiratory distress.      Breath sounds: Normal breath sounds. No wheezing, rhonchi or rales.   Abdominal:      General: Bowel sounds are normal. There is no distension.      Palpations: Abdomen is soft.      Tenderness: There is no abdominal tenderness. There is no guarding.   Musculoskeletal:      Right lower leg: No edema.      Left lower leg: No edema.   Skin:     General: Skin is warm and dry.   Neurological:      General: No focal deficit present.      Mental Status: She is alert and oriented to person, place, and time. Mental status is at baseline.             Significant Labs: All pertinent labs within the past 24 hours have been reviewed.    Significant Imaging: I have reviewed all pertinent imaging results/findings within the past 24 hours.

## 2024-12-19 NOTE — PLAN OF CARE
Problem: Fall Injury Risk  Goal: Absence of Fall and Fall-Related Injury  Outcome: Progressing     Problem: Skin Injury Risk Increased  Goal: Skin Health and Integrity  Outcome: Progressing   Iv abx and mag given. Pt rated rectal as 9/10. Prn morphine given at 1036. Pt still rated pain 9/10 two hrs later. MD notified and ordered one time 2mg morphine. Bed lcoked and in lowest position. Call light in reach.

## 2024-12-19 NOTE — PLAN OF CARE
12/19/24 1556   Discharge Reassessment   Assessment Type Discharge Planning Reassessment   Did the patient's condition or plan change since previous assessment? No   Discharge Plan discussed with: Patient;Sibling   Name(s) and Number(s) Corine [phone]   Communicated JELANI with patient/caregiver Date not available/Unable to determine   Discharge Plan A Skilled Nursing Facility   Discharge Plan B Home Health   DME Needed Upon Discharge    (pending recs)   Post-Acute Status   Discharge Delays (!) Patient and Family Barriers  (pt medically clear for admitting dx: delayed r/t family concern)

## 2024-12-19 NOTE — PLAN OF CARE
CHW scheduled pcp f/u   Future Appointments   Date Time Provider Department Center   1/2/2025  9:40 AM Prosper Mancilla MD [facility] Gulshan jordan PCW

## 2024-12-19 NOTE — PROGRESS NOTES
[facility] - Internal Medicine [facility] Medicine  Progress Note    Patient Name: Gabriel Grace  MRN: [id number]  Patient Class: IP- Inpatient   Admission Date: 12/16/2024  Length of Stay: 2 days  Attending Physician: Disha Taylor MD  Primary Care Provider: Prosper Mancilla MD        Subjective     Principal Problem:Ulcerative colitis        HPI:  Gabriel Grace is an 84 year old white woman with hypertension, hyperlipidemia, ulcerative colitis, migraine headaches, hypothyroidism, aortic atherosclerosis, history of pulmonary embolism, history of pancreatitic cyst in 2017, history of basal cell carcinoma of nasal bridge status post Mohs's surgery on 11/25/2024, history of total abdominal hysterectomy, left salpingo-oophorectomy, and right salpingectomy in 1973, history of cholecystectomy, 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 She lives in [place], [place]. She is . Her primary care physician is Dr. Prosper Mancilla.    She was seen at [facility] on 12/13/2024 for proctitis with rectal pain, diarrhea, inflamed external hemorrhoid, nausea, vomiting, decreased oral intake, and fecal incontinence as well as urinary tract infection. Potassium was mildly low at 3.4 mmol/L. She was prescribed ciprofloxacin, metronidazole, ondansetron, and oxycodone.    Her sister called [facility] to make an appointment.   She presented to [facility] on 12/16/2024 for persistent rectal pain, nausea, and decreased oral intake. She had 1 episode of blood with wiping after a bowel movement. She had been having watery loose stools and an episode of fecal incontinence. Her appetite had been decreased for the past 3 weeks. She was found to have a fecal impaction. Potassium was 3.3.    She was admitted to the Emergency Department  Observation Unit. She was given a brown bomb enema and began to have bowel movements but had persistent severe rectal pain. Urine culture taken on 12/13/2024 grew E coli sensitive to ciprofloxacin, so her prescribed antibiotics were continued. On 12/17/2024, she was upgraded to admission to Hospital Medicine Team B with Colorectal Surgery consult. Potassium was still 3.3.     Overview/Hospital Course:  Colorectal Surgery performed stool disimpaction in the endoscopy suite. They said she could go home and take a stool softener. Pt with concerns returning home alone d/t reported weakness. PT/OT ordered.     Interval History: NAEON. Moving bowels but reports continued rectal pain. Repeatedly reports significant weakness & concerns for going home alone. PT/OT ordered. Pt otherwise medically ready for discharge.     Review of Systems   Constitutional:  Negative for chills, diaphoresis and fever.   HENT:  Negative for congestion, rhinorrhea, sore throat and trouble swallowing.    Eyes:  Negative for photophobia and visual disturbance.   Respiratory:  Negative for cough, shortness of breath and wheezing.    Cardiovascular:  Negative for chest pain, palpitations and leg swelling.   Gastrointestinal:  Positive for constipation, diarrhea and rectal pain. Negative for abdominal distention, abdominal pain, nausea and vomiting.   Genitourinary:  Negative for difficulty urinating, dysuria and hematuria.   Musculoskeletal:  Negative for arthralgias, joint swelling and neck pain.   Skin:  Negative for rash and wound.   Neurological:  Positive for weakness. Negative for dizziness, light-headedness and headaches.   Psychiatric/Behavioral:  Negative for agitation, confusion and sleep disturbance. The patient is nervous/anxious.      Objective:     Vital Signs (Most Recent):  Temp: 99 °F (37.2 °C) (12/19/24 1532)  Pulse: 81 (12/19/24 1532)  Resp: 17 (12/19/24 1532)  BP: 118/67 (12/19/24 1532)  SpO2: 97 % (12/19/24 1532) Vital Signs  (24h Range):  Temp:  [97.7 °F (36.5 °C)-99.9 °F (37.7 °C)] 99 °F (37.2 °C)  Pulse:  [67-81] 81  Resp:  [12-19] 17  SpO2:  [96 %-100 %] 97 %  BP: (101-171)/(52-74) 118/67     Weight: 93.9 kg (207 lb)  Body mass index is 31.47 kg/m².    Intake/Output Summary (Last 24 hours) at 12/19/2024 1612  Last data filed at 12/19/2024 0624  Gross per 24 hour   Intake 1664.64 ml   Output 850 ml   Net 814.64 ml         Physical Exam  Constitutional:       General: She is not in acute distress.     Appearance: She is not toxic-appearing or diaphoretic.   HENT:      Head: Normocephalic and atraumatic.      Mouth/Throat:      Mouth: Mucous membranes are moist.   Eyes:      Conjunctiva/sclera: Conjunctivae normal.   Cardiovascular:      Rate and Rhythm: Normal rate and regular rhythm.      Heart sounds: Normal heart sounds.   Pulmonary:      Effort: Pulmonary effort is normal. No respiratory distress.      Breath sounds: Normal breath sounds. No wheezing, rhonchi or rales.   Abdominal:      General: Bowel sounds are normal. There is no distension.      Palpations: Abdomen is soft.      Tenderness: There is no abdominal tenderness. There is no guarding.   Musculoskeletal:      Right lower leg: No edema.      Left lower leg: No edema.   Skin:     General: Skin is warm and dry.   Neurological:      General: No focal deficit present.      Mental Status: She is alert and oriented to person, place, and time. Mental status is at baseline.             Significant Labs: All pertinent labs within the past 24 hours have been reviewed.    Significant Imaging: I have reviewed all pertinent imaging results/findings within the past 24 hours.        Assessment and Plan     Fecal impaction  CRS consulted who performed flexible sigmoidoscopy & fecal disimpaction. No anal stenosis or mass. Rectal mucosa healthy and without evidence of ulceration. No anal fissure or thrombosed hemorrhoids. Ok for general diet, restart MiraLax, and outpatient follow-up  with her regular GI team     * Ulcerative colitis  Continue home balsalazide.    E. coli UTI  Continue recently prescribed ciprofloxacin.      Hypertension  Chronic. Continue home amlodipine and carvedilol. Monitor BP.    Hyperlipidemia  Continue home simvastatin        VTE Risk Mitigation (From admission, onward)           Ordered     IP VTE HIGH RISK PATIENT  Once         12/17/24 1843     Place sequential compression device  Until discontinued         12/17/24 1843                    Discharge Planning   JELANI: 12/20/2024     Code Status: Full Code   Medical Readiness for Discharge Date: 12/18/2024  Discharge Plan A: Skilled Nursing Facility   Discharge Delays: (!) Patient and Family Barriers (pt medically clear for admitting dx: delayed r/t family concern)            Please place Justification for DME        Disha Taylor MD  Department of Hospital Medicine   [facility] - Internal Medicine Telemetry

## 2024-12-19 NOTE — ANESTHESIA POSTPROCEDURE EVALUATION
Anesthesia Post Evaluation    Patient: Gabriel Grace    Procedure(s) Performed: Procedure(s) (LRB):  SIGMOIDOSCOPY, FLEXIBLE (N/A)    Final Anesthesia Type: general      Patient location during evaluation: PACU  Patient participation: Yes- Able to Participate  Level of consciousness: awake and alert  Post-procedure vital signs: reviewed and stable  Pain management: adequate  Airway patency: patent    PONV status at discharge: No PONV  Anesthetic complications: no      Cardiovascular status: hemodynamically stable  Respiratory status: unassisted  Hydration status: euvolemic  Follow-up not needed.              Vitals Value Taken Time   /73 12/19/24 0429   Temp 37.4 °C (99.4 °F) 12/19/24 0429   Pulse 73 12/19/24 0429   Resp 17 12/19/24 0444   SpO2 98 % 12/19/24 0429         Event Time   Out of Recovery 17:34:00         Pain/Ramy Score: Pain Rating Prior to Med Admin: 8 (12/19/2024  4:44 AM)  Pain Rating Post Med Admin: 4 (12/18/2024  4:26 PM)  Ramy Score: 10 (12/18/2024  3:00 PM)

## 2024-12-19 NOTE — PROGRESS NOTES
Over the course of the morning, CM spoke to both pt and her sister Corine via phone, with permission, regarding discharge plan. Sister voiced concern r/t pt's mobility and the fact that she lives alone. Pt stated that she is open to any recommendations for therapy so that she can get stronger.  CM awaiting PT/OT recs.

## 2024-12-19 NOTE — TELEPHONE ENCOUNTER
"Returned call to pt's sister regarding message left below w/ phone staff.  Ms. Bhagat remains admitted at this time.    Confirmed caller's name & was asked to confirm pt's full name and  prior to providing any medical information about pt in question.    Procedure note:     Flexible sigmoidoscopy and fecal disimpaction performed.  No anal stenosis or mass. Rectal mucosa healthy and without evidence of ulceration.  No anal fissure or thrombosed hemorrhoids.     Ok for general diet, restart MiraLax, and outpatient follow-up with her regular GI team.     Nola Duong        Pt's sister states that she has concerns that a hospitalist is writing discharge orders when "he hasn't seen the patient."     Pt stopped taking Miralax at the recommendation of her sister, Corine.     Multiple complains/ concerns including providers, inpatient care provided, transportation home, discharge process, need for further eval, etc. She has been in touch w/ a  on Ms. Bhagat's case during this admission.    Informed pt's sister that I can provide Dr. Duong's note w/ feedback regarding the procedure but I am unable to speak to the care provision on the inpatient side of her sister's care.     Questions and concerns answered to the best of my ability from a CRS standpoint.     ----- Message from Tayler sent at 2024 11:12 AM CST -----  Regarding: Consult/Advisory  Contact: sister @  [phone] or [phone]  Consult/Advisory     Name Of Caller: sister      Contact Preference: [phone] (home)      Nature of call: pt's sister is calling to discuss pt's health and surgery on yesterday. Asking for a call back  "

## 2024-12-20 LAB
ALBUMIN SERPL BCP-MCNC: 2.5 G/DL (ref 3.5–5.2)
ANION GAP SERPL CALC-SCNC: 7 MMOL/L (ref 8–16)
BUN SERPL-MCNC: 11 MG/DL (ref 8–23)
CALCIUM SERPL-MCNC: 8.5 MG/DL (ref 8.7–10.5)
CHLORIDE SERPL-SCNC: 110 MMOL/L (ref 95–110)
CO2 SERPL-SCNC: 23 MMOL/L (ref 23–29)
CREAT SERPL-MCNC: 0.8 MG/DL (ref 0.5–1.4)
ERYTHROCYTE [DISTWIDTH] IN BLOOD BY AUTOMATED COUNT: 13.9 % (ref 11.5–14.5)
ERYTHROCYTE [DISTWIDTH] IN BLOOD BY AUTOMATED COUNT: 14 % (ref 11.5–14.5)
EST. GFR  (NO RACE VARIABLE): >60 ML/MIN/1.73 M^2
FERRITIN SERPL-MCNC: 437 NG/ML (ref 20–300)
FOLATE SERPL-MCNC: 17.1 NG/ML (ref 4–24)
GLUCOSE SERPL-MCNC: 119 MG/DL (ref 70–110)
HCT VFR BLD AUTO: 28.2 % (ref 37–48.5)
HCT VFR BLD AUTO: 32.1 % (ref 37–48.5)
HGB BLD-MCNC: 10.6 G/DL (ref 12–16)
HGB BLD-MCNC: 9.1 G/DL (ref 12–16)
IRON SERPL-MCNC: 25 UG/DL (ref 30–160)
MAGNESIUM SERPL-MCNC: 1.8 MG/DL (ref 1.6–2.6)
MCH RBC QN AUTO: 32.3 PG (ref 27–31)
MCH RBC QN AUTO: 32.3 PG (ref 27–31)
MCHC RBC AUTO-ENTMCNC: 32.3 G/DL (ref 32–36)
MCHC RBC AUTO-ENTMCNC: 33 G/DL (ref 32–36)
MCV RBC AUTO: 100 FL (ref 82–98)
MCV RBC AUTO: 98 FL (ref 82–98)
PHOSPHATE SERPL-MCNC: 2.4 MG/DL (ref 2.7–4.5)
PLATELET # BLD AUTO: 142 K/UL (ref 150–450)
PLATELET # BLD AUTO: 152 K/UL (ref 150–450)
PMV BLD AUTO: 11.2 FL (ref 9.2–12.9)
PMV BLD AUTO: 11.2 FL (ref 9.2–12.9)
POTASSIUM SERPL-SCNC: 3.5 MMOL/L (ref 3.5–5.1)
RBC # BLD AUTO: 2.82 M/UL (ref 4–5.4)
RBC # BLD AUTO: 3.28 M/UL (ref 4–5.4)
SATURATED IRON: 12 % (ref 20–50)
SODIUM SERPL-SCNC: 140 MMOL/L (ref 136–145)
TOTAL IRON BINDING CAPACITY: 210 UG/DL (ref 250–450)
TRANSFERRIN SERPL-MCNC: 142 MG/DL (ref 200–375)
VIT B12 SERPL-MCNC: 452 PG/ML (ref 210–950)
WBC # BLD AUTO: 6.75 K/UL (ref 3.9–12.7)
WBC # BLD AUTO: 8.24 K/UL (ref 3.9–12.7)

## 2024-12-20 PROCEDURE — 36415 COLL VENOUS BLD VENIPUNCTURE: CPT | Performed by: STUDENT IN AN ORGANIZED HEALTH CARE EDUCATION/TRAINING PROGRAM

## 2024-12-20 PROCEDURE — 82607 VITAMIN B-12: CPT | Performed by: STUDENT IN AN ORGANIZED HEALTH CARE EDUCATION/TRAINING PROGRAM

## 2024-12-20 PROCEDURE — 25000003 PHARM REV CODE 250: Performed by: STUDENT IN AN ORGANIZED HEALTH CARE EDUCATION/TRAINING PROGRAM

## 2024-12-20 PROCEDURE — 97161 PT EVAL LOW COMPLEX 20 MIN: CPT

## 2024-12-20 PROCEDURE — 25000003 PHARM REV CODE 250: Performed by: PHYSICIAN ASSISTANT

## 2024-12-20 PROCEDURE — 82728 ASSAY OF FERRITIN: CPT | Performed by: STUDENT IN AN ORGANIZED HEALTH CARE EDUCATION/TRAINING PROGRAM

## 2024-12-20 PROCEDURE — 82746 ASSAY OF FOLIC ACID SERUM: CPT | Performed by: STUDENT IN AN ORGANIZED HEALTH CARE EDUCATION/TRAINING PROGRAM

## 2024-12-20 PROCEDURE — 83735 ASSAY OF MAGNESIUM: CPT | Performed by: STUDENT IN AN ORGANIZED HEALTH CARE EDUCATION/TRAINING PROGRAM

## 2024-12-20 PROCEDURE — 11000001 HC ACUTE MED/SURG PRIVATE ROOM

## 2024-12-20 PROCEDURE — 97535 SELF CARE MNGMENT TRAINING: CPT

## 2024-12-20 PROCEDURE — 97116 GAIT TRAINING THERAPY: CPT

## 2024-12-20 PROCEDURE — 80069 RENAL FUNCTION PANEL: CPT | Performed by: STUDENT IN AN ORGANIZED HEALTH CARE EDUCATION/TRAINING PROGRAM

## 2024-12-20 PROCEDURE — 97166 OT EVAL MOD COMPLEX 45 MIN: CPT

## 2024-12-20 PROCEDURE — 63600175 PHARM REV CODE 636 W HCPCS: Mod: JZ,JG | Performed by: STUDENT IN AN ORGANIZED HEALTH CARE EDUCATION/TRAINING PROGRAM

## 2024-12-20 PROCEDURE — 63600175 PHARM REV CODE 636 W HCPCS: Performed by: FAMILY MEDICINE

## 2024-12-20 PROCEDURE — 83540 ASSAY OF IRON: CPT | Performed by: STUDENT IN AN ORGANIZED HEALTH CARE EDUCATION/TRAINING PROGRAM

## 2024-12-20 PROCEDURE — 63600175 PHARM REV CODE 636 W HCPCS: Performed by: PHYSICIAN ASSISTANT

## 2024-12-20 PROCEDURE — 85027 COMPLETE CBC AUTOMATED: CPT | Performed by: STUDENT IN AN ORGANIZED HEALTH CARE EDUCATION/TRAINING PROGRAM

## 2024-12-20 PROCEDURE — 25000003 PHARM REV CODE 250: Performed by: FAMILY MEDICINE

## 2024-12-20 RX ORDER — CIPROFLOXACIN 2 MG/ML
400 INJECTION, SOLUTION INTRAVENOUS
Status: COMPLETED | OUTPATIENT
Start: 2024-12-21 | End: 2024-12-21

## 2024-12-20 RX ORDER — METRONIDAZOLE 500 MG/100ML
500 INJECTION, SOLUTION INTRAVENOUS
Status: COMPLETED | OUTPATIENT
Start: 2024-12-20 | End: 2024-12-20

## 2024-12-20 RX ORDER — SODIUM,POTASSIUM PHOSPHATES 280-250MG
2 POWDER IN PACKET (EA) ORAL ONCE
Status: COMPLETED | OUTPATIENT
Start: 2024-12-20 | End: 2024-12-20

## 2024-12-20 RX ADMIN — OXYCODONE 5 MG: 5 TABLET ORAL at 01:12

## 2024-12-20 RX ADMIN — POTASSIUM & SODIUM PHOSPHATES POWDER PACK 280-160-250 MG 2 PACKET: 280-160-250 PACK at 10:12

## 2024-12-20 RX ADMIN — METRONIDAZOLE 500 MG: 5 INJECTION, SOLUTION INTRAVENOUS at 01:12

## 2024-12-20 RX ADMIN — CIPROFLOXACIN 400 MG: 2 INJECTION, SOLUTION INTRAVENOUS at 12:12

## 2024-12-20 RX ADMIN — POLYETHYLENE GLYCOL 3350 17 G: 17 POWDER, FOR SOLUTION ORAL at 10:12

## 2024-12-20 RX ADMIN — OXYCODONE 5 MG: 5 TABLET ORAL at 10:12

## 2024-12-20 RX ADMIN — METRONIDAZOLE 500 MG: 500 INJECTION, SOLUTION INTRAVENOUS at 07:12

## 2024-12-20 RX ADMIN — ATORVASTATIN CALCIUM 10 MG: 10 TABLET, FILM COATED ORAL at 09:12

## 2024-12-20 RX ADMIN — CARVEDILOL 12.5 MG: 12.5 TABLET, FILM COATED ORAL at 10:12

## 2024-12-20 RX ADMIN — BALSALAZIDE DISODIUM 2250 MG: 750 CAPSULE ORAL at 09:12

## 2024-12-20 RX ADMIN — CARVEDILOL 12.5 MG: 12.5 TABLET, FILM COATED ORAL at 05:12

## 2024-12-20 RX ADMIN — POTASSIUM BICARBONATE 30 MEQ: 391 TABLET, EFFERVESCENT ORAL at 12:12

## 2024-12-20 RX ADMIN — OXYCODONE 5 MG: 5 TABLET ORAL at 07:12

## 2024-12-20 RX ADMIN — ONDANSETRON 4 MG: 2 INJECTION INTRAMUSCULAR; INTRAVENOUS at 06:12

## 2024-12-20 RX ADMIN — METRONIDAZOLE 500 MG: 5 INJECTION, SOLUTION INTRAVENOUS at 10:12

## 2024-12-20 RX ADMIN — ONDANSETRON 4 MG: 2 INJECTION INTRAMUSCULAR; INTRAVENOUS at 11:12

## 2024-12-20 RX ADMIN — HYDROCORTISONE: 25 CREAM TOPICAL at 09:12

## 2024-12-20 RX ADMIN — AMLODIPINE BESYLATE 10 MG: 10 TABLET ORAL at 10:12

## 2024-12-20 NOTE — PLAN OF CARE
Problem: Occupational Therapy  Goal: Occupational Therapy Goal  Description: Goals to be met by: 1/3/25     Patient will increase functional independence with ADLs by performing:    Feeding with [place].  UE Dressing with Modified [place].  LE Dressing with Modified [place].  Grooming while standing at sink with Modified [place].  Toileting from toilet with Modified [place] for hygiene and clothing management.   Bathing from  shower chair/bench with Modified [place].  Sitting at edge of bed x20 minutes with Modified [place].  Toilet transfer to toilet with Modified [place].    Outcome: Progressing   OT 's initial evaluation completed and POC established.

## 2024-12-20 NOTE — PT/OT/SLP EVAL
Physical Therapy Evaluation    Patient Name:  Gabriel Grace   MRN:  [id number]    Recommendations:     Discharge Recommendations: Moderate Intensity Therapy (pt. indicated she would rather go home with HH PT)   Discharge Equipment Recommendations: walker, rolling, bedside commode   Barriers to discharge: None    Assessment:     Gabriel Grace is a 84 y.o. female admitted with a medical diagnosis of Ulcerative colitis.  She presents with the following impairments/functional limitations: weakness, impaired endurance, impaired self care skills, impaired functional mobility, gait instability, impaired balance Pt. cooperative and tolerated treatment fairly well. Pt. progressing with mobility with RW, but fatigued quickly with amb.    Rehab Prognosis: Good; patient would benefit from acute skilled PT services to address these deficits and reach maximum level of function.    Recent Surgery: Procedure(s) (LRB):  SIGMOIDOSCOPY, FLEXIBLE (N/A) 2 Days Post-Op    Plan:     During this hospitalization, patient to be seen 4 x/week to address the identified rehab impairments via gait training, therapeutic activities, therapeutic exercises, neuromuscular re-education and progress toward the following goals:    Plan of Care Expires:  01/19/25    Subjective     Chief Complaint: weakness  Patient/Family Comments/goals: pt. Agreeable to PT  Pain/Comfort:  Pain Rating 1: 0/10  Pain Rating Post-Intervention 1: 0/10    Patients cultural, spiritual, Sabianist conflicts given the current situation: no    Living Environment:  Pt. Lives alone in [facility] with no [place]  Prior to admission, patients level of function was indep.  Equipment used at home: none.  Upon discharge, patient will have assistance from sister on limited basis.    Objective:     Communicated with nursing prior to session.  Patient found supine with peripheral IV  upon PT entry to room.    General Precautions: Standard, fall  Orthopedic Precautions:N/A   Braces: N/A  Respiratory Status:  Room air    Exams:  RLE ROM: WFL  RLE Strength: WFL  LLE ROM: WFL  LLE Strength: WFL    Functional Mobility:  Bed Mobility:     Rolling Left:  stand by assistance  Scooting: stand by assistance  Supine to Sit: minimum assistance  Transfers:     Sit to Stand:  stand by assistance and contact guard assistance with rolling walker  Gait: 25' with RW and SBA-CGA for guidance/safety with decreased step length/mikie.  Balance: fair      AM-PAC 6 CLICK MOBILITY  Total Score:18       Treatment & Education:  Discussed therapy/DME needs, goals, safety with mobility, and POC.    Patient left up in chair with all lines intact and call button in reach.    GOALS:   Multidisciplinary Problems       Physical Therapy Goals          Problem: Physical Therapy    Goal Priority Disciplines Outcome Interventions   Physical Therapy Goal     PT, PT/OT Progressing    Description: Goals to be met by: 1/3/2025     Patient will increase functional independence with mobility by performin. Supine to sit with Set-up [place]  2. Sit to supine with Set-up [place]  3. Sit to stand transfer with Supervision  4. Bed to chair transfer with Supervision using Rolling Walker  5. Gait  x 150 feet with Supervision using Rolling Walker.   6. Lower extremity exercise program x15 reps per handout, with supervision                         History:     Past Medical History:   Diagnosis Date    Basal cell carcinoma 2024    upper nasal bridge    Cataract     Hyperglycemia 10/02/2013    Hyperlipidemia     Hypertension     Migraine headache     Pancreas cyst 2017    Thyroid disease     hypothyroidism    Ulcerative colitis, unspecified     Ulcerative colitis, unspecified     Visit for screening mammogram 2016       Past Surgical History:   Procedure Laterality Date    APPENDECTOMY      CAROTID ENDARTERECTOMY       SECTION      x1    CHOLECYSTECTOMY      DILATION AND CURETTAGE OF UTERUS      FLEXIBLE SIGMOIDOSCOPY N/A  12/18/2024    Procedure: SIGMOIDOSCOPY, FLEXIBLE;  Surgeon: Nola Duong MD;  Location: [facility] ([address] [place] [address]);  Service: Endoscopy;  Laterality: N/A;    HYSTERECTOMY  1973    fabien-lso and right salpingectomy. right ovary remains.    PARATHYROIDECTOMY      TONSILLECTOMY         Time Tracking:     PT Received On: 12/20/24  PT Start Time: 1240     PT Stop Time: 1303  PT Total Time (min): 23 min     Billable Minutes: Evaluation 15 and Gait Training 8      12/20/2024

## 2024-12-20 NOTE — PT/OT/SLP EVAL
Occupational Therapy  Co- Evaluation    Name: Gabriel Grace  MRN: [id number]  Admitting Diagnosis: Ulcerative colitis  Recent Surgery: Procedure(s) (LRB):  SIGMOIDOSCOPY, FLEXIBLE (N/A) 2 Days Post-Op  Pt was co-treated with Physical Therapy to assess abilities and/or deficits for appropriate skilled interventions due to medical complexity, low endurance, and for increased safety.    Recommendations:     Discharge Recommendations: Moderate Intensity Therapy  Discharge Equipment Recommendations:  walker, rolling, bedside commode  DME justifications:    Patient has a mobility limitation that significantly impairs their ability to participate in one or more mobility related activities of daily living, including toileting. This deficit can be resolved by using a bedside commode. Patient demonstrates mobility limitations that will cause them to be confined to one room at home without bathroom access for up to 30 days. Using a bedside commode will greatly improve the patient's ability to participate in MRADLs.     Patient demonstrates a mobility limitation that significantly impairs their ability to participate in one or more mobility related activities of daily living. Patient's mobility limitation cannot be sufficiently resolved with the use of a cane, but can be sufficiently resolved with the use of a rolling walker.The use of a rolling walker will considerably improve their ability to participate in   MRADLs. Patient will use the walker on a regular basis at home.      Barriers to discharge:  Decreased caregiver support  Nursing staff Recommendations: assist x1 t/f with a RW.   Assessment:     Gabriel Grace is a 84 y.o. female with a medical diagnosis of Ulcerative colitis.  She presents with generalized weakness and low initiation to participate in ADLs OOB on this date due to low confidence in her abilities and low endurance. Performance deficits affecting function: weakness, impaired endurance, decreased coordination,  "impaired self care skills, decreased upper extremity function, impaired functional mobility, gait instability, impaired balance, impaired cardiopulmonary response to activity, decreased safety awareness.      Rehab Prognosis: Good; patient would benefit from acute skilled OT services to address these deficits and reach maximum level of function.       Plan:     Patient to be seen 4 x/week to address the above listed problems via self-care/home management, therapeutic activities, community/work re-entry, therapeutic exercises, neuromuscular re-education, cognitive retraining  Plan of Care Expires: 01/03/25  Plan of Care Reviewed with: patient    Subjective     Chief Complaint: concerned about being able to care for herself, but wished to return home. "This is the first time that I've done this much"  Patient/Family Comments/goals: return to her PLOF.     Occupational Profile:  Living Environment: Pt lives alone in a [facility] with one threshold to enter. Has a walk in shower with a built in seat.   Previous level of function: Independent with ADLs and mobility prior to admit. Driving still. Her sister comes to visit, assist as needed, and they would have dinner together.   Roles and Routines: Retired (from nursing)  Equipment Used at Home: none  Assistance upon Discharge: sister but intermittent.     Pain/Comfort:  Pain Rating 1: 0/10    Patients cultural, spiritual, Anglican conflicts given the current situation: no    Objective:     Communicated with: RN prior to session.  Patient found HOB elevated with peripheral IV upon OT entry to room.    General Precautions: Standard, fall  Orthopedic Precautions: N/A  Braces: N/A  Respiratory Status: Room air    Occupational Performance:    Bed Mobility:    Patient completed Scooting/Bridging with stand by assistance  Patient completed Supine to Sit with minimum assistance despite max cues to utilize bedrails to maximize self participation first.     Functional " Mobility/Transfers:  Patient completed Sit <> Stand Transfer with stand by assistance  with  rolling walker   Patient completed Bed <> Chair Transfer using Step Transfer technique with stand by assistance with rolling walker  Functional Mobility: Ambulated to the hallway's doorway and back to chair with SBA. Pt declined further hallway mobility and to go into the bathroom for more ADLs due to fatigue and low confidence.     Activities of Daily Living:  Grooming: stand by assistance facial hygiene whiile seated  Upper Body Dressing: stand by assistance [facility] gow vanita    Cognitive/Visual Perceptual:  Cognitive/Psychosocial Skills:     -       Oriented to: Person, Place, Time, and Situation   -       Follows Commands/attention:Follows one-step commands  -       Memory: No Deficits noted  -       Safety awareness/insight to disability: fair   -       Mood/Affect/Coping skills/emotional control: Appropriate to situation    Physical Exam:  Balance:    -       static sitting (Mod I) and dynamic sitting (SPV)   static standing (SBA) and dynamic standing (SBA)  Sensation:    -       Intact  light/touch   Motor Planning:    -       intact  Dominant hand:    -       R  Upper Extremity Range of Motion:     -       Right Upper Extremity: WFL  -       Left Upper Extremity: WFL except <90* shoulder flexion  Upper Extremity Strength:    -       Right Upper Extremity: WFL  -       Left Upper Extremity: 2/5 proximally, 4/5 distally   Strength:    -       Right Upper Extremity: WFL  -       Left Upper Extremity: WFL  Fine Motor Coordination:    -       Intact  Left hand thumb/finger opposition skills and Right hand thumb/finger opposition skills  Gross motor coordination:   WFL    AMPAC 6 Click ADL:  AMPAC Total Score: 18    Treatment & Education:  Pt educated on the following topics:  OT 's plan of care and purpose of visit, ADLs, importance of increased activity in hospital setting, transfer training, bed mobility, body  mechanics, assistive device, modifications/compensatory strategies, sequencing, safety precautions, fall prevention, equipment recommendations, home safety, energy conservation techniques, and to call for assistance with call button  Understanding was verbalized, however additional teaching warranted.     Patient left up in chair with all lines intact and call button in reach    GOALS:   Multidisciplinary Problems       Occupational Therapy Goals          Problem: Occupational Therapy    Goal Priority Disciplines Outcome Interventions   Occupational Therapy Goal     OT, PT/OT Progressing    Description: Goals to be met by: 1/3/25     Patient will increase functional independence with ADLs by performing:    Feeding with [place].  UE Dressing with Modified [place].  LE Dressing with Modified [place].  Grooming while standing at sink with Modified [place].  Toileting from toilet with Modified [place] for hygiene and clothing management.   Bathing from  shower chair/bench with Modified [place].  Sitting at edge of bed x20 minutes with Modified [place].  Toilet transfer to toilet with Modified [place].                         History:     Past Medical History:   Diagnosis Date    Basal cell carcinoma 2024    upper nasal bridge    Cataract     Hyperglycemia 10/02/2013    Hyperlipidemia     Hypertension     Migraine headache     Pancreas cyst 2017    Thyroid disease     hypothyroidism    Ulcerative colitis, unspecified     Ulcerative colitis, unspecified     Visit for screening mammogram 2016         Past Surgical History:   Procedure Laterality Date    APPENDECTOMY      CAROTID ENDARTERECTOMY       SECTION      x1    CHOLECYSTECTOMY      DILATION AND CURETTAGE OF UTERUS      FLEXIBLE SIGMOIDOSCOPY N/A 2024    Procedure: SIGMOIDOSCOPY, FLEXIBLE;  Surgeon: Nola Duong MD;  Location: [address];  Service: Endoscopy;  Laterality: N/A;     HYSTERECTOMY  1973    fabien-lso and right salpingectomy. right ovary remains.    PARATHYROIDECTOMY      TONSILLECTOMY         Time Tracking:     OT Date of Treatment: 12/20/24  OT Start Time: 1238  OT Stop Time: 1303  OT Total Time (min): 25 min    Billable Minutes:Evaluation 15  Self Care/Home Management 10    12/20/2024

## 2024-12-20 NOTE — PROGRESS NOTES
[facility] - Internal Medicine [facility] Medicine  Progress Note    Patient Name: Gabriel Grace  MRN: [id number]  Patient Class: IP- Inpatient   Admission Date: 12/16/2024  Length of Stay: 3 days  Attending Physician: Disha Taylor MD  Primary Care Provider: Prosper Mancilla MD        Subjective     Principal Problem: Ulcerative colitis    HPI:  Gabriel Grace is an 83yo woman with hypertension, hyperlipidemia, ulcerative colitis, migraine headaches, hypothyroidism, aortic atherosclerosis, history of pulmonary embolism, history of pancreatitic cyst in 2017, history of basal cell carcinoma of nasal bridge status post Mohs's surgery on 11/25/2024, history of total abdominal hysterectomy, left salpingo-oophorectomy, and right salpingectomy in 1973, history of cholecystectomy, 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 She lives in [place], [place]. She is . Her primary care physician is Dr. Prosper Mancilla.    She was seen at [facility] on 12/13/2024 for proctitis with rectal pain, diarrhea, inflamed external hemorrhoid, nausea, vomiting, decreased oral intake, and fecal incontinence as well as urinary tract infection. Potassium was mildly low at 3.4 mmol/L. She was prescribed ciprofloxacin, metronidazole, ondansetron, and oxycodone.    Her sister called [facility] to make an appointment.   She presented to [facility] on 12/16/2024 for persistent rectal pain, nausea, and decreased oral intake. She had 1 episode of blood with wiping after a bowel movement. She had been having watery loose stools and an episode of fecal incontinence. Her appetite had been decreased for the past 3 weeks. She was found to have a fecal impaction. Potassium was 3.3.    She was admitted to the Emergency Department Observation Unit. She was  given a brown bomb enema and began to have bowel movements but had persistent severe rectal pain. Urine culture taken on 12/13/2024 grew E coli sensitive to ciprofloxacin, so her prescribed antibiotics were continued. On 12/17/2024, she was upgraded to admission to Hospital Medicine Team B with Colorectal Surgery consult. Potassium was still 3.3.     Overview/Hospital Course:  Colorectal Surgery performed stool disimpaction in the endoscopy suite. They said she could go home and take a stool softener. Pt with concerns returning home alone d/t reported weakness. PT/OT ordered.     Interval History: NAEON. Moving bowels. Reports some intermittent rectal pain but overall improved. Mild nausea this AM but no vomiting. PT/OT ordered. Dispo pending therapies assessment and possible placement. Otherwise medically ready for discharge.     Review of Systems   Constitutional:  Negative for chills, diaphoresis and fever.   HENT:  Negative for congestion, rhinorrhea, sore throat and trouble swallowing.    Eyes:  Negative for photophobia and visual disturbance.   Respiratory:  Negative for cough, shortness of breath and wheezing.    Cardiovascular:  Negative for chest pain, palpitations and leg swelling.   Gastrointestinal:  Positive for rectal pain. Negative for abdominal distention, abdominal pain, constipation, diarrhea, nausea and vomiting.   Genitourinary:  Negative for difficulty urinating, dysuria and hematuria.   Musculoskeletal:  Negative for arthralgias, joint swelling and neck pain.   Skin:  Negative for rash and wound.   Neurological:  Positive for weakness. Negative for dizziness, light-headedness and headaches.   Psychiatric/Behavioral:  Negative for agitation, confusion and sleep disturbance.      Objective:     Vital Signs (Most Recent):  Temp: 98.4 °F (36.9 °C) (12/20/24 1116)  Pulse: 84 (12/20/24 1116)  Resp: 17 (12/20/24 1116)  BP: 126/62 (12/20/24 1116)  SpO2: 98 % (12/20/24 1116) Vital Signs (24h  Range):  Temp:  [97.9 °F (36.6 °C)-99.1 °F (37.3 °C)] 98.4 °F (36.9 °C)  Pulse:  [71-86] 84  Resp:  [17-19] 17  SpO2:  [94 %-98 %] 98 %  BP: (118-131)/(61-74) 126/62     Weight: 93.9 kg (207 lb)  Body mass index is 31.47 kg/m².    Intake/Output Summary (Last 24 hours) at 12/20/2024 1324  Last data filed at 12/20/2024 0839  Gross per 24 hour   Intake 1235.47 ml   Output --   Net 1235.47 ml         Physical Exam  Constitutional:       General: She is not in acute distress.     Appearance: She is not toxic-appearing or diaphoretic.   HENT:      Head: Normocephalic and atraumatic.      Mouth/Throat:      Mouth: Mucous membranes are moist.   Eyes:      Conjunctiva/sclera: Conjunctivae normal.   Cardiovascular:      Rate and Rhythm: Normal rate and regular rhythm.      Heart sounds: Normal heart sounds.   Pulmonary:      Effort: Pulmonary effort is normal. No respiratory distress.      Breath sounds: Normal breath sounds. No wheezing, rhonchi or rales.   Abdominal:      General: Bowel sounds are normal. There is no distension.      Palpations: Abdomen is soft.      Tenderness: There is no abdominal tenderness. There is no guarding.   Musculoskeletal:      Right lower leg: No edema.      Left lower leg: No edema.   Skin:     General: Skin is warm and dry.   Neurological:      General: No focal deficit present.      Mental Status: She is alert and oriented to person, place, and time. Mental status is at baseline.             Significant Labs: All pertinent labs within the past 24 hours have been reviewed.    Significant Imaging: I have reviewed all pertinent imaging results/findings within the past 24 hours.        Assessment and Plan     Fecal impaction  CRS consulted who performed flexible sigmoidoscopy & fecal disimpaction. No anal stenosis or mass. Rectal mucosa healthy and without evidence of ulceration. No anal fissure or thrombosed hemorrhoids. Ok for general diet, restart MiraLax, and outpatient follow-up with her  regular GI team.    * Ulcerative colitis  Continue home balsalazide.    E. coli UTI  Continue recently prescribed ciprofloxacin. Flagyl x1 dose added. Completed abx course 12/20.     Hypertension  Chronic. Continue home amlodipine and carvedilol. Monitor BP.    Hyperlipidemia  Continue home simvastatin      VTE Risk Mitigation (From admission, onward)           Ordered     IP VTE HIGH RISK PATIENT  Once         12/17/24 1843     Place sequential compression device  Until discontinued         12/17/24 1843                    Discharge Planning   JELANI: 12/20/2024     Code Status: Full Code   Medical Readiness for Discharge Date: 12/18/2024  Discharge Plan A: Skilled Nursing Facility   Discharge Delays: (!) Patient and Family Barriers (pt medically clear for admitting dx: delayed r/t family concern)            Please place Justification for DME        Disha Taylor MD  Department of Hospital Medicine   [facility] - Internal Medicine Telemetry

## 2024-12-20 NOTE — SUBJECTIVE & OBJECTIVE
Interval History: NAEON. Continues to tolerate PO well. Medically ready for discharge pending SNF placement. Appreciate CM/SW assistance.    Review of Systems   Constitutional:  Negative for chills, diaphoresis and fever.   HENT:  Negative for congestion, rhinorrhea, sore throat and trouble swallowing.    Eyes:  Negative for photophobia and visual disturbance.   Respiratory:  Negative for cough, shortness of breath and wheezing.    Cardiovascular:  Negative for chest pain, palpitations and leg swelling.   Gastrointestinal:  Positive for nausea (minimal). Negative for abdominal distention, abdominal pain, constipation, diarrhea, rectal pain and vomiting.   Genitourinary:  Negative for difficulty urinating, dysuria and hematuria.   Musculoskeletal:  Negative for arthralgias, joint swelling and neck pain.   Skin:  Negative for rash and wound.   Neurological:  Positive for weakness. Negative for dizziness, light-headedness and headaches.   Psychiatric/Behavioral:  Negative for agitation, confusion and sleep disturbance.      Objective:     Vital Signs (Most Recent):  Temp: 98.4 °F (36.9 °C) (12/20/24 1116)  Pulse: 84 (12/20/24 1116)  Resp: 17 (12/20/24 1116)  BP: 126/62 (12/20/24 1116)  SpO2: 98 % (12/20/24 1116) Vital Signs (24h Range):  Temp:  [97.9 °F (36.6 °C)-99.1 °F (37.3 °C)] 98.4 °F (36.9 °C)  Pulse:  [71-86] 84  Resp:  [17-19] 17  SpO2:  [94 %-98 %] 98 %  BP: (118-131)/(61-74) 126/62     Weight: 93.9 kg (207 lb)  Body mass index is 31.47 kg/m².    Intake/Output Summary (Last 24 hours) at 12/20/2024 1324  Last data filed at 12/20/2024 0839  Gross per 24 hour   Intake 1235.47 ml   Output --   Net 1235.47 ml         Physical Exam  Constitutional:       General: She is not in acute distress.     Appearance: She is not toxic-appearing or diaphoretic.   HENT:      Head: Normocephalic and atraumatic.      Mouth/Throat:      Mouth: Mucous membranes are moist.   Eyes:      Conjunctiva/sclera: Conjunctivae normal.    Cardiovascular:      Rate and Rhythm: Normal rate and regular rhythm.      Heart sounds: Normal heart sounds.   Pulmonary:      Effort: Pulmonary effort is normal. No respiratory distress.      Breath sounds: Normal breath sounds. No wheezing, rhonchi or rales.   Abdominal:      General: Bowel sounds are normal. There is no distension.      Palpations: Abdomen is soft.      Tenderness: There is no abdominal tenderness. There is no guarding.   Musculoskeletal:      Right lower leg: No edema.      Left lower leg: No edema.   Skin:     General: Skin is warm and dry.   Neurological:      General: No focal deficit present.      Mental Status: She is alert and oriented to person, place, and time. Mental status is at baseline.           Significant Labs: All pertinent labs within the past 24 hours have been reviewed.    Significant Imaging: I have reviewed all pertinent imaging results/findings within the past 24 hours.

## 2024-12-20 NOTE — PLAN OF CARE
Problem: Physical Therapy  Goal: Physical Therapy Goal  Description: Goals to be met by: 1/3/2025     Patient will increase functional independence with mobility by performin. Supine to sit with Set-up [place]  2. Sit to supine with Set-up [place]  3. Sit to stand transfer with Supervision  4. Bed to chair transfer with Supervision using Rolling Walker  5. Gait  x 150 feet with Supervision using Rolling Walker.   6. Lower extremity exercise program x15 reps per handout, with supervision    Outcome: Progressing

## 2024-12-20 NOTE — CONSULTS
[facility]AS consulted for PIV access for PVA.    Nurse obtained access after consult placed.    [facility]

## 2024-12-21 PROCEDURE — 63600175 PHARM REV CODE 636 W HCPCS: Performed by: STUDENT IN AN ORGANIZED HEALTH CARE EDUCATION/TRAINING PROGRAM

## 2024-12-21 PROCEDURE — 83735 ASSAY OF MAGNESIUM: CPT | Performed by: STUDENT IN AN ORGANIZED HEALTH CARE EDUCATION/TRAINING PROGRAM

## 2024-12-21 PROCEDURE — 25000003 PHARM REV CODE 250: Performed by: PHYSICIAN ASSISTANT

## 2024-12-21 PROCEDURE — 25000003 PHARM REV CODE 250: Performed by: STUDENT IN AN ORGANIZED HEALTH CARE EDUCATION/TRAINING PROGRAM

## 2024-12-21 PROCEDURE — 63600175 PHARM REV CODE 636 W HCPCS: Performed by: FAMILY MEDICINE

## 2024-12-21 PROCEDURE — 25000003 PHARM REV CODE 250: Performed by: FAMILY MEDICINE

## 2024-12-21 PROCEDURE — 36415 COLL VENOUS BLD VENIPUNCTURE: CPT | Performed by: STUDENT IN AN ORGANIZED HEALTH CARE EDUCATION/TRAINING PROGRAM

## 2024-12-21 PROCEDURE — 11000001 HC ACUTE MED/SURG PRIVATE ROOM

## 2024-12-21 RX ORDER — PROMETHAZINE HYDROCHLORIDE 12.5 MG/1
25 TABLET ORAL EVERY 6 HOURS PRN
Status: DISCONTINUED | OUTPATIENT
Start: 2024-12-21 | End: 2024-12-28 | Stop reason: HOSPADM

## 2024-12-21 RX ORDER — ONDANSETRON HYDROCHLORIDE 2 MG/ML
8 INJECTION, SOLUTION INTRAVENOUS EVERY 6 HOURS PRN
Status: DISCONTINUED | OUTPATIENT
Start: 2024-12-21 | End: 2024-12-22

## 2024-12-21 RX ORDER — PROCHLORPERAZINE EDISYLATE 5 MG/ML
5 INJECTION INTRAMUSCULAR; INTRAVENOUS EVERY 6 HOURS PRN
Status: DISCONTINUED | OUTPATIENT
Start: 2024-12-21 | End: 2024-12-22

## 2024-12-21 RX ORDER — PROMETHAZINE HYDROCHLORIDE 12.5 MG/1
12.5 TABLET ORAL EVERY 6 HOURS PRN
Status: DISCONTINUED | OUTPATIENT
Start: 2024-12-21 | End: 2024-12-21

## 2024-12-21 RX ADMIN — CARVEDILOL 12.5 MG: 12.5 TABLET, FILM COATED ORAL at 08:12

## 2024-12-21 RX ADMIN — POLYETHYLENE GLYCOL 3350 17 G: 17 POWDER, FOR SOLUTION ORAL at 05:12

## 2024-12-21 RX ADMIN — ATORVASTATIN CALCIUM 10 MG: 10 TABLET, FILM COATED ORAL at 10:12

## 2024-12-21 RX ADMIN — OXYCODONE 5 MG: 5 TABLET ORAL at 08:12

## 2024-12-21 RX ADMIN — POLYETHYLENE GLYCOL 3350 17 G: 17 POWDER, FOR SOLUTION ORAL at 10:12

## 2024-12-21 RX ADMIN — PROMETHAZINE HYDROCHLORIDE 25 MG: 12.5 TABLET ORAL at 12:12

## 2024-12-21 RX ADMIN — AMLODIPINE BESYLATE 10 MG: 10 TABLET ORAL at 08:12

## 2024-12-21 RX ADMIN — ONDANSETRON 4 MG: 2 INJECTION INTRAMUSCULAR; INTRAVENOUS at 08:12

## 2024-12-21 RX ADMIN — CIPROFLOXACIN 400 MG: 2 INJECTION, SOLUTION INTRAVENOUS at 12:12

## 2024-12-21 RX ADMIN — BALSALAZIDE DISODIUM 2250 MG: 750 CAPSULE ORAL at 08:12

## 2024-12-21 RX ADMIN — CARVEDILOL 12.5 MG: 12.5 TABLET, FILM COATED ORAL at 05:12

## 2024-12-21 NOTE — PLAN OF CARE
Referrals have been placed for SNF.   Facilities will review this patient on Monday 12/23/24.   12/21/24 1004   Post-Acute Status   Post-Acute Authorization Placement   Post-Acute Placement Status Referrals Sent   Discharge Delays None known at this time   Discharge Plan   Discharge Plan A Skilled Nursing Facility   Discharge Plan B [facility]

## 2024-12-21 NOTE — PROGRESS NOTES
[facility] - Internal Medicine [facility] Medicine  Progress Note    Patient Name: Gabriel Grace  MRN: [id number]  Patient Class: IP- Inpatient   Admission Date: 12/16/2024  Length of Stay: 4 days  Attending Physician: Disha Taylor MD  Primary Care Provider: Prosper Mancilla MD        Subjective     Principal Problem: Ulcerative colitis    HPI:  Gabriel Grace is an 85yo woman with hypertension, hyperlipidemia, ulcerative colitis, migraine headaches, hypothyroidism, aortic atherosclerosis, history of pulmonary embolism, history of pancreatitic cyst in 2017, history of basal cell carcinoma of nasal bridge status post Mohs's surgery on 11/25/2024, history of total abdominal hysterectomy, left salpingo-oophorectomy, and right salpingectomy in 1973, history of cholecystectomy, 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 She lives in [place], [place]. She is . Her primary care physician is Dr. Prosper Mancilla.    She was seen at [facility] on 12/13/2024 for proctitis with rectal pain, diarrhea, inflamed external hemorrhoid, nausea, vomiting, decreased oral intake, and fecal incontinence as well as urinary tract infection. Potassium was mildly low at 3.4 mmol/L. She was prescribed ciprofloxacin, metronidazole, ondansetron, and oxycodone.    Her sister called [facility] to make an appointment.   She presented to [facility] on 12/16/2024 for persistent rectal pain, nausea, and decreased oral intake. She had 1 episode of blood with wiping after a bowel movement. She had been having watery loose stools and an episode of fecal incontinence. Her appetite had been decreased for the past 3 weeks. She was found to have a fecal impaction. Potassium was 3.3.    She was admitted to the Emergency Department Observation Unit. She was  PT placed in personal wheelchair. Resp even. PT placed in [facility] van and transported back to nursing home per staff member of [facility].      given a brown bomb enema and began to have bowel movements but had persistent severe rectal pain. Urine culture taken on 12/13/2024 grew E coli sensitive to ciprofloxacin, so her prescribed antibiotics were continued. On 12/17/2024, she was upgraded to admission to Hospital Medicine Team B with Colorectal Surgery consult. Potassium was still 3.3.     Overview/Hospital Course:  Stool disimpaction in the endoscopy suite per CRS. Started on regular stool softener. Pt tolerating PO and was medically ready to discharge, however pt had concerns returning home alone d/t reported weakness. PT/OT ordered who advised moderate-intensity therapy. Rectal pain subsided, but with persistent nausea (no vomiting & otherwise tolerating PO). Antiemetic regimen adjusted.     Interval History: NAEON. Continues to move bowels & reports rectal pain has subsided. Tolerating PO but reports persistent nausea throughout the day (regardless of PO intake). No vomiting. Has not found any relief with antinausea meds thus far. Trialling phenergan & increasing doses of zofran & compazine. If still no improvement may consider trial of ativan or haldol. CM/SW sending out referrals for SNF placement.       Review of Systems   Constitutional:  Negative for chills, diaphoresis and fever.   HENT:  Negative for congestion, rhinorrhea, sore throat and trouble swallowing.    Eyes:  Negative for photophobia and visual disturbance.   Respiratory:  Negative for cough, shortness of breath and wheezing.    Cardiovascular:  Negative for chest pain, palpitations and leg swelling.   Gastrointestinal:  Positive for nausea. Negative for abdominal distention, abdominal pain, constipation, diarrhea, rectal pain and vomiting.   Genitourinary:  Negative for difficulty urinating, dysuria and hematuria.   Musculoskeletal:  Negative for arthralgias, joint swelling and neck pain.   Skin:  Negative for rash and wound.   Neurological:  Positive for weakness. Negative for  dizziness, light-headedness and headaches.   Psychiatric/Behavioral:  Negative for agitation, confusion and sleep disturbance.      Objective:     Vital Signs (Most Recent):  Temp: 98.4 °F (36.9 °C) (12/20/24 1116)  Pulse: 84 (12/20/24 1116)  Resp: 17 (12/20/24 1116)  BP: 126/62 (12/20/24 1116)  SpO2: 98 % (12/20/24 1116) Vital Signs (24h Range):  Temp:  [97.9 °F (36.6 °C)-99.1 °F (37.3 °C)] 98.4 °F (36.9 °C)  Pulse:  [71-86] 84  Resp:  [17-19] 17  SpO2:  [94 %-98 %] 98 %  BP: (118-131)/(61-74) 126/62     Weight: 93.9 kg (207 lb)  Body mass index is 31.47 kg/m².    Intake/Output Summary (Last 24 hours) at 12/20/2024 1324  Last data filed at 12/20/2024 0839  Gross per 24 hour   Intake 1235.47 ml   Output --   Net 1235.47 ml         Physical Exam  Constitutional:       General: She is not in acute distress.     Appearance: She is not toxic-appearing or diaphoretic.   HENT:      Head: Normocephalic and atraumatic.      Mouth/Throat:      Mouth: Mucous membranes are moist.   Eyes:      Conjunctiva/sclera: Conjunctivae normal.   Cardiovascular:      Rate and Rhythm: Normal rate and regular rhythm.      Heart sounds: Normal heart sounds.   Pulmonary:      Effort: Pulmonary effort is normal. No respiratory distress.      Breath sounds: Normal breath sounds. No wheezing, rhonchi or rales.   Abdominal:      General: Bowel sounds are normal. There is no distension.      Palpations: Abdomen is soft.      Tenderness: There is no abdominal tenderness. There is no guarding.   Musculoskeletal:      Right lower leg: No edema.      Left lower leg: No edema.   Skin:     General: Skin is warm and dry.   Neurological:      General: No focal deficit present.      Mental Status: She is alert and oriented to person, place, and time. Mental status is at baseline.             Significant Labs: All pertinent labs within the past 24 hours have been reviewed.    Significant Imaging: I have reviewed all pertinent imaging results/findings within  the past 24 hours.        Assessment and Plan     Fecal impaction  CRS consulted who performed flexible sigmoidoscopy & fecal disimpaction. No anal stenosis or mass. Rectal mucosa healthy and without evidence of ulceration. No anal fissure or thrombosed hemorrhoids. Ok for general diet, restart MiraLax, and outpatient follow-up with her regular GI team.    Nausea  Persistent nausea w/o vomiting. Tolerating PO.   - Increase dose of zofran & compazine  - Trial phenergan     * Ulcerative colitis  Continue home balsalazide.    E. coli UTI  Continue recently prescribed ciprofloxacin. Flagyl x1 dose added. Completed abx course 12/20.     Hypertension  Chronic. Continue home amlodipine and carvedilol. Monitor BP.    Hyperlipidemia  Continue home simvastatin      VTE Risk Mitigation (From admission, onward)           Ordered     IP VTE HIGH RISK PATIENT  Once         12/17/24 1843     Place sequential compression device  Until discontinued         12/17/24 1843                    Discharge Planning   JELANI: 12/23/2024     Code Status: Full Code   Medical Readiness for Discharge Date: 12/18/2024  Discharge Plan A: Skilled Nursing Facility   Discharge Delays: None known at this time            Please place Justification for DME        Disha Taylor MD  Department of Hospital Medicine   [facility] - Internal Medicine Telemetry

## 2024-12-21 NOTE — PT/OT/SLP PROGRESS
Physical Therapy      Patient Name:  Gabriel Grace   MRN:  [id number]    Patient not seen today secondary to at 8:53 Am pt c/o Nausea, Rn notified and pt receiving medication. Second attempt at 9:42 Am pt still reporting Nausea.  Will follow-up at next PT POC date.

## 2024-12-22 PROBLEM — N39.0 E. COLI UTI: Status: RESOLVED | Noted: 2024-12-13 | Resolved: 2024-12-22

## 2024-12-22 PROBLEM — B96.20 E. COLI UTI: Status: RESOLVED | Noted: 2024-12-13 | Resolved: 2024-12-22

## 2024-12-22 LAB
ALBUMIN SERPL BCP-MCNC: 2.6 G/DL (ref 3.5–5.2)
ANION GAP SERPL CALC-SCNC: 8 MMOL/L (ref 8–16)
BUN SERPL-MCNC: 8 MG/DL (ref 8–23)
CALCIUM SERPL-MCNC: 8.8 MG/DL (ref 8.7–10.5)
CHLORIDE SERPL-SCNC: 109 MMOL/L (ref 95–110)
CO2 SERPL-SCNC: 22 MMOL/L (ref 23–29)
CREAT SERPL-MCNC: 0.7 MG/DL (ref 0.5–1.4)
ERYTHROCYTE [DISTWIDTH] IN BLOOD BY AUTOMATED COUNT: 13.9 % (ref 11.5–14.5)
EST. GFR  (NO RACE VARIABLE): >60 ML/MIN/1.73 M^2
GLUCOSE SERPL-MCNC: 147 MG/DL (ref 70–110)
HCT VFR BLD AUTO: 30.2 % (ref 37–48.5)
HGB BLD-MCNC: 10.2 G/DL (ref 12–16)
MAGNESIUM SERPL-MCNC: 1.6 MG/DL (ref 1.6–2.6)
MCH RBC QN AUTO: 33.4 PG (ref 27–31)
MCHC RBC AUTO-ENTMCNC: 33.8 G/DL (ref 32–36)
MCV RBC AUTO: 99 FL (ref 82–98)
PHOSPHATE SERPL-MCNC: 2.3 MG/DL (ref 2.7–4.5)
PLATELET # BLD AUTO: 150 K/UL (ref 150–450)
PMV BLD AUTO: 11.4 FL (ref 9.2–12.9)
POTASSIUM SERPL-SCNC: 3.7 MMOL/L (ref 3.5–5.1)
RBC # BLD AUTO: 3.05 M/UL (ref 4–5.4)
SODIUM SERPL-SCNC: 139 MMOL/L (ref 136–145)
WBC # BLD AUTO: 6.93 K/UL (ref 3.9–12.7)

## 2024-12-22 PROCEDURE — 97116 GAIT TRAINING THERAPY: CPT | Mod: CQ

## 2024-12-22 PROCEDURE — 25000003 PHARM REV CODE 250: Performed by: INTERNAL MEDICINE

## 2024-12-22 PROCEDURE — 80069 RENAL FUNCTION PANEL: CPT | Performed by: STUDENT IN AN ORGANIZED HEALTH CARE EDUCATION/TRAINING PROGRAM

## 2024-12-22 PROCEDURE — 97110 THERAPEUTIC EXERCISES: CPT | Mod: CQ

## 2024-12-22 PROCEDURE — 25000003 PHARM REV CODE 250: Performed by: FAMILY MEDICINE

## 2024-12-22 PROCEDURE — 85027 COMPLETE CBC AUTOMATED: CPT | Performed by: STUDENT IN AN ORGANIZED HEALTH CARE EDUCATION/TRAINING PROGRAM

## 2024-12-22 PROCEDURE — 97530 THERAPEUTIC ACTIVITIES: CPT | Mod: CO

## 2024-12-22 PROCEDURE — 11000001 HC ACUTE MED/SURG PRIVATE ROOM

## 2024-12-22 PROCEDURE — 25000003 PHARM REV CODE 250: Performed by: STUDENT IN AN ORGANIZED HEALTH CARE EDUCATION/TRAINING PROGRAM

## 2024-12-22 PROCEDURE — 36415 COLL VENOUS BLD VENIPUNCTURE: CPT | Performed by: STUDENT IN AN ORGANIZED HEALTH CARE EDUCATION/TRAINING PROGRAM

## 2024-12-22 PROCEDURE — 25000003 PHARM REV CODE 250: Performed by: PHYSICIAN ASSISTANT

## 2024-12-22 RX ORDER — SODIUM,POTASSIUM PHOSPHATES 280-250MG
2 POWDER IN PACKET (EA) ORAL
Status: COMPLETED | OUTPATIENT
Start: 2024-12-22 | End: 2024-12-22

## 2024-12-22 RX ORDER — PROCHLORPERAZINE MALEATE 5 MG
5 TABLET ORAL 4 TIMES DAILY PRN
Status: DISCONTINUED | OUTPATIENT
Start: 2024-12-22 | End: 2024-12-28 | Stop reason: HOSPADM

## 2024-12-22 RX ORDER — IBUPROFEN 400 MG/1
400 TABLET ORAL ONCE
Status: COMPLETED | OUTPATIENT
Start: 2024-12-22 | End: 2024-12-22

## 2024-12-22 RX ORDER — ONDANSETRON 4 MG/1
4 TABLET, ORALLY DISINTEGRATING ORAL EVERY 6 HOURS PRN
Status: DISCONTINUED | OUTPATIENT
Start: 2024-12-22 | End: 2024-12-28 | Stop reason: HOSPADM

## 2024-12-22 RX ADMIN — AMLODIPINE BESYLATE 10 MG: 10 TABLET ORAL at 08:12

## 2024-12-22 RX ADMIN — POTASSIUM & SODIUM PHOSPHATES POWDER PACK 280-160-250 MG 2 PACKET: 280-160-250 PACK at 11:12

## 2024-12-22 RX ADMIN — POLYETHYLENE GLYCOL 3350 17 G: 17 POWDER, FOR SOLUTION ORAL at 09:12

## 2024-12-22 RX ADMIN — CARVEDILOL 12.5 MG: 12.5 TABLET, FILM COATED ORAL at 08:12

## 2024-12-22 RX ADMIN — POTASSIUM & SODIUM PHOSPHATES POWDER PACK 280-160-250 MG 2 PACKET: 280-160-250 PACK at 04:12

## 2024-12-22 RX ADMIN — IBUPROFEN 400 MG: 400 TABLET, FILM COATED ORAL at 10:12

## 2024-12-22 RX ADMIN — ATORVASTATIN CALCIUM 10 MG: 10 TABLET, FILM COATED ORAL at 09:12

## 2024-12-22 RX ADMIN — CARVEDILOL 12.5 MG: 12.5 TABLET, FILM COATED ORAL at 04:12

## 2024-12-22 RX ADMIN — BALSALAZIDE DISODIUM 2250 MG: 750 CAPSULE ORAL at 08:12

## 2024-12-22 RX ADMIN — HYDROCORTISONE: 25 CREAM TOPICAL at 08:12

## 2024-12-22 NOTE — PT/OT/SLP PROGRESS
Occupational Therapy   Treatment    Name: Gabriel Grace  MRN: [id number]  Admitting Diagnosis:  Ulcerative colitis  4 Days Post-Op    Recommendations:     Discharge Recommendations: Moderate Intensity Therapy  Discharge Equipment Recommendations:  walker, rolling, bedside commode  Barriers to discharge:       Assessment:     Gabriel Grace is a 84 y.o. female with a medical diagnosis of Ulcerative colitis.  She presents with the following performance deficits affecting function: weakness, impaired endurance, impaired self care skills, impaired functional mobility, gait instability, impaired cardiopulmonary response to activity.     Rehab Prognosis:  Good; patient would benefit from acute skilled OT services to address these deficits and reach maximum level of function.       Plan:     Patient to be seen 4 x/week to address the above listed problems via self-care/home management, therapeutic activities, therapeutic exercises  Plan of Care Expires: 01/03/25  Plan of Care Reviewed with: patient    Subjective     Patient/Family Comments/goals: to improve function  Pain/Comfort:  Pain Rating 1: 0/10    Objective:     Communicated with: RN prior to session.  Patient found up in chair with  (no active lines) upon OT entry to room.  A client care conference was completed by the OTR and the PHELPS prior to treatment by the PHELPS to discuss the patient's POC and current status.    General Precautions: Standard, fall    Orthopedic Precautions:N/A  Braces: N/A  Respiratory Status: Room air     Occupational Performance:     Bed Mobility:    Patient completed Sit to Supine with stand by assistance     Functional Mobility/Transfers:  Patient completed Sit <> Stand Transfer with contact guard assistance  with  rolling walker   Patient completed Bed <> Chair Transfer using Step Transfer technique with stand by assistance with rolling walker  Functional Mobility: pt ambulating household distances with SBA using RW. No LOB or SOB noted.      Activities of Daily Living:  Not performed      [facility] 6 Click ADL: 18    Treatment & Education:  Pt educated on OT POC and frequency during hospital stay.   Pt educated on proper hand placement and techniques for RW mgmt to improve safety awareness.   Pt educated on importance of OOB activity to improve function and activity tolerance.  Seated EOB, pt completed BUE therex  ([id number] each)  - Straight Arm Raises  - Bicep Curls  - chest press  Addressed all patient questions/concerns within PHELPS scope of practice.     Patient left HOB elevated with all lines intact, call button in reach, and RN notified    GOALS:   Multidisciplinary Problems       Occupational Therapy Goals          Problem: Occupational Therapy    Goal Priority Disciplines Outcome Interventions   Occupational Therapy Goal     OT, PT/OT Progressing    Description: Goals to be met by: 1/3/25     Patient will increase functional independence with ADLs by performing:    Feeding with [place].  UE Dressing with Modified [place].  LE Dressing with Modified [place].  Grooming while standing at sink with Modified [place].  Toileting from toilet with Modified [place] for hygiene and clothing management.   Bathing from  shower chair/bench with Modified [place].  Sitting at edge of bed x20 minutes with Modified [place].  Toilet transfer to toilet with Modified [place].    Patient has a mobility limitation that significantly impairs their ability to participate in one or more mobility related activities of daily living, including toileting. This deficit can be resolved by using a bedside commode. Patient demonstrates mobility limitations that will cause them to be confined to one room at home without bathroom access for up to 30 days. Using a bedside commode will greatly improve the patient's ability to participate in MRADLs.     Patient demonstrates a mobility limitation that significantly impairs their ability to  participate in one or more mobility related activities of daily living. Patient's mobility limitation cannot be sufficiently resolved with the use of a cane, but can be sufficiently resolved with the use of a rolling walker.The use of a rolling walker will considerably improve their ability to participate in MRADLs. Patient will use the walker on a regular basis at home.                         Time Tracking:     OT Date of Treatment: 12/22/24  OT Start Time: 1341  OT Stop Time: 1353  OT Total Time (min): 12 min    Billable Minutes:Therapeutic Activity 12    OT/HENRY: HENRY     Number of HENRY visits since last OT visit: 1    12/22/2024

## 2024-12-22 NOTE — PT/OT/SLP PROGRESS
Physical Therapy Treatment    Patient Name:  Gabriel Grace   MRN:  [id number]    Recommendations:     Discharge Recommendations: Moderate Intensity Therapy (pt. indicated she would rather go home with HH PT)  Discharge Equipment Recommendations: walker, rolling, bedside commode  Barriers to discharge: None    Assessment:     Gabriel Grace is a 84 y.o. female admitted with a medical diagnosis of Ulcerative colitis.  She presents with the following impairments/functional limitations: weakness, impaired endurance, impaired self care skills, impaired functional mobility, decreased safety awareness, decreased lower extremity function, decreased upper extremity function, impaired cardiopulmonary response to activity Pt tolerated treatment session well today. Patient remains appropriate for continued skilled services within the acute environment and goals remain appropriate.   .    Rehab Prognosis: Good; patient would benefit from acute skilled PT services to address these deficits and reach maximum level of function.    Recent Surgery: Procedure(s) (LRB):  SIGMOIDOSCOPY, FLEXIBLE (N/A) 4 Days Post-Op    Plan:     During this hospitalization, patient to be seen 4 x/week to address the identified rehab impairments via gait training, therapeutic activities, therapeutic exercises, neuromuscular re-education and progress toward the following goals:    Plan of Care Expires:  01/19/25    Subjective     Chief Complaint: None stated   Patient/Family Comments/goals: Pt agreeable to PT   Pain/Comfort:  Pain Rating 1: 0/10      Objective:     Communicated with Rn prior to session.  Patient found supine with telemetry upon PT entry to room.     General Precautions: Standard, fall  Orthopedic Precautions: N/A  Braces: N/A  Respiratory Status: Room air     Functional Mobility:  Bed Mobility:     Supine to Sit: contact guard assistance    Transfers:     Sit to Stand x 2:  CGA from bed with no AD + CGA from commode with RW    Bed to Commode:  contact guard assistance with  hand-held assist  using  Stand Pivot  Gait: 70 ft CGA with RW   Pt ambulated with decreased mikie,  and step length     Pt performed 10 repetitions of seated B LE exercises consisting of: Marching, LAQ, ABD/ADD, heel raises, and toe raises.         AM-PAC 6 CLICK MOBILITY  Turning over in bed (including adjusting bedclothes, sheets and blankets)?: 3  Sitting down on and standing up from a chair with arms (e.g., wheelchair, bedside commode, etc.): 3  Moving from lying on back to sitting on the side of the bed?: 3  Moving to and from a bed to a chair (including a wheelchair)?: 3  Need to walk in hospital room?: 3  Climbing 3-5 steps with a railing?: 2  Basic Mobility Total Score: 17       Treatment & Education:  Therapist provided instruction and educated for safety during transfers and gait training. As well as proper body mechanics, energy conservation, and fall prevention strategies during tasks listed above, and the effects of prolonged immobility and the importance of performing EOB/OOB activity and exercises to promote healing and reduce recovery time.       Patient left up in chair with all lines intact, call button in reach, and Rn notified..    GOALS:   Multidisciplinary Problems       Physical Therapy Goals          Problem: Physical Therapy    Goal Priority Disciplines Outcome Interventions   Physical Therapy Goal     PT, PT/OT Progressing    Description: Goals to be met by: 1/3/2025     Patient will increase functional independence with mobility by performin. Supine to sit with Set-up [place]  2. Sit to supine with Set-up [place]  3. Sit to stand transfer with Supervision  4. Bed to chair transfer with Supervision using Rolling Walker  5. Gait  x 150 feet with Supervision using Rolling Walker.   6. Lower extremity exercise program x15 reps per handout, with supervision                         Time Tracking:     PT Received On: 24  PT Start Time: 4921      PT Stop Time: 1128  PT Total Time (min): 23 min     Billable Minutes: Gait Training 15 and Therapeutic Exercise 8    Treatment Type: Treatment  PT/PTA: PTA     Number of PTA visits since last PT visit: 1 12/22/2024

## 2024-12-22 NOTE — PROGRESS NOTES
[facility] - Internal Medicine [facility] Medicine  Progress Note    Patient Name: Gabriel Grace  MRN: [id number]  Patient Class: IP- Inpatient   Admission Date: 12/16/2024  Length of Stay: 5 days  Attending Physician: Disha Taylor MD  Primary Care Provider: Prosper Mancilla MD        Subjective     Principal Problem: Ulcerative colitis    HPI:  Gabriel Grace is an 83yo woman with hypertension, hyperlipidemia, ulcerative colitis, migraine headaches, hypothyroidism, aortic atherosclerosis, history of pulmonary embolism, history of pancreatitic cyst in 2017, history of basal cell carcinoma of nasal bridge status post Mohs's surgery on 11/25/2024, history of total abdominal hysterectomy, left salpingo-oophorectomy, and right salpingectomy in 1973, history of cholecystectomy, 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 She lives in [place], [place]. She is . Her primary care physician is Dr. Prosper Mancilla.    She was seen at [facility] on 12/13/2024 for proctitis with rectal pain, diarrhea, inflamed external hemorrhoid, nausea, vomiting, decreased oral intake, and fecal incontinence as well as urinary tract infection. Potassium was mildly low at 3.4 mmol/L. She was prescribed ciprofloxacin, metronidazole, ondansetron, and oxycodone.    Her sister called [facility] to make an appointment.   She presented to [facility] on 12/16/2024 for persistent rectal pain, nausea, and decreased oral intake. She had 1 episode of blood with wiping after a bowel movement. She had been having watery loose stools and an episode of fecal incontinence. Her appetite had been decreased for the past 3 weeks. She was found to have a fecal impaction. Potassium was 3.3.    She was admitted to the Emergency Department Observation Unit. She was  "given a brown bomb enema and began to have bowel movements but had persistent severe rectal pain. Urine culture taken on 12/13/2024 grew E coli sensitive to ciprofloxacin, so her prescribed antibiotics were continued. On 12/17/2024, she was upgraded to admission to Hospital Medicine Team B with Colorectal Surgery consult. Potassium was still 3.3.     Overview/Hospital Course:  Stool disimpaction in the endoscopy suite per CRS. Started on regular stool softener. Pt tolerating PO and was medically ready to discharge, however pt had concerns returning home alone d/t reported weakness. PT/OT ordered who advised moderate-intensity therapy. Rectal pain subsided, but with persistent nausea (no vomiting & otherwise tolerating PO). Antiemetic regimen adjusted.     Interval History: NAEON. Nausea markedly improved. Tolerating PO w/o issues. Moving bowels w/o rectal pain. Pt upset re: relationship w/ sister & "feeling abandoned." Expresses desire to go home but oscillates btwn SNF & HH w/ concerns re: both. Requested CM/SW to discuss dispo with pt further to decide on best discharge plan. Pt otherwise medically ready to discharge.     Review of Systems   Constitutional:  Negative for chills, diaphoresis and fever.   HENT:  Negative for congestion, rhinorrhea, sore throat and trouble swallowing.    Eyes:  Negative for photophobia and visual disturbance.   Respiratory:  Negative for cough, shortness of breath and wheezing.    Cardiovascular:  Negative for chest pain, palpitations and leg swelling.   Gastrointestinal:  Positive for nausea (minimal). Negative for abdominal distention, abdominal pain, constipation, diarrhea, rectal pain and vomiting.   Genitourinary:  Negative for difficulty urinating, dysuria and hematuria.   Musculoskeletal:  Negative for arthralgias, joint swelling and neck pain.   Skin:  Negative for rash and wound.   Neurological:  Positive for weakness. Negative for dizziness, light-headedness and headaches. "   Psychiatric/Behavioral:  Negative for agitation, confusion and sleep disturbance.      Objective:     Vital Signs (Most Recent):  Temp: 98.4 °F (36.9 °C) (12/20/24 1116)  Pulse: 84 (12/20/24 1116)  Resp: 17 (12/20/24 1116)  BP: 126/62 (12/20/24 1116)  SpO2: 98 % (12/20/24 1116) Vital Signs (24h Range):  Temp:  [97.9 °F (36.6 °C)-99.1 °F (37.3 °C)] 98.4 °F (36.9 °C)  Pulse:  [71-86] 84  Resp:  [17-19] 17  SpO2:  [94 %-98 %] 98 %  BP: (118-131)/(61-74) 126/62     Weight: 93.9 kg (207 lb)  Body mass index is 31.47 kg/m².    Intake/Output Summary (Last 24 hours) at 12/20/2024 1324  Last data filed at 12/20/2024 0839  Gross per 24 hour   Intake 1235.47 ml   Output --   Net 1235.47 ml         Physical Exam  Constitutional:       General: She is not in acute distress.     Appearance: She is not toxic-appearing or diaphoretic.   HENT:      Head: Normocephalic and atraumatic.      Mouth/Throat:      Mouth: Mucous membranes are moist.   Eyes:      Conjunctiva/sclera: Conjunctivae normal.   Cardiovascular:      Rate and Rhythm: Normal rate and regular rhythm.      Heart sounds: Normal heart sounds.   Pulmonary:      Effort: Pulmonary effort is normal. No respiratory distress.      Breath sounds: Normal breath sounds. No wheezing, rhonchi or rales.   Abdominal:      General: Bowel sounds are normal. There is no distension.      Palpations: Abdomen is soft.      Tenderness: There is no abdominal tenderness. There is no guarding.   Musculoskeletal:      Right lower leg: No edema.      Left lower leg: No edema.   Skin:     General: Skin is warm and dry.   Neurological:      General: No focal deficit present.      Mental Status: She is alert and oriented to person, place, and time. Mental status is at baseline.   Psychiatric:         Mood and Affect: Mood is anxious. Affect is tearful.             Significant Labs: All pertinent labs within the past 24 hours have been reviewed.    Significant Imaging: I have reviewed all  pertinent imaging results/findings within the past 24 hours.        Assessment and Plan     Fecal impaction  CRS consulted who performed flexible sigmoidoscopy & fecal disimpaction. No anal stenosis or mass. Rectal mucosa healthy and without evidence of ulceration. No anal fissure or thrombosed hemorrhoids. Ok for general diet, restart MiraLax, and outpatient follow-up with her regular GI team.    Nausea  Persistent nausea w/o vomiting. Tolerating PO.   - PRN zofran, compazine & phenergan     * Ulcerative colitis  Continue home balsalazide.    E. coli UTI  Continue recently prescribed ciprofloxacin. Flagyl x1 dose added. Completed abx course 12/20.     Hypertension  Chronic. Continue home amlodipine and carvedilol. Monitor BP.    Hyperlipidemia  Continue home simvastatin      VTE Risk Mitigation (From admission, onward)           Ordered     IP VTE HIGH RISK PATIENT  Once         12/17/24 1843     Place sequential compression device  Until discontinued         12/17/24 1843                    Discharge Planning   JELANI: 12/23/2024     Code Status: Full Code   Medical Readiness for Discharge Date: 12/18/2024  Discharge Plan A: Skilled Nursing Facility   Discharge Delays: None known at this time            Please place Justification for DME        Disha Taylor MD  Department of Hospital Medicine   [facility] - Internal Medicine Telemetry

## 2024-12-23 PROCEDURE — 11000001 HC ACUTE MED/SURG PRIVATE ROOM

## 2024-12-23 PROCEDURE — 83735 ASSAY OF MAGNESIUM: CPT | Performed by: STUDENT IN AN ORGANIZED HEALTH CARE EDUCATION/TRAINING PROGRAM

## 2024-12-23 PROCEDURE — 25000003 PHARM REV CODE 250: Performed by: PHYSICIAN ASSISTANT

## 2024-12-23 PROCEDURE — 36415 COLL VENOUS BLD VENIPUNCTURE: CPT | Performed by: STUDENT IN AN ORGANIZED HEALTH CARE EDUCATION/TRAINING PROGRAM

## 2024-12-23 PROCEDURE — 25000003 PHARM REV CODE 250: Performed by: STUDENT IN AN ORGANIZED HEALTH CARE EDUCATION/TRAINING PROGRAM

## 2024-12-23 PROCEDURE — 97116 GAIT TRAINING THERAPY: CPT | Mod: CQ

## 2024-12-23 PROCEDURE — 25000003 PHARM REV CODE 250: Performed by: FAMILY MEDICINE

## 2024-12-23 RX ORDER — LIDOCAINE HYDROCHLORIDE 20 MG/ML
JELLY TOPICAL EVERY 4 HOURS PRN
Start: 2024-12-23

## 2024-12-23 RX ORDER — POLYETHYLENE GLYCOL 3350 17 G/17G
17 POWDER, FOR SOLUTION ORAL 2 TIMES DAILY
Start: 2024-12-24

## 2024-12-23 RX ADMIN — CARVEDILOL 12.5 MG: 12.5 TABLET, FILM COATED ORAL at 05:12

## 2024-12-23 RX ADMIN — ONDANSETRON 4 MG: 4 TABLET, ORALLY DISINTEGRATING ORAL at 05:12

## 2024-12-23 RX ADMIN — BALSALAZIDE DISODIUM 2250 MG: 750 CAPSULE ORAL at 08:12

## 2024-12-23 RX ADMIN — HYDROCORTISONE: 25 CREAM TOPICAL at 08:12

## 2024-12-23 RX ADMIN — ATORVASTATIN CALCIUM 10 MG: 10 TABLET, FILM COATED ORAL at 08:12

## 2024-12-23 RX ADMIN — CARVEDILOL 12.5 MG: 12.5 TABLET, FILM COATED ORAL at 08:12

## 2024-12-23 RX ADMIN — AMLODIPINE BESYLATE 10 MG: 10 TABLET ORAL at 08:12

## 2024-12-23 RX ADMIN — ONDANSETRON 4 MG: 4 TABLET, ORALLY DISINTEGRATING ORAL at 06:12

## 2024-12-23 NOTE — PT/OT/SLP PROGRESS
"Physical Therapy Treatment    Patient Name:  Gabriel Grace   MRN:  [id number]    Recommendations:     Discharge Recommendations: Moderate Intensity Therapy (pt. indicated she would rather go home with HH PT)  Discharge Equipment Recommendations: walker, rolling, bedside commode  Barriers to discharge: None    Assessment:     Gabriel Grace is a 84 y.o. female admitted with a medical diagnosis of Ulcerative colitis.  She presents with the following impairments/functional limitations: impaired endurance, impaired self care skills, weakness, impaired functional mobility, gait instability, impaired balance, decreased safety awareness, pain, impaired cardiopulmonary response to activity. Pt was agreeable to participate in skilled therapy session and perform OOB activities. Pt demonstrated improved safety awareness and required decreased assistance with all activities. Pt is improving at this time with Acute skilled PT and further care is recommend at this time to gain independence with ADL's and improve functional mobility prior to leaving inpatient setting.     Rehab Prognosis: Good; patient would benefit from acute skilled PT services to address these deficits and reach maximum level of function.    Recent Surgery: Procedure(s) (LRB):  SIGMOIDOSCOPY, FLEXIBLE (N/A) 5 Days Post-Op    Plan:     During this hospitalization, patient to be seen 4 x/week to address the identified rehab impairments via gait training, therapeutic activities, therapeutic exercises, neuromuscular re-education and progress toward the following goals:    Plan of Care Expires:  01/19/25    Subjective     Chief Complaint: "I want to go home"  Patient/Family Comments/goals: Go home  Pain/Comfort:  Pain Rating 1: 0/10      Objective:     Communicated with RN prior to session.  Patient found up in chair with   upon PT entry to room.     General Precautions: Standard, fall  Orthopedic Precautions: N/A  Braces: N/A  Respiratory Status: Room air     Functional " Mobility:  Transfers:     Sit to Stand:  stand by assistance with rolling walker  Gait: 260' w/ SBA and RW. Slow cautious gait. No LOB.  Balance: Sitting: Independent; Standing: Supervision-SBA      AM-PAC 6 CLICK MOBILITY  Turning over in bed (including adjusting bedclothes, sheets and blankets)?: 4  Sitting down on and standing up from a chair with arms (e.g., wheelchair, bedside commode, etc.): 4  Moving from lying on back to sitting on the side of the bed?: 3  Moving to and from a bed to a chair (including a wheelchair)?: 3  Need to walk in hospital room?: 3  Climbing 3-5 steps with a railing?: 2  Basic Mobility Total Score: 19       Treatment & Education:  Pt educated on there purpose, goals and benefits of today's session. Pt informed on progress made and improved tolerance to activity today. Pt was given VC's throughout session for sequencing, hand placements and safety. Pt was encourage to follow commands to improve outcome of activities being performed.     Patient left up in chair with all lines intact and call button in reach..    GOALS:   Multidisciplinary Problems       Physical Therapy Goals          Problem: Physical Therapy    Goal Priority Disciplines Outcome Interventions   Physical Therapy Goal     PT, PT/OT Progressing    Description: Goals to be met by: 1/3/2025     Patient will increase functional independence with mobility by performin. Supine to sit with Set-up [place]  2. Sit to supine with Set-up [place]  3. Sit to stand transfer with Supervision  4. Bed to chair transfer with Supervision using Rolling Walker  5. Gait  x 150 feet with Supervision using Rolling Walker.   6. Lower extremity exercise program x15 reps per handout, with supervision                         Time Tracking:     PT Received On: 24  PT Start Time: 1137     PT Stop Time: 1155  PT Total Time (min): 18 min     Billable Minutes: Gait Training 18    Treatment Type: Treatment  PT/PTA: PTA     Number  of PTA visits since last PT visit: 2     12/23/2024

## 2024-12-23 NOTE — PROGRESS NOTES
Returned missed call from [facility].  They can accept.  Also, received call from Abad calzada [facility] who stated that he will look at  for acceptance [phone].

## 2024-12-23 NOTE — PLAN OF CARE
Problem: Adult Inpatient Plan of Care  Goal: Plan of Care Review  Outcome: Adequate for Care Transition  Goal: Patient-Specific Goal (Individualized)  Outcome: Adequate for Care Transition  Goal: Absence of Hospital-Acquired Illness or Injury  Outcome: Adequate for Care Transition  Goal: Optimal Comfort and Wellbeing  Outcome: Adequate for Care Transition  Goal: Readiness for Transition of Care  Outcome: Adequate for Care Transition     Problem: Fall Injury Risk  Goal: Absence of Fall and Fall-Related Injury  Outcome: Adequate for Care Transition     Problem: Skin Injury Risk Increased  Goal: Skin Health and Integrity  Outcome: Adequate for Care Transition   Pt AAO X 4; able to express needs.  Pain and nausea relieved with scheduled and PO meds. Ambulated in hallway with PT.   Up to bathroom independently with SBA.     Safety maintained.  Bed in low position,  call  light in reach.

## 2024-12-23 NOTE — PROGRESS NOTES
Met with  pt and spoke to sister on speakphone to review discharge recommendation of snf and is agreeable to plan.    Patient/family provided list of facilities in-network with patient's payor plan.  Providers that are owned, operated, or affiliated with [facility] are included on the list.     Notified that referral sent to below listed facilities from list based on proximity to home/family support:    1.[facility]  2. [facility]  3. [facility]  4. [facility]  5.     Patient/family instructed to identify preference.      Preferred Facility: [facility]    If an additional preferred facility not listed above is identified, additional referral to be sent.  If above facilities unable to accept, will send additional referrals to in-network providers.      [facility] and will [facility] for [facility].  [phone]

## 2024-12-24 PROBLEM — R11.0 NAUSEA: Status: ACTIVE | Noted: 2024-12-24

## 2024-12-24 LAB
ALBUMIN SERPL BCP-MCNC: 2.7 G/DL (ref 3.5–5.2)
ANION GAP SERPL CALC-SCNC: 8 MMOL/L (ref 8–16)
BUN SERPL-MCNC: 16 MG/DL (ref 8–23)
CALCIUM SERPL-MCNC: 9.2 MG/DL (ref 8.7–10.5)
CHLORIDE SERPL-SCNC: 108 MMOL/L (ref 95–110)
CO2 SERPL-SCNC: 23 MMOL/L (ref 23–29)
CREAT SERPL-MCNC: 0.8 MG/DL (ref 0.5–1.4)
ERYTHROCYTE [DISTWIDTH] IN BLOOD BY AUTOMATED COUNT: 13.8 % (ref 11.5–14.5)
EST. GFR  (NO RACE VARIABLE): >60 ML/MIN/1.73 M^2
GLUCOSE SERPL-MCNC: 140 MG/DL (ref 70–110)
HCT VFR BLD AUTO: 29.7 % (ref 37–48.5)
HGB BLD-MCNC: 9.4 G/DL (ref 12–16)
MAGNESIUM SERPL-MCNC: 1.6 MG/DL (ref 1.6–2.6)
MCH RBC QN AUTO: 31.9 PG (ref 27–31)
MCHC RBC AUTO-ENTMCNC: 31.6 G/DL (ref 32–36)
MCV RBC AUTO: 101 FL (ref 82–98)
PHOSPHATE SERPL-MCNC: 2.7 MG/DL (ref 2.7–4.5)
PLATELET # BLD AUTO: 144 K/UL (ref 150–450)
PMV BLD AUTO: 11 FL (ref 9.2–12.9)
POTASSIUM SERPL-SCNC: 4 MMOL/L (ref 3.5–5.1)
RBC # BLD AUTO: 2.95 M/UL (ref 4–5.4)
SODIUM SERPL-SCNC: 139 MMOL/L (ref 136–145)
WBC # BLD AUTO: 8.06 K/UL (ref 3.9–12.7)

## 2024-12-24 PROCEDURE — 36415 COLL VENOUS BLD VENIPUNCTURE: CPT | Performed by: STUDENT IN AN ORGANIZED HEALTH CARE EDUCATION/TRAINING PROGRAM

## 2024-12-24 PROCEDURE — 25000003 PHARM REV CODE 250: Performed by: STUDENT IN AN ORGANIZED HEALTH CARE EDUCATION/TRAINING PROGRAM

## 2024-12-24 PROCEDURE — 25000003 PHARM REV CODE 250: Performed by: PHYSICIAN ASSISTANT

## 2024-12-24 PROCEDURE — 80069 RENAL FUNCTION PANEL: CPT | Performed by: STUDENT IN AN ORGANIZED HEALTH CARE EDUCATION/TRAINING PROGRAM

## 2024-12-24 PROCEDURE — 30200315 PPD INTRADERMAL TEST REV CODE 302: Performed by: STUDENT IN AN ORGANIZED HEALTH CARE EDUCATION/TRAINING PROGRAM

## 2024-12-24 PROCEDURE — 86580 TB INTRADERMAL TEST: CPT | Performed by: STUDENT IN AN ORGANIZED HEALTH CARE EDUCATION/TRAINING PROGRAM

## 2024-12-24 PROCEDURE — 85027 COMPLETE CBC AUTOMATED: CPT | Performed by: STUDENT IN AN ORGANIZED HEALTH CARE EDUCATION/TRAINING PROGRAM

## 2024-12-24 PROCEDURE — 63600175 PHARM REV CODE 636 W HCPCS: Performed by: STUDENT IN AN ORGANIZED HEALTH CARE EDUCATION/TRAINING PROGRAM

## 2024-12-24 PROCEDURE — 11000001 HC ACUTE MED/SURG PRIVATE ROOM

## 2024-12-24 PROCEDURE — 97530 THERAPEUTIC ACTIVITIES: CPT

## 2024-12-24 PROCEDURE — 25000003 PHARM REV CODE 250: Performed by: FAMILY MEDICINE

## 2024-12-24 RX ORDER — BALSALAZIDE DISODIUM 750 MG/1
2250 CAPSULE ORAL 3 TIMES DAILY
Status: DISCONTINUED | OUTPATIENT
Start: 2024-12-24 | End: 2024-12-28 | Stop reason: HOSPADM

## 2024-12-24 RX ORDER — METOCLOPRAMIDE 5 MG/1
10 TABLET ORAL
Status: DISCONTINUED | OUTPATIENT
Start: 2024-12-24 | End: 2024-12-27

## 2024-12-24 RX ORDER — OXYCODONE HYDROCHLORIDE 5 MG/1
5 TABLET ORAL EVERY 6 HOURS PRN
Qty: 12 TABLET | Refills: 0 | Status: SHIPPED | OUTPATIENT
Start: 2024-12-24

## 2024-12-24 RX ORDER — BALSALAZIDE DISODIUM 750 MG/1
750 CAPSULE ORAL 3 TIMES DAILY
Status: DISCONTINUED | OUTPATIENT
Start: 2024-12-24 | End: 2024-12-24

## 2024-12-24 RX ADMIN — METOCLOPRAMIDE 10 MG: 5 TABLET ORAL at 05:12

## 2024-12-24 RX ADMIN — AMLODIPINE BESYLATE 10 MG: 10 TABLET ORAL at 08:12

## 2024-12-24 RX ADMIN — CARVEDILOL 12.5 MG: 12.5 TABLET, FILM COATED ORAL at 05:12

## 2024-12-24 RX ADMIN — METOCLOPRAMIDE 10 MG: 5 TABLET ORAL at 09:12

## 2024-12-24 RX ADMIN — PROCHLORPERAZINE MALEATE 5 MG: 5 TABLET ORAL at 02:12

## 2024-12-24 RX ADMIN — ATORVASTATIN CALCIUM 10 MG: 10 TABLET, FILM COATED ORAL at 09:12

## 2024-12-24 RX ADMIN — BALSALAZIDE DISODIUM 2250 MG: 750 CAPSULE ORAL at 09:12

## 2024-12-24 RX ADMIN — CARVEDILOL 12.5 MG: 12.5 TABLET, FILM COATED ORAL at 08:12

## 2024-12-24 RX ADMIN — BALSALAZIDE DISODIUM 2250 MG: 750 CAPSULE ORAL at 08:12

## 2024-12-24 RX ADMIN — TUBERCULIN PURIFIED PROTEIN DERIVATIVE 5 UNITS: 5 INJECTION, SOLUTION INTRADERMAL at 12:12

## 2024-12-24 RX ADMIN — ONDANSETRON 4 MG: 4 TABLET, ORALLY DISINTEGRATING ORAL at 12:12

## 2024-12-24 RX ADMIN — BALSALAZIDE DISODIUM 2250 MG: 750 CAPSULE ORAL at 05:12

## 2024-12-24 NOTE — PROGRESS NOTES
[facility] - Internal Medicine [facility]  Progress Note    Patient Name: Gabriel Grace  MRN: [id number]  Patient Class: IP- Inpatient   Admission Date: 12/16/2024  Length of Stay: 6 days  Attending Physician: Disha Taylor MD  Primary Care Provider: Prosper Mancilla MD        Subjective     Principal Problem:Ulcerative colitis        HPI:  Gabriel Grace is an 84 year old white woman with hypertension, hyperlipidemia, ulcerative colitis, migraine headaches, hypothyroidism, aortic atherosclerosis, history of pulmonary embolism, history of pancreatitic cyst in 2017, history of basal cell carcinoma of nasal bridge status post Mohs's surgery on 11/25/2024, history of total abdominal hysterectomy, left salpingo-oophorectomy, and right salpingectomy in 1973, history of cholecystectomy, 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 She lives in [place], [place]. She is . Her primary care physician is Dr. Prosper Mancilla.    She was seen at [facility] on 12/13/2024 for proctitis with rectal pain, diarrhea, inflamed external hemorrhoid, nausea, vomiting, decreased oral intake, and fecal incontinence as well as urinary tract infection. Potassium was mildly low at 3.4 mmol/L. She was prescribed ciprofloxacin, metronidazole, ondansetron, and oxycodone.    Her sister called [facility] to make an appointment.   She presented to [facility] on 12/16/2024 for persistent rectal pain, nausea, and decreased oral intake. She had 1 episode of blood with wiping after a bowel movement. She had been having watery loose stools and an episode of fecal incontinence. Her appetite had been decreased for the past 3 weeks. She was found to have a fecal impaction. Potassium was 3.3.    She was admitted to the Emergency Department  Observation Unit. She was given a brown bomb enema and began to have bowel movements but had persistent severe rectal pain. Urine culture taken on 12/13/2024 grew E coli sensitive to ciprofloxacin, so her prescribed antibiotics were continued. On 12/17/2024, she was upgraded to admission to Hospital Medicine Team B with Colorectal Surgery consult. Potassium was still 3.3.     Overview/Hospital Course:  Stool disimpaction in the endoscopy suite per CRS. Started on regular stool softener. Pt tolerating PO and was medically ready to discharge, however pt had concerns returning home alone d/t reported weakness. PT/OT ordered who advised moderate-intensity therapy. Rectal pain subsided, but with persistent nausea (no vomiting & otherwise tolerating PO). Antiemetic regimen adjusted.     Interval History: NAEON. Continues to tolerate PO well. Medically ready for discharge pending SNF placement. Appreciate CM/SW assistance.    Review of Systems   Constitutional:  Negative for chills, diaphoresis and fever.   HENT:  Negative for congestion, rhinorrhea, sore throat and trouble swallowing.    Eyes:  Negative for photophobia and visual disturbance.   Respiratory:  Negative for cough, shortness of breath and wheezing.    Cardiovascular:  Negative for chest pain, palpitations and leg swelling.   Gastrointestinal:  Positive for nausea (minimal). Negative for abdominal distention, abdominal pain, constipation, diarrhea, rectal pain and vomiting.   Genitourinary:  Negative for difficulty urinating, dysuria and hematuria.   Musculoskeletal:  Negative for arthralgias, joint swelling and neck pain.   Skin:  Negative for rash and wound.   Neurological:  Positive for weakness. Negative for dizziness, light-headedness and headaches.   Psychiatric/Behavioral:  Negative for agitation, confusion and sleep disturbance.      Objective:     Vital Signs (Most Recent):  Temp: 98.4 °F (36.9 °C) (12/20/24 1116)  Pulse: 84 (12/20/24 1116)  Resp: 17  (12/20/24 1116)  BP: 126/62 (12/20/24 1116)  SpO2: 98 % (12/20/24 1116) Vital Signs (24h Range):  Temp:  [97.9 °F (36.6 °C)-99.1 °F (37.3 °C)] 98.4 °F (36.9 °C)  Pulse:  [71-86] 84  Resp:  [17-19] 17  SpO2:  [94 %-98 %] 98 %  BP: (118-131)/(61-74) 126/62     Weight: 93.9 kg (207 lb)  Body mass index is 31.47 kg/m².    Intake/Output Summary (Last 24 hours) at 12/20/2024 1324  Last data filed at 12/20/2024 0839  Gross per 24 hour   Intake 1235.47 ml   Output --   Net 1235.47 ml         Physical Exam  Constitutional:       General: She is not in acute distress.     Appearance: She is not toxic-appearing or diaphoretic.   HENT:      Head: Normocephalic and atraumatic.      Mouth/Throat:      Mouth: Mucous membranes are moist.   Eyes:      Conjunctiva/sclera: Conjunctivae normal.   Cardiovascular:      Rate and Rhythm: Normal rate and regular rhythm.      Heart sounds: Normal heart sounds.   Pulmonary:      Effort: Pulmonary effort is normal. No respiratory distress.      Breath sounds: Normal breath sounds. No wheezing, rhonchi or rales.   Abdominal:      General: Bowel sounds are normal. There is no distension.      Palpations: Abdomen is soft.      Tenderness: There is no abdominal tenderness. There is no guarding.   Musculoskeletal:      Right lower leg: No edema.      Left lower leg: No edema.   Skin:     General: Skin is warm and dry.   Neurological:      General: No focal deficit present.      Mental Status: She is alert and oriented to person, place, and time. Mental status is at baseline.           Significant Labs: All pertinent labs within the past 24 hours have been reviewed.    Significant Imaging: I have reviewed all pertinent imaging results/findings within the past 24 hours.    Assessment and Plan     * Ulcerative colitis  Continue home balsalazide.    Hypertension  Chronic. Continue home amlodipine and carvedilol. Monitor BP.    Hyperlipidemia  Continue home simvastatin      VTE Risk Mitigation (From  admission, onward)           Ordered     IP VTE HIGH RISK PATIENT  Once         12/17/24 1843     Place sequential compression device  Until discontinued         12/17/24 1843                    Discharge Planning   JELAIN: 12/23/2024     Code Status: Full Code   Medical Readiness for Discharge Date: 12/18/2024  Discharge Plan A: Skilled Nursing Facility   Discharge Delays: None known at this time            Please place Justification for DME        Disha Taylor MD  Department of Hospital Medicine   [facility] - Internal Medicine Telemetry

## 2024-12-24 NOTE — ASSESSMENT & PLAN NOTE
Continue home balsalazide. Reports she takes 2250 mg TID at home, not 2250 mg daily as what is currently listed on home med list. Orders updated.

## 2024-12-24 NOTE — SUBJECTIVE & OBJECTIVE
Interval History:   I called and scheduled [id number] for 3:30 PM today.    Review of Systems   Constitutional:  Negative for chills and fever.   Respiratory:  Negative for cough and shortness of breath.    Neurological:  Positive for weakness. Negative for dizziness, light-headedness and headaches.     Objective:     Vital Signs (Most Recent):  Temp: 98.3 °F (36.8 °C) (12/24/24 1559)  Pulse: 81 (12/24/24 1559)  Resp: 18 (12/24/24 1559)  BP: 139/76 (12/24/24 1559)  SpO2: 97 % (12/24/24 1559) Vital Signs (24h Range):  Temp:  [97.7 °F (36.5 °C)-98.6 °F (37 °C)] 98.3 °F (36.8 °C)  Pulse:  [78-87] 81  Resp:  [17-18] 18  SpO2:  [93 %-98 %] 97 %  BP: (118-153)/(59-76) 139/76     Weight: 93.9 kg (207 lb)  Body mass index is 31.47 kg/m².    Intake/Output Summary (Last 24 hours) at 12/24/2024 1624  Last data filed at 12/24/2024 0831  Gross per 24 hour   Intake 840 ml   Output --   Net 840 ml         Physical Exam  Vitals and nursing note reviewed.   Constitutional:       General: She is not in acute distress.     Appearance: She is obese. She is not ill-appearing, toxic-appearing or diaphoretic.      Interventions: She is not intubated.  Pulmonary:      Effort: Pulmonary effort is normal. No accessory muscle usage or respiratory distress. She is not intubated.   Neurological:      General: No focal deficit present.      Mental Status: She is alert and oriented to person, place, and time. Mental status is at baseline.   Psychiatric:         Mood and Affect: Mood normal.         Behavior: Behavior normal.           Significant Labs: All pertinent labs within the past 24 hours have been reviewed.    Significant Imaging: I have reviewed all pertinent imaging results/findings within the past 24 hours.  X-Ray Chest AP Portable 12/24/24: FINDINGS:   Heart size normal.  No significant airspace consolidation or pleural effusion identified.

## 2024-12-24 NOTE — PLAN OF CARE
[facility] - Internal Medicine Telemetry      HOME HEALTH ORDERS  FACE TO FACE ENCOUNTER    Patient Name: Gabriel Grace  YOB: 1940    PCP: Prosper Mancilla MD   PCP Address: [address] / [place] [address]  PCP Phone Number: [phone]  PCP Fax: [phone]    Encounter Date: 12/16/24    Admit to Home Health    Diagnoses:  Active Hospital Problems    Diagnosis  POA    Ulcerative colitis [K51.90]  Yes     Chronic    Hyperlipidemia [E78.5]  Yes     Chronic    Hypertension [I10]  Yes     Chronic      Resolved Hospital Problems    Diagnosis Date Resolved POA    *Fecal impaction [K56.41] 12/19/2024 Yes    Nausea [R11.0] 12/27/2024 Yes    E. coli UTI [N39.0, B96.20] 12/22/2024 Yes       Follow Up Appointments:  Future Appointments   Date Time Provider Department Center   1/2/2025  9:40 AM Prosper Mancilla MD [facility]       Allergies:Review of patient's allergies indicates:  No Known Allergies    Medications: Review discharge medications with patient and family and provide education.    Current Facility-Administered Medications   Medication Dose Route Frequency Provider Last Rate Last Admin    albuterol inhaler 2 puff  2 puff Inhalation Q6H PRN Robin Whitfield MD        amLODIPine tablet 10 mg  10 mg Oral Daily Disha Taylor MD   10 mg at 12/27/24 1011    atorvastatin tablet 10 mg  10 mg Oral QHS Robin Whitfield MD   10 mg at 12/26/24 2053    balsalazide capsule 2,250 mg  2,250 mg Oral TID Disha Taylor MD   2,250 mg at 12/27/24 1010    carvediloL tablet 12.5 mg  12.5 mg Oral BID WM Disha Taylor MD   12.5 mg at 12/27/24 1011    glucose chewable tablet 16 g  16 g Oral PRN Robin Whitfield MD        glucose chewable tablet 24 g  24 g Oral PRN Robin Whitfield MD        hydrocortisone 2.5 % rectal cream   Rectal BID Leelee Byrd NP   Given at 12/27/24 1012    LIDOcaine HCL 2% jelly   Topical (Top) Q4H PRN Leelee Byrd, NP        melatonin tablet 6 mg  6  mg Oral Nightly PRN Robin Whitfield MD        ondansetron disintegrating tablet 4 mg  4 mg Oral Q6H PRN Disha Taylor MD   4 mg at 12/24/24 1224    oxyCODONE immediate release tablet 5 mg  5 mg Oral Q6H PRN Disha Taylor MD   5 mg at 12/21/24 0848    polyethylene glycol packet 17 g  17 g Oral BID PRN Disha Taylor MD        prochlorperazine tablet 5 mg  5 mg Oral QID PRN Disha Taylor MD   5 mg at 12/24/24 1419    promethazine tablet 25 mg  25 mg Oral Q6H PRN Disha Taylor MD   25 mg at 12/21/24 1245    sodium chloride 0.9% flush 10 mL  10 mL Intravenous Q12H PRN Robin Whitfield MD        white petrolatum 41 % ointment   Topical (Top) PRN Disha Taylor MD   Given at 12/25/24 1627        Medication List        START taking these medications      LIDOcaine HCL 2% 2 % jelly  Commonly known as: XYLOCAINE  Apply topically every 4 (four) hours as needed (externally to the rectum).     polyethylene glycol 17 gram Pwpk  Commonly known as: GLYCOLAX  Take 17 g by mouth 2 (two) times daily.            CONTINUE taking these medications      amLODIPine 10 MG tablet  Commonly known as: NORVASC  TAKE 1 TABLET EVERY DAY  .  STOP OLD RX     balsalazide 750 mg capsule  Commonly known as: COLAZAL  Take 2,250 mg by mouth once daily.     biotin 2,500 mcg Cap     calcium-magnesium-zinc Tab  Take by mouth. 1  Oral Every day     carvediloL 12.5 MG tablet  Commonly known as: COREG  Take 1 tablet (12.5 mg total) by mouth 2 (two) times daily with meals.     cholecalciferol (vitamin D3) 25 mcg (1,000 unit) capsule  Commonly known as: VITAMIN D3  Take 1,000 Units by mouth once daily.     cyanocobalamin (vitamin B-12) 50 mcg tablet  Take 1 tablet (50 mcg total) by mouth once daily.     cyclobenzaprine 5 MG tablet  Commonly known as: FLEXERIL  TAKE 1 TABLET THREE TIMES DAILY AS NEEDED FOR MUSCLE SPASM(S)     FLAXSEED OIL ORAL  Take 1,000 mg by mouth. 1  Oral Every day     FLUAD QUAD 2021-22(65Y UP)(PF) 60 mcg (15  mcg x 4)/0.5 mL Syrg  Generic drug: flu vac 2021 [id number]-[id number](PF)     folic acid 400 MCG tablet  Commonly known as: FOLVITE  Take 2 tablets (800 mcg total) by mouth once daily.     * ketoconazole 2 % cream  Commonly known as: NIZORAL  AAA qhs under breasts after cool blow dry     * ketoconazole 2 % cream  Commonly known as: NIZORAL  AAA qhs under breasts after cool blow dry prn flare     mometasone 0.1 % solution  Commonly known as: ELOCON  aaa on scalp qhs prn flare     multivitamin per tablet  Commonly known as: THERAGRAN  Take by mouth. 1  By mouth Every day     omega-3 fatty acids-vitamin E 1,000 mg Cap     omeprazole 20 MG capsule  Commonly known as: PRILOSEC  Take 1 capsule (20 mg total) by mouth once daily.     ondansetron 4 MG Tbdl  Commonly known as: ZOFRAN-ODT  Take 1 tablet (4 mg total) by mouth every 6 (six) hours as needed (nausea).     oxyCODONE 5 MG immediate release tablet  Commonly known as: ROXICODONE  Take 1 tablet (5 mg total) by mouth every 6 (six) hours as needed (moderate to severe pain).     simvastatin 10 MG tablet  Commonly known as: ZOCOR  Take 1 tablet (10 mg total) by mouth every evening.     triamcinolone acetonide 0.1% 0.1 % cream  Commonly known as: KENALOG  aaa on legs and arms qd- bid after moisturizing; not more than 2 weeks straight in the same location           * This list has 2 medication(s) that are the same as other medications prescribed for you. Read the directions carefully, and ask your doctor or other care provider to review them with you.                ASK your doctor about these medications      ciprofloxacin HCl 500 MG tablet  Commonly known as: CIPRO  Take 1 tablet (500 mg total) by mouth every 12 (twelve) hours for 7 days  Ask about: Should I take this medication?     metroNIDAZOLE 500 MG tablet  Commonly known as: FLAGYL  Take 1 tablet (500 mg total) by mouth every 8 (eight) hours. for 7 days  Ask about: Should I take this medication?                I have seen  and examined this patient within the last 30 days. My clinical findings that support the need for the home health skilled services and home bound status are the following:no   Weakness/numbness causing balance and gait disturbance due to Weakness/Debility making it taxing to leave home.     Diet:   regular diet    Labs:  none    Referrals/ Consults  Physical Therapy to evaluate and treat. Evaluate for home safety and equipment needs; Establish/upgrade home exercise program. Perform / instruct on therapeutic exercises, gait training, transfer training, and Range of Motion.  Occupational Therapy to evaluate and treat. Evaluate home environment for safety and equipment needs. Perform/Instruct on transfers, ADL training, ROM, and therapeutic exercises.   to evaluate for community resources/long-range planning.  Aide to provide assistance with personal care, ADLs, and vital signs.    Activities:   activity as tolerated    Nursing:   Agency to admit patient within 24 hours of hospital discharge unless specified on physician order or at patient request    SN to complete comprehensive assessment including routine vital signs. Instruct on disease process and s/s of complications to report to MD. Review/verify medication list sent home with the patient at time of discharge  and instruct patient/caregiver as needed. Frequency may be adjusted depending on start of care date.     Skilled nurse to perform up to 3 visits PRN for symptoms related to diagnosis    Notify MD if SBP > 160 or < 90; DBP > 90 or < 50; HR > 120 or < 50; Temp > 101; O2 < 88%    Ok to schedule additional visits based on staff availability and patient request on consecutive days within the home health episode.    When multiple disciplines ordered:    Start of Care occurs on Sunday - Wednesday schedule remaining discipline evaluations as ordered on separate consecutive days following the start of care.    Thursday SOC -schedule subsequent  evaluations Friday and Monday the following week.     Friday - Saturday SOC - schedule subsequent discipline evaluations on consecutive days starting Monday of the following week.    For all post-discharge communication and subsequent orders please contact patient's primary care physician. If unable to reach primary care physician or do not receive response within 30 minutes, please contact Prosper Mancilla MD for clinical staff order clarification    Miscellaneous   none    Home Health Aide:  Physical Therapy Three times weekly, Occupational Therapy Three times weekly, and Home Health Aide Three times weekly    Wound Care Orders  no    I certify that this patient is confined to her home and needs physical therapy and occupational therapy.        Christian Zelaya MD  [facility]

## 2024-12-24 NOTE — PROGRESS NOTES
[facility] - Internal Medicine [facility] Medicine  Progress Note    Patient Name: Gabriel Grace  MRN: [id number]  Patient Class: IP- Inpatient   Admission Date: 12/16/2024  Length of Stay: 7 days  Attending Physician: Disha Taylor MD  Primary Care Provider: Prosper Mancilla MD        Subjective     Principal Problem: Ulcerative colitis      HPI:  Gabriel Grace is an 84 year old white woman with hypertension, hyperlipidemia, ulcerative colitis, migraine headaches, hypothyroidism, aortic atherosclerosis, history of pulmonary embolism, history of pancreatitic cyst in 2017, history of basal cell carcinoma of nasal bridge status post Mohs's surgery on 11/25/2024, history of total abdominal hysterectomy, left salpingo-oophorectomy, and right salpingectomy in 1973, history of cholecystectomy, 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 She lives in [place], [place]. She is . Her primary care physician is Dr. Prosper Mancilla.    She was seen at [facility] on 12/13/2024 for proctitis with rectal pain, diarrhea, inflamed external hemorrhoid, nausea, vomiting, decreased oral intake, and fecal incontinence as well as urinary tract infection. Potassium was mildly low at 3.4 mmol/L. She was prescribed ciprofloxacin, metronidazole, ondansetron, and oxycodone.    Her sister called [facility] to make an appointment.   She presented to [facility] on 12/16/2024 for persistent rectal pain, nausea, and decreased oral intake. She had 1 episode of blood with wiping after a bowel movement. She had been having watery loose stools and an episode of fecal incontinence. Her appetite had been decreased for the past 3 weeks. She was found to have a fecal impaction. Potassium was 3.3.    She was admitted to the Emergency Department Observation  Unit. She was given a brown bomb enema and began to have bowel movements but had persistent severe rectal pain. Urine culture taken on 12/13/2024 grew E coli sensitive to ciprofloxacin, so her prescribed antibiotics were continued. On 12/17/2024, she was upgraded to admission to Hospital Medicine Team B with Colorectal Surgery consult. Potassium was still 3.3.     Overview/Hospital Course:  Stool disimpaction in the endoscopy suite per CRS. Started on regular stool softener. Pt tolerating PO and was medically ready to discharge, however pt had concerns returning home alone d/t reported weakness. PT/OT ordered who advised moderate-intensity therapy. Rectal pain subsided, but with persistent nausea (no vomiting & otherwise tolerating PO). Antiemetic regimen was adjusted.     Interval History:   Minimal nausea yesterday, moving bowels & tolerated PO well. Was able to bathe overnight which pt was very happy about. NAEON. Bad bout of nausea early this AM after breakfast, that was somewhat improved w/ PRN zofran and further improved w/ PRN compazine. Pt didn't eat lunch as she still felt a bit queezy, but overall nausea improved. No vomiting. Will schedule reglan to help with nausea (miles given family c/f possible gastroparesis). Pt notified provider that the dose of the balsalazide that she's been receiving inpatient is what her dose at home is; orders adjusted accordingly. Pt happy to move forward with discharging from the hospital as soon as possible and [facility] w/ her recovery. Provider called Patricio twice this afternoon to schedule requested [id number]; left VM.     Review of Systems   Constitutional:  Negative for chills, diaphoresis and fever.   HENT:  Negative for congestion, rhinorrhea, sore throat and trouble swallowing.    Eyes:  Negative for photophobia and visual disturbance.   Respiratory:  Negative for cough, shortness of breath and wheezing.    Cardiovascular:  Negative for chest  pain, palpitations and leg swelling.   Gastrointestinal:  Positive for nausea. Negative for abdominal distention, abdominal pain, constipation, diarrhea, rectal pain and vomiting.   Genitourinary:  Negative for difficulty urinating, dysuria and hematuria.   Musculoskeletal:  Negative for arthralgias, joint swelling and neck pain.   Skin:  Positive for rash. Negative for wound.   Neurological:  Positive for weakness. Negative for dizziness, light-headedness and headaches.   Psychiatric/Behavioral:  Negative for agitation, confusion and sleep disturbance.      Objective:     Vital Signs (Most Recent):  Temp: 98.3 °F (36.8 °C) (12/24/24 1559)  Pulse: 81 (12/24/24 1559)  Resp: 18 (12/24/24 1559)  BP: 139/76 (12/24/24 1559)  SpO2: 97 % (12/24/24 1559) Vital Signs (24h Range):  Temp:  [97.7 °F (36.5 °C)-98.6 °F (37 °C)] 98.3 °F (36.8 °C)  Pulse:  [78-87] 81  Resp:  [17-18] 18  SpO2:  [93 %-98 %] 97 %  BP: (118-153)/(59-76) 139/76     Weight: 93.9 kg (207 lb)  Body mass index is 31.47 kg/m².    Intake/Output Summary (Last 24 hours) at 12/24/2024 1624  Last data filed at 12/24/2024 0831  Gross per 24 hour   Intake 840 ml   Output --   Net 840 ml         Physical Exam  Constitutional:       General: She is not in acute distress.     Appearance: She is obese. She is not ill-appearing, toxic-appearing or diaphoretic.   HENT:      Head: Normocephalic and atraumatic.      Mouth/Throat:      Mouth: Mucous membranes are moist.   Eyes:      Conjunctiva/sclera: Conjunctivae normal.   Cardiovascular:      Rate and Rhythm: Normal rate and regular rhythm.      Heart sounds: Normal heart sounds.   Pulmonary:      Effort: Pulmonary effort is normal. No respiratory distress.      Breath sounds: Normal breath sounds. No wheezing, rhonchi or rales.   Abdominal:      General: Bowel sounds are normal. There is no distension.      Palpations: Abdomen is soft.      Tenderness: There is no abdominal tenderness. There is no guarding.    Musculoskeletal:      Right lower leg: No edema.      Left lower leg: No edema.   Skin:     General: Skin is warm and dry.      Findings: Rash (face, miles periorbital) present.   Neurological:      General: No focal deficit present.      Mental Status: She is alert and oriented to person, place, and time. Mental status is at baseline.   Psychiatric:         Mood and Affect: Mood normal.         Behavior: Behavior normal.           Significant Labs: All pertinent labs within the past 24 hours have been reviewed.    Significant Imaging: I have reviewed all pertinent imaging results/findings within the past 24 hours.        Assessment and Plan     Fecal impaction  CRS consulted who performed flexible sigmoidoscopy & fecal disimpaction. No anal stenosis or mass. Rectal mucosa healthy and without evidence of ulceration. No anal fissure or thrombosed hemorrhoids. Ok for general diet, restart MiraLax, and outpatient follow-up with her regular GI team.     Nausea  Persistent nausea w/o vomiting. Tolerating PO. Overall improving.   - PRN zofran, compazine & phenergan   - Start scheduled reglan  - Outpatient GI motility study     * Ulcerative colitis  Continue home balsalazide. Reports she takes 2250 mg TID at home, not 2250 mg daily as what is currently listed on home med list. Orders updated.     Hypertension  Chronic. Continue home amlodipine and carvedilol. Monitor BP.    Hyperlipidemia  Continue home simvastatin      VTE Risk Mitigation (From admission, onward)           Ordered     IP VTE HIGH RISK PATIENT  Once         12/17/24 1843     Place sequential compression device  Until discontinued         12/17/24 1843                    Discharge Planning   JELANI: 12/27/2024     Code Status: Full Code   Medical Readiness for Discharge Date: 12/18/2024  Discharge Plan A: Skilled Nursing Facility   Discharge Delays: None known at this time            Please place Justification for DME        Disha Taylor MD  Department of  [facility]   [facility] - Internal Medicine Telemetry

## 2024-12-24 NOTE — PT/OT/SLP PROGRESS
"Occupational Therapy   Treatment    Name: Gabriel Grace  MRN: [id number]  Admitting Diagnosis:  Nausea  6 Days Post-Op    Recommendations:     Discharge Recommendations: Moderate Intensity Therapy  Discharge Equipment Recommendations:  walker, rolling, bedside commode  Barriers to discharge:  Decreased caregiver support  Nursing staff Recommendations: assist x1 t/f with RW. Placed additional BSC frame over toilet to elevate height. Left hospital's RW in room for Pt's and staff's use to promote further mobility between therapy sessions.   Assessment:     Gabriel Grace is a 84 y.o. female with a medical diagnosis of Nausea.  She presents with improving confidence and activity tolerance for OOB mobility. Performance deficits affecting function are weakness, impaired endurance, decreased coordination, impaired self care skills, impaired functional mobility, impaired balance.     Rehab Prognosis:  Good; patient would benefit from acute skilled OT services to address these deficits and reach maximum level of function.       Plan:     Patient to be seen 4 x/week to address the above listed problems via self-care/home management, therapeutic activities, therapeutic exercises  Plan of Care Expires: 01/03/25  Plan of Care Reviewed with: patient    Subjective     Chief Complaint: "I'm determined"   Patient/Family Comments/goals: Stated that she's agreeable to rehab setting to improve strength and self care before going home.   Pain/Comfort:  Pain Rating 1: 0/10    Objective:     Communicated with: RN prior to session.  Patient found supine with peripheral IV upon OT entry to room.    General Precautions: Standard, fall    Orthopedic Precautions:N/A  Braces: N/A  Respiratory Status: Room air     Occupational Performance:     Bed Mobility:    Patient completed Supine to Sit with stand by assistance     Functional Mobility/Transfers:  Patient completed Sit <> Stand Transfer with stand by assistance  with  no assistive device "   Functional Mobility: Gait with RW into 2 lengths of the hospital's hallway with CGA.     Activities of Daily Living:  Pt declined other ADLs at this time.       [facility] 6 Click ADL: 21    Treatment & Education:  Pt educated on the following topics:  OT 's plan of care and purpose of visit, ADLs, importance of increased activity in hospital setting, transfer training, body mechanics, assistive device, modifications/compensatory strategies, sequencing, safety precautions, fall prevention, equipment recommendations, energy conservation techniques, and to call for assistance with call button  Understanding was verbalized, however additional teaching warranted.     Patient left up in chair with all lines intact, call button in reach, and sister present    GOALS:   Multidisciplinary Problems       Occupational Therapy Goals          Problem: Occupational Therapy    Goal Priority Disciplines Outcome Interventions   Occupational Therapy Goal     OT, PT/OT Progressing    Description: Goals to be met by: 1/3/25     Patient will increase functional independence with ADLs by performing:    Feeding with [place].  UE Dressing with Modified [place].  LE Dressing with Modified [place].  Grooming while standing at sink with Modified [place].  Toileting from toilet with Modified [place] for hygiene and clothing management.   Bathing from  shower chair/bench with Modified [place].  Sitting at edge of bed x20 minutes with Modified [place].  Toilet transfer to toilet with Modified [place].    Patient has a mobility limitation that significantly impairs their ability to participate in one or more mobility related activities of daily living, including toileting. This deficit can be resolved by using a bedside commode. Patient demonstrates mobility limitations that will cause them to be confined to one room at home without bathroom access for up to 30 days. Using a bedside commode will greatly  improve the patient's ability to participate in MRADLs.     Patient demonstrates a mobility limitation that significantly impairs their ability to participate in one or more mobility related activities of daily living. Patient's mobility limitation cannot be sufficiently resolved with the use of a cane, but can be sufficiently resolved with the use of a rolling walker.The use of a rolling walker will considerably improve their ability to participate in MRADLs. Patient will use the walker on a regular basis at home.                         Time Tracking:     OT Date of Treatment: 12/24/24  OT Start Time: 1607  OT Stop Time: 1630  OT Total Time (min): 23 min    Billable Minutes:Therapeutic Activity 23    OT/HENRY: OT     Number of HENRY visits since last OT visit: 1    12/24/2024

## 2024-12-24 NOTE — ASSESSMENT & PLAN NOTE
Persistent nausea without vomiting. Tolerating PO. Overall improving. Giving scheduled metoclopramide, prn ondansetron, prochlorperazine, and promethazine. Outpatient GI motility study.

## 2024-12-24 NOTE — PLAN OF CARE
"  Problem: Adult Inpatient Plan of Care  Goal: Optimal Comfort and Wellbeing  Outcome: Met     Problem: Adult Inpatient Plan of Care  Goal: Plan of Care Review  Outcome: Progressing    Pt lying supine, HOB elevated 30 degrees. AAOX4 in no apparent distress. Pt tolerated morning meds well. No stressors noted on morning assessment. After breakfast, pt c/o feeling nauseous, Zofran given with mild relief. Compazine given for 2nd line treatment, pt states "nausea has eased off" Helped pt transfer from bed to chair.    Bed locked, in lowest position, call light in reach.      "

## 2024-12-25 PROCEDURE — 25000003 PHARM REV CODE 250: Performed by: STUDENT IN AN ORGANIZED HEALTH CARE EDUCATION/TRAINING PROGRAM

## 2024-12-25 PROCEDURE — 25000003 PHARM REV CODE 250: Performed by: FAMILY MEDICINE

## 2024-12-25 PROCEDURE — 11000001 HC ACUTE MED/SURG PRIVATE ROOM

## 2024-12-25 PROCEDURE — 36415 COLL VENOUS BLD VENIPUNCTURE: CPT | Performed by: STUDENT IN AN ORGANIZED HEALTH CARE EDUCATION/TRAINING PROGRAM

## 2024-12-25 PROCEDURE — 83735 ASSAY OF MAGNESIUM: CPT | Performed by: STUDENT IN AN ORGANIZED HEALTH CARE EDUCATION/TRAINING PROGRAM

## 2024-12-25 RX ORDER — POLYETHYLENE GLYCOL 3350 17 G/17G
17 POWDER, FOR SOLUTION ORAL 2 TIMES DAILY PRN
Status: DISCONTINUED | OUTPATIENT
Start: 2024-12-25 | End: 2024-12-28 | Stop reason: HOSPADM

## 2024-12-25 RX ADMIN — BALSALAZIDE DISODIUM 2250 MG: 750 CAPSULE ORAL at 03:12

## 2024-12-25 RX ADMIN — WHITE PETROLATUM: 1.75 OINTMENT TOPICAL at 04:12

## 2024-12-25 RX ADMIN — METOCLOPRAMIDE 10 MG: 5 TABLET ORAL at 04:12

## 2024-12-25 RX ADMIN — METOCLOPRAMIDE 10 MG: 5 TABLET ORAL at 07:12

## 2024-12-25 RX ADMIN — CARVEDILOL 12.5 MG: 12.5 TABLET, FILM COATED ORAL at 05:12

## 2024-12-25 RX ADMIN — ATORVASTATIN CALCIUM 10 MG: 10 TABLET, FILM COATED ORAL at 09:12

## 2024-12-25 RX ADMIN — METOCLOPRAMIDE 10 MG: 5 TABLET ORAL at 11:12

## 2024-12-25 RX ADMIN — CARVEDILOL 12.5 MG: 12.5 TABLET, FILM COATED ORAL at 08:12

## 2024-12-25 RX ADMIN — BALSALAZIDE DISODIUM 2250 MG: 750 CAPSULE ORAL at 08:12

## 2024-12-25 RX ADMIN — AMLODIPINE BESYLATE 10 MG: 10 TABLET ORAL at 08:12

## 2024-12-25 RX ADMIN — METOCLOPRAMIDE 10 MG: 5 TABLET ORAL at 09:12

## 2024-12-25 RX ADMIN — BALSALAZIDE DISODIUM 2250 MG: 750 CAPSULE ORAL at 09:12

## 2024-12-25 NOTE — PROGRESS NOTES
[facility] - Internal Medicine [facility] Medicine  Progress Note    Patient Name: Gabriel Grace  MRN: [id number]  Patient Class: IP- Inpatient   Admission Date: 12/16/2024  Length of Stay: 8 days  Attending Physician: Disha Taylor MD  Primary Care Provider: Prosper Mancilla MD        Subjective     Principal Problem: Nausea      HPI:  Gabriel Grace is an 84 year old white woman with hypertension, hyperlipidemia, ulcerative colitis, migraine headaches, hypothyroidism, aortic atherosclerosis, history of pulmonary embolism, history of pancreatitic cyst in 2017, history of basal cell carcinoma of nasal bridge status post Mohs's surgery on 11/25/2024, history of total abdominal hysterectomy, left salpingo-oophorectomy, and right salpingectomy in 1973, history of cholecystectomy, 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 She lives in [place], [place]. She is . Her primary care physician is Dr. Prosper Mancilla.    She was seen at [facility] on 12/13/2024 for proctitis with rectal pain, diarrhea, inflamed external hemorrhoid, nausea, vomiting, decreased oral intake, and fecal incontinence as well as urinary tract infection. Potassium was mildly low at 3.4 mmol/L. She was prescribed ciprofloxacin, metronidazole, ondansetron, and oxycodone.    Her sister called [facility] to make an appointment.   She presented to [facility] on 12/16/2024 for persistent rectal pain, nausea, and decreased oral intake. She had 1 episode of blood with wiping after a bowel movement. She had been having watery loose stools and an episode of fecal incontinence. Her appetite had been decreased for the past 3 weeks. She was found to have a fecal impaction. Potassium was 3.3.    She was admitted to the Emergency Department Observation Unit. She  was given a brown bomb enema and began to have bowel movements but had persistent severe rectal pain. Urine culture taken on 12/13/2024 grew E coli sensitive to ciprofloxacin, so her prescribed antibiotics were continued. On 12/17/2024, she was upgraded to admission to Hospital Medicine Team B with Colorectal Surgery consult. Potassium was still 3.3.     Overview/Hospital Course:  Stool disimpaction was performed in the endoscopy suite by Colorectal Surgery. She was started on regular stool softener. She tolerated oral intake and was medically ready to discharge, however she had concerns returning home alone due to reported weakness. PT and OT were consulted and advised moderate-intensity therapy. Rectal pain subsided, but she had persistent nausea (no vomiting & otherwise tolerating oral intake). Antiemetic regimen was adjusted.     Interval History:   Balsalazide dose changed to TID (as that's what pt reportedly takes at home). Scheduled reglan added. Pt reports complete resolution of nausea. Tolerating PO well. Moving bowels (a little too well); pt suspects she may be having mild colitis flare. De-escalating bowel regimen. Continued facial rash that pt attributes to dry skin and manages with topical emolients at home. Lotion & aquaphor ordered.     Received call back from [facility] this AM. Unable to do [id number] today, but tomorrow's provider has until 3pm (CST) tomorrow to call and schedule. Accepted to [facility], dispo pending insurance authorization. Medically ready for discharge.       Review of Systems   Constitutional:  Negative for chills, diaphoresis and fever.   HENT:  Negative for congestion, rhinorrhea, sore throat and trouble swallowing.    Eyes:  Negative for photophobia and visual disturbance.   Respiratory:  Negative for cough, shortness of breath and wheezing.    Cardiovascular:  Negative for chest pain, palpitations and leg swelling.   Gastrointestinal:  Positive for nausea. Negative for  abdominal distention, abdominal pain, constipation, diarrhea, rectal pain and vomiting.   Genitourinary:  Negative for difficulty urinating, dysuria and hematuria.   Musculoskeletal:  Negative for arthralgias, joint swelling and neck pain.   Skin:  Positive for rash. Negative for wound.   Neurological:  Positive for weakness. Negative for dizziness, light-headedness and headaches.   Psychiatric/Behavioral:  Negative for agitation, confusion and sleep disturbance.      Objective:     Vital Signs (Most Recent):  Temp: 98.3 °F (36.8 °C) (12/24/24 1559)  Pulse: 81 (12/24/24 1559)  Resp: 18 (12/24/24 1559)  BP: 139/76 (12/24/24 1559)  SpO2: 97 % (12/24/24 1559) Vital Signs (24h Range):  Temp:  [97.7 °F (36.5 °C)-98.6 °F (37 °C)] 98.3 °F (36.8 °C)  Pulse:  [78-87] 81  Resp:  [17-18] 18  SpO2:  [93 %-98 %] 97 %  BP: (118-153)/(59-76) 139/76     Weight: 93.9 kg (207 lb)  Body mass index is 31.47 kg/m².    Intake/Output Summary (Last 24 hours) at 12/24/2024 1624  Last data filed at 12/24/2024 0831  Gross per 24 hour   Intake 840 ml   Output --   Net 840 ml         Physical Exam  Constitutional:       General: She is not in acute distress.     Appearance: She is obese. She is not ill-appearing, toxic-appearing or diaphoretic.   HENT:      Head: Normocephalic and atraumatic.      Mouth/Throat:      Mouth: Mucous membranes are moist.   Eyes:      Conjunctiva/sclera: Conjunctivae normal.   Cardiovascular:      Rate and Rhythm: Normal rate and regular rhythm.      Heart sounds: Normal heart sounds.   Pulmonary:      Effort: Pulmonary effort is normal. No respiratory distress.      Breath sounds: Normal breath sounds. No wheezing, rhonchi or rales.   Abdominal:      General: Bowel sounds are normal. There is no distension.      Palpations: Abdomen is soft.      Tenderness: There is no abdominal tenderness. There is no guarding.   Musculoskeletal:      Right lower leg: No edema.      Left lower leg: No edema.   Skin:     General:  Skin is warm and dry.      Findings: Rash (face, miles perioral & periorbital) present.   Neurological:      General: No focal deficit present.      Mental Status: She is alert and oriented to person, place, and time. Mental status is at baseline.   Psychiatric:         Mood and Affect: Mood normal.         Behavior: Behavior normal.           Significant Labs: All pertinent labs within the past 24 hours have been reviewed.    Significant Imaging: I have reviewed all pertinent imaging results/findings within the past 24 hours.        Assessment and Plan     Fecal impaction - resolved  CRS consulted who performed flexible sigmoidoscopy & fecal disimpaction. No anal stenosis or mass. Rectal mucosa healthy and without evidence of ulceration. No anal fissure or thrombosed hemorrhoids. Ok for general diet, restart MiraLax, and outpatient follow-up with her regular GI team.     Nausea - resolved  Persistent nausea w/o vomiting. Tolerating PO. Overall improving.   - PRN zofran, compazine & phenergan   - Start scheduled reglan  - Outpatient GI motility study    Ulcerative colitis  Continue home balsalazide. Reports she takes 2250 mg TID at home, not 2250 mg daily as what is currently listed on home med list. Orders updated.     Hypertension  Chronic. Continue home amlodipine and carvedilol. Monitor BP.    Hyperlipidemia  Continue home simvastatin      VTE Risk Mitigation (From admission, onward)           Ordered     IP VTE HIGH RISK PATIENT  Once         12/17/24 1843     Place sequential compression device  Until discontinued         12/17/24 1843                    Discharge Planning   JELANI: 12/26/2024     Code Status: Full Code   Medical Readiness for Discharge Date: 12/18/2024  Discharge Plan A: Skilled Nursing Facility   Discharge Delays: None known at this time            Please place Justification for DME        Disha Taylor MD  Department of Hospital Medicine   [facility] - Internal Medicine Telemetry

## 2024-12-25 NOTE — PLAN OF CARE
Problem: Adult Inpatient Plan of Care  Goal: Plan of Care Review  Outcome: Progressing  Goal: Patient-Specific Goal (Individualized)  Outcome: Progressing  Goal: Absence of Hospital-Acquired Illness or Injury  Outcome: Progressing  Goal: Readiness for Transition of Care  Outcome: Progressing     Problem: Fall Injury Risk  Goal: Absence of Fall and Fall-Related Injury  Outcome: Progressing     Problem: Skin Injury Risk Increased  Goal: Skin Health and Integrity  Outcome: Progressing

## 2024-12-26 LAB
ALBUMIN SERPL BCP-MCNC: 2.7 G/DL (ref 3.5–5.2)
ANION GAP SERPL CALC-SCNC: 8 MMOL/L (ref 8–16)
BUN SERPL-MCNC: 14 MG/DL (ref 8–23)
CALCIUM SERPL-MCNC: 9.4 MG/DL (ref 8.7–10.5)
CHLORIDE SERPL-SCNC: 110 MMOL/L (ref 95–110)
CO2 SERPL-SCNC: 22 MMOL/L (ref 23–29)
CREAT SERPL-MCNC: 0.7 MG/DL (ref 0.5–1.4)
ERYTHROCYTE [DISTWIDTH] IN BLOOD BY AUTOMATED COUNT: 13.7 % (ref 11.5–14.5)
EST. GFR  (NO RACE VARIABLE): >60 ML/MIN/1.73 M^2
GLUCOSE SERPL-MCNC: 130 MG/DL (ref 70–110)
HCT VFR BLD AUTO: 28.7 % (ref 37–48.5)
HGB BLD-MCNC: 9.5 G/DL (ref 12–16)
MAGNESIUM SERPL-MCNC: 1.6 MG/DL (ref 1.6–2.6)
MCH RBC QN AUTO: 32.8 PG (ref 27–31)
MCHC RBC AUTO-ENTMCNC: 33.1 G/DL (ref 32–36)
MCV RBC AUTO: 99 FL (ref 82–98)
PHOSPHATE SERPL-MCNC: 3.1 MG/DL (ref 2.7–4.5)
PLATELET # BLD AUTO: 143 K/UL (ref 150–450)
PMV BLD AUTO: 11.4 FL (ref 9.2–12.9)
POTASSIUM SERPL-SCNC: 3.7 MMOL/L (ref 3.5–5.1)
RBC # BLD AUTO: 2.9 M/UL (ref 4–5.4)
SODIUM SERPL-SCNC: 140 MMOL/L (ref 136–145)
WBC # BLD AUTO: 8.13 K/UL (ref 3.9–12.7)

## 2024-12-26 PROCEDURE — 80069 RENAL FUNCTION PANEL: CPT | Performed by: STUDENT IN AN ORGANIZED HEALTH CARE EDUCATION/TRAINING PROGRAM

## 2024-12-26 PROCEDURE — 25000003 PHARM REV CODE 250: Performed by: STUDENT IN AN ORGANIZED HEALTH CARE EDUCATION/TRAINING PROGRAM

## 2024-12-26 PROCEDURE — 11000001 HC ACUTE MED/SURG PRIVATE ROOM

## 2024-12-26 PROCEDURE — 85027 COMPLETE CBC AUTOMATED: CPT | Performed by: STUDENT IN AN ORGANIZED HEALTH CARE EDUCATION/TRAINING PROGRAM

## 2024-12-26 PROCEDURE — 97110 THERAPEUTIC EXERCISES: CPT | Mod: CO

## 2024-12-26 PROCEDURE — 97530 THERAPEUTIC ACTIVITIES: CPT | Mod: CO

## 2024-12-26 PROCEDURE — 36415 COLL VENOUS BLD VENIPUNCTURE: CPT | Performed by: STUDENT IN AN ORGANIZED HEALTH CARE EDUCATION/TRAINING PROGRAM

## 2024-12-26 PROCEDURE — 25000003 PHARM REV CODE 250: Performed by: FAMILY MEDICINE

## 2024-12-26 RX ADMIN — AMLODIPINE BESYLATE 10 MG: 10 TABLET ORAL at 09:12

## 2024-12-26 RX ADMIN — METOCLOPRAMIDE 10 MG: 5 TABLET ORAL at 08:12

## 2024-12-26 RX ADMIN — METOCLOPRAMIDE 10 MG: 5 TABLET ORAL at 07:12

## 2024-12-26 RX ADMIN — BALSALAZIDE DISODIUM 2250 MG: 750 CAPSULE ORAL at 08:12

## 2024-12-26 RX ADMIN — HYDROCORTISONE: 25 CREAM TOPICAL at 09:12

## 2024-12-26 RX ADMIN — METOCLOPRAMIDE 10 MG: 5 TABLET ORAL at 04:12

## 2024-12-26 RX ADMIN — ATORVASTATIN CALCIUM 10 MG: 10 TABLET, FILM COATED ORAL at 08:12

## 2024-12-26 RX ADMIN — BALSALAZIDE DISODIUM 2250 MG: 750 CAPSULE ORAL at 09:12

## 2024-12-26 RX ADMIN — BALSALAZIDE DISODIUM 2250 MG: 750 CAPSULE ORAL at 04:12

## 2024-12-26 RX ADMIN — CARVEDILOL 12.5 MG: 12.5 TABLET, FILM COATED ORAL at 04:12

## 2024-12-26 RX ADMIN — METOCLOPRAMIDE 10 MG: 5 TABLET ORAL at 11:12

## 2024-12-26 RX ADMIN — CARVEDILOL 12.5 MG: 12.5 TABLET, FILM COATED ORAL at 09:12

## 2024-12-26 NOTE — PT/OT/SLP PROGRESS
Occupational Therapy   Treatment    Name: Gabriel Grace  MRN: [id number]  Admitting Diagnosis:  Nausea  8 Days Post-Op    Recommendations:     Discharge Recommendations: Moderate Intensity Therapy  Discharge Equipment Recommendations:  walker, rolling, bedside commode  Barriers to discharge:  Decreased caregiver support    Assessment:     Gabriel Grace is a 84 y.o. female with a medical diagnosis of Nausea.  She presents with the fallowing performance deficits affecting function are weakness, impaired endurance, impaired self care skills, impaired functional mobility, pain. Patient agreeable to tx session, patient is demonstrating progress with functional transfers, bed mobility, and self-care task, however; patient continues to have limited activity tolerance due to pain and weakness from recent surgical procedure. Patient would benefit from Moderate intensity therapy intervention to address over all functional decline with mobility task, endurance, and ADLs in order to return to PLOF.     Rehab Prognosis:  Good; patient would benefit from acute skilled OT services to address these deficits and reach maximum level of function.       Plan:     Patient to be seen 4 x/week to address the above listed problems via self-care/home management, therapeutic activities, therapeutic exercises  Plan of Care Expires: 01/03/25  Plan of Care Reviewed with: patient    Subjective     Chief Complaint: weakness and fatigue   Patient/Family Comments/goals: to return to PLOF  Pain/Comfort:  Pain Rating 1: 0/10    Objective:     Communicated with: Nurse prior to session.  Patient found up in chair with  (no active lines) upon OT entry to room.  A client care conference was completed by the OTR and the PHELPS prior to treatment by the PHELPS to discuss the patient's POC and current status.   General Precautions: Standard, fall    Orthopedic Precautions:N/A  Braces: N/A  Respiratory Status: Room air     Occupational Performance:     Functional  Mobility/Transfers:  Patient completed Sit <> Stand Transfer with stand by assistance  with  rolling walker   Functional Mobility: Patient engaged in functional mobility task throughout hospital room/hallway with SBA with RW to maximize functional endurance and standing balance required for home/community mobility and occupational engagement. Patient required verbal cues to manage walker properly and to maintain upright posture. Patient required one brief standing rest break due to fatigue. Oxygen stats were monitor and remained 92-94%.   Patient performed seated therapeutic exercises to improve B UE/LE strength postural muscle activation and activity tolerance 1X10 reps   Biceps curls   Chest press  Shoulder press   Arm raises  Ankle pumps  Leg kicks   Single leg marches   Lateral leg raises          [facility] 6 Click ADL: 21    Treatment & Education:  Discussed OT POC and progress  Educated patient on the importance to continue to perform exercises in order to reduce stiffness and promote joint mobility and blood flow  Addressed patient's questions and concerns within PHELPS scope of practice      Patient left up in chair with all lines intact and call button in reach    GOALS:   Multidisciplinary Problems       Occupational Therapy Goals          Problem: Occupational Therapy    Goal Priority Disciplines Outcome Interventions   Occupational Therapy Goal     OT, PT/OT Progressing    Description: Goals to be met by: 1/3/25     Patient will increase functional independence with ADLs by performing:    Feeding with [place].  UE Dressing with Modified [place].  LE Dressing with Modified [place].  Grooming while standing at sink with Modified [place].  Toileting from toilet with Modified [place] for hygiene and clothing management.   Bathing from  shower chair/bench with Modified [place].  Sitting at edge of bed x20 minutes with Modified [place].  Toilet transfer to toilet with Modified  [place].    Patient has a mobility limitation that significantly impairs their ability to participate in one or more mobility related activities of daily living, including toileting. This deficit can be resolved by using a bedside commode. Patient demonstrates mobility limitations that will cause them to be confined to one room at home without bathroom access for up to 30 days. Using a bedside commode will greatly improve the patient's ability to participate in MRADLs.     Patient demonstrates a mobility limitation that significantly impairs their ability to participate in one or more mobility related activities of daily living. Patient's mobility limitation cannot be sufficiently resolved with the use of a cane, but can be sufficiently resolved with the use of a rolling walker.The use of a rolling walker will considerably improve their ability to participate in MRADLs. Patient will use the walker on a regular basis at home.                         Time Tracking:     OT Date of Treatment: 12/26/24  OT Start Time: 1258  OT Stop Time: 1328  OT Total Time (min): 30 min    Billable Minutes:Therapeutic Activity 15  Therapeutic Exercise 15    OT/HENRY: HENRY     Number of HENRY visits since last OT visit: 2    12/26/2024

## 2024-12-26 NOTE — PROGRESS NOTES
[facility] - Internal Medicine [facility] Medicine  Progress Note    Patient Name: Gabriel Grace  MRN: [id number]  Patient Class: IP- Inpatient   Admission Date: 12/16/2024  Length of Stay: 9 days  Attending Physician: Christian Zelaya MD  Primary Care Provider: Prosper Mancilla MD        Subjective     Principal Problem:Nausea        HPI:  Gabriel Grace is an 84 year old white woman with hypertension, hyperlipidemia, ulcerative colitis, migraine headaches, hypothyroidism, aortic atherosclerosis, history of pulmonary embolism, history of pancreatitic cyst in 2017, history of basal cell carcinoma of nasal bridge status post Mohs's surgery on 11/25/2024, history of total abdominal hysterectomy, left salpingo-oophorectomy, and right salpingectomy in 1973, history of cholecystectomy, 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 She lives in [place], [place]. She is . Her primary care physician is Dr. Prosper Mancilla.    She was seen at [facility] on 12/13/2024 for proctitis with rectal pain, diarrhea, inflamed external hemorrhoid, nausea, vomiting, decreased oral intake, and fecal incontinence as well as urinary tract infection. Potassium was mildly low at 3.4 mmol/L. She was prescribed ciprofloxacin, metronidazole, ondansetron, and oxycodone.    Her sister called [facility] to make an appointment.   She presented to [facility] on 12/16/2024 for persistent rectal pain, nausea, and decreased oral intake. She had 1 episode of blood with wiping after a bowel movement. She had been having watery loose stools and an episode of fecal incontinence. Her appetite had been decreased for the past 3 weeks. She was found to have a fecal impaction. Potassium was 3.3.    She was admitted to the Emergency Department Observation Unit. She  was given a brown bomb enema and began to have bowel movements but had persistent severe rectal pain. Urine culture taken on 12/13/2024 grew E coli sensitive to ciprofloxacin, so her prescribed antibiotics were continued. On 12/17/2024, she was upgraded to admission to Hospital Medicine Team B with Colorectal Surgery consult. Potassium was still 3.3.     Overview/Hospital Course:  Stool disimpaction was performed in the endoscopy suite by Colorectal Surgery. She was started on regular stool softener. She tolerated oral intake and was medically ready to discharge, however she had concerns returning home alone due to reported weakness. PT and OT were consulted and advised moderate-intensity therapy. Rectal pain subsided, but she had persistent nausea (no vomiting & otherwise tolerating oral intake). Antiemetic regimen was adjusted with scheduled metoclopramide. She had a facial rash that she attributed to dry skin, for which she uses topical emollients at home. She was accepted to a skilled nursing facility but awaited insurance authorization.     Interval History:   I called and scheduled [id number] for 3:30 PM today.    Review of Systems   Constitutional:  Negative for chills and fever.   Respiratory:  Negative for cough and shortness of breath.    Neurological:  Positive for weakness. Negative for dizziness, light-headedness and headaches.     Objective:     Vital Signs (Most Recent):  Temp: 98.3 °F (36.8 °C) (12/24/24 1559)  Pulse: 81 (12/24/24 1559)  Resp: 18 (12/24/24 1559)  BP: 139/76 (12/24/24 1559)  SpO2: 97 % (12/24/24 1559) Vital Signs (24h Range):  Temp:  [97.7 °F (36.5 °C)-98.6 °F (37 °C)] 98.3 °F (36.8 °C)  Pulse:  [78-87] 81  Resp:  [17-18] 18  SpO2:  [93 %-98 %] 97 %  BP: (118-153)/(59-76) 139/76     Weight: 93.9 kg (207 lb)  Body mass index is 31.47 kg/m².    Intake/Output Summary (Last 24 hours) at 12/24/2024 1624  Last data filed at 12/24/2024 0831  Gross per 24 hour   Intake 840 ml   Output --    Net 840 ml         Physical Exam  Vitals and nursing note reviewed.   Constitutional:       General: She is not in acute distress.     Appearance: She is obese. She is not ill-appearing, toxic-appearing or diaphoretic.      Interventions: She is not intubated.  Pulmonary:      Effort: Pulmonary effort is normal. No accessory muscle usage or respiratory distress. She is not intubated.   Neurological:      General: No focal deficit present.      Mental Status: She is alert and oriented to person, place, and time. Mental status is at baseline.   Psychiatric:         Mood and Affect: Mood normal.         Behavior: Behavior normal.           Significant Labs: All pertinent labs within the past 24 hours have been reviewed.    Significant Imaging: I have reviewed all pertinent imaging results/findings within the past 24 hours.  X-Ray Chest AP Portable 12/24/24: FINDINGS:   Heart size normal.  No significant airspace consolidation or pleural effusion identified.     Assessment and Plan     * Nausea  Persistent nausea without vomiting. Tolerating PO. Overall improving. Giving scheduled metoclopramide, prn ondansetron, prochlorperazine, and promethazine. Outpatient GI motility study.    Ulcerative colitis  Continue home balsalazide. Reports she takes 2250 mg TID at home, not 2250 mg daily as what is currently listed on home med list. Orders updated.     Hypertension  Chronic. Continue home amlodipine and carvedilol. Monitor BP.    Hyperlipidemia  Continue home simvastatin      Debility    Gabriel requires a commode for home use because she is confined to one level of the home environment and there is no toilet on that level.   Gabriel's mobility limitation cannot be sufficiently resolved by the use of a cane. Her functional mobility deficit can be sufficiently resolved with the use of a Rolling Walker. Patient's mobility limitation significantly impairs their ability to participate in one of more activities of daily living.  The  use of a RW will significantly improve the patient's ability to participate in MRADLS and the patient will use it on regular basis in the home.    VTE Risk Mitigation (From admission, onward)           Ordered     IP VTE HIGH RISK PATIENT  Once         12/17/24 1843     Place sequential compression device  Until discontinued         12/17/24 1843                    Discharge Planning   JELANI: 12/26/2024     Code Status: Full Code   Medical Readiness for Discharge Date: 12/18/2024  Discharge Plan A: Skilled Nursing Facility   Discharge Delays: None known at this time            Please place Justification for DME        Christian Zelaya MD  Department of Hospital Medicine   [facility] - Internal Medicine Telemetry

## 2024-12-26 NOTE — PLAN OF CARE
Problem: Adult Inpatient Plan of Care  Goal: Plan of Care Review  12/26/2024 1735 by Barthelemy, Racquel, LPN  Outcome: Progressing  12/26/2024 1733 by Barthelemy, Racquel, LPN  Outcome: Progressing  Goal: Patient-Specific Goal (Individualized)  12/26/2024 1735 by Barthelemy, Racquel, LPN  Outcome: Progressing  12/26/2024 1733 by Barthelemy, Racquel, LPN  Outcome: Progressing  Goal: Absence of Hospital-Acquired Illness or Injury  12/26/2024 1735 by Barthelemy, Racquel, LPN  Outcome: Progressing  12/26/2024 1733 by Barthelemy, Racquel, LPN  Outcome: Progressing     Problem: Fall Injury Risk  Goal: Absence of Fall and Fall-Related Injury  12/26/2024 1735 by Barthelemy, Racquel, LPN  Outcome: Progressing  12/26/2024 1733 by Barthelemy, Racquel, LPN  Outcome: Progressing     Problem: Skin Injury Risk Increased  Goal: Skin Health and Integrity  12/26/2024 1735 by Barthelemy, Racquel, LPN  Outcome: Progressing  12/26/2024 1733 by Barthelemy, Racquel, LPN  Outcome: Progressing

## 2024-12-26 NOTE — PLAN OF CARE
Problem: Skin Injury Risk Increased  Goal: Skin Health and Integrity  Outcome: Progressing     Problem: Adult Inpatient Plan of Care  Goal: Readiness for Transition of Care  Outcome: Met    Pt lying supine in bed watching tv, AAOX4 in no apparent distress. No c/o pain, N&V, or discomfort. Tolerated morning meds well. Sister at bedside.     Bed locked, in lowest position, call light in pt reach.

## 2024-12-26 NOTE — PLAN OF CARE
Problem: Adult Inpatient Plan of Care  Goal: Plan of Care Review  Outcome: Progressing  Goal: Patient-Specific Goal (Individualized)  Outcome: Progressing  Goal: Absence of Hospital-Acquired Illness or Injury  Outcome: Progressing     Problem: Fall Injury Risk  Goal: Absence of Fall and Fall-Related Injury  Outcome: Progressing     Problem: Skin Injury Risk Increased  Goal: Skin Health and Integrity  Outcome: Progressing

## 2024-12-26 NOTE — PROGRESS NOTES
Pt and sister (at bedside) aware that SNF was denied.  Pt agreeable to home with HH. CM noted multiple agency denials due to inability to provide timely care.  Additional referrals sent to secure an agency. Explained to pt that she will have a copay of $11.26 for a walker and out of pocket cost of $65.67 for a bedside commode.  Will ask DME company if raised toilet seat is available.

## 2024-12-27 PROBLEM — R11.0 NAUSEA: Status: RESOLVED | Noted: 2024-12-24 | Resolved: 2024-12-27

## 2024-12-27 PROCEDURE — 25000003 PHARM REV CODE 250: Performed by: STUDENT IN AN ORGANIZED HEALTH CARE EDUCATION/TRAINING PROGRAM

## 2024-12-27 PROCEDURE — 11000001 HC ACUTE MED/SURG PRIVATE ROOM

## 2024-12-27 PROCEDURE — 25000003 PHARM REV CODE 250: Performed by: FAMILY MEDICINE

## 2024-12-27 PROCEDURE — 97116 GAIT TRAINING THERAPY: CPT | Mod: CQ

## 2024-12-27 PROCEDURE — 36415 COLL VENOUS BLD VENIPUNCTURE: CPT | Performed by: STUDENT IN AN ORGANIZED HEALTH CARE EDUCATION/TRAINING PROGRAM

## 2024-12-27 PROCEDURE — 83735 ASSAY OF MAGNESIUM: CPT | Performed by: STUDENT IN AN ORGANIZED HEALTH CARE EDUCATION/TRAINING PROGRAM

## 2024-12-27 RX ADMIN — HYDROCORTISONE: 25 CREAM TOPICAL at 10:12

## 2024-12-27 RX ADMIN — BALSALAZIDE DISODIUM 2250 MG: 750 CAPSULE ORAL at 04:12

## 2024-12-27 RX ADMIN — BALSALAZIDE DISODIUM 2250 MG: 750 CAPSULE ORAL at 09:12

## 2024-12-27 RX ADMIN — CARVEDILOL 12.5 MG: 12.5 TABLET, FILM COATED ORAL at 05:12

## 2024-12-27 RX ADMIN — BALSALAZIDE DISODIUM 2250 MG: 750 CAPSULE ORAL at 10:12

## 2024-12-27 RX ADMIN — ATORVASTATIN CALCIUM 10 MG: 10 TABLET, FILM COATED ORAL at 09:12

## 2024-12-27 RX ADMIN — METOCLOPRAMIDE 10 MG: 5 TABLET ORAL at 06:12

## 2024-12-27 RX ADMIN — CARVEDILOL 12.5 MG: 12.5 TABLET, FILM COATED ORAL at 10:12

## 2024-12-27 RX ADMIN — AMLODIPINE BESYLATE 10 MG: 10 TABLET ORAL at 10:12

## 2024-12-27 NOTE — PROGRESS NOTES
"Per Patricio pt's case is considered as "information provided only".  Request to overturn denial for SNF has yet to begin.  Pt herself will have to call or she must be present with her representative in order for Patricio to initiate. Case #[id number].    This writer  met with pt at bedside to explain that, per Patricio, her SNF appeal will not be processed without pt herself making the call and giving her sister permission to speak on her behalf while on the call with the rep. Information left at bedside so that appeal can be initiated in pt's presence.  RAY also explained that per  leadership, appeal for denial can be complete while at home.   This writer explained to pt that [facility] will see her tomorrow.       CM delivered IMM to bedside and explained it to pt and sister Corine (via phone).  Pt hesitant to sign document without her sister reading over it.  Sister upset and stated that discharge will be appealed as it is "not safe to dump her on the curve at night just like they did last time."  Sister also stated that it is a "liability" to her as the family member and to this writer if pt is discharged.  Not being able to take care of herself.  Sister stated that she was currently standing in line at the bank but would come to the hospital after.   "

## 2024-12-27 NOTE — PROGRESS NOTES
[facility] - Internal Medicine Telemetry  Adult Nutrition  Progress Note    SUMMARY       Recommendations   1) Continue adult regular diet      2) If pt here >24 hours add 1 Boost daily with dinner    3) RD monitor wt, nutritional status, skin, and labs,     Goals: meet % of EEN/EPN by next RD f/u    Nutrition Goal Status: new  Communication of RD Recs: other (comment) (POC)    Assessment and Plan    Nutrition Problem  Inadequate oral intake     Related to (etiology):   Decreased appetite over the last 3 weeks    Signs and Symptoms (as evidenced by):   Abdominal pain, nausea, rectal pain    Interventions/Recommendations (treatment strategy):  Collaboration of nutritional care with other providers       Nutrition Diagnosis Status:   New; improving        Reason for Assessment    Reason For Assessment: length of stay    General Information Comments: 84-year-old female with history of HTN, ulcerative colitis in remission, presents for evaluation of persistent rectal pain and discomfort, nausea. Patient was seen for this here 3 days ago and had imaging concerning for colitis, was started on Cipro and Flagyl and discharged with oxycodone and Zofran. Since then she has had persistent rectal pain and discomfort despite taking her medications as directed, and nausea with minimal PO intake.    RD triggered for LOS day 11. Pt has had improvments with appetite and nausea since admit. Was on medication for her bowl regimen but reported it made her have diarrhea x 3 and asked to get off of it. Pt had 100% of breakfast and 75% of lunch. At home pt typically has 2 meals and 1 protien drink. Interested in boost with dinner in case she doesn't eat the dinner. Reported not having dinner last night and waking up very hungry.     Nutrition Discharge Planning: adequate oral intake    Nutrition Related Social Determinants of Health: SDOH: None Identified      Nutrition Risk Screen    Nutrition Risk Screen: no indicators  "present    Nutrition/Diet History    Patient Reported Diet/Restrictions/Preferences: general  Spiritual, Cultural Beliefs, Mandaen Practices, Values that Affect Care: no  Food Allergies: NKFA  Factors Affecting Nutritional Intake: None identified at this time    Anthropometrics    Temp: 97.8 °F (36.6 °C)  Height Method: Stated  Height: 5' 8" (172.7 cm)  Height (inches): 68 in  Weight Method: Stated  Weight: 93.9 kg (207 lb)  Weight (lb): 207 lb  Ideal Body Weight (IBW), Female: 140 lb  % Ideal Body Weight, Female (lb): 147.86 %  BMI (Calculated): 31.5       Lab/Procedures/Meds    Pertinent Labs Reviewed: reviewed     Labs:   Recent Labs   Lab 12/27/24  2356 12/28/24  0248   NA  --  140   K  --  3.4*   CL  --  110   CO2  --  21*   BUN  --  16   CREATININE  --  0.8   GLU  --  142*   CALCIUM  --  9.4   PHOS  --  2.6*   MG 1.6  --        Pertinent Medications Reviewed: reviewed    Scheduled Meds:          Estimated/Assessed Needs    Weight Used For Calorie Calculations: 93.9 kg (207 lb 0.2 oz)  Energy Calorie Requirements (kcal): 1719 kcal ([place] 1.2)  Energy Need Method: [place]  Protein Requirements: 75-94g (0.8-1.0g/kg)  Weight Used For Protein Calculations: 93.9 kg (207 lb 0.2 oz)  Fluid Requirements (mL): per MD  Estimated Fluid Requirement Method: RDA Method  RDA Method (mL): 1719         Nutrition Prescription Ordered    Current Diet Order: Regular Diet    Evaluation of Received Nutrient/Fluid Intake    I/O: .  Energy Calories Required: meeting needs  Protein Required: meeting needs  Fluid Required: meeting needs  % Intake of Estimated Energy Needs: 75 - 100 %  % Meal Intake: 75 - 100 %    Nutrition Risk    Level of Risk/Frequency of Follow-up: low - moderate     Monitor and Evaluation    Food and Nutrient Intake: energy intake, food and beverage intake  Food and Nutrient Adminstration: diet order  Anthropometric Measurements: weight, weight change, body mass index  Biochemical Data, Medical Tests and " Procedures: electrolyte and renal panel, gastrointestinal profile, lipid profile, inflammatory profile, glucose/endocrine profile  Nutrition-Focused Physical Findings: overall appearance     Nutrition Follow-Up    RD Follow-up?: Yes    Antoinette Omer, Registration Eligible, Provisional LDN , MS

## 2024-12-27 NOTE — ASSESSMENT & PLAN NOTE
Improved. Tolerating PO. Stop scheduled metoclopramide. Continue prn ondansetron, prochlorperazine, and promethazine (not needing). Outpatient GI motility study.

## 2024-12-27 NOTE — PT/OT/SLP PROGRESS
Physical Therapy Treatment    Patient Name:  Gabriel Grace   MRN:  [id number]    Recommendations:     Discharge Recommendations: Moderate Intensity Therapy (pt. indicated she would rather go home with HH PT)  Discharge Equipment Recommendations: walker, rolling, bedside commode  Barriers to discharge: None    Assessment:     Gabriel Grace is a 84 y.o. female admitted with a medical diagnosis of Fecal impaction.  She presents with the following impairments/functional limitations: weakness, impaired endurance, impaired self care skills, impaired functional mobility, impaired balance, gait instability. Pt was agreeable to skilled therapy session and ambulation trial. Pt exhibits consistent walking tolerance and is appropriately challenged with household distance.     Rehab Prognosis: Good; patient would benefit from acute skilled PT services to address these deficits and reach maximum level of function.    Recent Surgery: Procedure(s) (LRB):  SIGMOIDOSCOPY, FLEXIBLE (N/A) 9 Days Post-Op    Plan:     During this hospitalization, patient to be seen 4 x/week to address the identified rehab impairments via gait training, therapeutic activities, therapeutic exercises, neuromuscular re-education and progress toward the following goals:    Plan of Care Expires:  01/19/25    Subjective     Chief Complaint: None  Patient/Family Comments/goals: Return home  Pain/Comfort:  Pain Rating 1: 0/10      Objective:     Communicated with RN prior to session.  Patient found HOB elevated with  (No lines) upon PT entry to room.     General Precautions: Standard, fall  Orthopedic Precautions: N/A  Braces: N/A  Respiratory Status: Room air     Functional Mobility:  Bed Mobility:     Sit to Supine: independence  Transfers:     Sit to Stand:  supervision with rolling walker  Gait: 260' SBA w/ RW.   Slow, cautious gait  No LOB  Balance: EOB: Independent; Standing: Supervision      AM-PAC 6 CLICK MOBILITY  Turning over in bed (including adjusting  bedclothes, sheets and blankets)?: 4  Sitting down on and standing up from a chair with arms (e.g., wheelchair, bedside commode, etc.): 4  Moving from lying on back to sitting on the side of the bed?: 3  Moving to and from a bed to a chair (including a wheelchair)?: 3  Need to walk in hospital room?: 3  Climbing 3-5 steps with a railing?: 2  Basic Mobility Total Score: 19       Treatment & Education:  Patient provided with daily orientation and goals of this PT session.  Encouraged patient to perform daily exercises & mobility to increase endurance and decrease effects of bedrest. Time provided for therapeutic counseling and discussion of health disposition. All questions answered to patient's satisfaction, within scope of PT practice; voiced no other concerns. White board updated in patient's room, RN notified of session.    Patient left HOB elevated with all lines intact and call button in reach..    GOALS:   Multidisciplinary Problems       Physical Therapy Goals          Problem: Physical Therapy    Goal Priority Disciplines Outcome Interventions   Physical Therapy Goal     PT, PT/OT Progressing    Description: Goals to be met by: 1/3/2025     Patient will increase functional independence with mobility by performin. Supine to sit with Set-up [place]  2. Sit to supine with Set-up [place]  3. Sit to stand transfer with Supervision  4. Bed to chair transfer with Supervision using Rolling Walker  5. Gait  x 150 feet with Supervision using Rolling Walker.   6. Lower extremity exercise program x15 reps per handout, with supervision                         Time Tracking:     PT Received On: 24  PT Start Time: 1422     PT Stop Time: 1437  PT Total Time (min): 15 min     Billable Minutes: Gait Training 15    Treatment Type: Treatment  PT/PTA: PTA     Number of PTA visits since last PT visit: 3     2024

## 2024-12-27 NOTE — PROGRESS NOTES
Provided pt with information to appeal SNF denial and expalined to pt that after further review, DME company said that bsc and walker will be completely out of pocket.

## 2024-12-27 NOTE — SUBJECTIVE & OBJECTIVE
Interval History: I did urrz-gx-[id number] call yesterday and they denied SNF, recommended home health, based on PT and OT notes. She stated that she will not be able to perform ADLs by herself at home and that no one can help her.    Review of Systems   Constitutional:  Negative for chills and fever.   Respiratory:  Negative for cough and shortness of breath.    Neurological:  Positive for weakness. Negative for dizziness, light-headedness and headaches.     Objective:     Vital Signs (Most Recent):  Temp: 98.1 °F (36.7 °C) (12/27/24 0737)  Pulse: 83 (12/27/24 0737)  Resp: 18 (12/27/24 0737)  BP: 128/72 (12/27/24 0737)  SpO2: 95 % (12/27/24 0737) Vital Signs (24h Range):  Temp:  [98 °F (36.7 °C)-98.5 °F (36.9 °C)] 98.1 °F (36.7 °C)  Pulse:  [77-89] 83  Resp:  [18] 18  SpO2:  [92 %-96 %] 95 %  BP: (104-144)/(62-76) 128/72     Weight: 93.9 kg (207 lb)  Body mass index is 31.47 kg/m².    Intake/Output Summary (Last 24 hours) at 12/27/2024 0904  Last data filed at 12/26/2024 1330  Gross per 24 hour   Intake 480 ml   Output --   Net 480 ml         Physical Exam  Vitals and nursing note reviewed.   Constitutional:       General: She is not in acute distress.     Appearance: She is obese. She is not ill-appearing, toxic-appearing or diaphoretic.      Interventions: She is not intubated.  Pulmonary:      Effort: Pulmonary effort is normal. No accessory muscle usage or respiratory distress. She is not intubated.   Neurological:      General: No focal deficit present.      Mental Status: She is alert and oriented to person, place, and time. Mental status is at baseline.   Psychiatric:         Mood and Affect: Mood normal.         Behavior: Behavior normal.           Significant Labs: All pertinent labs within the past 24 hours have been reviewed.    Significant Imaging: I have reviewed all pertinent imaging results/findings within the past 24 hours.

## 2024-12-27 NOTE — PROGRESS NOTES
[facility] - Internal Medicine [facility] Medicine  Progress Note    Patient Name: Gabriel Grace  MRN: [id number]  Patient Class: IP- Inpatient   Admission Date: 12/16/2024  Length of Stay: 10 days  Attending Physician: Christian Zelaya MD  Primary Care Provider: Prosper Mancilla MD        Subjective     Principal Problem:Nausea        HPI:  Gabriel Grace is an 84 year old white woman with hypertension, hyperlipidemia, ulcerative colitis, migraine headaches, hypothyroidism, aortic atherosclerosis, history of pulmonary embolism, history of pancreatitic cyst in 2017, history of basal cell carcinoma of nasal bridge status post Mohs's surgery on 11/25/2024, history of total abdominal hysterectomy, left salpingo-oophorectomy, and right salpingectomy in 1973, history of cholecystectomy, 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 She lives in [place], [place]. She is . Her primary care physician is Dr. Prosper Mancilla.    She was seen at [facility] on 12/13/2024 for proctitis with rectal pain, diarrhea, inflamed external hemorrhoid, nausea, vomiting, decreased oral intake, and fecal incontinence as well as urinary tract infection. Potassium was mildly low at 3.4 mmol/L. She was prescribed ciprofloxacin, metronidazole, ondansetron, and oxycodone.    Her sister called [facility] to make an appointment.   She presented to [facility] on 12/16/2024 for persistent rectal pain, nausea, and decreased oral intake. She had 1 episode of blood with wiping after a bowel movement. She had been having watery loose stools and an episode of fecal incontinence. Her appetite had been decreased for the past 3 weeks. She was found to have a fecal impaction. Potassium was 3.3.    She was admitted to the Emergency Department Observation Unit. She  was given a brown bomb enema and began to have bowel movements but had persistent severe rectal pain. Urine culture taken on 12/13/2024 grew E coli sensitive to ciprofloxacin, so her prescribed antibiotics were continued. On 12/17/2024, she was upgraded to admission to Hospital Medicine Team B with Colorectal Surgery consult. Potassium was still 3.3.     Overview/Hospital Course:  Stool disimpaction was performed in the endoscopy suite by Colorectal Surgery. She was started on regular stool softener. She tolerated oral intake and was medically ready to discharge, however she had concerns returning home alone due to reported weakness. PT and OT were consulted and advised moderate-intensity therapy. Rectal pain subsided, but she had persistent nausea (no vomiting & otherwise tolerating oral intake). Antiemetic regimen was adjusted with scheduled metoclopramide. She had a facial rash that she attributed to dry skin, for which she uses topical emollients at home. She was accepted to a skilled nursing facility but awaited insurance authorization. [id number] discussion was done with her insurance, who denied skilled nursing facility based on her ability to walk down the hallway with a walker and contact guard assistance by therapists.     Interval History: I did [id number] call yesterday and they denied SNF, recommended home health, based on PT and OT notes. She stated that she will not be able to perform ADLs by herself at home and that no one can help her.    Review of Systems   Constitutional:  Negative for chills and fever.   Respiratory:  Negative for cough and shortness of breath.    Neurological:  Positive for weakness. Negative for dizziness, light-headedness and headaches.     Objective:     Vital Signs (Most Recent):  Temp: 98.1 °F (36.7 °C) (12/27/24 0737)  Pulse: 83 (12/27/24 0737)  Resp: 18 (12/27/24 0737)  BP: 128/72 (12/27/24 0737)  SpO2: 95 % (12/27/24 0737) Vital Signs (24h Range):  Temp:  [98 °F  (36.7 °C)-98.5 °F (36.9 °C)] 98.1 °F (36.7 °C)  Pulse:  [77-89] 83  Resp:  [18] 18  SpO2:  [92 %-96 %] 95 %  BP: (104-144)/(62-76) 128/72     Weight: 93.9 kg (207 lb)  Body mass index is 31.47 kg/m².    Intake/Output Summary (Last 24 hours) at 12/27/2024 0904  Last data filed at 12/26/2024 1330  Gross per 24 hour   Intake 480 ml   Output --   Net 480 ml         Physical Exam  Vitals and nursing note reviewed.   Constitutional:       General: She is not in acute distress.     Appearance: She is obese. She is not ill-appearing, toxic-appearing or diaphoretic.      Interventions: She is not intubated.  Pulmonary:      Effort: Pulmonary effort is normal. No accessory muscle usage or respiratory distress. She is not intubated.   Neurological:      General: No focal deficit present.      Mental Status: She is alert and oriented to person, place, and time. Mental status is at baseline.   Psychiatric:         Mood and Affect: Mood normal.         Behavior: Behavior normal.           Significant Labs: All pertinent labs within the past 24 hours have been reviewed.    Significant Imaging: I have reviewed all pertinent imaging results/findings within the past 24 hours.    Assessment and Plan     * Nausea  Improved. Tolerating PO. Stop scheduled metoclopramide. Continue prn ondansetron, prochlorperazine, and promethazine (not needing). Outpatient GI motility study.    Ulcerative colitis  Continue home balsalazide. Reports she takes 2250 mg TID at home, not 2250 mg daily as what is currently listed on home med list. Orders updated.     Hypertension  Chronic. Continue home amlodipine and carvedilol. Monitor BP.    Hyperlipidemia  Continue home simvastatin      VTE Risk Mitigation (From admission, onward)           Ordered     IP VTE HIGH RISK PATIENT  Once         12/17/24 1843     Place sequential compression device  Until discontinued         12/17/24 1843                    Discharge Planning   JELANI: 12/26/2024     Code Status:  Full Code   Medical Readiness for Discharge Date: 12/18/2024  Discharge Plan A: Skilled Nursing Facility   Discharge Delays: None known at this time            Please place Justification for DME        Christian Zelaya MD  Department of Hospital Medicine   [facility] - Internal Medicine Telemetry

## 2024-12-27 NOTE — PLAN OF CARE
Problem: Adult Inpatient Plan of Care  Goal: Plan of Care Review  Outcome: Progressing     Problem: Adult Inpatient Plan of Care  Goal: Absence of Hospital-Acquired Illness or Injury  Outcome: Progressing     Problem: Fall Injury Risk  Goal: Absence of Fall and Fall-Related Injury  Outcome: Progressing     Problem: Skin Injury Risk Increased  Goal: Skin Health and Integrity  Outcome: Progressing     Pt AOx4. No acute events noted during shift. Vitals remained stable. No c/o pain or discomfort noted, scheduled meds given per MD orders. Q1-2hr safety checks completed. Bed remained low, locked, with all personal items in reach.

## 2024-12-27 NOTE — DISCHARGE SUMMARY
[facility] - Internal Medicine [facility] Medicine  Discharge Summary      Patient Name: Gabriel Grace  MRN: [id number]  [facility]: [id number]  Patient Class: IP- Inpatient  Admission Date: 12/16/2024  Hospital Length of Stay: 10 days  Discharge Date and Time: 12/28/2024 11:04 AM  Attending Physician: Christian Zelaya MD   Discharging Provider: Christian Zelaya MD  Primary Care Provider: Prosper Mancilla MD  [facility] Medicine Team: [facility]OSP MED B Christian Zelaya MD  Primary Care Team: [facility] B    HPI:   Gabriel Grace is an 84 year old white woman with hypertension, hyperlipidemia, ulcerative colitis, migraine headaches, hypothyroidism, aortic atherosclerosis, history of pulmonary embolism, history of pancreatitic cyst in 2017, history of basal cell carcinoma of nasal bridge status post Mohs's surgery on 11/25/2024, history of total abdominal hysterectomy, left salpingo-oophorectomy, and right salpingectomy in 1973, history of cholecystectomy, 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 She lives in [place], [place]. She is . Her primary care physician is Dr. Prosper Mancilla.    She was seen at [facility] on 12/13/2024 for proctitis with rectal pain, diarrhea, inflamed external hemorrhoid, nausea, vomiting, decreased oral intake, and fecal incontinence as well as urinary tract infection. Potassium was mildly low at 3.4 mmol/L. She was prescribed ciprofloxacin, metronidazole, ondansetron, and oxycodone.    Her sister called [facility] to make an appointment.   She presented to [facility] on 12/16/2024 for persistent rectal pain, nausea, and decreased oral intake. She had 1 episode of blood with wiping after a bowel movement. She had been having watery loose stools and an episode of fecal incontinence. Her appetite had  been decreased for the past 3 weeks. She was found to have a fecal impaction. Potassium was 3.3.    She was admitted to the Emergency Department Observation Unit. She was given a brown bomb enema and began to have bowel movements but had persistent severe rectal pain. Urine culture taken on 12/13/2024 grew E coli sensitive to ciprofloxacin, so her prescribed antibiotics were continued. On 12/17/2024, she was upgraded to admission to Hospital Medicine Team B with Colorectal Surgery consult. Potassium was still 3.3.     Procedure(s) (LRB):  SIGMOIDOSCOPY, FLEXIBLE (N/A)      Hospital Course:   Stool disimpaction was performed in the endoscopy suite by Colorectal Surgery. She was started on regular stool softener. She tolerated oral intake and was medically ready to discharge, however she had concerns returning home alone due to reported weakness. PT and OT were consulted and advised moderate-intensity therapy. Rectal pain subsided, but she had persistent nausea (no vomiting & otherwise tolerating oral intake). Antiemetic regimen was adjusted with scheduled metoclopramide. She had a facial rash that she attributed to dry skin, for which she uses topical emollients at home. She was accepted to a skilled nursing facility but awaited insurance authorization. [id number] discussion was done with her insurance, who denied skilled nursing facility based on her ability to walk down the hallway with a walker and contact guard assistance by therapists. She was discharged home with home health.      Goals of Care Treatment Preferences:  Code Status: Full Code      SDOH Screening:  The patient was screened for utility difficulties, food insecurity, transport difficulties, housing insecurity, and interpersonal safety and there were no concerns identified this admission.     Consults:   Consults (From admission, onward)          Status Ordering Provider     Inpatient consult to PICC team (LUDMILA)  Once        Provider:  (Not yet  "assigned)    Completed ZULEMA HARRY     [facility]  Once        Provider:  (Not yet assigned)    Completed SONAM LEE     Inpatient consult to Colorectal Surgery  Once        Provider:  (Not yet assigned)    Completed CHATA GONZALES            Final Active Diagnoses:    Diagnosis Date Noted POA    Nausea [R11.0] 12/24/2024 Yes    Ulcerative colitis [K51.90] 02/28/2020 Yes     Chronic    Hyperlipidemia [E78.5]  Yes     Chronic    Hypertension [I10]  Yes     Chronic      Problems Resolved During this Admission:    Diagnosis Date Noted Date Resolved POA    PRINCIPAL PROBLEM:  Fecal impaction [K56.41] 12/17/2024 12/19/2024 Yes    E. coli UTI [N39.0, B96.20] 12/13/2024 12/22/2024 Yes       Discharged Condition: good    Disposition: Home-Health Care [facility]    Follow Up:   Follow-up Information       Prosper Mancilla MD Follow up.    Specialty: Internal Medicine  Why: As needed  Contact information:  [address]  [place] [id number] [phone]                           Patient Instructions:      COMMODE FOR HOME USE     Order Specific Question Answer Comments   Type: Standard    Height: 5' 8" (1.727 m)    Weight: 93.9 kg (207 lb)    Does patient have medical equipment at home? none    Length of need (1-99 months): 99      WALKER FOR HOME USE     Order Specific Question Answer Comments   Type of Walker: Adult (5'4"-6'6")    With wheels? Yes    Height: 5' 8" (1.727 m)    Weight: 93.9 kg (207 lb)    Length of need (1-99 months): 99    Does patient have medical equipment at home? none    Please check all that apply: Patient is unable to safely ambulate without equipment.      HME - OTHER     Order Specific Question Answer Comments   Type of Equipment: toilet seat riser    Height: 5' 8" (1.727 m)    Weight: 93.9 kg (207 lb)    Does patient have medical equipment at home? none      Ambulatory referral/consult to Gastroenterology   Standing Status: Future   Referral Priority: " Routine Referral Type: Consultation   Referral Reason: Specialty Services Required   Requested Specialty: Gastroenterology   Number of Visits Requested: 1     Diet Adult Regular     Notify your health care provider if you experience any of the following:  persistent nausea and vomiting or diarrhea     Notify your health care provider if you experience any of the following:  persistent dizziness, light-headedness, or visual disturbances     Activity as tolerated       Significant Diagnostic Studies:   X-Ray Chest AP Portable 12/24/24: FINDINGS:   Heart size normal.  No significant airspace consolidation or pleural effusion identified.     Medications:  Reconciled Home Medications:      Medication List        START taking these medications      LIDOcaine HCL 2% 2 % jelly  Commonly known as: XYLOCAINE  Apply topically every 4 (four) hours as needed (externally to the rectum).     polyethylene glycol 17 gram Pwpk  Commonly known as: GLYCOLAX  Take 17 g by mouth 2 (two) times daily.            CONTINUE taking these medications      amLODIPine 10 MG tablet  Commonly known as: NORVASC  TAKE 1 TABLET EVERY DAY  .  STOP OLD RX     balsalazide 750 mg capsule  Commonly known as: COLAZAL  Take 2,250 mg by mouth once daily.     biotin 2,500 mcg Cap     calcium-magnesium-zinc Tab  Take by mouth. 1  Oral Every day     carvediloL 12.5 MG tablet  Commonly known as: COREG  Take 1 tablet (12.5 mg total) by mouth 2 (two) times daily with meals.     cholecalciferol (vitamin D3) 25 mcg (1,000 unit) capsule  Commonly known as: VITAMIN D3  Take 1,000 Units by mouth once daily.     cyanocobalamin (vitamin B-12) 50 mcg tablet  Take 1 tablet (50 mcg total) by mouth once daily.     cyclobenzaprine 5 MG tablet  Commonly known as: FLEXERIL  TAKE 1 TABLET THREE TIMES DAILY AS NEEDED FOR MUSCLE SPASM(S)     FLAXSEED OIL ORAL  Take 1,000 mg by mouth. 1  Oral Every day     FLUAD QUAD 2021-22(65Y UP)(PF) 60 mcg (15 mcg x 4)/0.5 mL Syrg  Generic drug:  flu vac 2021 [id number]-[id number](PF)     folic acid 400 MCG tablet  Commonly known as: FOLVITE  Take 2 tablets (800 mcg total) by mouth once daily.     * ketoconazole 2 % cream  Commonly known as: NIZORAL  AAA qhs under breasts after cool blow dry     * ketoconazole 2 % cream  Commonly known as: NIZORAL  AAA qhs under breasts after cool blow dry prn flare     mometasone 0.1 % solution  Commonly known as: ELOCON  aaa on scalp qhs prn flare     multivitamin per tablet  Commonly known as: THERAGRAN  Take by mouth. 1  By mouth Every day     omega-3 fatty acids-vitamin E 1,000 mg Cap     omeprazole 20 MG capsule  Commonly known as: PRILOSEC  Take 1 capsule (20 mg total) by mouth once daily.     ondansetron 4 MG Tbdl  Commonly known as: ZOFRAN-ODT  Take 1 tablet (4 mg total) by mouth every 6 (six) hours as needed (nausea).     oxyCODONE 5 MG immediate release tablet  Commonly known as: ROXICODONE  Take 1 tablet (5 mg total) by mouth every 6 (six) hours as needed (moderate to severe pain).     simvastatin 10 MG tablet  Commonly known as: ZOCOR  Take 1 tablet (10 mg total) by mouth every evening.     triamcinolone acetonide 0.1% 0.1 % cream  Commonly known as: KENALOG  aaa on legs and arms qd- bid after moisturizing; not more than 2 weeks straight in the same location           * This list has 2 medication(s) that are the same as other medications prescribed for you. Read the directions carefully, and ask your doctor or other care provider to review them with you.                ASK your doctor about these medications      ciprofloxacin HCl 500 MG tablet  Commonly known as: CIPRO  Take 1 tablet (500 mg total) by mouth every 12 (twelve) hours for 7 days  Ask about: Should I take this medication?     metroNIDAZOLE 500 MG tablet  Commonly known as: FLAGYL  Take 1 tablet (500 mg total) by mouth every 8 (eight) hours. for 7 days  Ask about: Should I take this medication?              Indwelling Lines/Drains at time of discharge:  none      Time spent on the discharge of patient: 35 minutes         Christian Zelaya MD  Department of Hospital Medicine  [facility] - Internal Medicine Telemetry

## 2024-12-27 NOTE — PROGRESS NOTES
(Late entry)    12/27/2024 1700  CM spoke to pt and sister at bedside to explain that P2P failed, there is the right to appeal and that plan will be to discharge home with   home health agency to start as soon as possible once pt has been accepted.

## 2024-12-27 NOTE — PLAN OF CARE
Recommendations   1) Continue adult regular diet      2) If pt here >24 hours add 1 Boost daily with dinner    3) RD monitor wt, nutritional status, skin, and labs,     Goals: meet % of EEN/EPN by next RD f/u    Nutrition Goal Status: new

## 2024-12-28 VITALS
HEIGHT: 68 IN | RESPIRATION RATE: 18 BRPM | SYSTOLIC BLOOD PRESSURE: 161 MMHG | WEIGHT: 207 LBS | TEMPERATURE: 98 F | BODY MASS INDEX: 31.37 KG/M2 | OXYGEN SATURATION: 96 % | HEART RATE: 79 BPM | DIASTOLIC BLOOD PRESSURE: 80 MMHG

## 2024-12-28 LAB
ALBUMIN SERPL BCP-MCNC: 2.7 G/DL (ref 3.5–5.2)
ANION GAP SERPL CALC-SCNC: 9 MMOL/L (ref 8–16)
BUN SERPL-MCNC: 16 MG/DL (ref 8–23)
CALCIUM SERPL-MCNC: 9.4 MG/DL (ref 8.7–10.5)
CHLORIDE SERPL-SCNC: 110 MMOL/L (ref 95–110)
CO2 SERPL-SCNC: 21 MMOL/L (ref 23–29)
CREAT SERPL-MCNC: 0.8 MG/DL (ref 0.5–1.4)
ERYTHROCYTE [DISTWIDTH] IN BLOOD BY AUTOMATED COUNT: 13.7 % (ref 11.5–14.5)
EST. GFR  (NO RACE VARIABLE): >60 ML/MIN/1.73 M^2
GLUCOSE SERPL-MCNC: 142 MG/DL (ref 70–110)
HCT VFR BLD AUTO: 27.3 % (ref 37–48.5)
HGB BLD-MCNC: 9 G/DL (ref 12–16)
MAGNESIUM SERPL-MCNC: 1.6 MG/DL (ref 1.6–2.6)
MCH RBC QN AUTO: 32.5 PG (ref 27–31)
MCHC RBC AUTO-ENTMCNC: 33 G/DL (ref 32–36)
MCV RBC AUTO: 99 FL (ref 82–98)
PHOSPHATE SERPL-MCNC: 2.6 MG/DL (ref 2.7–4.5)
PLATELET # BLD AUTO: 166 K/UL (ref 150–450)
PMV BLD AUTO: 10.7 FL (ref 9.2–12.9)
POTASSIUM SERPL-SCNC: 3.4 MMOL/L (ref 3.5–5.1)
RBC # BLD AUTO: 2.77 M/UL (ref 4–5.4)
SODIUM SERPL-SCNC: 140 MMOL/L (ref 136–145)
WBC # BLD AUTO: 7.01 K/UL (ref 3.9–12.7)

## 2024-12-28 PROCEDURE — 25000003 PHARM REV CODE 250: Performed by: STUDENT IN AN ORGANIZED HEALTH CARE EDUCATION/TRAINING PROGRAM

## 2024-12-28 PROCEDURE — 85027 COMPLETE CBC AUTOMATED: CPT | Performed by: STUDENT IN AN ORGANIZED HEALTH CARE EDUCATION/TRAINING PROGRAM

## 2024-12-28 PROCEDURE — 80069 RENAL FUNCTION PANEL: CPT | Performed by: STUDENT IN AN ORGANIZED HEALTH CARE EDUCATION/TRAINING PROGRAM

## 2024-12-28 PROCEDURE — 36415 COLL VENOUS BLD VENIPUNCTURE: CPT | Performed by: STUDENT IN AN ORGANIZED HEALTH CARE EDUCATION/TRAINING PROGRAM

## 2024-12-28 RX ADMIN — CARVEDILOL 12.5 MG: 12.5 TABLET, FILM COATED ORAL at 09:12

## 2024-12-28 RX ADMIN — AMLODIPINE BESYLATE 10 MG: 10 TABLET ORAL at 09:12

## 2024-12-28 RX ADMIN — BALSALAZIDE DISODIUM 2250 MG: 750 CAPSULE ORAL at 09:12

## 2024-12-28 NOTE — PROGRESS NOTES
[facility] - Internal Medicine [facility] Medicine  Progress Note    Patient Name: Gabriel Grace  MRN: [id number]  Patient Class: IP- Inpatient   Admission Date: 12/16/2024  Length of Stay: 11 days  Attending Physician: Christian Zelaya MD  Primary Care Provider: Prosper Mancilla MD        Subjective     Principal Problem:Fecal impaction        HPI:  Gabriel Grace is an 84 year old white woman with hypertension, hyperlipidemia, ulcerative colitis, migraine headaches, hypothyroidism, aortic atherosclerosis, history of pulmonary embolism, history of pancreatitic cyst in 2017, history of basal cell carcinoma of nasal bridge status post Mohs's surgery on 11/25/2024, history of total abdominal hysterectomy, left salpingo-oophorectomy, and right salpingectomy in 1973, history of cholecystectomy, 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 She lives in [place], [place]. She is . Her primary care physician is Dr. Prosper Mancilla.    She was seen at [facility] on 12/13/2024 for proctitis with rectal pain, diarrhea, inflamed external hemorrhoid, nausea, vomiting, decreased oral intake, and fecal incontinence as well as urinary tract infection. Potassium was mildly low at 3.4 mmol/L. She was prescribed ciprofloxacin, metronidazole, ondansetron, and oxycodone.    Her sister called [facility] to make an appointment.   She presented to [facility] on 12/16/2024 for persistent rectal pain, nausea, and decreased oral intake. She had 1 episode of blood with wiping after a bowel movement. She had been having watery loose stools and an episode of fecal incontinence. Her appetite had been decreased for the past 3 weeks. She was found to have a fecal impaction. Potassium was 3.3.    She was admitted to the Emergency Department Observation  Unit. She was given a brown bomb enema and began to have bowel movements but had persistent severe rectal pain. Urine culture taken on 12/13/2024 grew E coli sensitive to ciprofloxacin, so her prescribed antibiotics were continued. On 12/17/2024, she was upgraded to admission to Hospital Medicine Team B with Colorectal Surgery consult. Potassium was still 3.3.     Overview/Hospital Course:  Stool disimpaction was performed in the endoscopy suite by Colorectal Surgery. She was started on regular stool softener. She tolerated oral intake and was medically ready to discharge, however she had concerns returning home alone due to reported weakness. PT and OT were consulted and advised moderate-intensity therapy. Rectal pain subsided, but she had persistent nausea (no vomiting & otherwise tolerating oral intake). Antiemetic regimen was adjusted with scheduled metoclopramide. She had a facial rash that she attributed to dry skin, for which she uses topical emollients at home. She was accepted to a skilled nursing facility but awaited insurance authorization. [id number] discussion was done with her insurance, who denied skilled nursing facility based on her ability to walk down the hallway with a walker and contact guard assistance by therapists. She was discharged home with home health on 12/27/2024. She appealed the discharge.    Interval History: Appealed discharge    Review of Systems   Constitutional:  Negative for chills and fever.   Neurological:  Positive for weakness. Negative for syncope.     Objective:     Vital Signs (Most Recent):  Temp: 97.8 °F (36.6 °C) (12/28/24 0730)  Pulse: 79 (12/28/24 0730)  Resp: 18 (12/28/24 0419)  BP: (!) 161/80 (12/28/24 0730)  SpO2: 96 % (12/28/24 0730) Vital Signs (24h Range):  Temp:  [97.4 °F (36.3 °C)-98.6 °F (37 °C)] 97.8 °F (36.6 °C)  Pulse:  [79-92] 79  Resp:  [18] 18  SpO2:  [92 %-96 %] 96 %  BP: (128-161)/(68-80) 161/80     Weight: 93.9 kg (207 lb)  Body mass index is  31.47 kg/m².    Intake/Output Summary (Last 24 hours) at 12/28/2024 0945  Last data filed at 12/27/2024 1200  Gross per 24 hour   Intake 240 ml   Output --   Net 240 ml         Physical Exam  Vitals and nursing note reviewed.   Constitutional:       General: She is not in acute distress.     Appearance: She is obese. She is not ill-appearing, toxic-appearing or diaphoretic.      Interventions: She is not intubated.  Pulmonary:      Effort: Pulmonary effort is normal. No accessory muscle usage or respiratory distress. She is not intubated.   Neurological:      Mental Status: She is alert and oriented to person, place, and time. Mental status is at baseline.           Significant Labs: All pertinent labs within the past 24 hours have been reviewed.    Significant Imaging: I have reviewed all pertinent imaging results/findings within the past 24 hours.    Assessment and Plan     Ulcerative colitis  Continue home balsalazide. Reports she takes 2250 mg TID at home, not 2250 mg daily as what is currently listed on home med list. Orders updated.     Hypertension  Chronic. Continue home amlodipine and carvedilol. Monitor BP.    Hyperlipidemia  Continue home simvastatin      VTE Risk Mitigation (From admission, onward)           Ordered     IP VTE HIGH RISK PATIENT  Once         12/17/24 1843     Place sequential compression device  Until discontinued         12/17/24 1843                    Discharge Planning   JELANI: 12/27/2024     Code Status: Full Code   Medical Readiness for Discharge Date: 12/18/2024  Discharge Plan A: Skilled Nursing Facility   Discharge Delays: None known at this time            Please place Justification for DME        Christian Zelaya MD  Department of Hospital Medicine   [facility] - Internal Medicine Telemetry     HHA/verbal instruction/spouse instructed/support person

## 2024-12-28 NOTE — SUBJECTIVE & OBJECTIVE
Interval History: Appealed discharge    Review of Systems   Constitutional:  Negative for chills and fever.   Neurological:  Positive for weakness. Negative for syncope.     Objective:     Vital Signs (Most Recent):  Temp: 97.8 °F (36.6 °C) (12/28/24 0730)  Pulse: 79 (12/28/24 0730)  Resp: 18 (12/28/24 0419)  BP: (!) 161/80 (12/28/24 0730)  SpO2: 96 % (12/28/24 0730) Vital Signs (24h Range):  Temp:  [97.4 °F (36.3 °C)-98.6 °F (37 °C)] 97.8 °F (36.6 °C)  Pulse:  [79-92] 79  Resp:  [18] 18  SpO2:  [92 %-96 %] 96 %  BP: (128-161)/(68-80) 161/80     Weight: 93.9 kg (207 lb)  Body mass index is 31.47 kg/m².    Intake/Output Summary (Last 24 hours) at 12/28/2024 0945  Last data filed at 12/27/2024 1200  Gross per 24 hour   Intake 240 ml   Output --   Net 240 ml         Physical Exam  Vitals and nursing note reviewed.   Constitutional:       General: She is not in acute distress.     Appearance: She is obese. She is not ill-appearing, toxic-appearing or diaphoretic.      Interventions: She is not intubated.  Pulmonary:      Effort: Pulmonary effort is normal. No accessory muscle usage or respiratory distress. She is not intubated.   Neurological:      Mental Status: She is alert and oriented to person, place, and time. Mental status is at baseline.           Significant Labs: All pertinent labs within the past 24 hours have been reviewed.    Significant Imaging: I have reviewed all pertinent imaging results/findings within the past 24 hours.

## 2024-12-28 NOTE — PLAN OF CARE
Update - 11:45 AM  Confirmed with charge nurse, patient picked up by transport as scheduled. [facility] nurse will contact patient today regarding admit.      Epic message sent to [facility], requested start of care date. Wheelchair van scheduled via [facility], requested pickup time 11:00 AM. Charge nurse notified.      Yeni Arrieta RN  Weekend  - [facility]  [id number]

## 2024-12-28 NOTE — PLAN OF CARE
12/27/24 1912   Medicare Message   Important Message from Medicare regarding Discharge Appeal Rights Given to patient/caregiver;Explained to patient/caregiver;Signed/date by patient/caregiver   Date IMM was signed 12/27/24   Time IMM was signed 1839       Yeni Arrieta RN  [facility]  - [facility]  [id number]

## 2024-12-28 NOTE — NURSING
Patient IV taken out, discharge instructions given and belongings packed. Patient transferred home via [facility].

## 2024-12-28 NOTE — PLAN OF CARE
On-call CM to patient's bedside to discuss IMM and review discharge plans with patient/sister. Patient declined HME, has RW and BSC at sister's home. Sister will bring to patient's home. [facility] accepted patient and CM will update on-call staff. Reviewed discharge and SNF appeal processes. Patient will proceed with SNF appeal from home and ask PCP for admission assistance. Extensive amount of time spent answering multiple questions. Confirmed patient had key to own home. Needs wheelchair van transportation home. Patient signed IMM and will place into chart. Due to late hour and no equipment available in the home, requested discharge hold until morning. Patient agreed to plan and verbalized understanding. Will scheduled transportation for 12/28 and request pickup at 11:00 AM. Sister agreed to meet transport to accept patient. Team updated.        Yeni Arrieta RN  Weekend  - [facility]  [id number]

## 2024-12-28 NOTE — PLAN OF CARE
[facility] - Internal Medicine Telemetry  Discharge Final Note    Primary Care Provider: Prosper Mancilla MD    Expected Discharge Date: 12/27/2024    Patient cleared for discharge from case management standpoint.    Final Discharge Note (most recent)       Final Note - 12/28/24 1156          Final Note    Assessment Type Final Discharge Note     Anticipated Discharge Disposition Home-Health Care [facility]     What phone number can be called within the next 1-3 days to see how you are doing after discharge? [phone]     Hospital Resources/Appts/Education Provided Provided patient/caregiver with written discharge plan information;Appointments scheduled and added to AVS;Post-Acute resouces added to AVS        Post-Acute Status    Post-Acute Authorization Home Health     Home Health Status Set-up Complete/Auth obtained     Coverage [employer] MANAGED MEDICARE - [employer] MEDICARE O     Discharge Delays None known at this time                 Important Message from Medicare  Important Message from Medicare regarding Discharge Appeal Rights: Given to patient/caregiver, Explained to patient/caregiver, Signed/date by patient/caregiver     Date IMM was signed: 12/27/24  Time IMM was signed: 1839     Follow-up providers       Prosper Mancilla MD   Specialty: Internal Medicine   Relationship: PCP - General    [address]  [facility] [address]   Phone: [phone]       Next Steps: Follow up    Instructions: As needed              After-discharge care                Home Medical Care       [facility] CARE [facility].   Service: Home Health Services    [address] [place] [address], [address] [place] [address]   Phone: [phone]                             Future Appointments   Date Time Provider Department Center   1/2/2025  9:40 AM Prosper Mancilla MD [facility]     Yeni Arrieta RN  Weekend  - [facility]  [id number]

## 2024-12-30 PROCEDURE — G0180 MD CERTIFICATION HHA PATIENT: HCPCS | Mod: ,,, | Performed by: INTERNAL MEDICINE

## 2025-01-03 ENCOUNTER — PATIENT OUTREACH (OUTPATIENT)
Dept: ADMINISTRATIVE | Facility: CLINIC | Age: 85
End: 2025-01-03
Payer: MEDICARE

## 2025-01-03 NOTE — PROGRESS NOTES
C3 nurse spoke with Gabriel Grace  for a [facility] Post Discharge follow-up call. The patient has a scheduled HOSFU appointment with [facility] Brynn Melara NP on 1/9/25. The NP will call you to provide a time-frame for the appointment.

## 2025-01-09 ENCOUNTER — TELEPHONE (OUTPATIENT)
Dept: HOME HEALTH SERVICES | Facility: CLINIC | Age: 85
End: 2025-01-09

## 2025-01-09 ENCOUNTER — OFFICE VISIT (OUTPATIENT)
Dept: HOME HEALTH SERVICES | Facility: CLINIC | Age: 85
End: 2025-01-09
Payer: MEDICARE

## 2025-01-09 VITALS
SYSTOLIC BLOOD PRESSURE: 132 MMHG | RESPIRATION RATE: 18 BRPM | TEMPERATURE: 97 F | OXYGEN SATURATION: 99 % | DIASTOLIC BLOOD PRESSURE: 64 MMHG | HEART RATE: 87 BPM

## 2025-01-09 DIAGNOSIS — I10 PRIMARY HYPERTENSION: Primary | Chronic | ICD-10-CM

## 2025-01-09 DIAGNOSIS — K50.00 CROHN'S DISEASE OF SMALL INTESTINE WITHOUT COMPLICATION: ICD-10-CM

## 2025-01-09 PROCEDURE — 3075F SYST BP GE 130 - 139MM HG: CPT | Mod: CPTII,S$GLB,, | Performed by: NURSE PRACTITIONER

## 2025-01-09 PROCEDURE — 1111F DSCHRG MED/CURRENT MED MERGE: CPT | Mod: CPTII,S$GLB,, | Performed by: NURSE PRACTITIONER

## 2025-01-09 PROCEDURE — 1160F RVW MEDS BY RX/DR IN RCRD: CPT | Mod: CPTII,S$GLB,, | Performed by: NURSE PRACTITIONER

## 2025-01-09 PROCEDURE — 1159F MED LIST DOCD IN RCRD: CPT | Mod: CPTII,S$GLB,, | Performed by: NURSE PRACTITIONER

## 2025-01-09 PROCEDURE — 3078F DIAST BP <80 MM HG: CPT | Mod: CPTII,S$GLB,, | Performed by: NURSE PRACTITIONER

## 2025-01-09 PROCEDURE — 99348 HOME/RES VST EST LOW MDM 30: CPT | Mod: S$GLB,,, | Performed by: NURSE PRACTITIONER

## 2025-01-09 NOTE — TELEPHONE ENCOUNTER
Pt Sister Corine called regarding pt appt scheduled on today with nurse practitioner Brynn Melara. She was upset because she called the PCP office and spk with an individual named Long, they told her that the pt appointment was for 8:00 AM this morning. They were not aware that Care @ home visits open time frame given to the pt is 9:00a-2:30p and we inform them that the provider will call them directly and give them a closer time frame of arrival. Sister wanted to spk with supervisor to see why the PCP office was not aware of this information. Call was transferred to my direct supervisor Annie Roe.

## 2025-01-13 DIAGNOSIS — Z00.00 ENCOUNTER FOR MEDICARE ANNUAL WELLNESS EXAM: ICD-10-CM

## 2025-01-22 NOTE — PATIENT INSTRUCTIONS
Instructions:  - [facility] Nurse Practitioner to schedule home follow-up visit with patient  as needed.  - Continue all medications, treatments and therapies as ordered.   - Follow all instructions, recommendations as discussed.  - Maintain Safety Precautions at all times.  - Attend all medical appointments as scheduled.  - For worsening symptoms: call Primary Care Physician or Nurse Practitioner.  - For emergencies, call 911 or immediately report to the nearest emergency room.  - Limit Risks of environmental exposure to coronavirus/COVID-19 as discussed including: social distancing, hand hygiene, and limiting departures from the home for necessities only.

## 2025-01-22 NOTE — PROGRESS NOTES
[facility]  Transitional Care Management (TCM) Home Visit    Encounter Provider: Brynn MARTINEZ Chairs   PCP: Prosper Mancilla MD  Consult Requested By: No ref. provider found  Admit Date: 12/16/24   IP Discharge Date: 12/28/24  Hospital Length of Stay:RRHLOS@ days  Admission Date: 12/16/2024  Hospital Length of Stay: 10 days  Discharge Date and Time: 12/28/2024  Hospital Diagnosis: rectal pain, fecal impaction      HISTORY OF PRESENT ILLNESS      Patient ID: Gabriel Grace is a 84 y.o. female was recently admitted to the hospital, this is their TCM encounter.    Hospital Course Synopsis:    HPI:   Gabriel Grace is an 84 year old white woman with hypertension, hyperlipidemia, ulcerative colitis, migraine headaches, hypothyroidism, aortic atherosclerosis, history of pulmonary embolism, history of pancreatitic cyst in 2017, history of basal cell carcinoma of nasal bridge status post Mohs's surgery on 11/25/2024, history of total abdominal hysterectomy, left salpingo-oophorectomy, and right salpingectomy in 1973, history of cholecystectomy, 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 She lives in [place], [place]. She is . Her primary care physician is Dr. Prosper Mancilla.               She was seen at [facility] on 12/13/2024 for proctitis with rectal pain, diarrhea, inflamed external hemorrhoid, nausea, vomiting, decreased oral intake, and fecal incontinence as well as urinary tract infection. Potassium was mildly low at 3.4 mmol/L. She was prescribed ciprofloxacin, metronidazole, ondansetron, and oxycodone.               Her sister called [facility] to make an appointment.              She presented to [facility] on 12/16/2024 for persistent rectal pain, nausea, and decreased oral intake. She had 1 episode of blood with wiping  after a bowel movement. She had been having watery loose stools and an episode of fecal incontinence. Her appetite had been decreased for the past 3 weeks. She was found to have a fecal impaction. Potassium was 3.3.               She was admitted to the Emergency Department Observation Unit. She was given a brown bomb enema and began to have bowel movements but had persistent severe rectal pain. Urine culture taken on 12/13/2024 grew E coli sensitive to ciprofloxacin, so her prescribed antibiotics were continued. On 12/17/2024, she was upgraded to admission to Hospital Medicine Team B with Colorectal Surgery consult. Potassium was still 3.3.      Procedure(s) (LRB):  SIGMOIDOSCOPY, FLEXIBLE (N/A)       Hospital Course:   Stool disimpaction was performed in the endoscopy suite by Colorectal Surgery. She was started on regular stool softener. She tolerated oral intake and was medically ready to discharge, however she had concerns returning home alone due to reported weakness. PT and OT were consulted and advised moderate-intensity therapy. Rectal pain subsided, but she had persistent nausea (no vomiting & otherwise tolerating oral intake). Antiemetic regimen was adjusted with scheduled metoclopramide. She had a facial rash that she attributed to dry skin, for which she uses topical emollients at home. She was accepted to a skilled nursing facility but awaited insurance authorization. [id number] discussion was done with her insurance, who denied skilled nursing facility based on her ability to walk down the hallway with a walker and contact guard assistance by therapists. She was discharged home with home health    DECISION MAKING TODAY       Assessment & Plan:  1. Primary hypertension  Assessment & Plan:  Chronic. Continue home amlodipine and carvedilol. Monitor BP.      2. Crohn's disease of small intestine without complication  Assessment & Plan:  The current medical regimen is effective;  continue present plan  and medications.              Medication List on Discharge:     Medication List            Accurate as of January 9, 2025 11:59 PM. If you have any questions, ask your nurse or doctor.                CONTINUE taking these medications      amLODIPine 10 MG tablet  Commonly known as: NORVASC  TAKE 1 TABLET EVERY DAY  .  STOP OLD RX     balsalazide 750 mg capsule  Commonly known as: COLAZAL  Take 2,250 mg by mouth once daily.     biotin 2,500 mcg Cap     calcium-magnesium-zinc Tab  Take by mouth. 1  Oral Every day     carvediloL 12.5 MG tablet  Commonly known as: COREG  Take 1 tablet (12.5 mg total) by mouth 2 (two) times daily with meals.     cholecalciferol (vitamin D3) 25 mcg (1,000 unit) capsule  Commonly known as: VITAMIN D3  Take 1,000 Units by mouth once daily.     cyanocobalamin (vitamin B-12) 50 mcg tablet  Take 1 tablet (50 mcg total) by mouth once daily.     cyclobenzaprine 5 MG tablet  Commonly known as: FLEXERIL  TAKE 1 TABLET THREE TIMES DAILY AS NEEDED FOR MUSCLE SPASM(S)     FLAXSEED OIL ORAL  Take 1,000 mg by mouth. 1  Oral Every day     FLUAD QUAD 2021-22(65Y UP)(PF) 60 mcg (15 mcg x 4)/0.5 mL Syrg  Generic drug: flu vac 2021 [id number]-[id number](PF)     folic acid 400 MCG tablet  Commonly known as: FOLVITE  Take 2 tablets (800 mcg total) by mouth once daily.     * ketoconazole 2 % cream  Commonly known as: NIZORAL  AAA qhs under breasts after cool blow dry     * ketoconazole 2 % cream  Commonly known as: NIZORAL  AAA qhs under breasts after cool blow dry prn flare     LIDOcaine HCL 2% 2 % jelly  Commonly known as: XYLOCAINE  Apply topically every 4 (four) hours as needed (externally to the rectum).     mometasone 0.1 % solution  Commonly known as: ELOCON  aaa on scalp qhs prn flare     multivitamin per tablet  Commonly known as: THERAGRAN  Take by mouth. 1  By mouth Every day     omega-3 fatty acids-vitamin E 1,000 mg Cap     omeprazole 20 MG capsule  Commonly known as: PRILOSEC  Take 1 capsule (20 mg  total) by mouth once daily.     ondansetron 4 MG Tbdl  Commonly known as: ZOFRAN-ODT  Take 1 tablet (4 mg total) by mouth every 6 (six) hours as needed (nausea).     oxyCODONE 5 MG immediate release tablet  Commonly known as: ROXICODONE  Take 1 tablet (5 mg total) by mouth every 6 (six) hours as needed (moderate to severe pain).     polyethylene glycol 17 gram Pwpk  Commonly known as: GLYCOLAX  Take 17 g by mouth 2 (two) times daily.     simvastatin 10 MG tablet  Commonly known as: ZOCOR  Take 1 tablet (10 mg total) by mouth every evening.     triamcinolone acetonide 0.1% 0.1 % cream  Commonly known as: KENALOG  aaa on legs and arms qd- bid after moisturizing; not more than 2 weeks straight in the same location           * This list has 2 medication(s) that are the same as other medications prescribed for you. Read the directions carefully, and ask your doctor or other care provider to review them with you.                  Medication Reconciliation:  Were medications changed on discharge? Yes  Were medications in the home? Yes  Is the patient taking the medications as directed? Yes  Does the patient understand the medications and changes? Yes  Does updated med list accurately reflects meds patient is currently taking? Yes    ENVIRONMENT OF CARE      Family and/or Caregiver present at visit?  Yes  Name of Caregiver: Next of Kin  History provided by: both    Advance Care Planning   Advanced Care Planning Status:  Patient has had an ACP conversation  Living Will: No  Power of : No  [place]: No    Does Caregiver have HCPoA: No  Changes today: No changes  Is patient hospice appropriate: No  (If needed, use PPS <30 or FAST score >7)  Was referral to hospice placed: No       Impression upon entering the home:  Physical Dwelling: single family home   Appearance of home environment: cleaniness: clean  Functional Status: minimal assistance  Mobility: ambulatory with device  Nutritional access: available food but  inadequate intake  Home Health: Yes,  Agency [facility]   DME/Supplies: rolling walker     Diagnostic tests reviewed/disposition: No diagnosic tests pending after this hospitalization.  Disease/illness education: Diabetes  Establishment or re-establishment of referral orders for community resources: No other necessary community resources.   Discussion with other health care providers: No discussion with other health care providers necessary.   Does patient have a PCP at [facility]? Yes   Repatriation plan with PCP? follow-up with PCP within 30d   Does patient have an ostomy (ileostomy, colostomy, suprapubic catheter, nephrostomy tube, tracheostomy, PEG tube, pleurex catheter, cholecystostomy, etc)? No  Were BPAs reviewed? Yes    Social History     Socioeconomic History    Marital status:    Tobacco Use    Smoking status: Never    Smokeless tobacco: Never   Substance and Sexual Activity    Alcohol use: Yes     Comment: rare    Drug use: No    Sexual activity: Not Currently     Social Drivers of Health     Financial Resource Strain: Low Risk  (12/19/2024)    Overall Financial Resource Strain (CARDIA)     Difficulty of Paying Living Expenses: Not hard at all   Food Insecurity: No Food Insecurity (12/19/2024)    Hunger Vital Sign     Worried About Running Out of Food in the Last Year: Never true     Ran Out of Food in the Last Year: Never true   Transportation Needs: No Transportation Needs (12/19/2024)    TRANSPORTATION NEEDS     Transportation : No   Physical Activity: Inactive (12/16/2024)    Exercise Vital Sign     Days of Exercise per Week: 0 days     Minutes of Exercise per Session: 0 min   Stress: No Stress Concern Present (12/19/2024)    [employer] - Occupational Stress Questionnaire     Feeling of Stress : Not at all   Housing Stability: Low Risk  (12/19/2024)    Housing Stability Vital Sign     Unable to Pay for Housing in the Last Year: No     Homeless in the Last Year: No        OBJECTIVE:     Vital Signs:  Vitals:    01/09/25 1250   BP: 132/64   Pulse: 87   Resp: 18   Temp: 97.3 °F (36.3 °C)       Review of Systems   Constitutional:  Negative for fatigue and unexpected weight change.   HENT:  Negative for congestion.    Respiratory:  Negative for choking.    Cardiovascular:  Negative for chest pain and palpitations.   Musculoskeletal:  Positive for gait problem.   Neurological:  Negative for dizziness.       Physical Exam:  Physical Exam  HENT:      Head: Atraumatic.   Cardiovascular:      Rate and Rhythm: Normal rate.      Pulses: Normal pulses.   Pulmonary:      Effort: Pulmonary effort is normal.   Abdominal:      General: Bowel sounds are normal.   Skin:     General: Skin is warm and dry.      Capillary Refill: Capillary refill takes less than 2 seconds.   Neurological:      Mental Status: She is alert. Mental status is at baseline.         INSTRUCTIONS FOR PATIENT:     Scheduled Follow-up, Appts Reviewed with Modifications if Needed: Yes  No future appointments.    Signature: Brynn Melara NP    Transition of Care Visit:  I have reviewed and updated the history and problem list.  I have reconciled the medication list.  I have discussed the hospitalization and current medical issues, prognosis and plans with the patient/family.

## 2025-03-25 NOTE — TELEPHONE ENCOUNTER
Please see the attached refill request.    Last seen 10/2024      Assessment / Plan:      Pathology Orders:         Normal Orders This Visit     Specimen to Pathology, Dermatology      Questions:     Procedure Type: Dermatology and skin neoplasms     Number of Specimens: 2     ------------------------: -------------------------     Spec 1 Procedure: Biopsy     Spec 1 Clinical Impression: r/o BCC     Spec 1 Source: upper nasal bridge     ------------------------: -------------------------     Spec 2 Procedure: Biopsy     Spec 2 Clinical Impression: r/o inflamed keratosis     Spec 2 Source: mid upper chest     Release to patient:              Neoplasm of uncertain behavior of skin x 2   Shave biopsy procedure note:     Shave biopsy performed after verbal consent including risk of infection, scar, recurrence, need for additional treatment of site. Area prepped with alcohol, anesthetized with approximately 1.0cc of 1% lidocaine with epinephrine. Lesional tissue shaved with razor blade. Hemostasis achieved with application of aluminum chloride followed by hyfrecation. No complications. Dressing applied. Wound care explained.     If biopsy positive for malignancy, refer to Dr. Gan for Mohs surgery consultation. - nose  -     Specimen to Pathology, Dermatology     SK (seborrheic keratosis)  These are benign inherited growths without a malignant potential. Reassurance given to patient. No treatment is necessary.      Intertrigo  -     ketoconazole (NIZORAL) 2 % cream; AAA qhs under breasts after cool blow dry prn flare  Dispense: 60 g; Refill: 3  -     fluconazole (DIFLUCAN) 200 MG Tab; Sig 1 pill po qd x 3 days  and repeat in 1 week  Dispense: 6 tablet; Refill: 1  Recommend white vinegar: water 1:1 compresses  nightly after bath/shower followed by cool blow dry and then application of prescription medication.      Use powder for maintenance in the morning.      Wash affected areas 2x per week with Hibiclens wash which  can be purchased over the counter        Scalp pruritus  -     mometasone (ELOCON) 0.1 % solution; aaa on scalp qhs prn flare  Dispense: 60 mL; Refill: 3  Mometasone solution to scalp for itching.  Can mix  a few drops in a bland moisturizer like cerave and apply for itching on neck or back      Can use cerave anti itch cream to back            Follow up for prn bx report.

## 2025-03-26 RX ORDER — FLUCONAZOLE 200 MG/1
TABLET ORAL
Qty: 6 TABLET | Refills: 1 | Status: SHIPPED | OUTPATIENT
Start: 2025-03-26

## 2025-03-27 ENCOUNTER — EXTERNAL HOME HEALTH (OUTPATIENT)
Dept: HOME HEALTH SERVICES | Facility: HOSPITAL | Age: 85
End: 2025-03-27
Payer: MEDICARE

## 2025-07-21 DIAGNOSIS — L29.9 SCALP PRURITUS: ICD-10-CM

## 2025-07-21 DIAGNOSIS — L30.4 INTERTRIGO: ICD-10-CM

## 2025-07-21 RX ORDER — KETOCONAZOLE 20 MG/G
CREAM TOPICAL
Qty: 60 G | Refills: 11 | Status: SHIPPED | OUTPATIENT
Start: 2025-07-21

## 2025-07-21 RX ORDER — MOMETASONE FUROATE 1 MG/ML
LOTION TOPICAL
Qty: 60 ML | Refills: 11 | Status: SHIPPED | OUTPATIENT
Start: 2025-07-21